# Patient Record
Sex: FEMALE | Race: WHITE | HISPANIC OR LATINO | Employment: PART TIME | URBAN - METROPOLITAN AREA
[De-identification: names, ages, dates, MRNs, and addresses within clinical notes are randomized per-mention and may not be internally consistent; named-entity substitution may affect disease eponyms.]

---

## 2017-01-24 ENCOUNTER — ALLSCRIPTS OFFICE VISIT (OUTPATIENT)
Dept: OTHER | Facility: OTHER | Age: 37
End: 2017-01-24

## 2017-01-24 DIAGNOSIS — S99.922A INJURY OF LEFT FOOT: ICD-10-CM

## 2017-01-31 ENCOUNTER — ALLSCRIPTS OFFICE VISIT (OUTPATIENT)
Dept: OTHER | Facility: OTHER | Age: 37
End: 2017-01-31

## 2017-02-27 ENCOUNTER — APPOINTMENT (EMERGENCY)
Dept: RADIOLOGY | Facility: HOSPITAL | Age: 37
End: 2017-02-27
Payer: COMMERCIAL

## 2017-02-27 ENCOUNTER — HOSPITAL ENCOUNTER (EMERGENCY)
Facility: HOSPITAL | Age: 37
Discharge: HOME | End: 2017-02-28
Attending: EMERGENCY MEDICINE | Admitting: EMERGENCY MEDICINE
Payer: COMMERCIAL

## 2017-02-27 VITALS
RESPIRATION RATE: 14 BRPM | HEART RATE: 65 BPM | OXYGEN SATURATION: 98 % | SYSTOLIC BLOOD PRESSURE: 124 MMHG | TEMPERATURE: 98.3 F | DIASTOLIC BLOOD PRESSURE: 53 MMHG

## 2017-02-27 DIAGNOSIS — M79.676 TOE PAIN: Primary | ICD-10-CM

## 2017-02-27 PROCEDURE — 73630 X-RAY EXAM OF FOOT: CPT

## 2017-02-27 RX ORDER — TRAMADOL HYDROCHLORIDE 50 MG/1
50 TABLET ORAL EVERY 6 HOURS PRN
Qty: 30 TABLET | Refills: 0 | Status: SHIPPED | OUTPATIENT
Start: 2017-02-27 | End: 2019-02-13 | Stop reason: SDUPTHER

## 2017-02-27 RX ORDER — CLONAZEPAM 0.5 MG/1
0.5 TABLET ORAL DAILY
COMMUNITY
End: 2018-02-01 | Stop reason: SDUPTHER

## 2017-02-28 PROCEDURE — 99283 EMERGENCY DEPT VISIT LOW MDM: CPT

## 2017-03-03 ENCOUNTER — ALLSCRIPTS OFFICE VISIT (OUTPATIENT)
Dept: OTHER | Facility: OTHER | Age: 37
End: 2017-03-03

## 2017-03-07 ENCOUNTER — ALLSCRIPTS OFFICE VISIT (OUTPATIENT)
Dept: OTHER | Facility: OTHER | Age: 37
End: 2017-03-07

## 2017-06-13 ENCOUNTER — ALLSCRIPTS OFFICE VISIT (OUTPATIENT)
Dept: OTHER | Facility: OTHER | Age: 37
End: 2017-06-13

## 2017-06-13 DIAGNOSIS — T83.39XA OTHER MECHANICAL COMPLICATION OF INTRAUTERINE CONTRACEPTIVE DEVICE, INITIAL ENCOUNTER: ICD-10-CM

## 2017-06-13 LAB
BACTERIA UR QL AUTO: NORMAL
CLUE CELL (HISTORICAL): NORMAL
HYPHAL YEAST (HISTORICAL): NORMAL
KOH PREP (HISTORICAL): NORMAL
PH UR STRIP.AUTO: 4 [PH]
TRICHOMONAS (HISTORICAL): NORMAL
YEAST (HISTORICAL): NORMAL

## 2017-07-07 ENCOUNTER — GENERIC CONVERSION - ENCOUNTER (OUTPATIENT)
Dept: OTHER | Facility: OTHER | Age: 37
End: 2017-07-07

## 2017-07-18 ENCOUNTER — ALLSCRIPTS OFFICE VISIT (OUTPATIENT)
Dept: OTHER | Facility: OTHER | Age: 37
End: 2017-07-18

## 2017-07-18 DIAGNOSIS — E66.8 OTHER OBESITY: ICD-10-CM

## 2017-07-18 DIAGNOSIS — R63.5 ABNORMAL WEIGHT GAIN: ICD-10-CM

## 2017-07-18 DIAGNOSIS — E55.9 VITAMIN D DEFICIENCY: ICD-10-CM

## 2017-07-18 DIAGNOSIS — M79.7 FIBROMYALGIA: ICD-10-CM

## 2017-10-19 ENCOUNTER — TRANSCRIBE ORDERS (OUTPATIENT)
Dept: LAB | Facility: HOSPITAL | Age: 37
End: 2017-10-19

## 2017-10-19 ENCOUNTER — APPOINTMENT (OUTPATIENT)
Dept: LAB | Facility: HOSPITAL | Age: 37
End: 2017-10-19
Payer: COMMERCIAL

## 2017-10-19 DIAGNOSIS — E66.8 OTHER OBESITY: ICD-10-CM

## 2017-10-19 DIAGNOSIS — M79.7 FIBROMYALGIA: ICD-10-CM

## 2017-10-19 DIAGNOSIS — E55.9 VITAMIN D DEFICIENCY: ICD-10-CM

## 2017-10-19 DIAGNOSIS — R63.5 ABNORMAL WEIGHT GAIN: ICD-10-CM

## 2017-10-19 LAB
25(OH)D3 SERPL-MCNC: 29.4 NG/ML (ref 30–100)
ALBUMIN SERPL BCP-MCNC: 3.9 G/DL (ref 3.5–5)
ALP SERPL-CCNC: 73 U/L (ref 46–116)
ALT SERPL W P-5'-P-CCNC: 49 U/L (ref 12–78)
ANION GAP SERPL CALCULATED.3IONS-SCNC: 7 MMOL/L (ref 4–13)
AST SERPL W P-5'-P-CCNC: 23 U/L (ref 5–45)
BILIRUB SERPL-MCNC: 0.35 MG/DL (ref 0.2–1)
BUN SERPL-MCNC: 14 MG/DL (ref 5–25)
CALCIUM SERPL-MCNC: 9.4 MG/DL (ref 8.3–10.1)
CHLORIDE SERPL-SCNC: 103 MMOL/L (ref 100–108)
CO2 SERPL-SCNC: 27 MMOL/L (ref 21–32)
CREAT SERPL-MCNC: 0.94 MG/DL (ref 0.6–1.3)
GFR SERPL CREATININE-BSD FRML MDRD: 78 ML/MIN/1.73SQ M
GLUCOSE SERPL-MCNC: 102 MG/DL (ref 65–140)
POTASSIUM SERPL-SCNC: 3.8 MMOL/L (ref 3.5–5.3)
PROT SERPL-MCNC: 8.2 G/DL (ref 6.4–8.2)
SODIUM SERPL-SCNC: 137 MMOL/L (ref 136–145)
TSH SERPL DL<=0.05 MIU/L-ACNC: 2.54 UIU/ML (ref 0.36–3.74)

## 2017-10-19 PROCEDURE — 84443 ASSAY THYROID STIM HORMONE: CPT

## 2017-10-19 PROCEDURE — 82306 VITAMIN D 25 HYDROXY: CPT

## 2017-10-19 PROCEDURE — 36415 COLL VENOUS BLD VENIPUNCTURE: CPT

## 2017-10-19 PROCEDURE — 80053 COMPREHEN METABOLIC PANEL: CPT

## 2017-10-29 ENCOUNTER — GENERIC CONVERSION - ENCOUNTER (OUTPATIENT)
Dept: OTHER | Facility: OTHER | Age: 37
End: 2017-10-29

## 2017-11-07 ENCOUNTER — GENERIC CONVERSION - ENCOUNTER (OUTPATIENT)
Dept: OTHER | Facility: OTHER | Age: 37
End: 2017-11-07

## 2017-11-13 ENCOUNTER — ALLSCRIPTS OFFICE VISIT (OUTPATIENT)
Dept: OTHER | Facility: OTHER | Age: 37
End: 2017-11-13

## 2017-12-05 ENCOUNTER — ALLSCRIPTS OFFICE VISIT (OUTPATIENT)
Dept: OTHER | Facility: OTHER | Age: 37
End: 2017-12-05

## 2017-12-05 ENCOUNTER — LAB REQUISITION (OUTPATIENT)
Dept: LAB | Facility: HOSPITAL | Age: 37
End: 2017-12-05
Payer: COMMERCIAL

## 2017-12-05 DIAGNOSIS — Z11.3 ENCOUNTER FOR SCREENING FOR INFECTIONS WITH PREDOMINANTLY SEXUAL MODE OF TRANSMISSION: ICD-10-CM

## 2017-12-05 LAB
BACTERIA UR QL AUTO: NORMAL
CLUE CELL (HISTORICAL): NORMAL
HYPHAL YEAST (HISTORICAL): NORMAL
KOH PREP (HISTORICAL): NORMAL
PH UR STRIP.AUTO: 4.5 [PH]
TRICHOMONAS (HISTORICAL): NORMAL
YEAST (HISTORICAL): NORMAL

## 2017-12-05 PROCEDURE — 87591 N.GONORRHOEAE DNA AMP PROB: CPT | Performed by: NURSE PRACTITIONER

## 2017-12-05 PROCEDURE — 87491 CHLMYD TRACH DNA AMP PROBE: CPT | Performed by: NURSE PRACTITIONER

## 2017-12-06 LAB
CHLAMYDIA DNA CVX QL NAA+PROBE: NORMAL
N GONORRHOEA DNA GENITAL QL NAA+PROBE: NORMAL

## 2018-01-09 NOTE — MISCELLANEOUS
Provider Comments  Provider Comments:   Pt  was a no show for her Rheumatology re-evaluation today, 2/17/16        Signatures   Electronically signed by : Tami Bass DO; Feb 17 2016  3:24PM EST                       (Author)

## 2018-01-10 NOTE — RESULT NOTES
Discussion/Summary   Normal Blood sugar, liver and kidney function and thyroid test   slightly low vitamin D- Take vitamin D 5000 IU once a day   - Dr J Carlos Rodrigues      Verified Results  (1) TSH WITH FT4 REFLEX 19Oct2017 12:30PM Toya Caal     Test Name Result Flag Reference   TSH 2 540 uIU/mL  0 358-3 740   Patients undergoing fluorescein dye angiography may retain small amounts of fluorescein in the body for 48-72 hours post procedure  Samples containing fluorescein can produce falsely depressed TSH values  If the patient had this procedure,a specimen should be resubmitted post fluorescein clearance  The recommended reference ranges for TSH during pregnancy are as follows:  First trimester 0 1 to 2 5 uIU/mL  Second trimester  0 2 to 3 0 uIU/mL  Third trimester 0 3 to 3 0 uIU/m     (1) COMPREHENSIVE METABOLIC PANEL 83UIN8800 16:70NF Toya Caal     Test Name Result Flag Reference   GLUCOSE,RANDM 102 mg/dL     If the patient is fasting, the ADA then defines impaired fasting glucose as > 100 mg/dL and diabetes as > or equal to 123 mg/dL  Specimen collection should occur prior to Sulfasalazine administration due to the potential for falsely depressed results  Specimen collection should occur prior to Sulfapyridine administration due to the potential for falsely elevated results  SODIUM 137 mmol/L  136-145   POTASSIUM 3 8 mmol/L  3 5-5 3   CHLORIDE 103 mmol/L  100-108   CARBON DIOXIDE 27 mmol/L  21-32   ANION GAP (CALC) 7 mmol/L  4-13   BLOOD UREA NITROGEN 14 mg/dL  5-25   CREATININE 0 94 mg/dL  0 60-1 30   Standardized to IDMS reference method   CALCIUM 9 4 mg/dL  8 3-10 1   BILI, TOTAL 0 35 mg/dL  0 20-1 00   ALK PHOSPHATAS 73 U/L     ALT (SGPT) 49 U/L  12-78   Specimen collection should occur prior to Sulfasalazine and/or Sulfapyridine administration due to the potential for falsely depressed results     AST(SGOT) 23 U/L  5-45   Specimen collection should occur prior to Sulfasalazine administration due to the potential for falsely depressed results  ALBUMIN 3 9 g/dL  3 5-5 0   TOTAL PROTEIN 8 2 g/dL  6 4-8 2   eGFR 78 ml/min/1 73sq m     National Kidney Disease Education Program recommendations are as follows:  GFR calculation is accurate only with a steady state creatinine  Chronic Kidney disease less than 60 ml/min/1 73 sq  meters  Kidney failure less than 15 ml/min/1 73 sq  meters  (1) VITAMIN D 25-HYDROXY 44Fbp8038 12:30PM Toya Caal     Test Name Result Flag Reference   VIT D 25-HYDROX 29 4 ng/mL L 30 0-100 0   This assay is a certified procedure of the CDC Vitamin D Standardization Certification Program (VDSCP)     Deficiency <20ng/ml   Insufficiency 20-30ng/ml   Sufficient  ng/ml     *Patients undergoing fluorescein dye angiography may retain small amounts of fluorescein in the body for 48-72 hours post procedure  Samples containing fluorescein can produce falsely elevated Vitamin D values  If the patient had this procedure, a specimen should be resubmitted post fluorescein clearance         Signatures   Electronically signed by : Mariela Maldonado MD; Oct 29 2017  2:31PM EST                       (Author)

## 2018-01-12 VITALS
HEART RATE: 64 BPM | BODY MASS INDEX: 35.07 KG/M2 | RESPIRATION RATE: 18 BRPM | DIASTOLIC BLOOD PRESSURE: 66 MMHG | SYSTOLIC BLOOD PRESSURE: 124 MMHG | WEIGHT: 198 LBS

## 2018-01-12 NOTE — MISCELLANEOUS
Provider Comments  Provider Comments:   Our records indicate that you missed an appointment on 12/13/16  If you have not done so already, please call our office and we will be happy to schedule another appointment for you  If you must cancel your appointment, our office policy requires you to call our office at least 24 hours in advance so that we may utilize your appointment time for another patient who requires our services  If you have any questions, please contact our office at [phone]           Signatures   Electronically signed by : Melody Finley, ; Dec 13 2016  4:32PM EST                       (Author)

## 2018-01-13 VITALS
BODY MASS INDEX: 35.71 KG/M2 | WEIGHT: 195.25 LBS | DIASTOLIC BLOOD PRESSURE: 78 MMHG | SYSTOLIC BLOOD PRESSURE: 110 MMHG | TEMPERATURE: 97.4 F | HEART RATE: 82 BPM | RESPIRATION RATE: 16 BRPM

## 2018-01-13 VITALS
WEIGHT: 199 LBS | DIASTOLIC BLOOD PRESSURE: 76 MMHG | HEIGHT: 62 IN | BODY MASS INDEX: 36.62 KG/M2 | SYSTOLIC BLOOD PRESSURE: 114 MMHG

## 2018-01-13 VITALS
RESPIRATION RATE: 16 BRPM | HEART RATE: 76 BPM | HEIGHT: 62 IN | WEIGHT: 196.4 LBS | BODY MASS INDEX: 36.14 KG/M2 | SYSTOLIC BLOOD PRESSURE: 90 MMHG | DIASTOLIC BLOOD PRESSURE: 66 MMHG

## 2018-01-13 NOTE — RESULT NOTES
Verified Results  * XR SPINE CERVICAL COMPLETE 4 OR 5 VIEW [id number] 02:50PM Mu Caal Order Number: [id number]     Test Name Result Flag Reference   XR SPINE CERVICAL COMPLETE 4 OR 5 VW (Report)     CERVICAL SPINE     INDICATION: Left arm pain and weakness  Fibromyalgia  COMPARISON: None     VIEWS: 5; 7 images     FINDINGS:     No evidence of fracture or subluxation  The intervertebral disc spaces are preserved  The neural foramina are patent  The prevertebral soft tissues are within normal limits  The lung apices are intact  IMPRESSION:     Unremarkable cervical spine         Workstation performed: [id number]     Signed by:   Perico Soria MD   3/30/16       Discussion/Summary   normal X ray of neck    - Dr Juanjo Logan   Electronically signed by : Gloria Jensen MD; Mar 30 2016  4:30PM EST                       (Author)

## 2018-01-14 NOTE — CONSULTS
Assessment    1  Vitamin D deficiency (268 9) (E55 9)   2  Fibromyalgia (729 1) (M79 7)   3  Numbness and tingling in right hand (782 0) (R20 2)   4  Numbness and tingling in left arm (782 0) (R20 0,R20 2)    Diffuse myalgias in face of chronic Vit D deficiency with likely combined central  and peripheral sensitization  Plan  Fibromyalgia, Numbness and tingling in left arm, Numbness and tingling in right hand    · * MRI CERVICAL SPINE WO CONTRAST; Status:Need Information - Financial  Authorization; Requested for:61Dko1895;   Fibromyalgia, Vitamin D deficiency    · (1) VITAMIN D 25-HYDROXY; Status:Active; Requested for:20Cgj8294;   Neck pain    · Lyrica 75 MG Oral Capsule; Take one capsule in am and two capsule in pm for one  week, then two capsukles ain am ans pm  Numbness and tingling in left arm, Numbness and tingling in right hand, Vitamin D  deficiency    · Follow-up visit in 3 weeks Evaluation and Treatment  Follow-up  Status: Hold For -  Scheduling  Requested for: 52Bcg2836    Discussion/Summary  Impression: neck pain  Currently, the condition is unchanged  The diagnostic plan includes cervical spine MRI and blood tests  Medication changes are as documented in orders  Patient discussion: discussed with the patient  Chief Complaint  Dr Alex Fernández consult for neck and shoulder pain  History of Present Illness  31 yo female with c/o posterior neck pain and bilateral traps for over a year   nontraumatic onset  Has had some chronic lower back pain from scoliosis   this is "different"  Dx ed with FMS last year  Placed on pregabalin for same with "little bit help"   Has associated global numbness in hands, tingling, developed widespread myalgias   saw Neurology and Rheumatology  On NSAIDs, pregabalin, and TCA (CBP 10 mg BID) topiramate for migraines, and SSRI sertraline   still with pain and poor sleep  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        Review of Systems    Constitutional: poor sleep , recent 15 # lb weight gain and feeling tired  Gastrointestinal: IBS under treatment  Genitourinary: no complaints of dysuria, no incontinence  Musculoskeletal: as noted in HPI  Neurological: numbness  Endocrine: No complaints of muscle weakness, no feelings of weakness, no frequent urination, no excessive thirst    Psychiatric: poor sleep, anxiety and depression  Active Problems    1  Abnormal weight gain (783 1) (R63 5)   2  Acid reflux (530 81) (K21 9)   3  Adult BMI > 30 (V85 30) (E66 8)   4  Anxiety (300 00) (F41 9)   5  Arthralgia (719 40) (M25 50)   6  Backache (724 5) (M54 9)   7  Cervical dysplasia (622 10) (N87 9)   8  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,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)   9  Complaints of memory disturbance (780 93) (R41 3)   10  Constipation (564 00) (K59 00)   11  Encounter for IUD removal and reinsertion (V25 13) (Z30 433)   12  Endometriosis (617 9) (N80 9)   13  External hemorrhoids (455 3) (K64 4)   14  Fatigue (780 79) (R53 83)   15  Fibromyalgia (729 1) (M79 7)   16  Hand pain, right (729 5) (M79 641)   17  Insomnia (780 52) (G47 00)   18  Irritable bowel syndrome (564 1) (K58 9)   19  IUD threads lost (996 32)   20  Joint Pain In Both Knees (719 46)   21  Lipid screening (V77 91) (Z13 220)   22  Migraine headache (346 90) (G43 909)   23  Neck pain (723 1) (M54 2)   24  Obesity (278 00) (E66 9)   25  Pap smear with atypical squamous cells of undetermined sign (ASC-US) (796 9)    (R89 6)   26  Positive JUAN C (antinuclear antibody) (795 79) (R76 8)   27  Skin tag (701 9) (L91 8)   28  Upper back pain on right side (724 5) (M54 9)   29  Vitamin D deficiency (268 9) (E55 9)    Past Medical History    1  History of Encounter for routine gynecological examination (V72 31) (Z01 419)   2  History of Hematochezia (578 1) (K92 1)   3  History of anemia (V12 3) (Z86 2)   4  History of depression (V11 8) (Z86 59)   5   History of herpes simplex infection (V12 09) (Z86 19)   6  History of hypertension (V12 59) (Z86 79)   7  History of migraine (V12 49) (Z86 69)   8  History of Intrauterine contraceptive device, checking (V25 42) (Z30 431)   9  History of Mental status change (780 97) (R41 82)   10  History of Neck pain (723 1) (M54 2)   11  History of Neck pain (723 1) (M54 2)   12  History of Need for influenza vaccination (V04 81) (Z23)   13  History of Pain, upper back (724 5) (M54 9)   14  History of Reported Previous STD   15  History of Reported Prior Stomach Problems (V12 79)   16  History of Reported Tests: VDRL Positive   17  History of Scoliosis (737 30) (M41 9)   18  History of Screen for STD (sexually transmitted disease) (V74 5) (Z11 3)   19  History of Weight loss counseling, encounter for (V65 3) (Z71 3)    The active problems and past medical history were reviewed and updated today  Surgical History    1  History of Exploratory Laparoscopy    The surgical history was reviewed and updated today  Family History  Mother    1  Family history of cerebrovascular accident (CVA) (V17 1) (Z82 3)   2  Family history of hypertension (V17 49) (Z82 49)   3  Family history of psoriasis (V19 4) (Z84 0)  Father    4  Family history of   Son    5  Family history of Type 1 diabetes mellitus without complication  Family History    6  Denied: Family history of Crohn's disease   7  Family history of malignant neoplasm of breast (V16 3) (Z80 3)   8  Denied: Family history of rheumatoid arthritis   9  Denied: Family history of systemic lupus erythematosus   10  Denied: Family history of ulcerative colitis    The family history was reviewed and updated today  Social History    · Cigarette smoker (305 1) (F17 210)   · Completed 12th grade   · Current Some Day Smoker (305 1)   · Denied: History of Drug Use   · Employed   · No drug use   · Single   · Social alcohol use (Z78 9)  The social history was reviewed and is unchanged  Current Meds   1  Fiber CAPS; TAKE 1 CAPSULE TWICE DAILY; Therapy: (Recorded:18Nov2015) to Recorded   2  Lyrica 75 MG Oral Capsule; Take 1 capsule twice daily; Therapy: 09EAO2661 to (Evaluate:28May2016); Last Rx:29Jan2016 Ordered   3  Melatonin 3 MG Oral Capsule; TAKE 1 CAPSULE AT BEDTIME AS NEEDED; Therapy: 10HCO8741 to (Miah Berg)  Requested for: 30VPE6255; Last   Rx:29Jan2016 Ordered   4  Mirena 20 MCG/24HR Intrauterine Intrauterine Device; Therapy: (Recorded:15Lko7641) to Recorded   5  Multi Vitamin Daily TABS; TAKE 1 TABLET DAILY; Therapy: (Recorded:18Nov2015) to Recorded   6  Omeprazole 40 MG Oral Capsule Delayed Release; TAKE 1 CAPSULE DAILY; Therapy: 10VZE6633 to (Evaluate:10Nov2016)  Requested for: 48NYT1340; Last   Rx:38Agi6353 Ordered   7  TraMADol HCl - 50 MG Oral Tablet; take 1 tablet daily prn; Therapy: 48KKO3272 to (Evaluate:19Apr2016); Last Rx:30Mar2016 Ordered    The medication list was reviewed and updated today  Allergies    1  Cymbalta    2  Seasonal    Vitals  Signs   Recorded: 87WAD3344 07:67NZ   Systolic: 632, LUE, Sitting  Diastolic: 80, LUE, Sitting  Heart Rate: 60  Respiration: 16  Height: 5 ft 2 in  Weight: 193 lb 6 oz  BMI Calculated: 35 37  BSA Calculated: 1 88    Physical Exam  + Walker;' son the right but no clonus  , Tinel's negative over both hands  Constitutional - General appearance: Abnormal  overweight  Musculoskeletal - Gait and station: Abnormal  Gait evaluation demonstrated poor posture with adaptive pectoral shortening  Neurologic - Cranial nerves: Normal  Reflexes: Abnormal  Deep tendon reflexes: + Elvira [place]' son the right  no ankle clonus on the right and no ankle clonus on the left  Sensation: Normal  Upper extremity peripheral neuro exam: Normal  Lower extremity peripheral neuro exam: Normal    Psychiatric - Mood and affect: Abnormal  Mood and Affect: flat     Eyes   Pupils and irises: Normal        Results/Data  I personally reviewed the films/images/results in the office today  My interpretation follows  vis   X-ray Review C spine loss of lordosis c-7 barely visualized      Diagnostic Review Last Vit D is 22 1 Jan 2014        Signatures   Electronically signed by : Kathrene Prader, DO; Jul 28 2016  4:09PM EST                       (Author)

## 2018-01-14 NOTE — RESULT NOTES
Verified Results  * XR SPINE CERVICAL 2 OR 3 VIEW 72Qvc8177 01:18PM Ernie Caal Order Number: [id number]     Test Name Result Flag Reference   XR SPINE CERVICAL 2 OR 3 VW (Report)     CERVICAL SPINE     INDICATION: Neck pain radiating to both shoulder blades  COMPARISON: March 30, 2016     VIEWS: AP, lateral and open mouth projections; 3 images     FINDINGS:     Alignment of the cervical spine remains stable  Vertebral body height is maintained  The intervertebral disc spaces are preserved  The prevertebral soft tissues are within normal limits  The lung apices are intact  IMPRESSION:     Unremarkable cervical spine         Workstation performed: [id number]     Signed by:   Barbara Paredes MD   7/25/16       Discussion/Summary   normal neck X ray     - Dr Kavita Mcdaniels   Electronically signed by : Morris Han MD; Jul 25 2016  3:00PM EST                       (Author)

## 2018-01-16 NOTE — PROGRESS NOTES
Assessment    1  Fibromyalgia (729 1) (M79 7)   2  Complaints of memory disturbance (780 93) (R41 3)   3  Insomnia (780 52) (G47 00)    Plan  Fibromyalgia    · Lyrica 75 MG Oral Capsule; Take 1 capsule twice daily  Insomnia    · Melatonin 3 MG Oral Capsule; TAKE 1 CAPSULE AT BEDTIME AS NEEDED  Migraine headache    · Naproxen 250 MG Oral Tablet; TAKE 1 TABLET EVERY 12 HOURS DAILY    Discussion/Summary   reassured patient that her memory disturbance could be a side effects of Topamax  not affecting her ADLs and she wants to continue Topamax for her migraines since its controlling her symptoms   will try to get approved for Lyrica to help with her fibromyalgia symptoms     total time of encounter was 30 minutes and 25 minutes was spent counseling  Chief Complaint  PT is here today due to having problems falling asleep and she stated that she feels like she is in a fog  History of Present Illness  HPI: not sleeping well  hard time falling asleep- melatonin helps sleeping  having problems with memory and concentration for the last 2 months   forgetting words at times   Lyrica is not covered by her previous insurance       Insomnia: Zack Guzmán presents with complaints of insomnia  Associated symptoms include difficulty falling asleep, difficulty staying asleep and unrefreshing sleep, but no parasomnia, no daytime sleepiness, no anxiety upon awakening, no sleeping at inappropriate times, no snoring, no depression, no chronic pain, no alcohol abuse, no drug abuse, no periodic limb movements of sleep, no nocturnal leg cramps, no nightmares, no night terrors and no sleepwalking  Review of Systems    Constitutional: no fever and no chills  ENT: no ear ache, no loss of hearing, no nosebleeds or nasal discharge, no sore throat or hoarseness  Cardiovascular: no complaints of slow or fast heart rate, no chest pain, no palpitations, no leg claudication or lower extremity edema     Respiratory: no complaints of shortness of breath, no wheezing, no dyspnea on exertion, no orthopnea or PND  Gastrointestinal: constipation, but no abdominal pain, no vomiting and no blood in stools  Genitourinary: no complaints of dysuria, no incontinence, no pelvic pain, no dysmenorrhea, no vaginal discharge or abnormal vaginal bleeding  Musculoskeletal: arthralgias and myalgias  Integumentary: no complaints of skin rash or lesion, no itching or dry skin, no skin wounds  Neurological: as noted in HPI  Active Problems    1  Abnormal weight gain (783 1) (R63 5)   2  Acid reflux (530 81) (K21 9)   3  Adult BMI > 30 (V85 30) (E66 8)   4  Anxiety (300 00) (F41 9)   5  Arthralgia (719 40) (M25 50)   6  Backache (724 5) (M54 9)   7  Cervical dysplasia (622 10) (N87 9)   8  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,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)   9  Constipation (564 00) (K59 00)   10  Endometriosis (617 9) (N80 9)   11  External hemorrhoids (455 3) (K64 4)   12  Fatigue (780 79) (R53 83)   13  Fibromyalgia (729 1) (M79 7)   14  Hand pain, right (729 5) (M79 641)   15  Insomnia (780 52) (G47 00)   16  Irritable bowel syndrome (564 1) (K58 9)   17  IUD threads lost (996 32)   18  Joint Pain In Both Knees (719 46)   19  Lipid screening (V77 91) (Z13 220)   20  Migraine headache (346 90) (G43 909)   21  Obesity (278 00) (E66 9)   22  Pap smear with atypical squamous cells of undetermined sign (ASC-US) (796 9) (R89 6)   23  Positive JUAN C (antinuclear antibody) (795 79) (R76 8)   24  Skin tag (701 9) (L91 8)   25  Vitamin D deficiency (268 9) (E55 9)    Past Medical History    1  History of Encounter for routine gynecological examination (V72 31) (Z01 419)   2  History of Hematochezia (578 1) (K92 1)   3  History of anemia (V12 3) (Z86 2)   4  History of depression (V11 8) (Z86 59)   5  History of herpes simplex infection (V12 09) (Z86 19)   6  History of hypertension (V12 59) (Z86 79)   7  History of migraine (V12 49) (Z86 69)   8   History of Intrauterine contraceptive device, checking (V25 42) (Z30 431)   9  History of Mental status change (780 97) (R41 82)   10  History of Need for influenza vaccination (V04 81) (Z23)   11  History of Reported Previous STD   12  History of Reported Prior Stomach Problems (V12 79)   13  History of Reported Tests: VDRL Positive   14  History of Scoliosis (737 30) (M41 9)   15  History of Screen for STD (sexually transmitted disease) (V74 5) (Z11 3)   16  History of Weight loss counseling, encounter for (V65 3) (Z71 3)  Active Problems And Past Medical History Reviewed: The active problems and past medical history were reviewed and updated today  Family History    1  Family history of cerebrovascular accident (CVA) (V17 1) (Z82 3)   2  Family history of hypertension (V17 49) (Z82 49)   3  Family history of psoriasis (V19 4) (Z84 0)    4  Family history of     5  Family history of Type 1 diabetes mellitus without complication    6  Denied: Family history of Crohn's disease   7  Family history of malignant neoplasm of breast (V16 3) (Z80 3)   8  Denied: Family history of rheumatoid arthritis   9  Denied: Family history of systemic lupus erythematosus   10  Denied: Family history of ulcerative colitis  Family History Reviewed: The family history was reviewed and updated today  Social History    · Cigarette smoker (305 1) (F17 210)   · 5 cigarettes per year   · Completed 12th grade   · Current Some Day Smoker (305 1)   · Denied: History of Drug Use   · 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   · Single   · Social alcohol use (F10 99)   · 1-2 drinks per month  The social history was reviewed and updated today  The social history was reviewed and is unchanged  Surgical History    1  History of Exploratory Laparoscopy  Surgical History Reviewed: The surgical history was reviewed and updated today  Current Meds   1   Dicyclomine HCl - 20 MG Oral Tablet; TAKE 1 TABLET EVERY 6 HOURS AS NEEDED; Therapy: 80GLT0993 to (Evaluate:75Syg0940)  Requested for: 61JJO3367; Last   Rx:10Ibm9883 Ordered   2  Fiber CAPS; TAKE 1 CAPSULE TWICE DAILY; Therapy: (Recorded:18Nov2015) to Recorded   3  Linzess 290 MCG Oral Capsule; take 1 capsule daily; Therapy: 43GPW1863 to (Evaluate:98Plh8182); Last Rx:03Ovj1133 Ordered   4  Mirena 20 MCG/24HR Intrauterine Intrauterine Device; Therapy: (Recorded:08Sep2015) to Recorded   5  Multi Vitamin Daily TABS; TAKE 1 TABLET DAILY; Therapy: (Recorded:18Nov2015) to Recorded   6  Naproxen 250 MG Oral Tablet; TAKE 1 TABLET EVERY 12 HOURS AS NEEDED; Therapy: 20IZG6147 to (Evaluate:16Jan2016)  Requested for: 20XQR6200; Last   Rx:02Cpd4137 Ordered   7  Nexium 24HR 20 MG Oral Capsule Delayed Release; take 1 capsule daily; Therapy: 62PRH1961 to (Last Rx:07Dec2015) Ordered   8  Polyethylene Glycol 3350 Oral Powder; Take 17 grams of powder mixed in 8 ounces of   water daily; Therapy: 88EOT6446 to (Caryl Kearns)  Requested for: 23RRC6042; Last   Rx:07Dec2015 Ordered   9  Sertraline HCl - 50 MG Oral Tablet; TAKE  ONE TAB ONCE A DAY; Therapy: 53EHV3763 to (Evaluate:20Jan2017)  Requested for: 75HQL9518; Last   Rx:26Jan2016 Ordered   10  Topiramate 25 MG Oral Tablet; TAKE 1 TABLET DAILY X 1 WEEK THEN 1 TWICE A DAY; Therapy: 57BCW1047 to (Evaluate:89Xri2577)  Requested for: 27MTR2505; Last    Rx:19Jan2016 Ordered    The medication list was reviewed and updated today  Allergies    1  Cymbalta    2  Seasonal    Vitals   Recorded: 47FUM9913 02:37PM   Heart Rate 64   Respiration 12   Systolic 694   Diastolic 78   Height 5 ft 2 4 in   Weight 186 lb 2 oz   BMI Calculated 33 61   BSA Calculated 1 86     Physical Exam    Constitutional   General appearance: No acute distress, well appearing and well nourished  Eyes   Conjunctiva and lids: No swelling, erythema or discharge  Pupils and irises: Equal, round and reactive to light      Ears, Nose, Mouth, and Throat External inspection of ears and nose: Normal     Otoscopic examination: Tympanic membranes translucent with normal light reflex  Canals patent without erythema  Nasal mucosa, septum, and turbinates: Normal without edema or erythema  Oropharynx: Normal with no erythema, edema, exudate or lesions  Pulmonary   Respiratory effort: No increased work of breathing or signs of respiratory distress  Auscultation of lungs: Clear to auscultation  Cardiovascular   Palpation of heart: Normal PMI, no thrills  Auscultation of heart: Normal rate and rhythm, normal S1 and S2, without murmurs  Examination of extremities for edema and/or varicosities: Normal     Abdomen   Abdomen: Non-tender, no masses  Liver and spleen: No hepatomegaly or splenomegaly  Lymphatic   Palpation of lymph nodes in neck: No lymphadenopathy  Skin   Skin and subcutaneous tissue: Normal without rashes or lesions  Neurologic   Cranial nerves: Cranial nerves 2-12 intact  Reflexes: 2+ and symmetric  Sensation: No sensory loss      Psychiatric   Orientation to person, place, and time: Normal     Mood and affect: Normal          Future Appointments    Date/Time Provider Specialty Site   05/04/2016 02:30 PM Fabio Caal MD [address] 1   02/17/2016 03:00 PM Edwige Hernandez DO Rheumatology [address] [facility]     Signatures   Electronically signed by : Naveed Del Cid MD; Jan 29 2016  3:24PM EST                       (Author)

## 2018-01-16 NOTE — MISCELLANEOUS
Provider Comments  Provider Comments:   Our records indicate that you missed an appointment on 07/07/17  If you have not done so already, please call our office and we will be happy to schedule another appointment for you  If you must cancel your appointment, our office policy requires you to call our office at least 24 hours in advance so that we may utilize your appointment time for another patient who requires our services  If you have any questions, please contact our office at [phone]           Signatures   Electronically signed by : Polo Carlson, ; Jul 7 2017  3:28PM EST                       (Author)

## 2018-01-17 NOTE — PROGRESS NOTES
Assessment    1  Encounter for preventive health examination (V70 0) (Z00 00)   2  Allergic rhinitis (477 9) (J30 9)   3  Adult BMI > 30 (V85 30) (E66 8)    Plan  Allergic rhinitis    · Azelastine HCl - 0 1 % Nasal Solution; USE 2 SPRAYS IN EACH       NOSTRIL  TWICE DAILY  Depression screen    · *VB - PHQ-9 Tool; Status:Complete;   Done: 96IWI3052 02:44PM  Health Maintenance    · Follow-up visit in 1 year Evaluation and Treatment  Follow-up  Status: Hold For -  Scheduling  Requested for: 84VQU0664   · Begin or continue regular aerobic exercise  Gradually work up to at least 3 sessions of 30  minutes of exercise a week ; Status:Complete;   Done: 46VPJ6078   · Brush your teeth 3 times a day and floss at least once a day ; Status:Complete;   Done:  44JTH5348   · Eat a low fat and low cholesterol diet ; Status:Complete;   Done: 39HSI0986   · Regular aerobic exercise can help reduce stress ; Status:Complete;   Done: 27OIF2410   · Use a sun block product with an SPF of 15 or more ; Status:Complete;   Done:  73TGD7581   · Vitamins can help you get daily requirements that your diet may not be giving you ;  Status:Complete;   Done: 16BNT1878   · We recommend routine visits to a dentist ; Status:Complete;   Done: 28HPT2958   · We recommend that you bring your body mass index down to 26 ; Status:Complete;    Done: 77OKE3420   · We want you to follow the Therapeutic Lifestyle Changes (TLC) diet ; Status:Complete;    Done: 33KHJ1793   · Call [phone] if: You find a new or different kind of lump in your breast ;  Status:Complete;   Done: 17AAO1843   · Call [phone] if: You have any warning signs of skin cancer ; Status:Complete;    Done: 72YAX7365   · Stop: Fluzone Quadrivalent 0 5 ML Intramuscular Suspension Prefilled  Syringe    Discussion/Summary  health maintenance visit Currently, she has an adequate exercise regimen   the risks and benefits of cervical cancer screening were discussed cervical cancer screening is current Breast cancer screening: the risks and benefits of breast cancer screening were discussed and breast cancer screening is not indicated  Colorectal cancer screening: the risks and benefits of colorectal cancer screening were discussed and colorectal cancer screening is not indicated  Osteoporosis screening: the risks and benefits of osteoporosis screening were discussed  The patient declines immunizations  Advice and education were given regarding aerobic exercise, weight bearing exercise, weight loss, calcium supplements, vitamin D supplements, reproductive health, contraception, alcohol use, sunscreen use, self skin examination, helmet use, seat belt use and fall risk reduction  Patient discussion: discussed with the patient  Chief Complaint  pt here for physical      History of Present Illness  HM, Adult Female: The patient is being seen for a health maintenance evaluation  The last health maintenance visit was 2 year(s) ago  General Health: The patient's health since the last visit is described as good  She does not have regular dental visits  She complains of vision problems  Vision care includes wearing glasses  She denies hearing loss  Immunizations status: not up to date  Lifestyle:  She consumes a diverse and healthy diet  She does not have any weight concerns  She does not exercise regularly  She does not use tobacco  She denies alcohol use  She denies drug use  Reproductive health:  she reports abnormal menses  Menstrual history: The cycles are irregular  The duration of her recent periods has been irregular  she uses contraception  For contraception, she has an intrauterine device  she is sexually active  pregnancy history: G 3P 3, 3  Screening: cancer screening reviewed and updated  metabolic screening reviewed and updated  risk screening reviewed and updated     HPI: SAW HER PSYCHIATRIST RECENTLY AND HER ZOLOFT WAS CHANGED  MG FORM 50 MG  HER FOOT PAIN IS LESSEN NOW THAN LAST WEEK  C/O ITCHY NOSE, EYES AND WATERY          Review of Systems    Constitutional: No fever, no chills, feels well, no tiredness, no recent weight gain or weight loss  Eyes: No complaints of eye pain, no red eyes, no eyesight problems, no discharge, no dry eyes, no itching of eyes  ENT: nasal discharge, but as noted in HPI  Cardiovascular: No complaints of slow heart rate, no fast heart rate, no chest pain, no palpitations, no leg claudication, no lower extremity edema  Respiratory: No complaints of shortness of breath, no wheezing, no cough, no SOB on exertion, no orthopnea, no PND  Gastrointestinal: No complaints of abdominal pain, no constipation, no nausea or vomiting, no diarrhea, no bloody stools  Genitourinary: No complaints of dysuria, no incontinence, no pelvic pain, no dysmenorrhea, no vaginal discharge or bleeding  Musculoskeletal: arthralgias and myalgias  Integumentary: No complaints of skin rash or lesions, no itching, no skin wounds, no breast pain or lump  Neurological: No complaints of headache, no confusion, no convulsions, no numbness, no dizziness or fainting, no tingling, no limb weakness, no difficulty walking  Psychiatric: Not suicidal, no sleep disturbance, no anxiety or depression, no change in personality, no emotional problems  Endocrine: No complaints of proptosis, no hot flashes, no muscle weakness, no deepening of the voice, no feelings of weakness  Hematologic/Lymphatic: No complaints of swollen glands, no swollen glands in the neck, does not bleed easily, does not bruise easily  Active Problems    1  Abnormal weight gain (783 1) (R63 5)   2  Acid reflux (530 81) (K21 9)   3  Adult BMI > 30 (V85 30) (E66 8)   4  Anxiety (300 00) (F41 9)   5  Arthralgia (719 40) (M25 50)   6  Backache (724 5) (M54 9)   7  Bacterial vaginosis (616 10,041 9) (N76 0,B96 89)   8  Cervical dysplasia (622 10) (N87 9)   9   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,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)   10  Claustrophobia (300 29) (F40 240)   11  Constipation (564 00) (K59 00)   12  Encounter for IUD removal and reinsertion (V25 13) (Z30 433)   13  Endometriosis (617 9) (N80 9)   14  External hemorrhoids (455 3) (K64 4)   15  Fatigue (780 79) (R53 83)   16  Fibromyalgia (729 1) (M79 7)   17  Foot pain, bilateral (729 5) (M79 671,M79 672)   18  Hand pain, right (729 5) (M79 641)   19  Injury of toe, left, initial encounter (959 7) (W19 151W)   20  Insomnia (780 52) (G47 00)   21  Irritable bowel syndrome (564 1) (K58 9)   22  IUD threads lost (996 32)   23  Joint Pain In Both Knees (719 46)   24  Lipid screening (V77 91) (Z13 220)   25  Migraine headache (346 90) (G43 909)   26  Neck pain (723 1) (M54 2)   27  Numbness and tingling in left arm (782 0) (R20 0,R20 2)   28  Numbness and tingling in right hand (782 0) (R20 2)   29  Obesity (278 00) (E66 9)   30  Pap smear with atypical squamous cells of undetermined sign (ASC-US) (796 9) (R89 6)   31  Positive JUAN C (antinuclear antibody) (795 79) (R76 8)   32  Routine screening for STI (sexually transmitted infection) (V74 5) (Z11 3)   33  Skin tag (701 9) (L91 8)   34  Upper back pain on right side (724 5) (M54 9)   35  Vaginal candidiasis (112 1) (B37 3)   36  Vaginal odor (625 8) (N89 8)   37   Vitamin D deficiency (268 9) (E55 9)    Past Medical History    · History of Encounter for routine gynecological examination (V72 31) (Z01 419)   · History of Hematochezia (578 1) (K92 1)   · History of anemia (V12 3) (Z86 2)   · History of depression (V11 8) (Z86 59)   · History of herpes simplex infection (V12 09) (Z86 19)   · History of hypertension (V12 59) (Z86 79)   · History of migraine (V12 49) (Z86 69)   · History of Intrauterine contraceptive device, checking (V25 42) (Z30 431)   · History of Mental status change (780 97) (R41 82)   · History of Neck pain (723 1) (M54 2)   · History of Neck pain (723 1) (M54 2)   · History of Need for influenza vaccination (V04 81) (Z23)   · History of Pain, upper back (724 5) (M54 9)   · History of Reported Previous STD   · History of Reported Prior Stomach Problems (V12 79)   · History of Reported Tests: VDRL Positive   · History of Scoliosis (737 30) (M41 9)   · History of Screen for STD (sexually transmitted disease) (V74 5) (Z11 3)   · History of Weight loss counseling, encounter for (V65 3) (Z71 3)    Surgical History    · History of Exploratory Laparoscopy    Family History  Mother    · Family history of cerebrovascular accident (CVA) (V17 1) (Z82 3)   · Family history of hypertension (V17 49) (Z82 49)   · Family history of psoriasis (V19 4) (Z84 0)  Father    · Family history of   Son    · Family history of Type 1 diabetes mellitus without complication  Family History    · Denied: Family history of Crohn's disease   · Family history of malignant neoplasm of breast (V16 3) (Z80 3)   · Denied: Family history of rheumatoid arthritis   · Denied: Family history of systemic lupus erythematosus   · Denied: Family history of ulcerative colitis    Social History    · Cigarette smoker (305 1) (F17 210)   · 5 cigarettes per year   · Completed 12th grade   · Current Some Day Smoker (305 1)   · Denied: History of Drug Use   · 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   · Single   · Social alcohol use (Z78 9)   · 1-2 drinks per month    Current Meds   1  ClonazePAM 0 5 MG Oral Tablet; take 1 tablet by mouth daily as directed; Therapy: 72BBV9389 to (Evaluate:2017); Last Rx:2017 Ordered   2  DrRx Diflucan 150 MG #1; Yeast infection; Therapy: 68SPL7644 to (Last Rx:2017) Ordered   3  HydrOXYzine HCl - 50 MG Oral Tablet; TAKE 1 TABLET Every 8 hours; Therapy: 69EMC1992 to (Evaluate:2017)  Requested for: 81CMH7592; Last   Rx:27Pft0828 Ordered   4  LORazepam 0 5 MG Oral Tablet; Take one tablet an gour before MRI, may repaet once   at MRI; Therapy: 60Akr3330 to (Last Rx:2016) Ordered   5  Melatonin 5 MG Oral Capsule; TAKE 1 CAPSULE Bedtime; Therapy: 02AEN5012 to (Evaluate:07Hza8417)  Requested for: 92Oib9186; Last   Rx:2016 Ordered   6  Mirena (52 MG) 20 MCG/24HR Intrauterine Intrauterine Device; Therapy: (Recorded:13Bku7459) to Recorded   7  Montelukast Sodium 10 MG Oral Tablet; Therapy: 2016 to Recorded   8  Multi Vitamin Daily TABS; TAKE 1 TABLET DAILY; Therapy: (Recorded:2015) to Recorded   9  Omeprazole 40 MG Oral Capsule Delayed Release; TAKE 1 CAPSULE BY MOUTH   DAILY; Therapy: 07RPM0151 to ([id number])  Requested for: 24NXE1839; Last   Rx:2017 Ordered   10  Topiramate 100 MG Oral Tablet; TAKE 1 TABLET AT BEDTIME; Therapy: 26QTX3857 to (Evaluate:2017)  Requested for: 48ZSY3142; Last    Rx:2017 Ordered   11  TraMADol HCl - 50 MG Oral Tablet; take 1 tablet daily prn; Therapy: 40IEY1247 to (Evaluate:2017); Last Rx:2017 Ordered   12  Zoloft 100 MG Oral Tablet; TAKE 1 TABLET DAILY AS DIRECTED; Therapy: 89WVE2089 to (Evaluate:2017)  Requested for: 62QTY5413 Recorded    Allergies    1  Cymbalta    2  Seasonal    Vitals   Recorded: 94WHP3622 02:39PM   Heart Rate 98   Respiration 18   Systolic 856 mm Hg   Diastolic 74 mm Hg   Height 5 ft 2 68 in   Weight 196 lb 2 oz   BMI Calculated 35 1 kg/m2   BSA Calculated 1 91 m2   O2 Saturation 97     Physical Exam    Constitutional   General appearance: No acute distress, well appearing and well nourished  Head and Face   Head and face: Normal     Palpation of the face and sinuses: No sinus tenderness  Eyes   Conjunctiva and lids: No swelling, erythema or discharge  Pupils and irises: Equal, round, reactive to light  Ophthalmoscopic examination: Normal fundi and optic discs  Ears, Nose, Mouth, and Throat   External inspection of ears and nose: Normal     Otoscopic examination: Tympanic membranes translucent with normal light reflex  Canals patent without erythema      Hearing: Normal     Nasal mucosa, septum, and turbinates: Normal without edema or erythema  Lips, teeth, and gums: Normal, good dentition  Oropharynx: Normal with no erythema, edema, exudate or lesions  Neck   Neck: Supple, symmetric, trachea midline, no masses  Thyroid: Normal, no thyromegaly  Pulmonary   Respiratory effort: No increased work of breathing or signs of respiratory distress  Percussion of chest: Normal     Auscultation of lungs: Clear to auscultation  Cardiovascular   Palpation of heart: Normal PMI, no thrills  Auscultation of heart: Normal rate and rhythm, normal S1 and S2, no murmurs  Examination of extremities for edema and/or varicosities: Normal     Chest   Chest: Normal     Abdomen   Abdomen: Non-tender, no masses  Liver and spleen: No hepatomegaly or splenomegaly  Examination for hernias: No hernia appreciated  Lymphatic   Palpation of lymph nodes in neck: No lymphadenopathy  Palpation of lymph nodes in axillae: No lymphadenopathy  Musculoskeletal   Gait and station: Normal     Digits and nails: Normal without clubbing or cyanosis  Joints, bones, and muscles: Normal     Range of motion: Normal     Stability: Normal     Muscle strength/tone: Normal     Skin   Skin and subcutaneous tissue: Normal without rashes or lesions  Palpation of skin and subcutaneous tissue: Normal turgor  Neurologic   Cranial nerves: Cranial nerves II-XII intact  Cortical function: Normal mental status  Reflexes: 2+ and symmetric  Sensation: No sensory loss  Coordination: Normal finger to nose and heel to shin  Psychiatric   Judgment and insight: Normal     Orientation to person, place, and time: Normal     Recent and remote memory: Intact      Mood and affect: Normal        Results/Data  *VB - PHQ-9 Tool 97WLO6563 02:44PM Toya Caal     Test Name Result Flag Reference   PHQ-9 Adult Depression Score 12     PHQ-9 Adult Depression Screening Positive     PHQ-9 Adult Depression Score 12     PHQ-9 Adult Depression Screening Positive               Signatures   Electronically signed by : Martin Barron MD; Mar  7 2017  3:07PM EST                       (Author)

## 2018-01-17 NOTE — PROGRESS NOTES
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)   9  Constipation (564 00) (K59 00)   10  Encounter for annual routine gynecological examination (V72 31) (Z01 419)   11  Endometriosis (617 9) (N80 9)   12  External hemorrhoids (455 3) (K64 4)   13  Fibromyalgia (729 1) (M79 7)   14  Flu vaccine need (V04 81) (Z23)   15  Insomnia (780 52) (G47 00)   16  Irritable bowel syndrome (564 1) (K58 9)   17  IUD threads lost (996 32)   18  Lipid screening (V77 91) (Z13 220)   19  Migraine headache (346 90) (G43 909)   20  Neck pain (723 1) (M54 2)   21  Need for tuberculosis vaccination (V03 2) (Z23)   22  Numbness and tingling in left arm (782 0) (R20 0,R20 2)   23  Numbness and tingling in right hand (782 0) (R20 0,R20 2)   24  Obesity (278 00) (E66 9)   25  Pap smear with atypical squamous cells of undetermined sign (ASC-US) (796 9) (R89 6)   26  Positive JUAN C (antinuclear antibody) (795 79) (R76 8)   27  Routine screening for STI (sexually transmitted infection) (V74 5) (Z11 3)   28  Skin tag (701 9) (L91 8)   29  Vitamin D deficiency (268 9) (E55 9)    Current Meds   1  Azelastine HCl - 0 1 % Nasal Solution; USE 2 SPRAYS IN EACH       NOSTRIL TWICE   DAILY; Therapy: 93FHC1699 to (Maritza Soda)  Requested for: 09KXC3413; Last   Rx:92Fzq6984 Ordered   2  ClonazePAM 0 5 MG Oral Tablet; TAKE 1 TABLET EVERY DAY AS NEEDED; Therapy: 44FVA1842 to (Evaluate:76Lbm3814)  Requested for: 72BOZ1272; Last   Rx:07Nov2017 Ordered   3  Cyclobenzaprine HCl - 10 MG Oral Tablet; TAKE ONE TABLET BY MOUTH EVERY 8   HOURS AS NEEDED;    Therapy: 29UNU3549 to (Evaluate:02Kaq0799) Requested for: 61QUD6216; Last   KE:65ZDG8419 Ordered   4  Melatonin 5 MG Oral Capsule; TAKE 1 CAPSULE Bedtime; Therapy: 47WHD9262 to (Janith Limb)  Requested for: 64AYH8451; Last   Rx:07Nov2017 Ordered   5  Mirena (52 MG) 20 MCG/24HR Intrauterine Intrauterine Device; Therapy: (Recorded:76Mvr1093) to Recorded   6  Montelukast Sodium 10 MG Oral Tablet; Therapy: 21Jun2016 to Recorded   7  Multi Vitamin Daily TABS; TAKE 1 TABLET DAILY; Therapy: (Recorded:18Nov2015) to Recorded   8  Naproxen 250 MG Oral Tablet; TAKE 1 TABLET EVERY 12 HOURS DAILY; Therapy: 36PUT6552 to (Eliceo Isis)  Requested for: 50LKB0360; Last   Rx:07Nov2017 Ordered   9  Omeprazole 40 MG Oral Capsule Delayed Release; TAKE 1 CAPSULE BY MOUTH   DAILY; Therapy: 27SZH0558 to 9845 8544)  Requested for: 13MXF6957; Last   Rx:24Jan2017 Ordered   10  Phentermine HCl - 37 5 MG Oral Capsule; TAKE 0 5 CAPSULE Daily; Last    Rx:10Ifj4654 Ordered   11  Topiramate 100 MG Oral Tablet; TAKE 1 TABLET AT BEDTIME; Therapy: 75YGN4691 to (Evaluate:80Ynd4811)  Requested for: 85XKE7296; Last    Rx:19Fej5660 Ordered   12  TraMADol HCl - 50 MG Oral Tablet; take 1 tablet by mouth daily as needed; Therapy: 34OMN8636 to (22 763479)  Requested for: 23EVX1359; Last    Rx:26Sep2017 Ordered   13  ValACYclovir HCl - 500 MG Oral Tablet; take 1 pill BID for 5 days; Therapy: 88FMT4540 to (Last Rx:13Jun2017)  Requested for: 13Jun2017 Ordered   14  Vitamin D3 2000 UNIT Oral Tablet; Take 1 tablet daily; Therapy: 05ZLL7451 to (Janith Limb)  Requested for: 55XLD9306; Last    Rx:07Nov2017 Ordered    Allergies    1  Cymbalta    2  Seasonal    Assessment    1   Need for tuberculosis vaccination (V03 2) (Z23)    Plan  Need for tuberculosis vaccination    · PPD    Future Appointments    Date/Time Provider Specialty Site   03/08/2018 11:30 AM Tom Caal MD Family Medicine [facility]   Electronically signed by : Maldonado Betancur MD; Nov 13 2017  2:01PM EST                       (Author)

## 2018-01-22 VITALS
DIASTOLIC BLOOD PRESSURE: 68 MMHG | WEIGHT: 192.38 LBS | SYSTOLIC BLOOD PRESSURE: 122 MMHG | RESPIRATION RATE: 16 BRPM | HEART RATE: 80 BPM | BODY MASS INDEX: 35.19 KG/M2

## 2018-01-22 VITALS
HEART RATE: 98 BPM | HEIGHT: 63 IN | SYSTOLIC BLOOD PRESSURE: 112 MMHG | OXYGEN SATURATION: 97 % | BODY MASS INDEX: 34.75 KG/M2 | WEIGHT: 196.13 LBS | RESPIRATION RATE: 18 BRPM | DIASTOLIC BLOOD PRESSURE: 74 MMHG

## 2018-01-23 VITALS
WEIGHT: 196 LBS | SYSTOLIC BLOOD PRESSURE: 109 MMHG | HEIGHT: 62 IN | BODY MASS INDEX: 36.07 KG/M2 | DIASTOLIC BLOOD PRESSURE: 75 MMHG

## 2018-01-27 DIAGNOSIS — K21.9 GASTROESOPHAGEAL REFLUX DISEASE WITHOUT ESOPHAGITIS: Primary | ICD-10-CM

## 2018-01-29 RX ORDER — OMEPRAZOLE 40 MG/1
CAPSULE, DELAYED RELEASE ORAL
Qty: 90 CAPSULE | Refills: 3 | Status: SHIPPED | OUTPATIENT
Start: 2018-01-29 | End: 2018-01-30 | Stop reason: SDUPTHER

## 2018-01-30 DIAGNOSIS — K21.9 GASTROESOPHAGEAL REFLUX DISEASE WITHOUT ESOPHAGITIS: ICD-10-CM

## 2018-01-30 RX ORDER — OMEPRAZOLE 40 MG/1
CAPSULE, DELAYED RELEASE ORAL
Qty: 90 CAPSULE | Refills: 3 | Status: SHIPPED | OUTPATIENT
Start: 2018-01-30 | End: 2018-03-08

## 2018-02-01 DIAGNOSIS — F41.9 ANXIETY: Primary | ICD-10-CM

## 2018-02-01 RX ORDER — CLONAZEPAM 0.5 MG/1
TABLET ORAL
Qty: 30 TABLET | Refills: 0 | Status: SHIPPED | OUTPATIENT
Start: 2018-02-01 | End: 2018-02-15 | Stop reason: SDUPTHER

## 2018-02-04 DIAGNOSIS — G43.709 CHRONIC MIGRAINE WITHOUT AURA WITHOUT STATUS MIGRAINOSUS, NOT INTRACTABLE: Primary | ICD-10-CM

## 2018-02-05 RX ORDER — TOPIRAMATE 50 MG/1
TABLET, FILM COATED ORAL
Qty: 90 TABLET | Refills: 2 | Status: SHIPPED | OUTPATIENT
Start: 2018-02-05 | End: 2018-03-08

## 2018-02-09 ENCOUNTER — OFFICE VISIT (OUTPATIENT)
Dept: URGENT CARE | Age: 38
End: 2018-02-09
Payer: COMMERCIAL

## 2018-02-09 VITALS
RESPIRATION RATE: 18 BRPM | SYSTOLIC BLOOD PRESSURE: 120 MMHG | HEIGHT: 63 IN | OXYGEN SATURATION: 98 % | BODY MASS INDEX: 33.95 KG/M2 | WEIGHT: 191.6 LBS | HEART RATE: 72 BPM | DIASTOLIC BLOOD PRESSURE: 60 MMHG | TEMPERATURE: 98.3 F

## 2018-02-09 DIAGNOSIS — L50.9 HIVES: Primary | ICD-10-CM

## 2018-02-09 DIAGNOSIS — L70.0 ACNE COMEDONE: ICD-10-CM

## 2018-02-09 PROCEDURE — G0382 LEV 3 HOSP TYPE B ED VISIT: HCPCS | Performed by: FAMILY MEDICINE

## 2018-02-09 PROCEDURE — 99283 EMERGENCY DEPT VISIT LOW MDM: CPT | Performed by: FAMILY MEDICINE

## 2018-02-09 RX ORDER — VALACYCLOVIR HYDROCHLORIDE 500 MG/1
1 TABLET, FILM COATED ORAL DAILY PRN
COMMUNITY
Start: 2017-06-13 | End: 2019-11-18 | Stop reason: SDUPTHER

## 2018-02-09 RX ORDER — PHENTERMINE HYDROCHLORIDE 37.5 MG/1
18.75 TABLET ORAL DAILY
Refills: 0 | COMMUNITY
Start: 2017-12-09 | End: 2018-03-15 | Stop reason: SDUPTHER

## 2018-02-09 RX ORDER — AZELASTINE 1 MG/ML
2 SPRAY, METERED NASAL 2 TIMES DAILY PRN
COMMUNITY
Start: 2017-03-07 | End: 2018-05-08 | Stop reason: SDUPTHER

## 2018-02-09 RX ORDER — CYCLOBENZAPRINE HCL 10 MG
1 TABLET ORAL
COMMUNITY
Start: 2017-11-07 | End: 2018-07-19 | Stop reason: SDUPTHER

## 2018-02-09 RX ORDER — MONTELUKAST SODIUM 10 MG/1
1 TABLET ORAL DAILY PRN
COMMUNITY
Start: 2016-06-21 | End: 2020-02-26 | Stop reason: SDUPTHER

## 2018-02-10 NOTE — PATIENT INSTRUCTIONS
Take Benadryl or Allegra for itch relief and rash  Apply Benadryl gel or calamine lotion to the rash for symptomatic relief  May apply rubbing alcohol to acne until you follow up with your dermatologist   Call dermatologist to make an appointment  Call [facility] Link if you need referral to dermatologist  If symptoms worsen or if not resolving call your family doctor or go to the Er

## 2018-02-10 NOTE — PROGRESS NOTES
[facility]        NAME: Roberta Nichole is a 40 y o  female  : 1980    MRN: [id number]  DATE: 2018  TIME: 7:27 PM    Assessment and Plan   Hives [L50 9]  1  Hives     2  Acne comedone       Take Benadryl or Allegra for itch relief and rash  Apply Benadryl gel or calamine lotion to the rash for symptomatic relief  May apply rubbing alcohol to acne until you follow up with your dermatologist   Call dermatologist to make an appointment  Call [facility] Link if you need referral to dermatologist  If symptoms worsen or if not resolving call your family doctor or go to the Er  All questions answered  Precautions given  Ddx  If no resolution of non-acne lesions with continued itching, consider pityriasis rosea  Patient Instructions     Follow up with PCP in 3-5 days  Proceed to  ER if symptoms worsen  Chief Complaint     Chief Complaint   Patient presents with    Rash     Using Rx facila acne cream on back for recent back acne  Redness and itching since yesterday - now extending onto shoulders  History of Present Illness   Roberta Nichole presents to the clinic c/o    Patient is a 28-year-old female presenting with complaint of red painful, itchy bumps on her back x1 day  She notes the symptoms are typical of her back acne  She was previously prescribed benzoyl peroxide cream for this condition which resolved both bumps and associated symptoms  She has an old prescription of this cream which typically relieved both symptoms  She used this cream, which caused her to develop more bumps that are smaller and more itchy than previous lesions  She believes cream was   She does not currently follow with a dermatologist due to change of insurance  No other rashes  Review of Systems   Review of Systems   Constitutional: Negative for chills, diaphoresis and fever  Respiratory: Negative for shortness of breath and wheezing      Gastrointestinal: Negative for nausea and vomiting  Musculoskeletal: Negative for arthralgias and myalgias  Skin: Negative for color change and pallor           Current Medications     Long-Term Prescriptions   Medication Sig Dispense Refill    azelastine (ASTELIN) 0 1 % nasal spray 2 sprays into each nostril 2 (two) times a day as needed      clonazePAM (KlonoPIN) 0 5 mg tablet TAKE 1 TABLET BY MOUTH EVERY DAY AS NEEDED 30 tablet 0    [employer] VITAMIN D 2000 units CAPS Take 1 capsule by mouth daily  3    cyclobenzaprine (FLEXERIL) 10 mg tablet Take 1 tablet by mouth daily at bedtime      levonorgestrel (MIRENA) 20 MCG/24HR IUD Mirena 20 MCG/24HR Intrauterine Intrauterine Device  Refills: 0  Active      montelukast (SINGULAIR) 10 mg tablet Take 1 tablet by mouth daily as needed      omeprazole (PriLOSEC) 40 MG capsule TAKE 1 CAPSULE BY MOUTH DAILY (Patient taking differently: TAKE 1 CAPSULE BY MOUTH DAILY prn) 90 capsule 3    phentermine (ADIPEX-P) 37 5 MG tablet 18 75 mg daily  0    topiramate (TOPAMAX) 50 MG tablet TAKE 1 TABLET BEDTIME 90 tablet 2    valACYclovir (VALTREX) 500 mg tablet Take 1 tablet by mouth daily as needed      [DISCONTINUED] levonorgestrel (MIRENA, 52 MG,) 20 MCG/24HR IUD by Intrauterine route         Current Allergies     Allergies as of 02/09/2018 - Reviewed 02/09/2018   Allergen Reaction Noted    Cymbalta [duloxetine hcl] Nausea Only and GI Intolerance 11/18/2015            The following portions of the patient's history were reviewed and updated as appropriate: allergies, current medications, past family history, past medical history, past social history, past surgical history and problem list     Objective   /60 (BP Location: Right arm, Patient Position: Sitting, Cuff Size: Standard)   Pulse 72   Temp 98 3 °F (36 8 °C) (Oral)   Resp 18   Ht 5' 3" (1 6 m)   Wt 86 9 kg (191 lb 9 6 oz)   LMP 01/29/2018 (Approximate)   SpO2 98%   BMI 33 94 kg/m²        Physical Exam     Physical Exam   Constitutional: She is oriented to person, place, and time  She appears well-developed and well-nourished  HENT:   Head: Normocephalic and atraumatic  Eyes: Conjunctivae are normal    Cardiovascular: Normal rate, regular rhythm and normal heart sounds  Pulmonary/Chest: Effort normal and breath sounds normal  No respiratory distress  She has no wheezes  She has no rales  Neurological: She is alert and oriented to person, place, and time  No cranial nerve deficit  Skin: Skin is warm and dry  Multiple punctate, scattered maculopapular erythematous lesions of the back as well as 2-4 mm inflamed papules  Some white comedones  Excoriation marks throughout  Two 2cm lesions, one on each lateral surface of back of darkened flesh base and  Punctate skin colored papules within each of the lesions  Nursing note and vitals reviewed

## 2018-02-15 DIAGNOSIS — F41.9 ANXIETY: ICD-10-CM

## 2018-02-15 RX ORDER — CLONAZEPAM 0.5 MG/1
TABLET ORAL
Qty: 30 TABLET | Refills: 0 | OUTPATIENT
Start: 2018-02-15 | End: 2018-04-09

## 2018-02-23 ENCOUNTER — TELEPHONE (OUTPATIENT)
Dept: FAMILY MEDICINE CLINIC | Facility: CLINIC | Age: 38
End: 2018-02-23

## 2018-02-23 NOTE — TELEPHONE ENCOUNTER
I Called the pharmacy it was sent to the pharmacy on 02/15/18 and it is there waiting for patient to  I left message for patient ok to  at Pharmacy

## 2018-03-15 ENCOUNTER — OFFICE VISIT (OUTPATIENT)
Dept: FAMILY MEDICINE CLINIC | Facility: CLINIC | Age: 38
End: 2018-03-15
Payer: COMMERCIAL

## 2018-03-15 VITALS
BODY MASS INDEX: 33.84 KG/M2 | WEIGHT: 191 LBS | SYSTOLIC BLOOD PRESSURE: 120 MMHG | HEIGHT: 63 IN | RESPIRATION RATE: 16 BRPM | DIASTOLIC BLOOD PRESSURE: 62 MMHG | HEART RATE: 72 BPM

## 2018-03-15 DIAGNOSIS — M79.7 FIBROMYALGIA: ICD-10-CM

## 2018-03-15 DIAGNOSIS — M25.562 CHRONIC PAIN OF BOTH KNEES: ICD-10-CM

## 2018-03-15 DIAGNOSIS — M25.561 CHRONIC PAIN OF BOTH KNEES: ICD-10-CM

## 2018-03-15 DIAGNOSIS — K58.1 IRRITABLE BOWEL SYNDROME WITH CONSTIPATION: ICD-10-CM

## 2018-03-15 DIAGNOSIS — G89.29 CHRONIC PAIN OF BOTH KNEES: ICD-10-CM

## 2018-03-15 DIAGNOSIS — M22.41 CHONDROMALACIA OF BOTH PATELLAE: ICD-10-CM

## 2018-03-15 DIAGNOSIS — E66.09 CLASS 1 OBESITY DUE TO EXCESS CALORIES WITHOUT SERIOUS COMORBIDITY WITH BODY MASS INDEX (BMI) OF 30.0 TO 30.9 IN ADULT: ICD-10-CM

## 2018-03-15 DIAGNOSIS — Z00.00 ENCOUNTER FOR WELL ADULT EXAM WITHOUT ABNORMAL FINDINGS: Primary | ICD-10-CM

## 2018-03-15 DIAGNOSIS — M22.42 CHONDROMALACIA OF BOTH PATELLAE: ICD-10-CM

## 2018-03-15 PROCEDURE — 99395 PREV VISIT EST AGE 18-39: CPT | Performed by: FAMILY MEDICINE

## 2018-03-15 PROCEDURE — 3725F SCREEN DEPRESSION PERFORMED: CPT | Performed by: FAMILY MEDICINE

## 2018-03-15 RX ORDER — PHENTERMINE HYDROCHLORIDE 37.5 MG/1
18.75 TABLET ORAL DAILY
Qty: 30 TABLET | Refills: 0 | Status: SHIPPED | OUTPATIENT
Start: 2018-03-15 | End: 2018-07-17 | Stop reason: ALTCHOICE

## 2018-03-15 RX ORDER — DICYCLOMINE HCL 20 MG
20 TABLET ORAL EVERY 6 HOURS
Qty: 90 TABLET | Refills: 0 | Status: SHIPPED | OUTPATIENT
Start: 2018-03-15 | End: 2018-04-06 | Stop reason: SDUPTHER

## 2018-03-15 NOTE — PROGRESS NOTES
David Rizo was seen today for physical exam     Diagnoses and all orders for this visit:    Encounter for well adult exam without abnormal findings    Fibromyalgia  -     Ambulatory referral to Orthopedic Surgery    Chronic pain of both knees  -     Ambulatory referral to Orthopedic Surgery    Chondromalacia of both patellae    Irritable bowel syndrome with constipation  -     dicyclomine (BENTYL) 20 mg tablet; Take 1 tablet (20 mg total) by mouth every 6 (six) hours Dicyclomine HCl - 20 MG Oral Tablet TAKE 1 TABLET EVERY 6 HOURS AS NEEDED  Quantity: 30;  Refills: 0    Toya Caal MD;  Started 7-Dec-2015 Active    Class 1 obesity due to excess calories without serious comorbidity with body mass index (BMI) of 30 0 to 30 9 in adult  -     phentermine (ADIPEX-P) 37 5 MG tablet; Take 0 5 tablets (18 75 mg total) by mouth daily      Patient Counseling:  --Nutrition: Stressed importance of moderation in sodium/caffeine intake, saturated fat and cholesterol, caloric balance, sufficient intake of fresh fruits, vegetables, fiber, calcium, iron, and 1 mg of folate supplement per day   --Discussed  calcium supplement, and the daily use of baby aspirin  --Exercise: Stressed the importance of regular exercise  --Substance Abuse: Discussed cessation/primary prevention of tobacco, alcohol, or other drug use; driving or other dangerous activities under the influence; availability of treatment for abuse  --Sexuality: Discussed sexually transmitted diseases, partner selection, use of condoms, avoidance of unintended pregnancy  and contraceptive alternatives  --Injury prevention: Discussed safety belts, safety helmets, smoke detector, smoking near bedding or upholstery  --Dental health: Discussed importance of regular tooth brushing, flossing, and dental visits  --Immunizations reviewed      Chief Complaint   Patient presents with    Physical Exam       HPI    Patient here for a comprehensive physical exam The patient reports no problems other than worsening knee pain     Do you take any herbs or supplements that were not prescribed by a doctor? no   Are you taking calcium supplements? no   Are you taking aspirin daily? no  How often do you exercise? Tried to work out a couple of times a week   Diet? 2-3 servings of fruits and vegetables   Dental visit:     Vision test: wears glasses      History:  LMP: 1 month ago- Mirena IUD in place   Last pap date: less tan 1 year ago   Abnormal pap? no  : 3  Para: 3      History   Sexual Activity    Sexual activity: Yes    Partners: Male       Review of Systems   Constitutional: Negative  HENT: Negative  Eyes: Negative  Respiratory: Negative  Cardiovascular: Negative  Gastrointestinal: Negative  Endocrine: Negative  Genitourinary: Negative  Musculoskeletal: Positive for arthralgias, joint swelling and myalgias  Skin: Negative  Allergic/Immunologic: Negative  Neurological: Negative  Hematological: Negative  Psychiatric/Behavioral: Negative            Family History   Problem Relation Age of Onset   Huong Leisure Stroke Mother     Hypertension Mother     Psoriasis Mother     Breast cancer Family     Diabetes type I Son        Past Medical History:   Diagnosis Date    Allergic rhinitis     Anemia     Anxiety     Depression     Fibromyalgia     Fibromyalgia     GERD (gastroesophageal reflux disease)     Hematochezia     Herpes simplex infection     Hypertension     IBS (irritable bowel syndrome)     Insomnia     Mental status change     Migraine     Obesity 2015    Scoliosis     Scoliosis          Social History     Social History    Marital status: Single     Spouse name: N/A    Number of children: N/A    Years of education: completed 12th grade     Occupational History    home health aide      Social History Main Topics    Smoking status: Former Smoker     Types: Cigarettes    Smokeless tobacco: Never Used      Comment: casual, current some day smoker per allscripts    Alcohol use 2 4 oz/week     4 Glasses of wine per week      Comment: yearly, social 1-2 drinks per month per allscripts    Drug use: No    Sexual activity: Yes     Partners: Male     Other Topics Concern    Not on file     Social History Narrative    No narrative on file       Current Outpatient Prescriptions on File Prior to Visit   Medication Sig Dispense Refill    azelastine (ASTELIN) 0 1 % nasal spray 2 sprays into each nostril 2 (two) times a day as needed      clonazePAM (KlonoPIN) 0 5 mg tablet Take 1 tablet by mouth daily      [employer]S VITAMIN D 2000 units CAPS Take 1 capsule by mouth daily  3    cyclobenzaprine (FLEXERIL) 10 mg tablet Take 1 tablet by mouth daily at bedtime      Lactobacillus (ACIDOPHILUS PROBIOTIC PO) Take 1 tablet by mouth daily      levonorgestrel (MIRENA) 20 MCG/24HR IUD Mirena 20 MCG/24HR Intrauterine Intrauterine Device  Refills: 0  Active      Linaclotide (LINZESS) 290 MCG CAPS Take 1 capsule by mouth daily as needed Linzess 290 MCG Oral Capsule take 1 capsule daily  Quantity: 15; Refills: 0    Cayetano Villa MD;  Started 7-Dec-2015 Active       Melatonin 5 MG CAPS Take 1 capsule by mouth      montelukast (SINGULAIR) 10 mg tablet Take 1 tablet by mouth daily as needed      naproxen (NAPROSYN) 250 mg tablet Take 1 tablet by mouth every 12 (twelve) hours      omeprazole (PriLOSEC) 40 MG capsule Take 1 capsule by mouth daily      topiramate (TOPAMAX) 100 mg tablet Take 1 tablet by mouth      valACYclovir (VALTREX) 500 mg tablet Take 1 tablet by mouth daily as needed      [DISCONTINUED] dicyclomine (BENTYL) 20 mg tablet Dicyclomine HCl - 20 MG Oral Tablet TAKE 1 TABLET EVERY 6 HOURS AS NEEDED    Quantity: 30;  Refills: 0    Toya Caal MD;  Started 7-Dec-2015 Active      [DISCONTINUED] phentermine (ADIPEX-P) 37 5 MG tablet 18 75 mg daily  0    clonazePAM (KlonoPIN) 0 5 mg tablet TAKE 1 TABLET BY MOUTH EVERY DAY AS NEEDED 30 tablet 0    esomeprazole (NEXIUM 24HR) 20 mg capsule Take 20 mg by mouth daily as needed Nexium 24HR 20 MG Oral Capsule Delayed Release take 1 capsule daily  Quantity: 10; Refills: 0    Toya Caal MD;  Started 7-Dec-2015 Active       Melatonin 3 MG CAPS Melatonin 3 MG Oral Capsule TAKE 1 CAPSULE AT BEDTIME AS NEEDED  Quantity: 30;  Refills: 3    Toya Caal MD;  Started 29-Jan-2016 Active       No current facility-administered medications on file prior to visit  Allergies   Allergen Reactions    Cymbalta [Duloxetine Hcl] Nausea Only and GI Intolerance     Stomach upset         Vitals:    03/15/18 1337   BP: 120/62   BP Location: Right arm   Patient Position: Sitting   Cuff Size: Standard   Pulse: 72   Resp: 16   Weight: 86 6 kg (191 lb)   Height: 5' 2 76" (1 594 m)         Physical Exam   Constitutional: She is oriented to person, place, and time  Vital signs are normal  She appears well-developed and well-nourished  HENT:   Head: Normocephalic and atraumatic  Nose: Nose normal    Mouth/Throat: Oropharynx is clear and moist    Eyes: Pupils are equal, round, and reactive to light  Neck: Normal range of motion  Neck supple  No thyromegaly present  Cardiovascular: Normal rate and regular rhythm  No murmur heard  Pulmonary/Chest: Effort normal and breath sounds normal    Abdominal: Soft  Bowel sounds are normal    Musculoskeletal: Normal range of motion  She exhibits tenderness  She exhibits no edema or deformity  Bilateral knee caps   Neurological: She is alert and oriented to person, place, and time  She has normal reflexes  Skin: Skin is warm  No rash noted  No erythema  Psychiatric: She has a normal mood and affect   Her behavior is normal

## 2018-03-29 DIAGNOSIS — F41.9 ANXIETY: Primary | ICD-10-CM

## 2018-03-29 RX ORDER — CLONAZEPAM 0.5 MG/1
TABLET ORAL
Qty: 30 TABLET | Refills: 5 | Status: SHIPPED | OUTPATIENT
Start: 2018-03-29 | End: 2018-10-02 | Stop reason: SDUPTHER

## 2018-04-06 DIAGNOSIS — K58.1 IRRITABLE BOWEL SYNDROME WITH CONSTIPATION: ICD-10-CM

## 2018-04-06 RX ORDER — DICYCLOMINE HCL 20 MG
TABLET ORAL
Qty: 90 TABLET | Refills: 0 | Status: SHIPPED | OUTPATIENT
Start: 2018-04-06 | End: 2019-08-28 | Stop reason: SDUPTHER

## 2018-04-09 ENCOUNTER — OFFICE VISIT (OUTPATIENT)
Dept: OBGYN CLINIC | Facility: CLINIC | Age: 38
End: 2018-04-09
Payer: COMMERCIAL

## 2018-04-09 ENCOUNTER — APPOINTMENT (OUTPATIENT)
Dept: RADIOLOGY | Facility: CLINIC | Age: 38
End: 2018-04-09
Payer: COMMERCIAL

## 2018-04-09 VITALS
DIASTOLIC BLOOD PRESSURE: 66 MMHG | BODY MASS INDEX: 33.7 KG/M2 | HEIGHT: 63 IN | HEART RATE: 73 BPM | WEIGHT: 190.2 LBS | SYSTOLIC BLOOD PRESSURE: 97 MMHG

## 2018-04-09 DIAGNOSIS — M17.0 PRIMARY OSTEOARTHRITIS OF BOTH KNEES: ICD-10-CM

## 2018-04-09 DIAGNOSIS — G89.29 CHRONIC PAIN OF BOTH KNEES: ICD-10-CM

## 2018-04-09 DIAGNOSIS — M25.561 CHRONIC PAIN OF BOTH KNEES: ICD-10-CM

## 2018-04-09 DIAGNOSIS — M22.2X2 PATELLOFEMORAL PAIN SYNDROME OF BOTH KNEES: Primary | ICD-10-CM

## 2018-04-09 DIAGNOSIS — M25.562 CHRONIC PAIN OF BOTH KNEES: ICD-10-CM

## 2018-04-09 DIAGNOSIS — M22.2X1 PATELLOFEMORAL PAIN SYNDROME OF BOTH KNEES: Primary | ICD-10-CM

## 2018-04-09 PROCEDURE — 73562 X-RAY EXAM OF KNEE 3: CPT

## 2018-04-09 PROCEDURE — 20610 DRAIN/INJ JOINT/BURSA W/O US: CPT | Performed by: PHYSICIAN ASSISTANT

## 2018-04-09 PROCEDURE — 99203 OFFICE O/P NEW LOW 30 MIN: CPT | Performed by: ORTHOPAEDIC SURGERY

## 2018-04-09 RX ORDER — LIDOCAINE HYDROCHLORIDE 10 MG/ML
2 INJECTION, SOLUTION INFILTRATION; PERINEURAL
Status: COMPLETED | OUTPATIENT
Start: 2018-04-09 | End: 2018-04-09

## 2018-04-09 RX ORDER — BUPIVACAINE HYDROCHLORIDE 2.5 MG/ML
2 INJECTION, SOLUTION INFILTRATION; PERINEURAL
Status: COMPLETED | OUTPATIENT
Start: 2018-04-09 | End: 2018-04-09

## 2018-04-09 RX ORDER — BETAMETHASONE SODIUM PHOSPHATE AND BETAMETHASONE ACETATE 3; 3 MG/ML; MG/ML
12 INJECTION, SUSPENSION INTRA-ARTICULAR; INTRALESIONAL; INTRAMUSCULAR; SOFT TISSUE
Status: COMPLETED | OUTPATIENT
Start: 2018-04-09 | End: 2018-04-09

## 2018-04-09 RX ADMIN — BUPIVACAINE HYDROCHLORIDE 2 ML: 2.5 INJECTION, SOLUTION INFILTRATION; PERINEURAL at 12:43

## 2018-04-09 RX ADMIN — BETAMETHASONE SODIUM PHOSPHATE AND BETAMETHASONE ACETATE 12 MG: 3; 3 INJECTION, SUSPENSION INTRA-ARTICULAR; INTRALESIONAL; INTRAMUSCULAR; SOFT TISSUE at 12:44

## 2018-04-09 RX ADMIN — BUPIVACAINE HYDROCHLORIDE 2 ML: 2.5 INJECTION, SOLUTION INFILTRATION; PERINEURAL at 12:44

## 2018-04-09 RX ADMIN — LIDOCAINE HYDROCHLORIDE 2 ML: 10 INJECTION, SOLUTION INFILTRATION; PERINEURAL at 12:44

## 2018-04-09 RX ADMIN — BETAMETHASONE SODIUM PHOSPHATE AND BETAMETHASONE ACETATE 12 MG: 3; 3 INJECTION, SUSPENSION INTRA-ARTICULAR; INTRALESIONAL; INTRAMUSCULAR; SOFT TISSUE at 12:43

## 2018-04-09 RX ADMIN — LIDOCAINE HYDROCHLORIDE 2 ML: 10 INJECTION, SOLUTION INFILTRATION; PERINEURAL at 12:43

## 2018-04-09 NOTE — PROGRESS NOTES
Assessment/Plan:     40y o  year old female   All of bilateral patellofemoral pain syndrome and osteoarthritis of bilateral knees, worse in patellofemoral compartments    Plan is as follows:    - Pt given instructions on Mocconnell taping technique and also a referral to physical therapy  She was told that she should use the taping technique under she is out   Walking long distances or for any extended activity  On her feet  -   Explained to the patient that pain she is experiencing may be more bony pain rather than arthritic pain, if this were the case than a corticosteroid injection would likely not be that beneficial   Understanding this, she did opt for bilateral corticosteroid injections in the knees  She received these injections and tolerated them well without any immediate complications  -   Discussed viscosupplementation with the patient, explained that this may be an option for however as her arthritis is mild she may not need this injection, however she received corticosteroid injections today this likely satisfy insurancer requirements for HA injections  The assessment and plan was formulated by the attending physician and I assisted in carrying it out  Follow Up:  6  week(s)      Subjective:     CHIEF COMPLAINT:  Chief Complaint   Patient presents with    Left Knee - Pain, Locking    Right Knee - Pain, Locking       HPI:  Amna Shirley is a 40y o  year old female  with history of fibromyalgia presenting the office today complaining of anterolateral Bilateral knee pain  She describes as a mostly aching pain, chronic in nature which began at least 12 years ago however has gotten worse in the past year, especially the last couple months  She denies any injury to the knees  Says the pain is constant, 7/10 at its worst and 3/10 at minimum  She says it is worse with activity such as dancing and also getting up after lying or sitting for extended periods of time    Is better with rest and prescription over-the-counter pain medication  She denies any swelling of the knees, however she reports that she notices slight bruising in the knees after night of dancing  These resolve on their own  She denies any numbness or tingling, fevers, or chills      PAST MEDICAL HISTORY:  Past Medical History:   Diagnosis Date    Allergic rhinitis     Anemia     Anxiety     Depression     Fibromyalgia     Fibromyalgia     GERD (gastroesophageal reflux disease)     Hematochezia     Herpes simplex infection     Hypertension     IBS (irritable bowel syndrome)     Insomnia     Mental status change     Migraine     Obesity 8/6/2015    Scoliosis     Scoliosis        PAST SURGICAL HISTORY:  Past Surgical History:   Procedure Laterality Date    EXPLORATORY LAPAROTOMY      WISDOM TOOTH EXTRACTION         FAMILY HISTORY:  Family History   Problem Relation Age of Onset    Stroke Mother     Hypertension Mother     Psoriasis Mother     Breast cancer Family     Diabetes type I Son        SOCIAL HISTORY:  Social History   Substance Use Topics    Smoking status: Former Smoker     Types: Cigarettes    Smokeless tobacco: Never Used      Comment: casual, current some day smoker per allscripts    Alcohol use 2 4 oz/week     4 Glasses of wine per week      Comment: yearly, social 1-2 drinks per month per allscripts       MEDICATIONS:    Current Outpatient Prescriptions:     azelastine (ASTELIN) 0 1 % nasal spray, 2 sprays into each nostril 2 (two) times a day as needed, Disp: , Rfl:     clonazePAM (KlonoPIN) 0 5 mg tablet, TAKE 1 TABLET BY MOUTH EVERY DAY, Disp: 30 tablet, Rfl: 5    CVS VITAMIN D 2000 units CAPS, Take 1 capsule by mouth daily, Disp: , Rfl: 3    cyclobenzaprine (FLEXERIL) 10 mg tablet, Take 1 tablet by mouth daily at bedtime, Disp: , Rfl:     dicyclomine (BENTYL) 20 mg tablet, TAKE 1 TABLET EVERY 6 HOURS AS NEEDED, Disp: 90 tablet, Rfl: 0    Lactobacillus (ACIDOPHILUS PROBIOTIC PO), Take 1 tablet by mouth daily, Disp: , Rfl:     levonorgestrel (MIRENA) 20 MCG/24HR IUD, Mirena 20 MCG/24HR Intrauterine Intrauterine Device  Refills: 0  Active, Disp: , Rfl:     Linaclotide (LINZESS) 290 MCG CAPS, Take 1 capsule by mouth daily as needed Linzess 290 MCG Oral Capsule take 1 capsule daily  Quantity: 15; Refills: 0    Ursula Durand MD;  Started 7-Dec-2015 Active , Disp: , Rfl:     Melatonin 5 MG CAPS, Take 1 capsule by mouth, Disp: , Rfl:     montelukast (SINGULAIR) 10 mg tablet, Take 1 tablet by mouth daily as needed, Disp: , Rfl:     naproxen (NAPROSYN) 250 mg tablet, Take 1 tablet by mouth every 12 (twelve) hours, Disp: , Rfl:     omeprazole (PriLOSEC) 40 MG capsule, Take 1 capsule by mouth daily, Disp: , Rfl:     phentermine (ADIPEX-P) 37 5 MG tablet, Take 0 5 tablets (18 75 mg total) by mouth daily, Disp: 30 tablet, Rfl: 0    topiramate (TOPAMAX) 100 mg tablet, Take 1 tablet by mouth, Disp: , Rfl:     valACYclovir (VALTREX) 500 mg tablet, Take 1 tablet by mouth daily as needed, Disp: , Rfl:     ALLERGIES:  Allergies   Allergen Reactions    Cymbalta [Duloxetine Hcl] Nausea Only and GI Intolerance     Stomach upset       REVIEW OF SYSTEMS:  Pertinent items are noted in HPI  A comprehensive review of systems was negative except for: Constitutional: positive for weight gain  Gastrointestinal: positive for abdominal pain  Neurological: positive for Symptoms; Neuro: headaches and paresthesia  Behavioral/Psych: positive for Symptoms;  Pyschiatric: anxiety, depression and concentration difficulty    LABS:  HgA1c:   Lab Results   Component Value Date    HGBA1C 5 2 08/13/2015     BMP:   Lab Results   Component Value Date    GLUCOSE 102 10/19/2017    CALCIUM 9 4 10/19/2017     10/19/2017    K 3 8 10/19/2017    CO2 27 10/19/2017     10/19/2017    BUN 14 10/19/2017    CREATININE 0 94 10/19/2017         Objective:     _____________________________________________________  PHYSICAL EXAMINATION:  General: well developed and well nourished, alert, oriented times 3 and appears comfortable  Psychiatric: Normal  HEENT: Trachea Midline, No torticollis  Cardiovascular: No discernable arrhythmia  Pulmonary: No wheezing or stridor  Skin: No masses, erthema, lacerations, fluctation, ulcerations  Neurovascular: Pulses Intact and   Skin warm well perfused, but motor and sensory intact L1 through S1     MUSCULOSKELETAL EXAMINATION:  Left knee:   Full range of motion flexion-extension  No synovial lining warmth noted  No appreciable effusion or popliteal mass palpated  No ligamentous laxity with varus or valgus stress at full extension or 30 degrees flexion  Negative Lachman's  Medial and lateral joint line tenderness, slightly more  Medial and lateral, tenderness to palpation over suprapatellar region  Positive patellar grind and patellar apprehension test   Equivocal Katt's laterally  Right Knee:  Full range of motion flexion-extension  No synovial lining warmth noted  No appreciable effusion or popliteal mass palpated  No ligamentous laxity with varus or valgus stress at full extension or 30 degrees flexion  Negative Lachman's  Negative Katt's  Medial and lateral joint line tenderness, slightly more  Medial and lateral, tenderness to palpation over suprapatellar region  Positive patellar grind and patellar apprehension test  _____________________________________________________  STUDIES REVIEWED:  No Studies to review and I have personally reviewed pertinent films in PACS and my interpretation is  x-ray of bilateral knees:  Mild joint space narrowing medial tibial femoral compartments observed bilaterally, left slightly worse than right  Mild lateralization of the patella on patellar view with osteophyte formation noted  and patellofemoral arthritic change  Brent Arevalo       PROCEDURES PERFORMED:  Large joint arthrocentesis  Date/Time: 4/9/2018 12:43 PM  Consent given by: patient  Site marked: site marked  Timeout: Immediately prior to procedure a time out was called to verify the correct patient, procedure, equipment, support staff and site/side marked as required   Supporting Documentation  Indications: pain   Procedure Details  Location: knee - R knee  Preparation: Patient was prepped and draped in the usual sterile fashion  Needle size: 22 G  Ultrasound guidance: no  Approach: anterolateral  Medications administered: 2 mL lidocaine 1 %; 2 mL bupivacaine 0 25 %; 12 mg betamethasone acetate-betamethasone sodium phosphate 6 (3-3) mg/mL    Patient tolerance: patient tolerated the procedure well with no immediate complications  Dressing:  Sterile dressing applied  Large joint arthrocentesis  Date/Time: 4/9/2018 12:44 PM  Consent given by: patient  Site marked: site marked  Timeout: Immediately prior to procedure a time out was called to verify the correct patient, procedure, equipment, support staff and site/side marked as required   Supporting Documentation  Indications: pain   Procedure Details  Location: knee - L knee  Preparation: Patient was prepped and draped in the usual sterile fashion  Needle size: 22 G  Ultrasound guidance: no  Approach: anterolateral  Medications administered: 2 mL lidocaine 1 %; 2 mL bupivacaine 0 25 %; 12 mg betamethasone acetate-betamethasone sodium phosphate 6 (3-3) mg/mL    Patient tolerance: patient tolerated the procedure well with no immediate complications  Dressing:  Sterile dressing applied

## 2018-05-08 DIAGNOSIS — J30.2 SEASONAL ALLERGIC RHINITIS, UNSPECIFIED TRIGGER: Primary | ICD-10-CM

## 2018-05-08 RX ORDER — AZELASTINE 1 MG/ML
SPRAY, METERED NASAL
Qty: 5 ML | Refills: 1 | Status: SHIPPED | OUTPATIENT
Start: 2018-05-08 | End: 2018-07-27 | Stop reason: SDUPTHER

## 2018-07-17 ENCOUNTER — OFFICE VISIT (OUTPATIENT)
Dept: FAMILY MEDICINE CLINIC | Facility: CLINIC | Age: 38
End: 2018-07-17
Payer: COMMERCIAL

## 2018-07-17 ENCOUNTER — OFFICE VISIT (OUTPATIENT)
Dept: OBGYN CLINIC | Facility: HOSPITAL | Age: 38
End: 2018-07-17
Payer: COMMERCIAL

## 2018-07-17 ENCOUNTER — APPOINTMENT (OUTPATIENT)
Dept: LAB | Facility: HOSPITAL | Age: 38
End: 2018-07-17
Payer: COMMERCIAL

## 2018-07-17 VITALS
DIASTOLIC BLOOD PRESSURE: 76 MMHG | BODY MASS INDEX: 31.18 KG/M2 | SYSTOLIC BLOOD PRESSURE: 118 MMHG | HEIGHT: 63 IN | HEART RATE: 79 BPM | WEIGHT: 176 LBS

## 2018-07-17 VITALS
BODY MASS INDEX: 31.01 KG/M2 | WEIGHT: 175 LBS | HEIGHT: 63 IN | RESPIRATION RATE: 18 BRPM | SYSTOLIC BLOOD PRESSURE: 128 MMHG | DIASTOLIC BLOOD PRESSURE: 72 MMHG | HEART RATE: 82 BPM

## 2018-07-17 DIAGNOSIS — M79.7 FIBROMYALGIA: ICD-10-CM

## 2018-07-17 DIAGNOSIS — K58.1 IRRITABLE BOWEL SYNDROME WITH CONSTIPATION: Primary | ICD-10-CM

## 2018-07-17 DIAGNOSIS — N89.8 VAGINAL IRRITATION: ICD-10-CM

## 2018-07-17 DIAGNOSIS — Z72.51 HIGH RISK SEXUAL BEHAVIOR: ICD-10-CM

## 2018-07-17 DIAGNOSIS — F41.9 ANXIETY: ICD-10-CM

## 2018-07-17 DIAGNOSIS — K59.04 CHRONIC IDIOPATHIC CONSTIPATION: ICD-10-CM

## 2018-07-17 DIAGNOSIS — K58.1 IRRITABLE BOWEL SYNDROME WITH CONSTIPATION: ICD-10-CM

## 2018-07-17 DIAGNOSIS — N90.89 VULVAR IRRITATION: Primary | ICD-10-CM

## 2018-07-17 DIAGNOSIS — E66.09 CLASS 1 OBESITY DUE TO EXCESS CALORIES WITHOUT SERIOUS COMORBIDITY WITH BODY MASS INDEX (BMI) OF 30.0 TO 30.9 IN ADULT: ICD-10-CM

## 2018-07-17 DIAGNOSIS — G43.709 CHRONIC MIGRAINE WITHOUT AURA WITHOUT STATUS MIGRAINOSUS, NOT INTRACTABLE: ICD-10-CM

## 2018-07-17 PROCEDURE — 87389 HIV-1 AG W/HIV-1&-2 AB AG IA: CPT

## 2018-07-17 PROCEDURE — 86592 SYPHILIS TEST NON-TREP QUAL: CPT

## 2018-07-17 PROCEDURE — 87210 SMEAR WET MOUNT SALINE/INK: CPT | Performed by: NURSE PRACTITIONER

## 2018-07-17 PROCEDURE — 99214 OFFICE O/P EST MOD 30 MIN: CPT | Performed by: FAMILY MEDICINE

## 2018-07-17 PROCEDURE — 87591 N.GONORRHOEAE DNA AMP PROB: CPT | Performed by: NURSE PRACTITIONER

## 2018-07-17 PROCEDURE — 87491 CHLMYD TRACH DNA AMP PROBE: CPT | Performed by: NURSE PRACTITIONER

## 2018-07-17 PROCEDURE — 87255 GENET VIRUS ISOLATE HSV: CPT | Performed by: NURSE PRACTITIONER

## 2018-07-17 PROCEDURE — 1036F TOBACCO NON-USER: CPT | Performed by: FAMILY MEDICINE

## 2018-07-17 PROCEDURE — 99213 OFFICE O/P EST LOW 20 MIN: CPT | Performed by: NURSE PRACTITIONER

## 2018-07-17 PROCEDURE — 36415 COLL VENOUS BLD VENIPUNCTURE: CPT

## 2018-07-17 PROCEDURE — 87340 HEPATITIS B SURFACE AG IA: CPT

## 2018-07-17 RX ORDER — SENNA PLUS 8.6 MG/1
1 TABLET ORAL DAILY
Qty: 90 TABLET | Refills: 0 | Status: SHIPPED | OUTPATIENT
Start: 2018-07-17 | End: 2018-08-22 | Stop reason: SDUPTHER

## 2018-07-17 RX ORDER — BUSPIRONE HYDROCHLORIDE 7.5 MG/1
7.5 TABLET ORAL 2 TIMES DAILY
Qty: 60 TABLET | Refills: 0 | Status: SHIPPED | OUTPATIENT
Start: 2018-07-17 | End: 2018-08-15 | Stop reason: SDUPTHER

## 2018-07-17 RX ORDER — TOPIRAMATE 50 MG/1
50 TABLET, FILM COATED ORAL
Refills: 2 | COMMUNITY
Start: 2018-07-11 | End: 2018-07-17 | Stop reason: SDUPTHER

## 2018-07-17 RX ORDER — POLYETHYLENE GLYCOL 3350 17 G/17G
17 POWDER, FOR SOLUTION ORAL DAILY
Qty: 30 EACH | Refills: 3 | Status: SHIPPED | OUTPATIENT
Start: 2018-07-17 | End: 2018-07-17 | Stop reason: SDUPTHER

## 2018-07-17 RX ORDER — NYSTATIN AND TRIAMCINOLONE ACETONIDE 100000; 1 [USP'U]/G; MG/G
OINTMENT TOPICAL 2 TIMES DAILY
Qty: 30 G | Refills: 0 | Status: SHIPPED | OUTPATIENT
Start: 2018-07-17 | End: 2019-04-30

## 2018-07-17 NOTE — PATIENT INSTRUCTIONS
Nistatina/Triamcinolona (Para la piel)   Trata infecciones de hongos de la piel, especialmente infecciones de levaduras  Dinesh(s) :   Existen muchas otras marcas de alis medicamento  Alis medicamento no debe ser usado cuando:   No tome esta medicina si alguna vez ha tenido kiana reacción alérgica a nistatina, triamcinolona o cualquier fármaco a base de cortisona  Forma de usar alis medicamento:   [facility]  · Pearl médico le dirá la cantidad de medicameno que debe usar y la frecuencia para hacerlo  · Aplíquese la medicina sobre la whit afectada y frote cuidadosamente  No deje que le toque los ojos  · No se ponga kiana venda en la whit a menos que se lo diga el doctor  · Evite pañales o calzones apretados si la está usando en la whit del pañal del bebé  · Use ropa suelta cuando use esta medicina en la gege (entrepierna)  · Es importante que use el medicamento por el tiempo indicado por el médico  Si la infección no empieza a mejorar a [employer] o vince ANGERS, o si Ardelia Baseman al doctor  Si kiana dosis es olvidada:   · Use la dosis olvidada lo más pronto posible  · Use la dosis olvidada lo más pronto posibleNo use la dosis que se olvido si es ann la hora de pearl próxima dosis regular  · No tome dos dosis a la vez  · Trate de no olvidarse de ninguna dosis  Erin Jona guardar y botar alis medicamento:   · Guarde pearl medicamento a temperatura ambiente, lejos del calor y la dianne directa  No lo congele  · [address] de vencimiento haya expirado y las medicinas que ya no necesita siguiendo las instrucciones del farmacéutico, médico o paramédico   · Guarde todos los medicamentos fuera del alcance de los niños  Medicamentos y [employer] que debe evitar:   Consulte con pearl médico o farmacéutico antes de usar cualquier medicamento, incluyendo los que compra sin receta médica, las vitaminas y los productos herbales    · Informe al doctor si usted está usando cualquier [facility] o [facility] sobre pearl piel   · Si es diabético, consulte con el doctor antes de cambiar la dieta o la dosis de medicina para la diabetes  Precauciones alejandra el uso de annalee medicamento:   · Si está embarazada o dando de lactar, informe al doctor antes de usar [employer]  · Si se [facility] mientras [facility] esta medicina, informe al doctor  · No lo use con más frecuencia ni por más tiempo de los que gutierres doctor ordenó  · Informe al doctor si sufre de otros problemas médicos, especialmente diabetes, herpes, tuberculosis de la piel, vaccinia, varicela, u otras infecciones en la piel  · Llame al médico si le aparece kiana irritación de la piel que no existía antes de empezar a usar [employer]  Efectos secundarios que pueden presentarse alejandra el uso de annalee medicamento:   Consulte con el médico si nota los siguientes efectos secundarios menos graves:  · Peladuras, picazón, ardor, o ampollas en la piel  · Erupción de acné  · Líneas rojizas moradas sobre la piel  · Moretones frecuentes  Consulte con el médico si nota otros efectos secundarios que cindi son causados por annalee medicamento  Llame a gutierres médico para consultarle Eileen Rodriguez puede notificar pita efectos secundarios al FDA al [phone]  © 2017 [address] [employer] Information is for End User's use only and may not be sold, redistributed or otherwise used for commercial purposes  Esta información es sólo para uso en educación  Gutierres intención no es darle un consejo médico sobre enfermedades o tratamientos  Colsulte con gutierres Dewain Racer farmacéutico antes de seguir cualquier régimen médico para saber si es seguro y efectivo para vern

## 2018-07-17 NOTE — PROGRESS NOTES
Assessment/Plan:         Diagnoses and all orders for this visit:    Irritable bowel syndrome with constipation  -     polyethylene glycol (MIRALAX) 17 g packet; Take 17 g by mouth daily  -     senna (SENNA LAXATIVE) 8 6 MG tablet; Take 1 tablet (8 6 mg total) by mouth daily  -     psyllium (METAMUCIL SMOOTH TEXTURE) 28 % packet; Take 1 packet by mouth 2 (two) times a day    Chronic migraine without aura without status migrainosus, not intractable    Fibromyalgia    Chronic idiopathic constipation  -     polyethylene glycol (MIRALAX) 17 g packet; Take 17 g by mouth daily  -     senna (SENNA LAXATIVE) 8 6 MG tablet; Take 1 tablet (8 6 mg total) by mouth daily  -     psyllium (METAMUCIL SMOOTH TEXTURE) 28 % packet; Take 1 packet by mouth 2 (two) times a day    Anxiety  -     busPIRone (BUSPAR) 7 5 mg tablet; Take 1 tablet (7 5 mg total) by mouth 2 (two) times a day    Class 1 obesity due to excess calories without serious comorbidity with body mass index (BMI) of 30 0 to 30 9 in adult    Other orders  -     Discontinue: topiramate (TOPAMAX) 50 MG tablet; 50 mg daily at bedtime      added Buspar today   To continue physical activity to improve stress  Spent 25 minutes with the patient       Subjective:      Patient ID: Stephanie Starr is a 40 y o  female  Constipation   This is a recurrent problem  The current episode started in the past 7 days  The problem has been waxing and waning since onset  The patient is not on a high fiber diet  She does not exercise regularly  There has not been adequate water intake  Pertinent negatives include no back pain, bloating, diarrhea, difficulty urinating, fecal incontinence, flatus, hematochezia, hemorrhoids, melena, nausea, rectal pain or weight loss  She has tried stool softeners for the symptoms  The treatment provided no relief   There is no history of abdominal surgery, endocrine disease, inflammatory bowel disease, irritable bowel syndrome, metabolic disease, neurologic disease, neuromuscular disease, psychiatric history or radiation treatment  Anxiety   Presents for follow-up visit  Symptoms include decreased concentration, depressed mood, excessive worry, insomnia, irritability and nervous/anxious behavior  Patient reports no chest pain, compulsions, confusion, dizziness, dry mouth, feeling of choking, hyperventilation, impotence, malaise, muscle tension, nausea, obsessions, palpitations, panic, restlessness or shortness of breath  Symptoms occur most days  The severity of symptoms is moderate  The quality of sleep is fair  Nighttime awakenings: occasional      Compliance with medications is %  Broke up with her boyfriend a month ago and more house hold responsibilities and bills     The following portions of the patient's history were reviewed and updated as appropriate: allergies, current medications, past family history, past medical history, past social history, past surgical history and problem list     Review of Systems   Constitutional: Positive for irritability  Negative for weight loss  Respiratory: Negative for shortness of breath  Cardiovascular: Negative for chest pain and palpitations  Gastrointestinal: Positive for constipation  Negative for bloating, diarrhea, flatus, hematochezia, hemorrhoids, melena, nausea and rectal pain  Genitourinary: Negative for difficulty urinating and impotence  Musculoskeletal: Negative for back pain  Neurological: Negative for dizziness  Psychiatric/Behavioral: Positive for decreased concentration  Negative for confusion  The patient is nervous/anxious and has insomnia                Past Medical History:   Diagnosis Date    Allergic rhinitis     Anemia     Anxiety     Depression     Fibromyalgia     Fibromyalgia     GERD (gastroesophageal reflux disease)     Hematochezia     Herpes simplex infection     Hypertension     IBS (irritable bowel syndrome)     Insomnia     Mental status change     Migraine     Obesity 8/6/2015    Scoliosis     Scoliosis      Past Surgical History:   Procedure Laterality Date    EXPLORATORY LAPAROTOMY      WISDOM TOOTH EXTRACTION       Social History     Social History    Marital status: Single     Spouse name: N/A    Number of children: N/A    Years of education: completed 12th grade     Occupational History    home health aide      Social History Main Topics    Smoking status: Former Smoker     Types: Cigarettes    Smokeless tobacco: Never Used      Comment: casual, current some day smoker per allscripts    Alcohol use 2 4 oz/week     4 Glasses of wine per week      Comment: yearly, social 1-2 drinks per month per allscripts    Drug use: No    Sexual activity: Yes     Partners: Male     Other Topics Concern    Not on file     Social History Narrative    No narrative on file     Allergies   Allergen Reactions    Cymbalta [Duloxetine Hcl] Nausea Only and GI Intolerance     Stomach upset         Family History   Problem Relation Age of Onset    Stroke Mother     Hypertension Mother     Psoriasis Mother     Breast cancer Family     Diabetes type I Son            Current Outpatient Prescriptions:     azelastine (ASTELIN) 0 1 % nasal spray, USE 2 SPRAYS IN EACH NOSTRIL TWICE DAILY, Disp: 5 mL, Rfl: 1    clonazePAM (KlonoPIN) 0 5 mg tablet, TAKE 1 TABLET BY MOUTH EVERY DAY, Disp: 30 tablet, Rfl: 5    cyclobenzaprine (FLEXERIL) 10 mg tablet, Take 1 tablet by mouth daily at bedtime, Disp: , Rfl:     dicyclomine (BENTYL) 20 mg tablet, TAKE 1 TABLET EVERY 6 HOURS AS NEEDED, Disp: 90 tablet, Rfl: 0    Lactobacillus (ACIDOPHILUS PROBIOTIC PO), Take 1 tablet by mouth daily, Disp: , Rfl:     levonorgestrel (MIRENA) 20 MCG/24HR IUD, Mirena 20 MCG/24HR Intrauterine Intrauterine Device  Refills: 0  Active, Disp: , Rfl:     Melatonin 5 MG CAPS, Take 1 capsule by mouth, Disp: , Rfl:     montelukast (SINGULAIR) 10 mg tablet, Take 1 tablet by mouth daily as needed, Disp: , Rfl:     naproxen (NAPROSYN) 250 mg tablet, Take 1 tablet by mouth every 12 (twelve) hours, Disp: , Rfl:     omeprazole (PriLOSEC) 40 MG capsule, Take 1 capsule by mouth daily, Disp: , Rfl:     topiramate (TOPAMAX) 100 mg tablet, Take 1 tablet by mouth, Disp: , Rfl:     busPIRone (BUSPAR) 7 5 mg tablet, Take 1 tablet (7 5 mg total) by mouth 2 (two) times a day, Disp: 60 tablet, Rfl: 0    [employer] VITAMIN D 2000 units CAPS, Take 1 capsule by mouth daily, Disp: , Rfl: 3    Linaclotide (LINZESS) 290 MCG CAPS, Take 1 capsule by mouth daily as needed Linzess 290 MCG Oral Capsule take 1 capsule daily  Quantity: 15; Refills: 0    Chao Alvarado MD;  Started 7-Dec-2015 Active , Disp: , Rfl:     polyethylene glycol (MIRALAX) 17 g packet, Take 17 g by mouth daily, Disp: 30 each, Rfl: 3    psyllium (METAMUCIL SMOOTH TEXTURE) 28 % packet, Take 1 packet by mouth 2 (two) times a day, Disp: 60 packet, Rfl: 0    senna (SENNA LAXATIVE) 8 6 MG tablet, Take 1 tablet (8 6 mg total) by mouth daily, Disp: 90 tablet, Rfl: 0    valACYclovir (VALTREX) 500 mg tablet, Take 1 tablet by mouth daily as needed, Disp: , Rfl:       Objective:      /72 (BP Location: Left arm, Patient Position: Sitting, Cuff Size: Standard)   Pulse 82   Resp 18   Ht 5' 3" (1 6 m)   Wt 79 4 kg (175 lb)   BMI 31 00 kg/m²          Physical Exam   Constitutional: She appears well-developed and well-nourished  Eyes: EOM are normal  Pupils are equal, round, and reactive to light  Neck: Normal range of motion  Neck supple  Cardiovascular: Normal rate and regular rhythm  Pulmonary/Chest: Effort normal and breath sounds normal    Musculoskeletal: Normal range of motion  She exhibits no edema or tenderness     Psychiatric:   Anxious, depressed

## 2018-07-18 LAB
CHLAMYDIA DNA CVX QL NAA+PROBE: NORMAL
HBV SURFACE AG SER QL: NORMAL
N GONORRHOEA DNA GENITAL QL NAA+PROBE: NORMAL
RPR SER QL: NORMAL

## 2018-07-18 RX ORDER — POLYETHYLENE GLYCOL 3350 17 G/17G
17 POWDER, FOR SOLUTION ORAL DAILY
Qty: 30 EACH | Refills: 0 | Status: SHIPPED | OUTPATIENT
Start: 2018-07-18 | End: 2019-01-15 | Stop reason: ALTCHOICE

## 2018-07-19 DIAGNOSIS — M79.7 FIBROMYALGIA: Primary | ICD-10-CM

## 2018-07-19 LAB
HIV 1+2 AB+HIV1 P24 AG SERPL QL IA: NORMAL
HSV SPEC CULT: NORMAL

## 2018-07-19 RX ORDER — CYCLOBENZAPRINE HCL 10 MG
10 TABLET ORAL
Qty: 30 TABLET | Refills: 0 | Status: SHIPPED | OUTPATIENT
Start: 2018-07-19 | End: 2018-08-19 | Stop reason: SDUPTHER

## 2018-07-27 DIAGNOSIS — J30.2 SEASONAL ALLERGIC RHINITIS, UNSPECIFIED TRIGGER: ICD-10-CM

## 2018-07-27 RX ORDER — AZELASTINE HYDROCHLORIDE 137 UG/1
SPRAY, METERED NASAL
Qty: 1 BOTTLE | Refills: 1 | Status: SHIPPED | OUTPATIENT
Start: 2018-07-27 | End: 2018-10-28 | Stop reason: SDUPTHER

## 2018-08-02 ENCOUNTER — TELEPHONE (OUTPATIENT)
Dept: FAMILY MEDICINE CLINIC | Facility: CLINIC | Age: 38
End: 2018-08-02

## 2018-08-03 DIAGNOSIS — R76.8 POSITIVE ANA (ANTINUCLEAR ANTIBODY): Primary | ICD-10-CM

## 2018-08-03 DIAGNOSIS — Z13.220 LIPID SCREENING: ICD-10-CM

## 2018-08-03 DIAGNOSIS — M79.7 FIBROMYALGIA: ICD-10-CM

## 2018-08-15 DIAGNOSIS — F41.9 ANXIETY: ICD-10-CM

## 2018-08-15 RX ORDER — BUSPIRONE HYDROCHLORIDE 7.5 MG/1
7.5 TABLET ORAL 2 TIMES DAILY
Qty: 60 TABLET | Refills: 0 | Status: SHIPPED | OUTPATIENT
Start: 2018-08-15 | End: 2018-12-12 | Stop reason: SDUPTHER

## 2018-08-19 DIAGNOSIS — M79.7 FIBROMYALGIA: ICD-10-CM

## 2018-08-20 RX ORDER — CYCLOBENZAPRINE HCL 10 MG
10 TABLET ORAL
Qty: 30 TABLET | Refills: 0 | Status: SHIPPED | OUTPATIENT
Start: 2018-08-20 | End: 2019-01-15 | Stop reason: SDUPTHER

## 2018-08-22 DIAGNOSIS — K59.04 CHRONIC IDIOPATHIC CONSTIPATION: ICD-10-CM

## 2018-08-22 DIAGNOSIS — K58.1 IRRITABLE BOWEL SYNDROME WITH CONSTIPATION: ICD-10-CM

## 2018-08-22 RX ORDER — SENNA PLUS 8.6 MG/1
1 TABLET ORAL DAILY
Qty: 90 TABLET | Refills: 0 | Status: SHIPPED | OUTPATIENT
Start: 2018-08-22 | End: 2019-01-02 | Stop reason: SDUPTHER

## 2018-10-02 DIAGNOSIS — F41.9 ANXIETY: ICD-10-CM

## 2018-10-03 RX ORDER — CLONAZEPAM 0.5 MG/1
TABLET ORAL
Qty: 30 TABLET | Refills: 0 | Status: SHIPPED | OUTPATIENT
Start: 2018-10-03 | End: 2018-10-30 | Stop reason: SDUPTHER

## 2018-10-23 DIAGNOSIS — K58.1 IRRITABLE BOWEL SYNDROME WITH CONSTIPATION: ICD-10-CM

## 2018-10-23 DIAGNOSIS — K59.04 CHRONIC IDIOPATHIC CONSTIPATION: ICD-10-CM

## 2018-10-23 RX ORDER — SENNOSIDES 8.6 MG
1 TABLET ORAL DAILY
Qty: 90 TABLET | Refills: 0 | Status: SHIPPED | OUTPATIENT
Start: 2018-10-23 | End: 2019-01-15 | Stop reason: SDUPTHER

## 2018-10-28 DIAGNOSIS — J30.2 SEASONAL ALLERGIC RHINITIS, UNSPECIFIED TRIGGER: ICD-10-CM

## 2018-10-29 RX ORDER — AZELASTINE HYDROCHLORIDE 137 UG/1
SPRAY, METERED NASAL
Qty: 1 BOTTLE | Refills: 1 | Status: SHIPPED | OUTPATIENT
Start: 2018-10-29 | End: 2019-01-06 | Stop reason: SDUPTHER

## 2018-10-30 DIAGNOSIS — F41.9 ANXIETY: ICD-10-CM

## 2018-10-30 RX ORDER — CLONAZEPAM 0.5 MG/1
TABLET ORAL
Qty: 30 TABLET | Refills: 0 | Status: SHIPPED | OUTPATIENT
Start: 2018-10-30 | End: 2018-12-12 | Stop reason: SDUPTHER

## 2018-11-02 DIAGNOSIS — G43.709 CHRONIC MIGRAINE WITHOUT AURA WITHOUT STATUS MIGRAINOSUS, NOT INTRACTABLE: ICD-10-CM

## 2018-11-02 RX ORDER — TOPIRAMATE 50 MG/1
TABLET, FILM COATED ORAL
Qty: 90 TABLET | Refills: 2 | Status: SHIPPED | OUTPATIENT
Start: 2018-11-02 | End: 2019-01-02 | Stop reason: SDUPTHER

## 2018-12-12 DIAGNOSIS — F41.9 ANXIETY: ICD-10-CM

## 2018-12-12 RX ORDER — CLONAZEPAM 0.5 MG/1
0.5 TABLET ORAL DAILY
Qty: 30 TABLET | Refills: 0 | Status: SHIPPED | OUTPATIENT
Start: 2018-12-12 | End: 2019-01-12 | Stop reason: SDUPTHER

## 2018-12-12 RX ORDER — BUSPIRONE HYDROCHLORIDE 7.5 MG/1
7.5 TABLET ORAL 2 TIMES DAILY
Qty: 60 TABLET | Refills: 5 | Status: SHIPPED | OUTPATIENT
Start: 2018-12-12 | End: 2019-06-06 | Stop reason: SDUPTHER

## 2019-01-02 ENCOUNTER — TELEPHONE (OUTPATIENT)
Dept: OTHER | Facility: OTHER | Age: 39
End: 2019-01-02

## 2019-01-02 ENCOUNTER — OFFICE VISIT (OUTPATIENT)
Dept: URGENT CARE | Age: 39
End: 2019-01-02
Payer: COMMERCIAL

## 2019-01-02 VITALS
OXYGEN SATURATION: 97 % | BODY MASS INDEX: 31 KG/M2 | SYSTOLIC BLOOD PRESSURE: 105 MMHG | HEART RATE: 75 BPM | WEIGHT: 175 LBS | RESPIRATION RATE: 20 BRPM | TEMPERATURE: 98.5 F | DIASTOLIC BLOOD PRESSURE: 52 MMHG

## 2019-01-02 DIAGNOSIS — J02.9 SORE THROAT: ICD-10-CM

## 2019-01-02 DIAGNOSIS — J02.0 ACUTE STREPTOCOCCAL PHARYNGITIS: Primary | ICD-10-CM

## 2019-01-02 LAB — S PYO AG THROAT QL: POSITIVE

## 2019-01-02 PROCEDURE — 99283 EMERGENCY DEPT VISIT LOW MDM: CPT | Performed by: FAMILY MEDICINE

## 2019-01-02 PROCEDURE — 87430 STREP A AG IA: CPT | Performed by: FAMILY MEDICINE

## 2019-01-02 PROCEDURE — G0382 LEV 3 HOSP TYPE B ED VISIT: HCPCS | Performed by: FAMILY MEDICINE

## 2019-01-02 PROCEDURE — 99213 OFFICE O/P EST LOW 20 MIN: CPT | Performed by: FAMILY MEDICINE

## 2019-01-02 RX ORDER — AMOXICILLIN 500 MG/1
500 CAPSULE ORAL EVERY 8 HOURS SCHEDULED
Qty: 30 CAPSULE | Refills: 0 | Status: SHIPPED | OUTPATIENT
Start: 2019-01-02 | End: 2019-01-12

## 2019-01-02 NOTE — PROGRESS NOTES
[facility]        NAME: Perlita Roger is a 41 y o  female  : 1980    MRN: [id number]  DATE: 2019  TIME: 3:26 PM    Assessment and Plan   Sore throat [J02 9]  1  Sore throat  POCT rapid strepA   2  Acute streptococcal pharyngitis  amoxicillin (AMOXIL) 500 mg capsule         Patient Instructions       Follow up with PCP in 3-5 days  Proceed to  ER if symptoms worsen  Chief Complaint     Chief Complaint   Patient presents with    Sore Throat     bialteral ear pain for 3 days   Cough    Headache    Earache    Generalized Body Aches         History of Present Illness       Sore Throat    This is a new problem  The current episode started yesterday  The problem has been unchanged  There has been no fever  Associated symptoms include abdominal pain, headaches and stridor  Pertinent negatives include no congestion, coughing, diarrhea, drooling, ear discharge, ear pain, hoarse voice, plugged ear sensation, neck pain, shortness of breath, swollen glands, trouble swallowing or vomiting  She has had no exposure to strep or mono  The treatment provided mild relief  Review of Systems   Review of Systems   Constitutional: Negative for chills, fatigue and fever  HENT: Positive for sore throat  Negative for congestion, drooling, ear discharge, ear pain, hearing loss, hoarse voice, postnasal drip, sinus pain, sinus pressure and trouble swallowing  Eyes: Negative for pain and discharge  Respiratory: Positive for stridor  Negative for cough, chest tightness and shortness of breath  Cardiovascular: Negative for chest pain  Gastrointestinal: Positive for abdominal pain  Negative for constipation, diarrhea, nausea and vomiting  Genitourinary: Negative for difficulty urinating  Musculoskeletal: Negative for arthralgias, myalgias and neck pain  Skin: Negative for rash  Neurological: Positive for headaches  Negative for dizziness     Psychiatric/Behavioral: Negative for behavioral problems           Current Medications       Current Outpatient Prescriptions:     Azelastine HCl 137 MCG/SPRAY SOLN, 2 SPRAYS INTO EACH NOSTRIL TWICE A DAY, Disp: 1 Bottle, Rfl: 1    busPIRone (BUSPAR) 7 5 mg tablet, Take 1 tablet (7 5 mg total) by mouth 2 (two) times a day, Disp: 60 tablet, Rfl: 5    clonazePAM (KlonoPIN) 0 5 mg tablet, Take 1 tablet (0 5 mg total) by mouth daily, Disp: 30 tablet, Rfl: 0    CVS SENNA 8 6 MG tablet, TAKE 1 TABLET (8 6 MG TOTAL) BY MOUTH DAILY, Disp: 90 tablet, Rfl: 0    cyclobenzaprine (FLEXERIL) 10 mg tablet, TAKE 1 TABLET (10 MG TOTAL) BY MOUTH DAILY AT BEDTIME, Disp: 30 tablet, Rfl: 0    dicyclomine (BENTYL) 20 mg tablet, TAKE 1 TABLET EVERY 6 HOURS AS NEEDED, Disp: 90 tablet, Rfl: 0    Lactobacillus (ACIDOPHILUS PROBIOTIC PO), Take 1 tablet by mouth daily, Disp: , Rfl:     levonorgestrel (MIRENA) 20 MCG/24HR IUD, Mirena 20 MCG/24HR Intrauterine Intrauterine Device  Refills: 0  Active, Disp: , Rfl:     Melatonin 5 MG CAPS, Take 1 capsule by mouth, Disp: , Rfl:     montelukast (SINGULAIR) 10 mg tablet, Take 1 tablet by mouth daily as needed, Disp: , Rfl:     naproxen (NAPROSYN) 250 mg tablet, Take 1 tablet by mouth every 12 (twelve) hours, Disp: , Rfl:     nystatin-triamcinolone (MYCOLOG-II) ointment, Apply topically 2 (two) times a day, Disp: 30 g, Rfl: 0    omeprazole (PriLOSEC) 40 MG capsule, Take 1 capsule by mouth daily, Disp: , Rfl:     psyllium (METAMUCIL SMOOTH TEXTURE) 28 % packet, Take 1 packet by mouth 2 (two) times a day, Disp: 60 packet, Rfl: 0    topiramate (TOPAMAX) 100 mg tablet, Take 1 tablet by mouth, Disp: , Rfl:     valACYclovir (VALTREX) 500 mg tablet, Take 1 tablet by mouth daily as needed, Disp: , Rfl:     amoxicillin (AMOXIL) 500 mg capsule, Take 1 capsule (500 mg total) by mouth every 8 (eight) hours for 10 days, Disp: 30 capsule, Rfl: 0    polyethylene glycol (MIRALAX) 17 g packet, Take 17 g by mouth daily (Patient not taking: Reported on 1/2/2019 ), Disp: 30 each, Rfl: 0    Current Allergies     Allergies as of 01/02/2019 - Reviewed 01/02/2019   Allergen Reaction Noted    Cymbalta [duloxetine hcl] Nausea Only and GI Intolerance 11/18/2015            The following portions of the patient's history were reviewed and updated as appropriate: allergies, current medications, past family history, past medical history, past social history, past surgical history and problem list      Past Medical History:   Diagnosis Date    Allergic rhinitis     Anemia     Anxiety     Depression     Fibromyalgia     Fibromyalgia     GERD (gastroesophageal reflux disease)     Hematochezia     Herpes simplex infection     Hypertension     IBS (irritable bowel syndrome)     Insomnia     Mental status change     Migraine     Obesity 8/6/2015    Scoliosis     Scoliosis        Past Surgical History:   Procedure Laterality Date    EXPLORATORY LAPAROTOMY      WISDOM TOOTH EXTRACTION         Family History   Problem Relation Age of Onset    Stroke Mother     Hypertension Mother     Psoriasis Mother     Breast cancer Family     Diabetes type I Son          Medications have been verified  Objective   /52   Pulse 75   Temp 98 5 °F (36 9 °C) (Temporal)   Resp 20   Wt 79 4 kg (175 lb)   SpO2 97%   BMI 31 00 kg/m²        Physical Exam     Physical Exam   Constitutional: She is oriented to person, place, and time  She appears well-developed and well-nourished  HENT:   Right Ear: Tympanic membrane and external ear normal    Left Ear: Tympanic membrane and external ear normal    Mouth/Throat: Uvula is midline and mucous membranes are normal  Oropharyngeal exudate, posterior oropharyngeal edema and posterior oropharyngeal erythema present  Neck: Normal range of motion  No edema present  Cardiovascular: Normal rate, regular rhythm, S1 normal, S2 normal and normal heart sounds  No murmur heard    Pulmonary/Chest: Effort normal and breath sounds normal  No respiratory distress  She has no wheezes  She has no rales  She exhibits no tenderness  Lymphadenopathy:     She has no cervical adenopathy  Neurological: She is alert and oriented to person, place, and time  Skin: Skin is warm, dry and intact  No rash noted  Psychiatric: She has a normal mood and affect  Her speech is normal and behavior is normal    Nursing note and vitals reviewed

## 2019-01-02 NOTE — PATIENT INSTRUCTIONS

## 2019-01-06 DIAGNOSIS — J30.2 SEASONAL ALLERGIC RHINITIS, UNSPECIFIED TRIGGER: ICD-10-CM

## 2019-01-07 RX ORDER — AZELASTINE HYDROCHLORIDE 137 UG/1
SPRAY, METERED NASAL
Qty: 1 BOTTLE | Refills: 1 | Status: SHIPPED | OUTPATIENT
Start: 2019-01-07 | End: 2019-06-27 | Stop reason: SDUPTHER

## 2019-01-08 ENCOUNTER — OFFICE VISIT (OUTPATIENT)
Dept: OBGYN CLINIC | Facility: CLINIC | Age: 39
End: 2019-01-08

## 2019-01-08 VITALS
SYSTOLIC BLOOD PRESSURE: 116 MMHG | HEART RATE: 74 BPM | BODY MASS INDEX: 31.18 KG/M2 | HEIGHT: 63 IN | WEIGHT: 176 LBS | DIASTOLIC BLOOD PRESSURE: 71 MMHG

## 2019-01-08 DIAGNOSIS — Z11.3 SCREEN FOR SEXUALLY TRANSMITTED DISEASES: ICD-10-CM

## 2019-01-08 DIAGNOSIS — B37.3 VULVOVAGINAL CANDIDIASIS: ICD-10-CM

## 2019-01-08 DIAGNOSIS — R30.0 DYSURIA: ICD-10-CM

## 2019-01-08 DIAGNOSIS — Z72.51 HIGH RISK SEXUAL BEHAVIOR, UNSPECIFIED TYPE: Primary | ICD-10-CM

## 2019-01-08 PROCEDURE — 99213 OFFICE O/P EST LOW 20 MIN: CPT | Performed by: OBSTETRICS & GYNECOLOGY

## 2019-01-08 PROCEDURE — 87086 URINE CULTURE/COLONY COUNT: CPT | Performed by: OBSTETRICS & GYNECOLOGY

## 2019-01-08 NOTE — PROGRESS NOTES
Assessment:  41 y o   sexually active female w/LMP of 18 and BC of Mirena p/w vaginal itching      Plan:  Vaginal itching: workup negative; RTC prn  Consider genital cx at next visit if sx persist  UPT: negative    RTC prn    Subjective:    Time of sx onset: 19 (start of amoxicillin - Strep [id number]/31/18)    Color of vaginal discharge: none    Constitutional sx:    Fever: no   Chills: no      Objective:    Vitals:    19 1343   BP: 116/71   Pulse: 74   Weight: 79 8 kg (176 lb)   Height: 5' 3" (1 6 m)     Lab Results   Component Value Date    WBC 4 69 2015    HGB 14 5 2015    HCT 42 6 2015    MCV 93 2015     2015     Lab Results   Component Value Date    GLUCOSE 91 2015    CALCIUM 9 4 10/19/2017     2015    K 3 8 10/19/2017    CO2 27 10/19/2017     10/19/2017    BUN 14 10/19/2017    CREATININE 0 94 10/19/2017     Lab Results   Component Value Date    CREATININE 0 94 10/19/2017     Lab Results   Component Value Date    ALT 49 10/19/2017    AST 23 10/19/2017    ALKPHOS 73 10/19/2017    BILITOT 0 70 2015         Patient's Medications   New Prescriptions    No medications on file   Previous Medications    AMOXICILLIN (AMOXIL) 500 MG CAPSULE    Take 1 capsule (500 mg total) by mouth every 8 (eight) hours for 10 days    AZELASTINE  MCG/SPRAY SOLN    2 SPRAYS INTO EACH NOSTRIL TWICE A DAY    BUSPIRONE (BUSPAR) 7 5 MG TABLET    Take 1 tablet (7 5 mg total) by mouth 2 (two) times a day    CLONAZEPAM (KLONOPIN) 0 5 MG TABLET    Take 1 tablet (0 5 mg total) by mouth daily    [employer]S SENNA 8 6 MG TABLET    TAKE 1 TABLET (8 6 MG TOTAL) BY MOUTH DAILY    CYCLOBENZAPRINE (FLEXERIL) 10 MG TABLET    TAKE 1 TABLET (10 MG TOTAL) BY MOUTH DAILY AT BEDTIME    DICYCLOMINE (BENTYL) 20 MG TABLET    TAKE 1 TABLET EVERY 6 HOURS AS NEEDED    LACTOBACILLUS (ACIDOPHILUS PROBIOTIC PO)    Take 1 tablet by mouth daily    LEVONORGESTREL (MIRENA) 20 MCG/24HR IUD Mirena 20 MCG/24HR Intrauterine Intrauterine Device  Refills: 0  Active    MELATONIN 5 MG CAPS    Take 1 capsule by mouth    MONTELUKAST (SINGULAIR) 10 MG TABLET    Take 1 tablet by mouth daily as needed    NAPROXEN (NAPROSYN) 250 MG TABLET    Take 1 tablet by mouth every 12 (twelve) hours    NYSTATIN-TRIAMCINOLONE (MYCOLOG-II) OINTMENT    Apply topically 2 (two) times a day    OMEPRAZOLE (PRILOSEC) 40 MG CAPSULE    Take 1 capsule by mouth daily    POLYETHYLENE GLYCOL (MIRALAX) 17 G PACKET    Take 17 g by mouth daily    PSYLLIUM (METAMUCIL SMOOTH TEXTURE) 28 % PACKET    Take 1 packet by mouth 2 (two) times a day    TOPIRAMATE (TOPAMAX) 100 MG TABLET    Take 1 tablet by mouth    VALACYCLOVIR (VALTREX) 500 MG TABLET    Take 1 tablet by mouth daily as needed   Modified Medications    No medications on file   Discontinued Medications    No medications on file         Physical Exam:  General: No Acute Distress      Inspection:    Labia minora: Atrophy: no    Erythema: no    Exudate: no    Discharge: no    Warts: no    Rash: no    Speculum exam:    Vagina:    Vaginal mucopurulent discharge: no    Warts: no    Blood in posterior vault: no    Purulent fluid in posterior vault: no    Smoothening of rugae present: no   Cervix:     Endocervical purulent discharge: no    Cervical friability: no    Cervical lesions or petichiae: no    Bleeding from the os: no      Urine dip:    protein absent   ketones absent   nitrites neg    leuks moderate    all other measured parameters negative         Microscopy:   1 drop of normal saline applied to slide prepared from swab collected from vaginal sidewall fluid:      No motile trichomonads visualized when viewed at 40x power on Brightfield microscopy      Amsel's criteria: 0 /4     absence of greyish-white, thin, homogenous discharge that smoothly coats the vaginal walls     pH <4 5     Negative Whiff test     Absence of clue cells when viewed at 40x power on Brightfield microscopy     1 drop of KOH applied to slide prepared from swab collected from vaginal sidewall fluid revealed an absence of pseudohyphae when viewed at 40x power on Brightfield microscopy

## 2019-01-09 LAB — BACTERIA UR CULT: NORMAL

## 2019-01-12 DIAGNOSIS — F41.9 ANXIETY: ICD-10-CM

## 2019-01-14 RX ORDER — CLONAZEPAM 0.5 MG/1
TABLET ORAL
Qty: 30 TABLET | Refills: 0 | Status: SHIPPED | OUTPATIENT
Start: 2019-01-14 | End: 2019-02-17 | Stop reason: SDUPTHER

## 2019-01-15 ENCOUNTER — OFFICE VISIT (OUTPATIENT)
Dept: FAMILY MEDICINE CLINIC | Facility: CLINIC | Age: 39
End: 2019-01-15
Payer: COMMERCIAL

## 2019-01-15 VITALS
OXYGEN SATURATION: 98 % | RESPIRATION RATE: 18 BRPM | BODY MASS INDEX: 31.71 KG/M2 | DIASTOLIC BLOOD PRESSURE: 74 MMHG | WEIGHT: 179 LBS | TEMPERATURE: 97 F | HEART RATE: 90 BPM | HEIGHT: 63 IN | SYSTOLIC BLOOD PRESSURE: 116 MMHG

## 2019-01-15 DIAGNOSIS — K59.04 CHRONIC IDIOPATHIC CONSTIPATION: ICD-10-CM

## 2019-01-15 DIAGNOSIS — Z23 NEED FOR VACCINATION: ICD-10-CM

## 2019-01-15 DIAGNOSIS — K58.1 IRRITABLE BOWEL SYNDROME WITH CONSTIPATION: ICD-10-CM

## 2019-01-15 DIAGNOSIS — M79.7 FIBROMYALGIA: ICD-10-CM

## 2019-01-15 DIAGNOSIS — Z00.00 ANNUAL PHYSICAL EXAM: Primary | ICD-10-CM

## 2019-01-15 DIAGNOSIS — N76.0 ACUTE VAGINITIS: ICD-10-CM

## 2019-01-15 PROCEDURE — 99395 PREV VISIT EST AGE 18-39: CPT | Performed by: FAMILY MEDICINE

## 2019-01-15 PROCEDURE — 86580 TB INTRADERMAL TEST: CPT

## 2019-01-15 RX ORDER — FLUCONAZOLE 150 MG/1
150 TABLET ORAL ONCE
Qty: 1 TABLET | Refills: 0 | Status: SHIPPED | OUTPATIENT
Start: 2019-01-15 | End: 2019-01-15

## 2019-01-15 RX ORDER — CYCLOBENZAPRINE HCL 10 MG
10 TABLET ORAL
Qty: 30 TABLET | Refills: 0 | Status: SHIPPED | OUTPATIENT
Start: 2019-01-15 | End: 2019-03-12 | Stop reason: SDUPTHER

## 2019-01-15 RX ORDER — SENNA PLUS 8.6 MG/1
1 TABLET ORAL DAILY
Qty: 90 TABLET | Refills: 0 | Status: SHIPPED | OUTPATIENT
Start: 2019-01-15 | End: 2019-08-28 | Stop reason: SDUPTHER

## 2019-01-15 NOTE — PROGRESS NOTES
ADULT ANNUAL PHYSICAL  [facility] - [facility]    NAME: Perlita Roger  AGE: 41 y o  SEX: female  : 1980     DATE: 1/15/2019     Assessment and Plan:     Problem List Items Addressed This Visit        Digestive    Irritable bowel syndrome    Relevant Medications    senna ([facility] SENNA) 8 6 MG tablet       Other    Constipation    Relevant Medications    senna ([employer]S SENNA) 8 6 MG tablet    Fibromyalgia    Relevant Medications    cyclobenzaprine (FLEXERIL) 10 mg tablet      Other Visit Diagnoses     Annual physical exam    -  Primary    Relevant Orders    Comprehensive metabolic panel    Lipid Panel with Direct LDL reflex    Vitamin D 25 hydroxy    Need for vaccination        Relevant Orders    TB Skin Test (Completed)    Acute vaginitis        Relevant Medications    fluconazole (DIFLUCAN) 150 mg tablet          Health maintenance and preventative care screenings were discussed with patient today  Appropriate education was printed on patient's after visit summary  · Discussed risks/benefits of screening for cervical cancer, chlamydia/gonorrhea, high cholesterol, diabetes and HIV  Patient is up-to-date with their preventive screenings  · Immunizations were reviewed: patient is up-to-date with her immunizations  Counseling:  Dental Health: discussed importance of regular tooth brushing, flossing, and dental visits  BMI Counseling: Body mass index is 31 71 kg/m²  Discussed the patient's BMI with her  The BMI is above average  BMI counseling and education was provided to the patient  Nutrition recommendations include reducing portion sizes, decreasing overall calorie intake, 3-5 servings of fruits/vegetables daily, reducing fast food intake, consuming healthier snacks, decreasing soda and/or juice intake, moderation in carbohydrate intake, increasing intake of lean protein, reducing intake of saturated fat and trans fat and reducing intake of cholesterol   Exercise recommendations include moderate aerobic physical activity for 150 minutes/week  Sexual health: discussed sexually transmitted diseases, partner selection, use of condoms, avoidance of unintended pregnancy, and contraceptive alternatives  · Alcohol/drug use: discussed moderation in alcohol intake and avoidance of illicit drug use  Return in about 6 months (around 7/15/2019) for Recheck  Chief Complaint:     Chief Complaint   Patient presents with    Annual Exam      History of Present Illness:     Adult Annual Physical   Patient here for a comprehensive physical exam  The patient reports no problems  Diet and Physical Activity  · Diet/Nutrition: well balanced diet and consuming 3-5 servings of fruits/vegetables daily  · Weight concerns: patient has class 1 obesity (BMI 30-34 9)  · Exercise: 1-2 times a week on average  Depression Screening  PHQ-9 Depression Screening    PHQ-9:    Frequency of the following problems over the past two weeks:            General Health  · Sleep: sleeps well  · Hearing: normal - bilateral   · Vision: goes for regular eye exams and most recent eye exam <1 year ago  · Dental: regular dental visits and brushes teeth twice daily  /GYN Health  · Last menstrual period:   · Contraceptive method: IUD placement  · History of STDs?: no      Review of Systems:     Review of Systems   Constitutional: Negative  HENT: Negative  Eyes: Negative  Respiratory: Negative  Cardiovascular: Negative  Gastrointestinal: Negative  Endocrine: Negative  Genitourinary: Negative  Musculoskeletal: Negative  Skin: Negative  Allergic/Immunologic: Negative  Neurological: Negative  Hematological: Negative  Psychiatric/Behavioral: Negative         Past Medical History:     Past Medical History:   Diagnosis Date    Allergic rhinitis     Anemia     Anxiety     Depression     Fibromyalgia     Fibromyalgia     GERD (gastroesophageal reflux disease)  Hematochezia     Herpes simplex infection     Hypertension     IBS (irritable bowel syndrome)     Insomnia     Mental status change     Migraine     Obesity 8/6/2015    Scoliosis     Scoliosis       Past Surgical History:     Past Surgical History:   Procedure Laterality Date    EXPLORATORY LAPAROTOMY      WISDOM TOOTH EXTRACTION        Social History:     Social History     Social History    Marital status: Single     Spouse name: N/A    Number of children: N/A    Years of education: completed 12th grade     Occupational History    home health aide      Social History Main Topics    Smoking status: Former Smoker     Types: Cigarettes    Smokeless tobacco: Never Used      Comment: casual, current some day smoker per allscripts    Alcohol use 2 4 oz/week     4 Glasses of wine per week      Comment: yearly, social 1-2 drinks per month per allscripts    Drug use: No    Sexual activity: Yes     Partners: Male     Birth control/ protection: IUD     Other Topics Concern    None     Social History Narrative    None      Family History:     Family History   Problem Relation Age of Onset    Stroke Mother     Hypertension Mother     Psoriasis Mother     Breast cancer Family     Diabetes type I Son       Current Medications:     Current Outpatient Prescriptions   Medication Sig Dispense Refill    Azelastine HCl 137 MCG/SPRAY SOLN 2 SPRAYS INTO EACH NOSTRIL TWICE A DAY 1 Bottle 1    busPIRone (BUSPAR) 7 5 mg tablet Take 1 tablet (7 5 mg total) by mouth 2 (two) times a day 60 tablet 5    clonazePAM (KlonoPIN) 0 5 mg tablet TAKE 1 TABLET BY MOUTH EVERY DAY 30 tablet 0    cyclobenzaprine (FLEXERIL) 10 mg tablet Take 1 tablet (10 mg total) by mouth daily at bedtime 30 tablet 0    dicyclomine (BENTYL) 20 mg tablet TAKE 1 TABLET EVERY 6 HOURS AS NEEDED 90 tablet 0    Lactobacillus (ACIDOPHILUS PROBIOTIC PO) Take 1 tablet by mouth daily      levonorgestrel (MIRENA) 20 MCG/24HR IUD Mirena 20 MCG/24HR Intrauterine Intrauterine Device  Refills: 0  Active      Melatonin 5 MG CAPS Take 1 capsule by mouth      montelukast (SINGULAIR) 10 mg tablet Take 1 tablet by mouth daily as needed      naproxen (NAPROSYN) 250 mg tablet Take 1 tablet by mouth every 12 (twelve) hours      nystatin-triamcinolone (MYCOLOG-II) ointment Apply topically 2 (two) times a day 30 g 0    omeprazole (PriLOSEC) 40 MG capsule Take 1 capsule by mouth daily      psyllium (METAMUCIL SMOOTH TEXTURE) 28 % packet Take 1 packet by mouth 2 (two) times a day 60 packet 0    senna (CVS SENNA) 8 6 MG tablet Take 1 tablet (8 6 mg total) by mouth daily 90 tablet 0    valACYclovir (VALTREX) 500 mg tablet Take 1 tablet by mouth daily as needed      fluconazole (DIFLUCAN) 150 mg tablet Take 1 tablet (150 mg total) by mouth once for 1 dose 1 tablet 0     No current facility-administered medications for this visit  Allergies: Allergies   Allergen Reactions    Cymbalta [Duloxetine Hcl] Nausea Only and GI Intolerance     Stomach upset      Objective:     /74 (BP Location: Left arm, Patient Position: Sitting, Cuff Size: Standard)   Pulse 90   Temp (!) 97 °F (36 1 °C)   Resp 18   Ht 5' 3" (1 6 m)   Wt 81 2 kg (179 lb)   LMP 12/28/2018   SpO2 98%   BMI 31 71 kg/m²     Physical Exam   Constitutional: She is oriented to person, place, and time  She appears well-developed and well-nourished  HENT:   Head: Normocephalic and atraumatic  Eyes: Pupils are equal, round, and reactive to light  EOM are normal    Neck: Normal range of motion  Neck supple  Cardiovascular: Normal rate and regular rhythm  Pulmonary/Chest: Effort normal and breath sounds normal  No respiratory distress  She has no wheezes  Abdominal: Soft  Bowel sounds are normal    Musculoskeletal: Normal range of motion  She exhibits no edema or deformity  Neurological: She is alert and oriented to person, place, and time  Skin: Skin is warm     Psychiatric: She has a normal mood and affect   Her behavior is normal         Health Maintenance:     Health Maintenance   Topic Date Due    INFLUENZA VACCINE  10/01/2019 (Originally 7/1/2018)    Depression Screening PHQ  01/02/2020    PAP SMEAR  07/30/2020    DTaP,Tdap,and Td Vaccines (2 - Td) 10/08/2023     Immunization History   Administered Date(s) Administered    Influenza 10/13/2015, 11/07/2017    Influenza Quadrivalent Preservative Free 3 years and older IM 10/13/2015, 11/07/2017    Influenza TIV (IM) 10/08/2013    Tdap 10/08/2013    Tuberculin Skin Test-PPD Intradermal 11/13/2017, 01/15/2019       Salina Vivas MD  [address] [facility]

## 2019-01-15 NOTE — PATIENT INSTRUCTIONS

## 2019-01-17 ENCOUNTER — CLINICAL SUPPORT (OUTPATIENT)
Dept: FAMILY MEDICINE CLINIC | Facility: CLINIC | Age: 39
End: 2019-01-17

## 2019-01-17 DIAGNOSIS — Z11.1 SCREENING FOR TUBERCULOSIS: Primary | ICD-10-CM

## 2019-01-17 LAB
INDURATION: 0 MM
TB SKIN TEST: NEGATIVE

## 2019-01-17 PROCEDURE — 3510F DOC TB SCRNG-RSLTS INTERPD: CPT

## 2019-01-21 NOTE — PROGRESS NOTES
Assessment/Plan:    No problem-specific Assessment & Plan notes found for this encounter  Diagnoses and all orders for this visit:    Screening for tuberculosis          Subjective:      Patient ID: Perlita Roger is a 41 y o  female      HPI        Review of Systems      Objective:      LMP 12/28/2018          Physical Exam

## 2019-01-31 DIAGNOSIS — F51.01 PRIMARY INSOMNIA: Primary | ICD-10-CM

## 2019-01-31 RX ORDER — MELATONIN 5 MG
CAPSULE ORAL
Qty: 90 CAPSULE | Refills: 0 | Status: SHIPPED | OUTPATIENT
Start: 2019-01-31 | End: 2019-02-01 | Stop reason: SDUPTHER

## 2019-02-01 DIAGNOSIS — F51.01 PRIMARY INSOMNIA: ICD-10-CM

## 2019-02-01 RX ORDER — MELATONIN 5 MG
CAPSULE ORAL
Qty: 90 CAPSULE | Refills: 0 | Status: SHIPPED | OUTPATIENT
Start: 2019-02-01 | End: 2019-08-28 | Stop reason: SDUPTHER

## 2019-02-13 ENCOUNTER — TELEPHONE (OUTPATIENT)
Dept: FAMILY MEDICINE CLINIC | Facility: CLINIC | Age: 39
End: 2019-02-13

## 2019-02-13 DIAGNOSIS — M79.7 FIBROMYALGIA: Primary | ICD-10-CM

## 2019-02-13 RX ORDER — TRAMADOL HYDROCHLORIDE 50 MG/1
50 TABLET ORAL EVERY 6 HOURS PRN
Qty: 30 TABLET | Refills: 0 | Status: SHIPPED | OUTPATIENT
Start: 2019-02-13 | End: 2019-02-13 | Stop reason: SDUPTHER

## 2019-02-13 RX ORDER — TRAMADOL HYDROCHLORIDE 50 MG/1
50 TABLET ORAL EVERY 6 HOURS PRN
Qty: 30 TABLET | Refills: 0 | Status: SHIPPED | OUTPATIENT
Start: 2019-02-13 | End: 2019-04-15 | Stop reason: SDUPTHER

## 2019-02-13 NOTE — TELEPHONE ENCOUNTER
Patient left voice message on the script line needing a refill on Tramadol    I do not see it on her medication list and it didn't come up on PDMP

## 2019-02-17 DIAGNOSIS — M25.50 ARTHRALGIA, UNSPECIFIED JOINT: Primary | ICD-10-CM

## 2019-02-17 DIAGNOSIS — F41.9 ANXIETY: ICD-10-CM

## 2019-02-18 RX ORDER — NAPROXEN 250 MG/1
TABLET ORAL
Qty: 60 TABLET | Refills: 4 | Status: SHIPPED | OUTPATIENT
Start: 2019-02-18 | End: 2020-02-26

## 2019-02-18 RX ORDER — CLONAZEPAM 0.5 MG/1
TABLET ORAL
Qty: 30 TABLET | Refills: 0 | Status: SHIPPED | OUTPATIENT
Start: 2019-02-18 | End: 2019-05-30 | Stop reason: SDUPTHER

## 2019-03-12 ENCOUNTER — OFFICE VISIT (OUTPATIENT)
Dept: FAMILY MEDICINE CLINIC | Facility: CLINIC | Age: 39
End: 2019-03-12
Payer: COMMERCIAL

## 2019-03-12 VITALS
HEART RATE: 97 BPM | TEMPERATURE: 98.3 F | HEIGHT: 63 IN | RESPIRATION RATE: 16 BRPM | SYSTOLIC BLOOD PRESSURE: 122 MMHG | BODY MASS INDEX: 30.83 KG/M2 | WEIGHT: 174 LBS | OXYGEN SATURATION: 98 % | DIASTOLIC BLOOD PRESSURE: 80 MMHG

## 2019-03-12 DIAGNOSIS — M22.42 CHONDROMALACIA OF BOTH PATELLAE: ICD-10-CM

## 2019-03-12 DIAGNOSIS — K21.9 GASTROESOPHAGEAL REFLUX DISEASE WITHOUT ESOPHAGITIS: ICD-10-CM

## 2019-03-12 DIAGNOSIS — F41.9 ANXIETY: Primary | ICD-10-CM

## 2019-03-12 DIAGNOSIS — M22.41 CHONDROMALACIA OF BOTH PATELLAE: ICD-10-CM

## 2019-03-12 DIAGNOSIS — M79.7 FIBROMYALGIA: ICD-10-CM

## 2019-03-12 DIAGNOSIS — R76.8 POSITIVE ANA (ANTINUCLEAR ANTIBODY): ICD-10-CM

## 2019-03-12 PROCEDURE — 99214 OFFICE O/P EST MOD 30 MIN: CPT | Performed by: FAMILY MEDICINE

## 2019-03-12 PROCEDURE — 3008F BODY MASS INDEX DOCD: CPT | Performed by: FAMILY MEDICINE

## 2019-03-12 RX ORDER — OXYCODONE HYDROCHLORIDE AND ACETAMINOPHEN 5; 325 MG/1; MG/1
1 TABLET ORAL EVERY 4 HOURS PRN
Qty: 20 TABLET | Refills: 0 | Status: SHIPPED | OUTPATIENT
Start: 2019-03-12 | End: 2019-04-30

## 2019-03-12 RX ORDER — CYCLOBENZAPRINE HCL 10 MG
10 TABLET ORAL
Qty: 90 TABLET | Refills: 0 | Status: SHIPPED | OUTPATIENT
Start: 2019-03-12 | End: 2019-12-08 | Stop reason: SDUPTHER

## 2019-03-12 RX ORDER — RANITIDINE 150 MG/1
150 CAPSULE ORAL 2 TIMES DAILY
Qty: 60 CAPSULE | Refills: 0 | Status: SHIPPED | OUTPATIENT
Start: 2019-03-12 | End: 2019-04-30

## 2019-03-12 NOTE — PROGRESS NOTES
Assessment/Plan:         Diagnoses and all orders for this visit:    Anxiety    Gastroesophageal reflux disease without esophagitis  -     ranitidine (ZANTAC) 150 MG capsule; Take 1 capsule (150 mg total) by mouth 2 (two) times a day    Chondromalacia of both patellae  -     Ambulatory referral to Orthopedic Surgery  -     oxyCODONE-acetaminophen (PERCOCET) 5-325 mg per tablet; Take 1 tablet by mouth every 4 (four) hours as needed for moderate painMax Daily Amount: 6 tablets    Fibromyalgia  -     cyclobenzaprine (FLEXERIL) 10 mg tablet; Take 1 tablet (10 mg total) by mouth daily at bedtime  -     oxyCODONE-acetaminophen (PERCOCET) 5-325 mg per tablet; Take 1 tablet by mouth every 4 (four) hours as needed for moderate painMax Daily Amount: 6 tablets  -     Ambulatory referral to Rheumatology    Positive JUAN C (antinuclear antibody)  -     Ambulatory referral to Rheumatology      to use percocet sparingly  To see Dr Afshan Naylor for her     Subjective:      Patient ID: Curtis Arroyo is a 41 y o  female  Worsening bilateral upper back pain and knee pain for the few weeks   Naproxen and Tramadol weren't helping   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    Heartburn   She complains of heartburn  She reports no abdominal pain, no belching, no chest pain, no choking, no coughing, no dysphagia, no globus sensation, no hoarse voice, no nausea, no sore throat, no stridor, no tooth decay, no water brash or no wheezing  This is a recurrent problem  The current episode started in the past 7 days  The problem occurs occasionally  The problem has been waxing and waning  The heartburn is of mild intensity  The heartburn does not wake her from sleep  The heartburn does not limit her activity  The heartburn doesn't change with position  Pertinent negatives include no anemia, fatigue, melena, muscle weakness, orthopnea or weight loss  The treatment provided mild relief   Past procedures do not include an abdominal ultrasound, an EGD, esophageal pH monitoring or a UGI  Anxiety   Presents for follow-up visit  Symptoms include nervous/anxious behavior  Patient reports no chest pain, compulsions, excessive worry, feeling of choking, hyperventilation, insomnia, irritability, nausea, obsessions, palpitations, shortness of breath or suicidal ideas  Symptoms occur occasionally  The quality of sleep is good  Compliance with medications is %  The following portions of the patient's history were reviewed and updated as appropriate: allergies, current medications, past family history, past medical history, past social history, past surgical history and problem list     Review of Systems   Constitutional: Negative for fatigue, irritability and weight loss  HENT: Negative for hoarse voice and sore throat  Respiratory: Negative for cough, choking, shortness of breath and wheezing  Cardiovascular: Negative for chest pain and palpitations  Gastrointestinal: Positive for heartburn  Negative for abdominal pain, dysphagia, melena and nausea  Musculoskeletal: Negative for muscle weakness  Psychiatric/Behavioral: Negative for suicidal ideas  The patient is nervous/anxious  The patient does not have insomnia  Objective:      /80 (BP Location: Left arm, Patient Position: Sitting, Cuff Size: Standard)   Pulse 97   Temp 98 3 °F (36 8 °C) (Tympanic)   Resp 16   Ht 5' 3" (1 6 m)   Wt 78 9 kg (174 lb)   SpO2 98%   BMI 30 82 kg/m²          Physical Exam   Constitutional: She is oriented to person, place, and time  She appears well-developed and well-nourished  Eyes: Pupils are equal, round, and reactive to light  EOM are normal    Neck: Normal range of motion  Neck supple  Cardiovascular: Normal rate and regular rhythm  Exam reveals no friction rub  No murmur heard  Pulmonary/Chest: Effort normal and breath sounds normal    Musculoskeletal: She exhibits tenderness     Bilateral knee and shoulders   Neurological: She is alert and oriented to person, place, and time  No cranial nerve deficit

## 2019-04-08 ENCOUNTER — OFFICE VISIT (OUTPATIENT)
Dept: OBGYN CLINIC | Facility: CLINIC | Age: 39
End: 2019-04-08

## 2019-04-08 VITALS
SYSTOLIC BLOOD PRESSURE: 112 MMHG | WEIGHT: 176 LBS | HEIGHT: 63 IN | DIASTOLIC BLOOD PRESSURE: 70 MMHG | BODY MASS INDEX: 31.18 KG/M2 | HEART RATE: 76 BPM

## 2019-04-08 DIAGNOSIS — N89.8 VAGINAL ODOR: ICD-10-CM

## 2019-04-08 DIAGNOSIS — B96.89 BV (BACTERIAL VAGINOSIS): ICD-10-CM

## 2019-04-08 DIAGNOSIS — T83.32XA INTRAUTERINE CONTRACEPTIVE DEVICE THREADS LOST, INITIAL ENCOUNTER: ICD-10-CM

## 2019-04-08 DIAGNOSIS — N76.0 BV (BACTERIAL VAGINOSIS): ICD-10-CM

## 2019-04-08 LAB — SL AMB POCT URINE HCG: NORMAL

## 2019-04-08 PROCEDURE — 81025 URINE PREGNANCY TEST: CPT | Performed by: OBSTETRICS & GYNECOLOGY

## 2019-04-08 PROCEDURE — 87591 N.GONORRHOEAE DNA AMP PROB: CPT | Performed by: OBSTETRICS & GYNECOLOGY

## 2019-04-08 PROCEDURE — 87491 CHLMYD TRACH DNA AMP PROBE: CPT | Performed by: OBSTETRICS & GYNECOLOGY

## 2019-04-08 PROCEDURE — 99213 OFFICE O/P EST LOW 20 MIN: CPT | Performed by: OBSTETRICS & GYNECOLOGY

## 2019-04-08 RX ORDER — METRONIDAZOLE 500 MG/1
500 TABLET ORAL 2 TIMES DAILY
Qty: 14 TABLET | Refills: 0 | Status: SHIPPED | OUTPATIENT
Start: 2019-04-08 | End: 2019-04-15

## 2019-04-09 LAB
C TRACH DNA SPEC QL NAA+PROBE: NEGATIVE
N GONORRHOEA DNA SPEC QL NAA+PROBE: NEGATIVE

## 2019-04-15 DIAGNOSIS — M79.7 FIBROMYALGIA: ICD-10-CM

## 2019-04-16 RX ORDER — TRAMADOL HYDROCHLORIDE 50 MG/1
TABLET ORAL
Qty: 30 TABLET | Refills: 0 | Status: SHIPPED | OUTPATIENT
Start: 2019-04-16 | End: 2019-05-06 | Stop reason: SDUPTHER

## 2019-04-18 ENCOUNTER — TELEPHONE (OUTPATIENT)
Dept: OBGYN CLINIC | Facility: CLINIC | Age: 39
End: 2019-04-18

## 2019-04-30 ENCOUNTER — OFFICE VISIT (OUTPATIENT)
Dept: OBGYN CLINIC | Facility: CLINIC | Age: 39
End: 2019-04-30

## 2019-04-30 VITALS
WEIGHT: 171 LBS | SYSTOLIC BLOOD PRESSURE: 139 MMHG | HEART RATE: 77 BPM | BODY MASS INDEX: 30.3 KG/M2 | HEIGHT: 63 IN | DIASTOLIC BLOOD PRESSURE: 78 MMHG

## 2019-04-30 DIAGNOSIS — B37.3 VAGINAL YEAST INFECTION: Primary | ICD-10-CM

## 2019-04-30 DIAGNOSIS — T83.32XD INTRAUTERINE CONTRACEPTIVE DEVICE THREADS LOST, SUBSEQUENT ENCOUNTER: ICD-10-CM

## 2019-04-30 PROBLEM — T83.32XA IUD THREADS LOST: Status: ACTIVE | Noted: 2019-04-30

## 2019-04-30 PROCEDURE — 87210 SMEAR WET MOUNT SALINE/INK: CPT | Performed by: NURSE PRACTITIONER

## 2019-04-30 PROCEDURE — 99213 OFFICE O/P EST LOW 20 MIN: CPT | Performed by: NURSE PRACTITIONER

## 2019-04-30 RX ORDER — FLUCONAZOLE 150 MG/1
150 TABLET ORAL ONCE
Qty: 1 TABLET | Refills: 1 | Status: SHIPPED | OUTPATIENT
Start: 2019-04-30 | End: 2019-05-30 | Stop reason: SDUPTHER

## 2019-05-06 ENCOUNTER — OFFICE VISIT (OUTPATIENT)
Dept: OBGYN CLINIC | Facility: CLINIC | Age: 39
End: 2019-05-06

## 2019-05-06 VITALS
SYSTOLIC BLOOD PRESSURE: 120 MMHG | BODY MASS INDEX: 30.65 KG/M2 | WEIGHT: 173 LBS | HEIGHT: 63 IN | DIASTOLIC BLOOD PRESSURE: 56 MMHG | HEART RATE: 67 BPM

## 2019-05-06 DIAGNOSIS — B37.3 VAGINAL YEAST INFECTION: Primary | ICD-10-CM

## 2019-05-06 DIAGNOSIS — B37.3 VAGINAL YEAST INFECTION: ICD-10-CM

## 2019-05-06 DIAGNOSIS — M79.7 FIBROMYALGIA: ICD-10-CM

## 2019-05-06 PROCEDURE — 99213 OFFICE O/P EST LOW 20 MIN: CPT | Performed by: NURSE PRACTITIONER

## 2019-05-06 PROCEDURE — 87210 SMEAR WET MOUNT SALINE/INK: CPT | Performed by: NURSE PRACTITIONER

## 2019-05-06 RX ORDER — TRAMADOL HYDROCHLORIDE 50 MG/1
50 TABLET ORAL EVERY 8 HOURS PRN
Qty: 30 TABLET | Refills: 0 | Status: SHIPPED | OUTPATIENT
Start: 2019-05-06 | End: 2019-06-25 | Stop reason: SDUPTHER

## 2019-05-07 RX ORDER — FLUCONAZOLE 150 MG/1
TABLET ORAL
Qty: 1 TABLET | Refills: 1 | OUTPATIENT
Start: 2019-05-07

## 2019-05-30 DIAGNOSIS — F41.9 ANXIETY: ICD-10-CM

## 2019-05-30 DIAGNOSIS — B37.3 VAGINAL YEAST INFECTION: ICD-10-CM

## 2019-05-30 RX ORDER — CLONAZEPAM 0.5 MG/1
TABLET ORAL
Qty: 30 TABLET | Refills: 0 | Status: SHIPPED | OUTPATIENT
Start: 2019-05-30 | End: 2019-06-25 | Stop reason: SDUPTHER

## 2019-05-30 RX ORDER — FLUCONAZOLE 150 MG/1
150 TABLET ORAL ONCE
Qty: 1 TABLET | Refills: 0 | Status: SHIPPED | OUTPATIENT
Start: 2019-05-30 | End: 2019-05-30

## 2019-05-31 ENCOUNTER — OFFICE VISIT (OUTPATIENT)
Dept: OBGYN CLINIC | Facility: CLINIC | Age: 39
End: 2019-05-31

## 2019-05-31 VITALS
BODY MASS INDEX: 31.36 KG/M2 | WEIGHT: 177 LBS | HEART RATE: 69 BPM | DIASTOLIC BLOOD PRESSURE: 84 MMHG | SYSTOLIC BLOOD PRESSURE: 129 MMHG | HEIGHT: 63 IN

## 2019-05-31 DIAGNOSIS — N89.8 VAGINAL DISCHARGE: ICD-10-CM

## 2019-05-31 DIAGNOSIS — B37.3 VAGINAL YEAST INFECTION: Primary | ICD-10-CM

## 2019-05-31 DIAGNOSIS — T83.32XD INTRAUTERINE CONTRACEPTIVE DEVICE THREADS LOST, SUBSEQUENT ENCOUNTER: ICD-10-CM

## 2019-05-31 PROCEDURE — 87147 CULTURE TYPE IMMUNOLOGIC: CPT | Performed by: NURSE PRACTITIONER

## 2019-05-31 PROCEDURE — 99213 OFFICE O/P EST LOW 20 MIN: CPT | Performed by: NURSE PRACTITIONER

## 2019-05-31 PROCEDURE — 87070 CULTURE OTHR SPECIMN AEROBIC: CPT | Performed by: NURSE PRACTITIONER

## 2019-05-31 PROCEDURE — 87210 SMEAR WET MOUNT SALINE/INK: CPT | Performed by: NURSE PRACTITIONER

## 2019-05-31 RX ORDER — NYSTATIN AND TRIAMCINOLONE ACETONIDE 100000; 1 [USP'U]/G; MG/G
OINTMENT TOPICAL 2 TIMES DAILY
Qty: 30 G | Refills: 0 | Status: SHIPPED | OUTPATIENT
Start: 2019-05-31 | End: 2019-11-07

## 2019-05-31 RX ORDER — FLUCONAZOLE 150 MG/1
150 TABLET ORAL 2 TIMES WEEKLY
Qty: 2 TABLET | Refills: 0 | Status: SHIPPED | OUTPATIENT
Start: 2019-05-31 | End: 2019-06-07

## 2019-06-02 LAB
BACTERIA GENITAL AEROBE CULT: ABNORMAL

## 2019-06-06 DIAGNOSIS — F41.9 ANXIETY: ICD-10-CM

## 2019-06-06 RX ORDER — BUSPIRONE HYDROCHLORIDE 7.5 MG/1
7.5 TABLET ORAL 2 TIMES DAILY
Qty: 60 TABLET | Refills: 5 | Status: SHIPPED | OUTPATIENT
Start: 2019-06-06 | End: 2020-02-26

## 2019-06-14 DIAGNOSIS — F41.9 ANXIETY: ICD-10-CM

## 2019-06-14 RX ORDER — CLONAZEPAM 0.5 MG/1
TABLET ORAL
Qty: 30 TABLET | Refills: 0 | OUTPATIENT
Start: 2019-06-14

## 2019-06-25 ENCOUNTER — APPOINTMENT (OUTPATIENT)
Dept: LAB | Facility: HOSPITAL | Age: 39
End: 2019-06-25
Payer: COMMERCIAL

## 2019-06-25 DIAGNOSIS — F41.9 ANXIETY: ICD-10-CM

## 2019-06-25 DIAGNOSIS — M79.7 FIBROMYALGIA: ICD-10-CM

## 2019-06-25 DIAGNOSIS — Z13.220 LIPID SCREENING: ICD-10-CM

## 2019-06-25 DIAGNOSIS — Z00.00 ANNUAL PHYSICAL EXAM: ICD-10-CM

## 2019-06-25 DIAGNOSIS — R76.8 POSITIVE ANA (ANTINUCLEAR ANTIBODY): ICD-10-CM

## 2019-06-25 LAB
25(OH)D3 SERPL-MCNC: 24.9 NG/ML (ref 30–100)
ALBUMIN SERPL BCP-MCNC: 3.8 G/DL (ref 3.5–5)
ALP SERPL-CCNC: 68 U/L (ref 46–116)
ALT SERPL W P-5'-P-CCNC: 28 U/L (ref 12–78)
ANION GAP SERPL CALCULATED.3IONS-SCNC: 5 MMOL/L (ref 4–13)
AST SERPL W P-5'-P-CCNC: 15 U/L (ref 5–45)
BILIRUB SERPL-MCNC: 0.49 MG/DL (ref 0.2–1)
BUN SERPL-MCNC: 14 MG/DL (ref 5–25)
CALCIUM SERPL-MCNC: 9.1 MG/DL (ref 8.3–10.1)
CHLORIDE SERPL-SCNC: 103 MMOL/L (ref 100–108)
CHOLEST SERPL-MCNC: 168 MG/DL (ref 50–200)
CO2 SERPL-SCNC: 27 MMOL/L (ref 21–32)
CREAT SERPL-MCNC: 0.76 MG/DL (ref 0.6–1.3)
GFR SERPL CREATININE-BSD FRML MDRD: 101 ML/MIN/1.73SQ M
GLUCOSE P FAST SERPL-MCNC: 96 MG/DL (ref 65–99)
HDLC SERPL-MCNC: 62 MG/DL (ref 40–60)
LDLC SERPL CALC-MCNC: 91 MG/DL (ref 0–100)
POTASSIUM SERPL-SCNC: 4.3 MMOL/L (ref 3.5–5.3)
PROT SERPL-MCNC: 7.5 G/DL (ref 6.4–8.2)
SODIUM SERPL-SCNC: 135 MMOL/L (ref 136–145)
TRIGL SERPL-MCNC: 74 MG/DL

## 2019-06-25 PROCEDURE — 86038 ANTINUCLEAR ANTIBODIES: CPT

## 2019-06-25 PROCEDURE — 86039 ANTINUCLEAR ANTIBODIES (ANA): CPT

## 2019-06-25 PROCEDURE — 86430 RHEUMATOID FACTOR TEST QUAL: CPT

## 2019-06-25 PROCEDURE — 36415 COLL VENOUS BLD VENIPUNCTURE: CPT

## 2019-06-25 PROCEDURE — 80061 LIPID PANEL: CPT

## 2019-06-25 PROCEDURE — 82306 VITAMIN D 25 HYDROXY: CPT

## 2019-06-25 PROCEDURE — 80053 COMPREHEN METABOLIC PANEL: CPT | Performed by: FAMILY MEDICINE

## 2019-06-25 RX ORDER — TRAMADOL HYDROCHLORIDE 50 MG/1
50 TABLET ORAL EVERY 8 HOURS PRN
Qty: 30 TABLET | Refills: 0 | Status: SHIPPED | OUTPATIENT
Start: 2019-06-25 | End: 2019-08-08 | Stop reason: SDUPTHER

## 2019-06-25 RX ORDER — CLONAZEPAM 0.5 MG/1
0.5 TABLET ORAL DAILY
Qty: 30 TABLET | Refills: 0 | Status: SHIPPED | OUTPATIENT
Start: 2019-06-25 | End: 2019-08-11 | Stop reason: SDUPTHER

## 2019-06-26 LAB
ANA HOMOGEN SER QL IF: NORMAL
ANA HOMOGEN TITR SER: NORMAL {TITER}
RHEUMATOID FACT SER QL LA: NEGATIVE
RYE IGE QN: POSITIVE

## 2019-06-27 DIAGNOSIS — J30.2 SEASONAL ALLERGIC RHINITIS, UNSPECIFIED TRIGGER: ICD-10-CM

## 2019-06-27 RX ORDER — AZELASTINE HYDROCHLORIDE 137 UG/1
SPRAY, METERED NASAL
Qty: 30 ML | Refills: 1 | Status: SHIPPED | OUTPATIENT
Start: 2019-06-27 | End: 2019-08-31 | Stop reason: SDUPTHER

## 2019-08-03 ENCOUNTER — HOSPITAL ENCOUNTER (EMERGENCY)
Facility: HOSPITAL | Age: 39
Discharge: HOME | End: 2019-08-03
Attending: EMERGENCY MEDICINE | Admitting: EMERGENCY MEDICINE
Payer: COMMERCIAL

## 2019-08-03 VITALS
RESPIRATION RATE: 16 BRPM | OXYGEN SATURATION: 97 % | HEIGHT: 63 IN | HEART RATE: 53 BPM | TEMPERATURE: 98.6 F | BODY MASS INDEX: 31.09 KG/M2 | WEIGHT: 175.49 LBS | SYSTOLIC BLOOD PRESSURE: 113 MMHG | DIASTOLIC BLOOD PRESSURE: 58 MMHG

## 2019-08-03 DIAGNOSIS — G43.909 MIGRAINE: Primary | ICD-10-CM

## 2019-08-03 DIAGNOSIS — G43.709 CHRONIC MIGRAINE WITHOUT AURA WITHOUT STATUS MIGRAINOSUS, NOT INTRACTABLE: ICD-10-CM

## 2019-08-03 LAB
EXT PREG TEST URINE: NEGATIVE
EXT. CONTROL ED NAV: NORMAL

## 2019-08-03 PROCEDURE — 99284 EMERGENCY DEPT VISIT MOD MDM: CPT | Performed by: PHYSICIAN ASSISTANT

## 2019-08-03 PROCEDURE — 96375 TX/PRO/DX INJ NEW DRUG ADDON: CPT

## 2019-08-03 PROCEDURE — 81025 URINE PREGNANCY TEST: CPT | Performed by: PHYSICIAN ASSISTANT

## 2019-08-03 PROCEDURE — 96361 HYDRATE IV INFUSION ADD-ON: CPT

## 2019-08-03 PROCEDURE — 99283 EMERGENCY DEPT VISIT LOW MDM: CPT

## 2019-08-03 PROCEDURE — 96365 THER/PROPH/DIAG IV INF INIT: CPT

## 2019-08-03 RX ORDER — MAGNESIUM SULFATE HEPTAHYDRATE 40 MG/ML
2 INJECTION, SOLUTION INTRAVENOUS ONCE
Status: COMPLETED | OUTPATIENT
Start: 2019-08-03 | End: 2019-08-03

## 2019-08-03 RX ORDER — METOCLOPRAMIDE HYDROCHLORIDE 5 MG/ML
10 INJECTION INTRAMUSCULAR; INTRAVENOUS ONCE
Status: COMPLETED | OUTPATIENT
Start: 2019-08-03 | End: 2019-08-03

## 2019-08-03 RX ORDER — DIPHENHYDRAMINE HYDROCHLORIDE 50 MG/ML
25 INJECTION INTRAMUSCULAR; INTRAVENOUS ONCE
Status: COMPLETED | OUTPATIENT
Start: 2019-08-03 | End: 2019-08-03

## 2019-08-03 RX ORDER — ONDANSETRON 4 MG/1
4 TABLET, ORALLY DISINTEGRATING ORAL ONCE
Status: COMPLETED | OUTPATIENT
Start: 2019-08-03 | End: 2019-08-03

## 2019-08-03 RX ORDER — KETOROLAC TROMETHAMINE 30 MG/ML
30 INJECTION, SOLUTION INTRAMUSCULAR; INTRAVENOUS ONCE
Status: COMPLETED | OUTPATIENT
Start: 2019-08-03 | End: 2019-08-03

## 2019-08-03 RX ORDER — ACETAMINOPHEN 325 MG/1
975 TABLET ORAL ONCE
Status: COMPLETED | OUTPATIENT
Start: 2019-08-03 | End: 2019-08-03

## 2019-08-03 RX ORDER — NAPROXEN 500 MG/1
500 TABLET ORAL EVERY 12 HOURS PRN
Qty: 10 TABLET | Refills: 0 | Status: SHIPPED | OUTPATIENT
Start: 2019-08-03 | End: 2019-12-05

## 2019-08-03 RX ORDER — ONDANSETRON 4 MG/1
4 TABLET, ORALLY DISINTEGRATING ORAL EVERY 8 HOURS PRN
Qty: 20 TABLET | Refills: 0 | Status: SHIPPED | OUTPATIENT
Start: 2019-08-03 | End: 2019-08-08 | Stop reason: SDUPTHER

## 2019-08-03 RX ADMIN — ONDANSETRON 4 MG: 4 TABLET, ORALLY DISINTEGRATING ORAL at 15:33

## 2019-08-03 RX ADMIN — ACETAMINOPHEN 975 MG: 325 TABLET, FILM COATED ORAL at 15:32

## 2019-08-03 RX ADMIN — METOCLOPRAMIDE 10 MG: 5 INJECTION, SOLUTION INTRAMUSCULAR; INTRAVENOUS at 14:00

## 2019-08-03 RX ADMIN — MAGNESIUM SULFATE HEPTAHYDRATE 2 G: 40 INJECTION, SOLUTION INTRAVENOUS at 14:01

## 2019-08-03 RX ADMIN — SODIUM CHLORIDE 1000 ML: 0.9 INJECTION, SOLUTION INTRAVENOUS at 14:00

## 2019-08-03 RX ADMIN — KETOROLAC TROMETHAMINE 30 MG: 30 INJECTION, SOLUTION INTRAMUSCULAR at 13:59

## 2019-08-03 RX ADMIN — DIPHENHYDRAMINE HYDROCHLORIDE 25 MG: 50 INJECTION, SOLUTION INTRAMUSCULAR; INTRAVENOUS at 14:00

## 2019-08-03 NOTE — ED PROVIDER NOTES
History  Chief Complaint   Patient presents with    Headache - Recurrent or Known Dx Migraines     Pt presents to ED from home w/ migraine for 3 days  Pt (+) N/V, photosensitivity, etc   Pt (+) hx migraines, states normal presentation  Abigail Garcia is a 41 y o  female with a past medical history of migraines and fibromyalgia who presents to the ED with complaints of right-sided headache which progresses to the generalized head, nausea, vomiting, photophobia and phonophobia over the past 3 days  Patient states she was previously on Topamax for migraine prophylaxis but discontinue medication due to hair loss  Patient states she did take Excedrin this morning without relief  Patient states her migraine today's feels similar to her previous headaches  Denies head injury, changes in vision, eye pain, dizziness, numbness, tingling, weakness, neck pain, neck stiffness, abdominal pain, urinary symptoms, fever, chills  History provided by:  Patient  Headache   Pain location:  Generalized  Quality:  Stabbing  Severity currently:  710  Severity at highest:  8/10  Onset quality:  Gradual  Duration:  3 days  Timing:  Constant  Associated symptoms: nausea and vomiting    Associated symptoms: no abdominal pain, no back pain, no blurred vision, no congestion, no cough, no diarrhea, no dizziness, no drainage, no ear pain, no eye pain, no facial pain, no fatigue, no fever, no focal weakness, no hearing loss, no loss of balance, no myalgias, no near-syncope, no neck pain, no neck stiffness, no numbness, no paresthesias, no photophobia, no seizures, no sinus pressure, no sore throat, no swollen glands, no syncope, no tingling, no URI, no visual change and no weakness        Prior to Admission Medications   Prescriptions Last Dose Informant Patient Reported? Taking?    Azelastine HCl 137 MCG/SPRAY SOLN   No No   Si SPRAYS INTO EACH NOSTRIL TWICE A DAY   [facility]S MELATONIN 5 MG CAPS   No No   Sig: TAKE 1 CAPSULE BEDTIME Lactobacillus (ACIDOPHILUS PROBIOTIC PO)   Yes No   Sig: Take 1 tablet by mouth daily   busPIRone (BUSPAR) 7 5 mg tablet   No No   Sig: TAKE 1 TABLET (7 5 MG TOTAL) BY MOUTH 2 (TWO) TIMES A DAY   clonazePAM (KlonoPIN) 0 5 mg tablet   No No   Sig: Take 1 tablet (0 5 mg total) by mouth daily   cyclobenzaprine (FLEXERIL) 10 mg tablet   No No   Sig: Take 1 tablet (10 mg total) by mouth daily at bedtime   dicyclomine (BENTYL) 20 mg tablet   No No   Sig: TAKE 1 TABLET EVERY 6 HOURS AS NEEDED   levonorgestrel (MIRENA) 20 MCG/24HR IUD   Yes No   Sig: Mirena 20 MCG/24HR Intrauterine Intrauterine Device  Refills: 0  Active   montelukast (SINGULAIR) 10 mg tablet   Yes No   Sig: Take 1 tablet by mouth daily as needed   naproxen (NAPROSYN) 250 mg tablet   No No   Sig: TAKE 1 TABLET EVERY 12 HOURS DAILY     nystatin-triamcinolone (MYCOLOG-II) ointment   No No   Sig: Apply topically 2 (two) times a day   senna (CVS SENNA) 8 6 MG tablet   No No   Sig: Take 1 tablet (8 6 mg total) by mouth daily   traMADol (ULTRAM) 50 mg tablet   No No   Sig: Take 1 tablet (50 mg total) by mouth every 8 (eight) hours as needed for moderate pain   valACYclovir (VALTREX) 500 mg tablet   Yes No   Sig: Take 1 tablet by mouth daily as needed      Facility-Administered Medications: None       Past Medical History:   Diagnosis Date    Allergic rhinitis     Anemia     Anxiety     Depression     Fibromyalgia     Fibromyalgia     GERD (gastroesophageal reflux disease)     Hematochezia     Herpes simplex infection     Hypertension     IBS (irritable bowel syndrome)     Insomnia     Mental status change     Migraine     Obesity 8/6/2015    Scoliosis     Scoliosis        Past Surgical History:   Procedure Laterality Date    EXPLORATORY LAPAROTOMY      WISDOM TOOTH EXTRACTION         Family History   Problem Relation Age of Onset    Stroke Mother     Hypertension Mother     Psoriasis Mother     Breast cancer Family     Diabetes type I Son I have reviewed and agree with the history as documented  Social History     Tobacco Use    Smoking status: Current Some Day Smoker     Types: Cigarettes    Smokeless tobacco: Never Used    Tobacco comment: casual, current some day smoker per allscripts   Substance Use Topics    Alcohol use: Yes     Alcohol/week: 3 0 standard drinks     Types: 1 Glasses of wine, 1 Cans of beer, 1 Shots of liquor per week     Comment: yearly, social 1-2 drinks per month per allscripts    Drug use: Yes     Frequency: 3 0 times per week     Types: Marijuana        Review of Systems   Constitutional: Negative for appetite change, chills, fatigue, fever and unexpected weight change  HENT: Negative for congestion, drooling, ear pain, hearing loss, postnasal drip, rhinorrhea, sinus pressure, sore throat, trouble swallowing and voice change  Eyes: Negative for blurred vision, photophobia, pain, discharge, redness and visual disturbance  Respiratory: Negative for cough, shortness of breath, wheezing and stridor  Cardiovascular: Negative for chest pain, palpitations, leg swelling, syncope and near-syncope  Gastrointestinal: Positive for nausea and vomiting  Negative for abdominal pain, blood in stool, constipation and diarrhea  Genitourinary: Negative for dysuria, flank pain, frequency, hematuria and urgency  Musculoskeletal: Negative for back pain, gait problem, joint swelling, myalgias, neck pain and neck stiffness  Skin: Negative for color change and rash  Neurological: Positive for headaches  Negative for dizziness, focal weakness, seizures, facial asymmetry, weakness, light-headedness, numbness, paresthesias and loss of balance  Physical Exam  Physical Exam   Constitutional: She is oriented to person, place, and time  She appears well-developed and well-nourished  Sitting upright in bed, talking on cell phone, no acute distress   HENT:   Head: Normocephalic and atraumatic     Nose: Nose normal  Mouth/Throat: Oropharynx is clear and moist    Eyes: Pupils are equal, round, and reactive to light  Conjunctivae and EOM are normal    Cardiovascular: Normal rate, regular rhythm and intact distal pulses  Pulmonary/Chest: Effort normal and breath sounds normal    Musculoskeletal: Normal range of motion  Neurological: She is alert and oriented to person, place, and time  She has normal strength and normal reflexes  No cranial nerve deficit or sensory deficit  GCS eye subscore is 4  GCS verbal subscore is 5  GCS motor subscore is 6  Alert and oriented to person, place, and time  PERRL and 3 mm symmetrical, EOMI with no evidence of nystagmus  Visual fields were intact to confrontation  Visual pursuits were smooth with normal saccadic eye movements  Facial sensation intact b/l with no evidence of facial asymmetry  Hearing was normal b/l and the tongue and palate were in midline  SCM and upper trapezius strength normal b/l  Normal muscle tone, muscle strength testing revealed 5/5 for both upper and lower extremities   DTRs were 2+ both upper and lower, plantar reflex was flexor b/l   No evidence of postural or action tremor, No dysmetria seen on FTN testing   No evidence of sensory deficits    Skin: Skin is warm and dry  Capillary refill takes less than 2 seconds  Psychiatric: She has a normal mood and affect  Nursing note and vitals reviewed        Vital Signs  ED Triage Vitals [08/03/19 1314]   Temperature Pulse Respirations Blood Pressure SpO2   98 6 °F (37 °C) (!) 52 16 149/65 100 %      Temp Source Heart Rate Source Patient Position - Orthostatic VS BP Location FiO2 (%)   Oral Monitor Sitting Right arm --      Pain Score       7           Vitals:    08/03/19 1314 08/03/19 1534   BP: 149/65 113/58   Pulse: (!) 52 (!) 53   Patient Position - Orthostatic VS: Sitting Lying         Visual Acuity      ED Medications  Medications   ketorolac (TORADOL) injection 30 mg (30 mg Intravenous Given 8/3/19 2009) diphenhydrAMINE (BENADRYL) injection 25 mg (25 mg Intravenous Given 8/3/19 1400)   metoclopramide (REGLAN) injection 10 mg (10 mg Intravenous Given 8/3/19 1400)   magnesium sulfate 2 g/50 mL IVPB (premix) 2 g (0 g Intravenous Stopped 8/3/19 1500)   sodium chloride 0 9 % bolus 1,000 mL (0 mL Intravenous Stopped 8/3/19 1532)   acetaminophen (TYLENOL) tablet 975 mg (975 mg Oral Given 8/3/19 1532)   ondansetron (ZOFRAN-ODT) dispersible tablet 4 mg (4 mg Oral Given 8/3/19 1533)       Diagnostic Studies  Results Reviewed     Procedure Component Value Units Date/Time    POCT pregnancy, urine [[id number]]  (Normal) Resulted:  08/03/19 1347    Lab Status:  Final result Updated:  08/03/19 1347     EXT PREG TEST UR (Ref: Negative) Negative     Control Valid                 No orders to display              Procedures  Procedures       ED Course  ED Course as of Aug 03 1645   Sat Aug 03, 2019   1521 Recheck patient, patient states she is feeling better after migraine cocktail  65 Educated patient regarding diagnosis and management  Advised patient to follow up with PCP  Advised patient to RTER for persistent or worsening symptoms  MDM  Number of Diagnoses or Management Options  Diagnosis management comments: Differential diagnosis included but not limited to: Migraine, headache, unlikely intracranial process    Urine pregnancy negative  Pain improved with migraine cocktail  I provided patient with strict RTER precautions  I advised patient follow-up with PCP in 24-48 hours  Patient verbalized understanding          Amount and/or Complexity of Data Reviewed  Clinical lab tests: ordered and reviewed    Risk of Complications, Morbidity, and/or Mortality  Presenting problems: moderate  Diagnostic procedures: moderate  Management options: moderate    Patient Progress  Patient progress: improved      Disposition  Final diagnoses:   Migraine     Time reflects when diagnosis was documented in both MDM as applicable and the Disposition within this note     Time User Action Codes Description Comment    8/3/2019  3:32 PM [address] [place] [address] Drive, [address] [[id number]] Migraine       ED Disposition     ED Disposition Condition Date/Time Comment    Discharge Stable Sat Aug 3, 2019  3:32 PM Bon Singleton discharge to home/self care  Follow-up Information    None         Patient's Medications   Discharge Prescriptions    NAPROXEN (NAPROSYN) 500 MG TABLET    Take 1 tablet (500 mg total) by mouth every 12 (twelve) hours as needed for mild pain, moderate pain or headaches for up to 5 days       Start Date: 8/3/2019  End Date: 8/8/2019       Order Dose: 500 mg       Quantity: 10 tablet    Refills: 0    ONDANSETRON (ZOFRAN-ODT) 4 MG DISINTEGRATING TABLET    Take 1 tablet (4 mg total) by mouth every 8 (eight) hours as needed for nausea or vomiting       Start Date: 8/3/2019  End Date: --       Order Dose: 4 mg       Quantity: 20 tablet    Refills: 0     No discharge procedures on file      ED Provider  Electronically Signed by           Ricci Santizo PA-C  08/03/19 2273

## 2019-08-03 NOTE — DISCHARGE INSTRUCTIONS
Migraña   LO QUE NECESITA SABER:   Royetta Jw es un dolor de thor intenso  El dolor puede ser tan severo que interfiere con ladonna actividades cotidianas  Royetta Jw puede durar desde pocas horas hasta varios días  La causa exacta de la migraña no es conocida  INSTRUCCIONES SOBRE EL [facility]:   Regrese a la cristela de emergencias si:   · Usted tiene dolor de thor que parece ser diferente o mucho peor que pearl migraña habitual     · Usted tiene un dolor de thor severo con fiebre o rigidez en el oleg  · Usted tiene nuevos problemas con el habla, la visión, el equilibrio o el [address]  · Usted siente que se va a desmayar, se siente confundido o sufre kiana convulsión  Comuníquese con pearl médico o neurólogo si:   · Pearl migraña interfiere con ladonna actividades cotidianas  · Ladonna medicamentos o tratamientos heide de funcionar  · Usted tiene preguntas o inquietudes acerca de pearl condición o cuidado  Medicamentos:  Usted podría  necesitar alguno de los siguientes  Hendron medicamento tan pronto vivek sienta que le comienza Royetta Jw  · Un medicamento con receta para el dolor  podrían ser Sharita Squibb  No espere a que el dolor sea muy intenso para jacqueline el medicamento  · Medicamentos para la migraña  se [place] para evitar kiana migraña o detenerla kiana vez que comience  · Los medicamentos contra las náuseas  pueden darse para calmar pearl estómago y ayudarle a prevenir los vómitos  Alis medicamento también puede aliviar el dolor  · Hendron ladonna medicamentos vivek se le haya indicado  Consulte con pearl médico si usted cindi que pearl medicamento no le está ayudando o si presenta efectos secundarios  Infórmele si es alérgico a cualquier medicamento  Mantenga kiana lista actualizada de los [place], las vitaminas y los productos herbales que lidya  Incluya los siguientes datos de los medicamentos: cantidad, frecuencia y motivo de administración   Traiga con usted la lista o los envases de la píldoras a ladonna citas de seguimiento  Lleve la lista de los medicamentos con usted en martinez de kiana emergencia  El [place] de pearl síntomas:   · Repose en kiana habitación oscura y [place] ayudará a disminuir el dolor  El dormir también podría ayudarlo a aliviar pearl dolor  · Aplique hielo para reducir el dolor  Use un paquete de hielo o ponga hielo molido dentro de [employer]  Cubra el paquete de hielo con kiana toalla y colóqueselo en la thor  Aplique hielo alejandra 15 a 20 minutos cada hora  · Aplique calor para disminuir el dolor y los espasmos musculares  Utilice kiana toalla pequeña empapada con [place], kiana almohada térmica o tome un baño de annabel con agua tibia  Aplique la compresa caliente sobre el área por 20 a 30 minutos cada 2 horas  Usted puede alternar el calor y el hielo  · Mantenga un registro de las migrañas  Escriba cuándo comienzan y terminan pita migrañas  [place] y [place] haciendo cuando comenzó [place]  Registre lo que comió y lo que tomó las 24 horas antes de que comenzó pearl migraña  Mantenga un registro de lo que hizo para tratar pearl migraña y si funcionó  Traiga el registro de las migrañas con usted a las citas con pearl médico   Programe kiana kj con pearl médico o pearl neurólogo vivek se le indique:  Lleve pearl registro de migrañas con usted  Anote pita preguntas para que se acuerde de hacerlas alejandra pita visitas  Evite otra migraña:   · No fume  La nicotina y otras sustancias químicas en los cigarrillos y puros pueden desencadenar kiana Faiza Juancarlos  Pida información a pearl médico si usted actualmente fuma y necesita ayuda para dejar de fumar  Los cigarrillos electrónicos o tabaco sin humo todavía contienen nicotina  Consulte con pearl médico antes de [employer]  · No consuma alcohol  El alcohol puede provocar migraña  También puede impedir que [employer] medicamentos para la migraña  · Ejercítese regularmente    El ejercicio puede ayudar a evitar migrañas  Consulte con gutierres médico acerca de cuál es el mejor régimen de ejercicio para usted  Trate de hacer unos 30 minutos de ejercicio todos los días que pueda  · Controle el estrés  El estrés podría provocar migraña  Aprenda nuevas maneras de relajarse vivek la respiración profunda  · Establezca un horario para dormir  Acuéstese y levántese a la misma hora cada día  No kathleen televisión inmediatamente antes de acostarse  · Coma pita comidas regularmente  Incluya alimentos [employer] fruta, verduras, panes de grano entero, productos lácteos bajos en grasa, frijoles, carne magra y pescado  No consuma alimentos o bebidas que puedan desencadenar pita migrañas  © 2017 [address] Information is for End User's use only and may not be sold, redistributed or otherwise used for commercial purposes  All illustrations and images included in CareNotes® are the copyrighted property of A D A M , Inc  or Clarke Tariq  Esta información es sólo para uso en educación  Gutierres intención no es darle un consejo médico sobre enfermedades o tratamientos  Colsulte con gutierres Kaylen Every farmacéutico antes de seguir cualquier régimen médico para saber si es seguro y efectivo para usted

## 2019-08-05 RX ORDER — TOPIRAMATE 50 MG/1
TABLET, FILM COATED ORAL
Qty: 90 TABLET | Refills: 3 | Status: SHIPPED | OUTPATIENT
Start: 2019-08-05 | End: 2019-08-29 | Stop reason: ALTCHOICE

## 2019-08-06 ENCOUNTER — TELEPHONE (OUTPATIENT)
Dept: FAMILY MEDICINE CLINIC | Facility: CLINIC | Age: 39
End: 2019-08-06

## 2019-08-06 NOTE — TELEPHONE ENCOUNTER
Patient called and was seen in the ER on 8/3/2019 for severe migraine pain  She is not feeling better and is feeling nauseous since Friday  She is taking Excedrin Migraine but it is not helping  Patient is requesting to be seen ASAP  Please advise where I may schedule patient? Should it be 15 or 30 minutes?

## 2019-08-08 ENCOUNTER — OFFICE VISIT (OUTPATIENT)
Dept: FAMILY MEDICINE CLINIC | Facility: CLINIC | Age: 39
End: 2019-08-08
Payer: COMMERCIAL

## 2019-08-08 VITALS
OXYGEN SATURATION: 98 % | TEMPERATURE: 98.4 F | BODY MASS INDEX: 31.08 KG/M2 | HEART RATE: 65 BPM | SYSTOLIC BLOOD PRESSURE: 110 MMHG | DIASTOLIC BLOOD PRESSURE: 80 MMHG | RESPIRATION RATE: 16 BRPM | WEIGHT: 175.4 LBS | HEIGHT: 63 IN

## 2019-08-08 DIAGNOSIS — G43.909 MIGRAINE: ICD-10-CM

## 2019-08-08 DIAGNOSIS — K59.04 CHRONIC IDIOPATHIC CONSTIPATION: ICD-10-CM

## 2019-08-08 DIAGNOSIS — K21.9 GASTROESOPHAGEAL REFLUX DISEASE WITHOUT ESOPHAGITIS: ICD-10-CM

## 2019-08-08 DIAGNOSIS — M79.7 FIBROMYALGIA: ICD-10-CM

## 2019-08-08 DIAGNOSIS — G43.709 CHRONIC MIGRAINE WITHOUT AURA WITHOUT STATUS MIGRAINOSUS, NOT INTRACTABLE: Primary | ICD-10-CM

## 2019-08-08 PROCEDURE — 99214 OFFICE O/P EST MOD 30 MIN: CPT | Performed by: FAMILY MEDICINE

## 2019-08-08 RX ORDER — DEXAMETHASONE 4 MG/1
4 TABLET ORAL 2 TIMES DAILY WITH MEALS
Qty: 6 TABLET | Refills: 0 | Status: SHIPPED | OUTPATIENT
Start: 2019-08-08 | End: 2019-08-11

## 2019-08-08 RX ORDER — TRAMADOL HYDROCHLORIDE 50 MG/1
50 TABLET ORAL EVERY 8 HOURS PRN
Qty: 30 TABLET | Refills: 0 | Status: SHIPPED | OUTPATIENT
Start: 2019-08-08 | End: 2019-08-29 | Stop reason: SDUPTHER

## 2019-08-08 RX ORDER — ONDANSETRON 4 MG/1
4 TABLET, ORALLY DISINTEGRATING ORAL EVERY 8 HOURS PRN
Qty: 30 TABLET | Refills: 0 | Status: SHIPPED | OUTPATIENT
Start: 2019-08-08 | End: 2019-08-28 | Stop reason: SDUPTHER

## 2019-08-08 NOTE — PROGRESS NOTES
Assessment/Plan:    No problem-specific Assessment & Plan notes found for this encounter  Diagnoses and all orders for this visit:    Chronic migraine without aura without status migrainosus, not intractable  -     dexamethasone (DECADRON) 4 mg tablet; Take 1 tablet (4 mg total) by mouth 2 (two) times a day with meals for 3 days    Gastroesophageal reflux disease without esophagitis  Comments:  stable   watch diet    Fibromyalgia  Comments:  worsening   Tramadol PRN   Orders:  -     traMADol (ULTRAM) 50 mg tablet; Take 1 tablet (50 mg total) by mouth every 8 (eight) hours as needed for moderate pain    Chronic idiopathic constipation  Comments:  doing better  continue to monitor     Migraine  -     ondansetron (ZOFRAN-ODT) 4 mg disintegrating tablet; Take 1 tablet (4 mg total) by mouth every 8 (eight) hours as needed for nausea or vomiting  -     dexamethasone (DECADRON) 4 mg tablet; Take 1 tablet (4 mg total) by mouth 2 (two) times a day with meals for 3 days          Subjective:      Patient ID: Natan Dietrich is a 41 y o  female  Here for follow up   Was in Er 4 days ago for worsening migraines   Not feeling better  +nausea  Worsening joint pain    Migraine    This is a chronic problem  The current episode started in the past 7 days  The problem occurs daily  The problem has been unchanged  The pain does not radiate  The pain quality is similar to prior headaches  The pain is at a severity of 2/10  The pain is mild  Associated symptoms include dizziness, muscle aches and nausea  Pertinent negatives include no abdominal pain, abnormal behavior, anorexia, back pain, blurred vision, coughing, facial sweating, fever, hearing loss, insomnia, sinus pressure, sore throat or swollen glands  Her past medical history is significant for migraine headaches  There is no history of cancer, cluster headaches, recent head traumas or sinus disease  Anxiety   Presents for follow-up visit   Symptoms include dizziness, nausea and nervous/anxious behavior  Patient reports no chest pain, compulsions, excessive worry, feeling of choking or insomnia  The following portions of the patient's history were reviewed and updated as appropriate: allergies, current medications, past family history, past medical history, past social history, past surgical history and problem list     Review of Systems   Constitutional: Negative for fever  HENT: Negative for hearing loss, sinus pressure and sore throat  Eyes: Negative for blurred vision  Respiratory: Negative for cough  Cardiovascular: Negative for chest pain  Gastrointestinal: Positive for nausea  Negative for abdominal pain and anorexia  Musculoskeletal: Negative for back pain  Neurological: Positive for dizziness, light-headedness and headaches  Psychiatric/Behavioral: The patient is nervous/anxious  The patient does not have insomnia  Objective:      /80 (BP Location: Left arm, Patient Position: Sitting, Cuff Size: Standard)   Pulse 65   Temp 98 4 °F (36 9 °C) (Tympanic)   Resp 16   Ht 5' 3" (1 6 m)   Wt 79 6 kg (175 lb 6 4 oz)   SpO2 98%   BMI 31 07 kg/m²          Physical Exam   Constitutional: She is oriented to person, place, and time  She appears well-developed and well-nourished  HENT:   Head: Normocephalic  Eyes: Pupils are equal, round, and reactive to light  EOM are normal    Neck: No tracheal deviation present  No thyromegaly present  Cardiovascular: Normal rate and regular rhythm  Exam reveals no friction rub  No murmur heard  Pulmonary/Chest: Effort normal and breath sounds normal    Musculoskeletal: Normal range of motion  She exhibits no edema  Neurological: She is alert and oriented to person, place, and time  Skin: No rash noted  No erythema  Psychiatric: She has a normal mood and affect   Her behavior is normal

## 2019-08-09 ENCOUNTER — HOSPITAL ENCOUNTER (EMERGENCY)
Facility: HOSPITAL | Age: 39
Discharge: HOME | End: 2019-08-10
Attending: EMERGENCY MEDICINE | Admitting: EMERGENCY MEDICINE
Payer: COMMERCIAL

## 2019-08-09 DIAGNOSIS — R51.9 HEADACHE: Primary | ICD-10-CM

## 2019-08-09 LAB
EXT PREG TEST URINE: NEGATIVE
EXT. CONTROL ED NAV: NORMAL

## 2019-08-09 PROCEDURE — 99284 EMERGENCY DEPT VISIT MOD MDM: CPT | Performed by: EMERGENCY MEDICINE

## 2019-08-09 PROCEDURE — 99283 EMERGENCY DEPT VISIT LOW MDM: CPT

## 2019-08-09 PROCEDURE — 81025 URINE PREGNANCY TEST: CPT | Performed by: EMERGENCY MEDICINE

## 2019-08-09 RX ORDER — ACETAMINOPHEN 325 MG/1
975 TABLET ORAL ONCE
Status: COMPLETED | OUTPATIENT
Start: 2019-08-09 | End: 2019-08-10

## 2019-08-09 RX ORDER — METOCLOPRAMIDE HYDROCHLORIDE 5 MG/ML
10 INJECTION INTRAMUSCULAR; INTRAVENOUS ONCE
Status: COMPLETED | OUTPATIENT
Start: 2019-08-09 | End: 2019-08-10

## 2019-08-09 RX ORDER — DIPHENHYDRAMINE HYDROCHLORIDE 50 MG/ML
25 INJECTION INTRAMUSCULAR; INTRAVENOUS ONCE
Status: COMPLETED | OUTPATIENT
Start: 2019-08-09 | End: 2019-08-10

## 2019-08-09 RX ORDER — KETOROLAC TROMETHAMINE 30 MG/ML
15 INJECTION, SOLUTION INTRAMUSCULAR; INTRAVENOUS ONCE
Status: COMPLETED | OUTPATIENT
Start: 2019-08-09 | End: 2019-08-10

## 2019-08-10 VITALS
HEIGHT: 63 IN | HEART RATE: 76 BPM | SYSTOLIC BLOOD PRESSURE: 123 MMHG | BODY MASS INDEX: 31.07 KG/M2 | OXYGEN SATURATION: 99 % | DIASTOLIC BLOOD PRESSURE: 57 MMHG | TEMPERATURE: 98.5 F | RESPIRATION RATE: 16 BRPM

## 2019-08-10 PROCEDURE — 96375 TX/PRO/DX INJ NEW DRUG ADDON: CPT

## 2019-08-10 PROCEDURE — 96361 HYDRATE IV INFUSION ADD-ON: CPT

## 2019-08-10 PROCEDURE — 96374 THER/PROPH/DIAG INJ IV PUSH: CPT

## 2019-08-10 RX ORDER — LIDOCAINE HYDROCHLORIDE 20 MG/ML
5 INJECTION, SOLUTION EPIDURAL; INFILTRATION; INTRACAUDAL; PERINEURAL ONCE
Status: COMPLETED | OUTPATIENT
Start: 2019-08-10 | End: 2019-08-10

## 2019-08-10 RX ADMIN — ACETAMINOPHEN 975 MG: 325 TABLET ORAL at 00:27

## 2019-08-10 RX ADMIN — KETOROLAC TROMETHAMINE 15 MG: 30 INJECTION, SOLUTION INTRAMUSCULAR at 00:27

## 2019-08-10 RX ADMIN — DIPHENHYDRAMINE HYDROCHLORIDE 25 MG: 50 INJECTION INTRAMUSCULAR; INTRAVENOUS at 00:27

## 2019-08-10 RX ADMIN — LIDOCAINE HYDROCHLORIDE 5 ML: 20 INJECTION, SOLUTION EPIDURAL; INFILTRATION; INTRACAUDAL; PERINEURAL at 00:28

## 2019-08-10 RX ADMIN — SODIUM CHLORIDE 1000 ML: 0.9 INJECTION, SOLUTION INTRAVENOUS at 00:27

## 2019-08-10 RX ADMIN — METOCLOPRAMIDE 10 MG: 5 INJECTION, SOLUTION INTRAMUSCULAR; INTRAVENOUS at 00:28

## 2019-08-10 NOTE — ED PROVIDER NOTES
History  Chief Complaint   Patient presents with    Headache - Recurrent or Known Dx Migraines     Presents for eval of ongoing migraine since last friday  Routine and PRN meds at home non-eff  (+) nausea, photosensitivity, dizziness  HPI     Patient is a pleasant 41 yof who presents with a migraine  Achi, severe, similar to prior, no exac or relieving, assoc with nausea  HA was gradual onset  No f/c/s  No neck stiffness  No focal neurological symptoms  No temporal artery pain/tenderness  No vision changes  Headaches are not increasing in severity or frequency  Not worse in the AM  No head trauma  Doubt IIH  No dysarthria, nystagmus, dysphagia, diplopia, aphasia, vertigo, ataxia, visions loss, dysmetria  Normal finger to nose, gait, rapid alternating movement,  tandem gait, strength and sensation in bilat upper and lower extremities  Normal upper and lower reflexes  No pronator drift  Normal heel to shin  EOMI, no visual field defects  CN 2-12 intact  GCS 15  AOx3  MDM well appearing female, will treat for migraine  The patient (and any family present) verbalized understanding of the discharge instructions and warnings that would necessitate return to the Emergency Department  Gave verbal in addition to written discharge instructions  Specifically highlighted areas of special concern regarding the written and verbal discharge instructions and return precautions  All questions were answered prior to discharge  Prior to Admission Medications   Prescriptions Last Dose Informant Patient Reported? Taking?    Azelastine HCl 137 MCG/SPRAY SOLN   No No   Si SPRAYS INTO EACH NOSTRIL TWICE A DAY   [facility] MELATONIN 5 MG CAPS   No No   Sig: TAKE 1 CAPSULE BEDTIME   Lactobacillus (ACIDOPHILUS PROBIOTIC PO)   Yes No   Sig: Take 1 tablet by mouth daily   busPIRone (BUSPAR) 7 5 mg tablet   No No   Sig: TAKE 1 TABLET (7 5 MG TOTAL) BY MOUTH 2 (TWO) TIMES A DAY   clonazePAM (KlonoPIN) 0 5 mg tablet   No No   Sig: Take 1 tablet (0 5 mg total) by mouth daily   cyclobenzaprine (FLEXERIL) 10 mg tablet   No No   Sig: Take 1 tablet (10 mg total) by mouth daily at bedtime   dexamethasone (DECADRON) 4 mg tablet   No No   Sig: Take 1 tablet (4 mg total) by mouth 2 (two) times a day with meals for 3 days   dicyclomine (BENTYL) 20 mg tablet   No No   Sig: TAKE 1 TABLET EVERY 6 HOURS AS NEEDED   levonorgestrel (MIRENA) 20 MCG/24HR IUD   Yes No   Sig: Mirena 20 MCG/24HR Intrauterine Intrauterine Device  Refills: 0  Active   montelukast (SINGULAIR) 10 mg tablet   Yes No   Sig: Take 1 tablet by mouth daily as needed   naproxen (NAPROSYN) 250 mg tablet   No No   Sig: TAKE 1 TABLET EVERY 12 HOURS DAILY     naproxen (NAPROSYN) 500 mg tablet   No No   Sig: Take 1 tablet (500 mg total) by mouth every 12 (twelve) hours as needed for mild pain, moderate pain or headaches for up to 5 days   nystatin-triamcinolone (MYCOLOG-II) ointment   No No   Sig: Apply topically 2 (two) times a day   ondansetron (ZOFRAN-ODT) 4 mg disintegrating tablet   No No   Sig: Take 1 tablet (4 mg total) by mouth every 8 (eight) hours as needed for nausea or vomiting   senna ([employer] SENNA) 8 6 MG tablet   No No   Sig: Take 1 tablet (8 6 mg total) by mouth daily   topiramate (TOPAMAX) 50 MG tablet   No No   Sig: TAKE 1 TABLET BEDTIME   traMADol (ULTRAM) 50 mg tablet   No No   Sig: Take 1 tablet (50 mg total) by mouth every 8 (eight) hours as needed for moderate pain   valACYclovir (VALTREX) 500 mg tablet   Yes No   Sig: Take 1 tablet by mouth daily as needed      Facility-Administered Medications: None       Past Medical History:   Diagnosis Date    Allergic rhinitis     Anemia     Anxiety     Depression     Fibromyalgia     Fibromyalgia     GERD (gastroesophageal reflux disease)     Hematochezia     Herpes simplex infection     Hypertension     IBS (irritable bowel syndrome)     Insomnia     Mental status change     Migraine     Obesity 8/6/2015    Scoliosis     Scoliosis        Past Surgical History:   Procedure Laterality Date    EXPLORATORY LAPAROTOMY      WISDOM TOOTH EXTRACTION         Family History   Problem Relation Age of Onset   Alejandro Bella Stroke Mother     Hypertension Mother     Psoriasis Mother     Breast cancer Family     Diabetes type I Son      I have reviewed and agree with the history as documented  Social History     Tobacco Use    Smoking status: Current Some Day Smoker     Types: Cigarettes    Smokeless tobacco: Never Used    Tobacco comment: casual, current some day smoker per allscripts   Substance Use Topics    Alcohol use: Yes     Alcohol/week: 3 0 standard drinks     Types: 1 Glasses of wine, 1 Cans of beer, 1 Shots of liquor per week     Comment: yearly, social 1-2 drinks per month per allscripts    Drug use: Yes     Frequency: 3 0 times per week     Types: Marijuana        Review of Systems   Gastrointestinal: Positive for nausea and vomiting  Neurological: Positive for headaches  All other systems reviewed and are negative  Physical Exam  Physical Exam   Constitutional: She is oriented to person, place, and time  She appears well-developed and well-nourished  HENT:   Head: Normocephalic and atraumatic  Right Ear: External ear normal    Left Ear: External ear normal    Eyes: Conjunctivae and EOM are normal    Neck: Normal range of motion  Neck supple  No JVD present  No tracheal deviation present  Cardiovascular: Normal rate, regular rhythm and normal heart sounds  Pulmonary/Chest: Effort normal  No respiratory distress  She has no wheezes  She has no rales  Abdominal: Soft  Bowel sounds are normal  There is no tenderness  There is no rebound and no guarding  Musculoskeletal: She exhibits no edema or tenderness  Neurological: She is alert and oriented to person, place, and time  Skin: Skin is warm and dry  No rash noted  No erythema  Psychiatric: She has a normal mood and affect   Thought content normal    Nursing note and vitals reviewed  Vital Signs  ED Triage Vitals [08/09/19 2237]   Temperature Pulse Respirations Blood Pressure SpO2   98 5 °F (36 9 °C) 88 18 129/58 100 %      Temp Source Heart Rate Source Patient Position - Orthostatic VS BP Location FiO2 (%)   Oral Monitor Sitting Right arm --      Pain Score       8           Vitals:    08/09/19 2237   BP: 129/58   Pulse: 88   Patient Position - Orthostatic VS: Sitting         Visual Acuity      ED Medications  Medications   sodium chloride 0 9 % bolus 1,000 mL (1,000 mL Intravenous New Bag 8/10/19 0027)   metoclopramide (REGLAN) injection 10 mg (10 mg Intravenous Given 8/10/19 0028)   acetaminophen (TYLENOL) tablet 975 mg (975 mg Oral Given 8/10/19 0027)   ketorolac (TORADOL) injection 15 mg (15 mg Intravenous Given 8/10/19 0027)   diphenhydrAMINE (BENADRYL) injection 25 mg (25 mg Intravenous Given 8/10/19 0027)   lidocaine (PF) (XYLOCAINE-MPF) 2 % injection 5 mL (5 mL Inhalation Given 8/10/19 0028)       Diagnostic Studies  Results Reviewed     Procedure Component Value Units Date/Time    POCT pregnancy, urine [[id number]]  (Normal) Resulted:  08/09/19 2309    Lab Status:  Final result Updated:  08/09/19 2309     EXT PREG TEST UR (Ref: Negative) negative     Control valid                 No orders to display              Procedures  Procedures       ED Course                               MDM    Disposition  Final diagnoses:   Headache     Time reflects when diagnosis was documented in both MDM as applicable and the Disposition within this note     Time User Action Codes Description Comment    8/10/2019  1:04 AM Catrina Londono, [address] [R51] Headache       ED Disposition     ED Disposition Condition Date/Time Comment    Discharge Stable Sat Aug 10, 2019  1:04 AM Lexie Moon discharge to home/self care              Follow-up Information     Follow up With Specialties Details Why Contact Info    Luiz Tamayo MD Family Medicine In 1 day  [address] [id number]  [phone]            Patient's Medications   Discharge Prescriptions    No medications on file     No discharge procedures on file      ED Provider  Electronically Signed by           Britany Villa MD  08/10/19 8500

## 2019-08-11 DIAGNOSIS — F41.9 ANXIETY: ICD-10-CM

## 2019-08-11 RX ORDER — CLONAZEPAM 0.5 MG/1
TABLET ORAL
Qty: 30 TABLET | Refills: 0 | Status: SHIPPED | OUTPATIENT
Start: 2019-08-11 | End: 2019-09-17 | Stop reason: SDUPTHER

## 2019-08-12 ENCOUNTER — TELEPHONE (OUTPATIENT)
Dept: FAMILY MEDICINE CLINIC | Facility: CLINIC | Age: 39
End: 2019-08-12

## 2019-08-12 DIAGNOSIS — G43.709 CHRONIC MIGRAINE WITHOUT AURA WITHOUT STATUS MIGRAINOSUS, NOT INTRACTABLE: Primary | ICD-10-CM

## 2019-08-12 DIAGNOSIS — J30.2 SEASONAL ALLERGIC RHINITIS, UNSPECIFIED TRIGGER: ICD-10-CM

## 2019-08-12 RX ORDER — BUTALBITAL, ACETAMINOPHEN AND CAFFEINE 50; 325; 40 MG/1; MG/1; MG/1
1 TABLET ORAL EVERY 4 HOURS PRN
Qty: 20 TABLET | Refills: 0 | Status: SHIPPED | OUTPATIENT
Start: 2019-08-12 | End: 2019-08-28 | Stop reason: SDUPTHER

## 2019-08-12 NOTE — TELEPHONE ENCOUNTER
Spoke to patient and advised but she wanted to know what she can do for her back pain   Please advise

## 2019-08-12 NOTE — TELEPHONE ENCOUNTER
She needs to PT and take tramadol not sure what else to do for her  She is also due to see a rheumatologist  Does she want to try a different pain meds?

## 2019-08-12 NOTE — TELEPHONE ENCOUNTER
PT CALLED SEEN YOU LAST Friday FOR MIGRAINES  THEN HAD ORAL SURGERY FOUR DAYS LATER  STILL HAVING MIGRAINES BAD WENT TO ER TWICE  PT STILL HAS MIGRAINES/NAUSEA AND WEAK AND NOW PAIN IN LOWER BACK   PLS ADVISE OF WHAT PT CAN DO

## 2019-08-12 NOTE — TELEPHONE ENCOUNTER
Pt said her body hurts/she is taking ibuprofen now is having cramps when she urinates and lower back pain  I offered physical therapy pt kept saying she feels really weak and body hurts   pls advise for above

## 2019-08-28 DIAGNOSIS — F51.01 PRIMARY INSOMNIA: ICD-10-CM

## 2019-08-28 DIAGNOSIS — G43.709 CHRONIC MIGRAINE WITHOUT AURA WITHOUT STATUS MIGRAINOSUS, NOT INTRACTABLE: ICD-10-CM

## 2019-08-28 DIAGNOSIS — K59.04 CHRONIC IDIOPATHIC CONSTIPATION: ICD-10-CM

## 2019-08-28 DIAGNOSIS — K58.1 IRRITABLE BOWEL SYNDROME WITH CONSTIPATION: ICD-10-CM

## 2019-08-28 DIAGNOSIS — G43.909 MIGRAINE: ICD-10-CM

## 2019-08-28 DIAGNOSIS — F41.9 ANXIETY: ICD-10-CM

## 2019-08-28 RX ORDER — BUTALBITAL, ACETAMINOPHEN AND CAFFEINE 50; 325; 40 MG/1; MG/1; MG/1
1 TABLET ORAL EVERY 4 HOURS PRN
Qty: 20 TABLET | Refills: 0 | Status: SHIPPED | OUTPATIENT
Start: 2019-08-28 | End: 2021-03-09 | Stop reason: SDUPTHER

## 2019-08-28 RX ORDER — DICYCLOMINE HCL 20 MG
20 TABLET ORAL EVERY 6 HOURS PRN
Qty: 90 TABLET | Refills: 0 | Status: SHIPPED | OUTPATIENT
Start: 2019-08-28 | End: 2019-10-16 | Stop reason: SDUPTHER

## 2019-08-28 RX ORDER — ONDANSETRON 4 MG/1
4 TABLET, ORALLY DISINTEGRATING ORAL EVERY 8 HOURS PRN
Qty: 30 TABLET | Refills: 0 | Status: SHIPPED | OUTPATIENT
Start: 2019-08-28 | End: 2020-08-27

## 2019-08-28 RX ORDER — CLONAZEPAM 0.5 MG/1
0.5 TABLET ORAL DAILY
Qty: 30 TABLET | Refills: 0 | Status: CANCELLED | OUTPATIENT
Start: 2019-08-28

## 2019-08-28 RX ORDER — SENNA PLUS 8.6 MG/1
1 TABLET ORAL DAILY
Qty: 90 TABLET | Refills: 0 | Status: SHIPPED | OUTPATIENT
Start: 2019-08-28 | End: 2020-05-04

## 2019-08-29 ENCOUNTER — OFFICE VISIT (OUTPATIENT)
Dept: FAMILY MEDICINE CLINIC | Facility: CLINIC | Age: 39
End: 2019-08-29
Payer: COMMERCIAL

## 2019-08-29 VITALS
HEIGHT: 63 IN | DIASTOLIC BLOOD PRESSURE: 62 MMHG | SYSTOLIC BLOOD PRESSURE: 92 MMHG | HEART RATE: 59 BPM | BODY MASS INDEX: 29.95 KG/M2 | WEIGHT: 169 LBS | RESPIRATION RATE: 16 BRPM | TEMPERATURE: 98.1 F | OXYGEN SATURATION: 97 %

## 2019-08-29 DIAGNOSIS — F41.9 ANXIETY: ICD-10-CM

## 2019-08-29 DIAGNOSIS — K58.1 IRRITABLE BOWEL SYNDROME WITH CONSTIPATION: Primary | ICD-10-CM

## 2019-08-29 DIAGNOSIS — M79.7 FIBROMYALGIA: ICD-10-CM

## 2019-08-29 DIAGNOSIS — K21.9 GASTROESOPHAGEAL REFLUX DISEASE WITHOUT ESOPHAGITIS: ICD-10-CM

## 2019-08-29 DIAGNOSIS — G43.709 CHRONIC MIGRAINE WITHOUT AURA WITHOUT STATUS MIGRAINOSUS, NOT INTRACTABLE: ICD-10-CM

## 2019-08-29 PROCEDURE — 99214 OFFICE O/P EST MOD 30 MIN: CPT | Performed by: FAMILY MEDICINE

## 2019-08-29 RX ORDER — TRAMADOL HYDROCHLORIDE 50 MG/1
50 TABLET ORAL EVERY 8 HOURS PRN
Qty: 30 TABLET | Refills: 0 | Status: SHIPPED | OUTPATIENT
Start: 2019-08-29 | End: 2019-11-07

## 2019-08-29 RX ORDER — AMITRIPTYLINE HYDROCHLORIDE 25 MG/1
25 TABLET, FILM COATED ORAL
Qty: 90 TABLET | Refills: 0 | Status: SHIPPED | OUTPATIENT
Start: 2019-08-29 | End: 2019-11-27 | Stop reason: SDUPTHER

## 2019-08-29 NOTE — PROGRESS NOTES
Assessment/Plan:    No problem-specific Assessment & Plan notes found for this encounter  Diagnoses and all orders for this visit:    Irritable bowel syndrome with constipation  Comments:  doing well on senna    Anxiety  Comments:  doing well on Buspar   Clonazepam PRN     Chronic migraine without aura without status migrainosus, not intractable  Comments:  not doing well  neuro appt december 2019  added amitripyline   call in 2 weeks to update   Orders:  -     amitriptyline (ELAVIL) 25 mg tablet; Take 1 tablet (25 mg total) by mouth daily at bedtime    Fibromyalgia  -     traMADol (ULTRAM) 50 mg tablet; Take 1 tablet (50 mg total) by mouth every 8 (eight) hours as needed for moderate pain    Fibromyalgia  Comments:  worsening   Tramadol PRN   Orders:  -     traMADol (ULTRAM) 50 mg tablet; Take 1 tablet (50 mg total) by mouth every 8 (eight) hours as needed for moderate pain    Gastroesophageal reflux disease without esophagitis  Comments:  stable  avoid triggers           Subjective:      Patient ID: Luz Flores is a 46 y o  female  Stopped topamax 2 months ago due to side effects  Still getting migraines daily  Joints pain on and off but better     Migraine    This is a recurrent problem  The current episode started more than 1 year ago  The problem occurs daily  The problem has been rapidly worsening  The pain quality is not similar to prior headaches  The pain is mild  Pertinent negatives include no abdominal pain, abnormal behavior, anorexia, back pain, blurred vision, coughing, dizziness, drainage, ear pain, facial sweating, fever, hearing loss, insomnia, loss of balance, muscle aches, nausea, neck pain, numbness, phonophobia, photophobia, rhinorrhea, scalp tenderness, seizures, sinus pressure, sore throat, swollen glands, tingling, tinnitus or vomiting  The symptoms are aggravated by bright light  The treatment provided mild relief  Her past medical history is significant for migraine headaches  There is no history of cancer, cluster headaches, hypertension, immunosuppression, migraines in the family, obesity, recent head traumas or sinus disease  Anxiety   Presents for follow-up visit  Patient reports no chest pain, dizziness, feeling of choking, insomnia, nausea, nervous/anxious behavior, obsessions, panic or restlessness  Symptoms occur occasionally  The severity of symptoms is mild  The quality of sleep is good  Compliance with medications is %  The following portions of the patient's history were reviewed and updated as appropriate: allergies, current medications, past family history, past medical history, past social history, past surgical history and problem list     Review of Systems   Constitutional: Negative for fever  HENT: Negative for ear pain, hearing loss, rhinorrhea, sinus pressure, sore throat and tinnitus  Eyes: Negative for blurred vision and photophobia  Respiratory: Negative for cough  Cardiovascular: Negative for chest pain  Gastrointestinal: Negative for abdominal pain, anorexia, nausea and vomiting  Musculoskeletal: Negative for back pain and neck pain  Neurological: Negative for dizziness, tingling, seizures, numbness and loss of balance  Psychiatric/Behavioral: The patient is not nervous/anxious and does not have insomnia  Objective:      BP 92/62 (BP Location: Left arm, Patient Position: Sitting, Cuff Size: Standard)   Pulse 59   Temp 98 1 °F (36 7 °C) (Tympanic)   Resp 16   Ht 5' 3" (1 6 m)   Wt 76 7 kg (169 lb)   SpO2 97%   BMI 29 94 kg/m²          Physical Exam   Constitutional: She is oriented to person, place, and time  She appears well-developed and well-nourished  HENT:   Head: Normocephalic  Mouth/Throat: No oropharyngeal exudate  Eyes: Pupils are equal, round, and reactive to light  Neck: Normal range of motion  No tracheal deviation present  No thyromegaly present     Cardiovascular: Normal rate and regular rhythm  Pulmonary/Chest: Effort normal and breath sounds normal    Abdominal: Soft  Bowel sounds are normal  She exhibits no distension  There is no tenderness  Musculoskeletal: She exhibits no edema or deformity  Neurological: She is alert and oriented to person, place, and time  Skin: No rash noted  No erythema  Psychiatric: She has a normal mood and affect

## 2019-08-31 DIAGNOSIS — J30.2 SEASONAL ALLERGIC RHINITIS, UNSPECIFIED TRIGGER: ICD-10-CM

## 2019-09-03 RX ORDER — AZELASTINE HYDROCHLORIDE 137 UG/1
SPRAY, METERED NASAL
Qty: 1 BOTTLE | Refills: 1 | Status: SHIPPED | OUTPATIENT
Start: 2019-09-03 | End: 2021-03-31

## 2019-09-17 DIAGNOSIS — F41.9 ANXIETY: ICD-10-CM

## 2019-09-18 RX ORDER — CLONAZEPAM 0.5 MG/1
TABLET ORAL
Qty: 30 TABLET | Refills: 0 | Status: SHIPPED | OUTPATIENT
Start: 2019-09-18 | End: 2019-11-05 | Stop reason: SDUPTHER

## 2019-10-15 ENCOUNTER — OFFICE VISIT (OUTPATIENT)
Dept: FAMILY MEDICINE CLINIC | Facility: CLINIC | Age: 39
End: 2019-10-15
Payer: COMMERCIAL

## 2019-10-15 VITALS
HEIGHT: 63 IN | SYSTOLIC BLOOD PRESSURE: 114 MMHG | RESPIRATION RATE: 16 BRPM | WEIGHT: 167.2 LBS | BODY MASS INDEX: 29.62 KG/M2 | DIASTOLIC BLOOD PRESSURE: 80 MMHG

## 2019-10-15 DIAGNOSIS — M79.7 FIBROMYALGIA: ICD-10-CM

## 2019-10-15 DIAGNOSIS — R53.83 FATIGUE, UNSPECIFIED TYPE: ICD-10-CM

## 2019-10-15 DIAGNOSIS — K21.9 GASTROESOPHAGEAL REFLUX DISEASE WITHOUT ESOPHAGITIS: ICD-10-CM

## 2019-10-15 DIAGNOSIS — R23.8 EASY BRUISING: Primary | ICD-10-CM

## 2019-10-15 DIAGNOSIS — G43.709 CHRONIC MIGRAINE WITHOUT AURA WITHOUT STATUS MIGRAINOSUS, NOT INTRACTABLE: ICD-10-CM

## 2019-10-15 DIAGNOSIS — B34.9 ACUTE VIRAL SYNDROME: ICD-10-CM

## 2019-10-15 PROCEDURE — 99214 OFFICE O/P EST MOD 30 MIN: CPT | Performed by: FAMILY MEDICINE

## 2019-10-15 RX ORDER — MECLIZINE HYDROCHLORIDE 25 MG/1
25 TABLET ORAL EVERY 8 HOURS SCHEDULED
Qty: 30 TABLET | Refills: 0 | Status: SHIPPED | OUTPATIENT
Start: 2019-10-15 | End: 2019-11-07

## 2019-10-15 NOTE — PROGRESS NOTES
Assessment/Plan:    No problem-specific Assessment & Plan notes found for this encounter  Diagnoses and all orders for this visit:    Easy bruising  -     CBC and differential; Future  -     Protime-INR; Future  -     Mononucleosis screen; Future  -     Comprehensive metabolic panel    Fatigue, unspecified type  -     CBC and differential; Future  -     Protime-INR; Future  -     Mononucleosis screen; Future  -     Comprehensive metabolic panel    Acute viral syndrome  -     meclizine (ANTIVERT) 25 mg tablet; Take 1 tablet (25 mg total) by mouth every 8 (eight) hours    Gastroesophageal reflux disease without esophagitis  Comments:  avoid triggers  Tums or pepcid OTC     Fibromyalgia  Comments:  doing well  tramadol PRN     Chronic migraine without aura without status migrainosus, not intractable  Comments:  stable   continue current meds      - blood work as ordered   - supportive care with rest and hydration   - to avoid aleve and Ibuprofen for now due to bruising  BMI Counseling: Body mass index is 29 62 kg/m²  The BMI is above normal  Nutrition recommendations include 3-5 servings of fruits/vegetables daily, reducing fast food intake, decreasing soda and/or juice intake and moderation in carbohydrate intake  Exercise recommendations include moderate aerobic physical activity for 150 minutes/week  Subjective:      Patient ID: Nieves Vieira is a 46 y o  female      HPI   C/o stomach pain and vomiting this am   + tinged blood in mucous x 1 episode this morning   Came back from the trip from [facility] this week   +easy bruising over arms and thighs for the last few days   + fatigue and tired lately   No fever or URI symptoms    The following portions of the patient's history were reviewed and updated as appropriate: allergies, current medications, past family history, past medical history, past social history, past surgical history and problem list     Review of Systems   Constitutional: Positive for fatigue  Negative for unexpected weight change  HENT: Positive for postnasal drip  Negative for congestion, dental problem, ear discharge, ear pain and facial swelling  Eyes: Negative  Respiratory: Negative  Cardiovascular: Negative  Gastrointestinal: Positive for nausea and vomiting  Genitourinary: Negative  Musculoskeletal: Negative  Neurological: Negative  Hematological: Negative  Psychiatric/Behavioral: Negative  Objective:      /80 (BP Location: Left arm, Patient Position: Sitting, Cuff Size: Standard)   Resp 16   Ht 5' 3" (1 6 m)   Wt 75 8 kg (167 lb 3 2 oz)   BMI 29 62 kg/m²          Physical Exam   Constitutional: She is oriented to person, place, and time  She appears well-developed and well-nourished  Eyes: Pupils are equal, round, and reactive to light  EOM are normal    Cardiovascular: Normal rate and regular rhythm  Pulmonary/Chest: Effort normal and breath sounds normal    Abdominal: She exhibits no distension  There is no tenderness  Musculoskeletal: Normal range of motion  She exhibits no edema, tenderness or deformity  Neurological: She is alert and oriented to person, place, and time  Skin: No rash noted   No erythema    + bruising [place] left thigh

## 2019-10-16 DIAGNOSIS — K58.1 IRRITABLE BOWEL SYNDROME WITH CONSTIPATION: ICD-10-CM

## 2019-10-16 RX ORDER — DICYCLOMINE HCL 20 MG
20 TABLET ORAL EVERY 6 HOURS PRN
Qty: 90 TABLET | Refills: 0 | Status: SHIPPED | OUTPATIENT
Start: 2019-10-16 | End: 2019-11-04 | Stop reason: SDUPTHER

## 2019-10-18 DIAGNOSIS — G43.709 CHRONIC MIGRAINE WITHOUT AURA WITHOUT STATUS MIGRAINOSUS, NOT INTRACTABLE: ICD-10-CM

## 2019-10-18 RX ORDER — TOPIRAMATE 50 MG/1
TABLET, FILM COATED ORAL
Qty: 90 TABLET | Refills: 3 | Status: SHIPPED | OUTPATIENT
Start: 2019-10-18 | End: 2019-11-07

## 2019-10-22 ENCOUNTER — APPOINTMENT (OUTPATIENT)
Dept: LAB | Facility: HOSPITAL | Age: 39
End: 2019-10-22
Payer: COMMERCIAL

## 2019-10-22 DIAGNOSIS — R23.8 EASY BRUISING: ICD-10-CM

## 2019-10-22 DIAGNOSIS — R53.83 FATIGUE, UNSPECIFIED TYPE: ICD-10-CM

## 2019-10-22 LAB
ALBUMIN SERPL BCP-MCNC: 3.8 G/DL (ref 3.5–5)
ALP SERPL-CCNC: 64 U/L (ref 46–116)
ALT SERPL W P-5'-P-CCNC: 44 U/L (ref 12–78)
ANION GAP SERPL CALCULATED.3IONS-SCNC: 4 MMOL/L (ref 4–13)
AST SERPL W P-5'-P-CCNC: 24 U/L (ref 5–45)
BASOPHILS # BLD AUTO: 0.03 THOUSANDS/ΜL (ref 0–0.1)
BASOPHILS NFR BLD AUTO: 1 % (ref 0–1)
BILIRUB SERPL-MCNC: 0.49 MG/DL (ref 0.2–1)
BUN SERPL-MCNC: 16 MG/DL (ref 5–25)
CALCIUM SERPL-MCNC: 8.9 MG/DL (ref 8.3–10.1)
CHLORIDE SERPL-SCNC: 104 MMOL/L (ref 100–108)
CO2 SERPL-SCNC: 28 MMOL/L (ref 21–32)
CREAT SERPL-MCNC: 0.78 MG/DL (ref 0.6–1.3)
EOSINOPHIL # BLD AUTO: 0.08 THOUSAND/ΜL (ref 0–0.61)
EOSINOPHIL NFR BLD AUTO: 1 % (ref 0–6)
ERYTHROCYTE [DISTWIDTH] IN BLOOD BY AUTOMATED COUNT: 12.3 % (ref 11.6–15.1)
GFR SERPL CREATININE-BSD FRML MDRD: 97 ML/MIN/1.73SQ M
GLUCOSE P FAST SERPL-MCNC: 78 MG/DL (ref 65–99)
HCT VFR BLD AUTO: 43 % (ref 34.8–46.1)
HETEROPH AB SER QL: NEGATIVE
HGB BLD-MCNC: 13.7 G/DL (ref 11.5–15.4)
IMM GRANULOCYTES # BLD AUTO: 0.02 THOUSAND/UL (ref 0–0.2)
IMM GRANULOCYTES NFR BLD AUTO: 0 % (ref 0–2)
INR PPP: 1.1 (ref 0.84–1.19)
LYMPHOCYTES # BLD AUTO: 1.17 THOUSANDS/ΜL (ref 0.6–4.47)
LYMPHOCYTES NFR BLD AUTO: 19 % (ref 14–44)
MCH RBC QN AUTO: 32.1 PG (ref 26.8–34.3)
MCHC RBC AUTO-ENTMCNC: 31.9 G/DL (ref 31.4–37.4)
MCV RBC AUTO: 101 FL (ref 82–98)
MONOCYTES # BLD AUTO: 0.53 THOUSAND/ΜL (ref 0.17–1.22)
MONOCYTES NFR BLD AUTO: 9 % (ref 4–12)
NEUTROPHILS # BLD AUTO: 4.39 THOUSANDS/ΜL (ref 1.85–7.62)
NEUTS SEG NFR BLD AUTO: 70 % (ref 43–75)
NRBC BLD AUTO-RTO: 0 /100 WBCS
PLATELET # BLD AUTO: 180 THOUSANDS/UL (ref 149–390)
PMV BLD AUTO: 11 FL (ref 8.9–12.7)
POTASSIUM SERPL-SCNC: 4 MMOL/L (ref 3.5–5.3)
PROT SERPL-MCNC: 7.4 G/DL (ref 6.4–8.2)
PROTHROMBIN TIME: 13.8 SECONDS (ref 11.6–14.5)
RBC # BLD AUTO: 4.27 MILLION/UL (ref 3.81–5.12)
SODIUM SERPL-SCNC: 136 MMOL/L (ref 136–145)
WBC # BLD AUTO: 6.22 THOUSAND/UL (ref 4.31–10.16)

## 2019-10-22 PROCEDURE — 36415 COLL VENOUS BLD VENIPUNCTURE: CPT | Performed by: FAMILY MEDICINE

## 2019-10-22 PROCEDURE — 80053 COMPREHEN METABOLIC PANEL: CPT | Performed by: FAMILY MEDICINE

## 2019-10-22 PROCEDURE — 86308 HETEROPHILE ANTIBODY SCREEN: CPT

## 2019-10-22 PROCEDURE — 85025 COMPLETE CBC W/AUTO DIFF WBC: CPT

## 2019-10-22 PROCEDURE — 85610 PROTHROMBIN TIME: CPT

## 2019-10-24 ENCOUNTER — APPOINTMENT (OUTPATIENT)
Dept: LAB | Age: 39
End: 2019-10-24
Attending: PREVENTIVE MEDICINE

## 2019-10-24 ENCOUNTER — TRANSCRIBE ORDERS (OUTPATIENT)
Dept: ADMINISTRATIVE | Age: 39
End: 2019-10-24

## 2019-10-24 DIAGNOSIS — Z11.1 SCREENING EXAMINATION FOR PULMONARY TUBERCULOSIS: ICD-10-CM

## 2019-10-24 DIAGNOSIS — Z11.1 SCREENING EXAMINATION FOR PULMONARY TUBERCULOSIS: Primary | ICD-10-CM

## 2019-10-24 PROCEDURE — 86480 TB TEST CELL IMMUN MEASURE: CPT

## 2019-10-24 PROCEDURE — 36415 COLL VENOUS BLD VENIPUNCTURE: CPT

## 2019-10-28 ENCOUNTER — OFFICE VISIT (OUTPATIENT)
Dept: URGENT CARE | Age: 39
End: 2019-10-28
Payer: COMMERCIAL

## 2019-10-28 VITALS
OXYGEN SATURATION: 99 % | HEART RATE: 71 BPM | HEIGHT: 63 IN | RESPIRATION RATE: 18 BRPM | TEMPERATURE: 98.2 F | SYSTOLIC BLOOD PRESSURE: 118 MMHG | DIASTOLIC BLOOD PRESSURE: 63 MMHG | BODY MASS INDEX: 30.3 KG/M2 | WEIGHT: 171 LBS

## 2019-10-28 DIAGNOSIS — J01.00 ACUTE MAXILLARY SINUSITIS, RECURRENCE NOT SPECIFIED: Primary | ICD-10-CM

## 2019-10-28 DIAGNOSIS — J02.9 SORE THROAT: ICD-10-CM

## 2019-10-28 LAB
GAMMA INTERFERON BACKGROUND BLD IA-ACNC: 0.02 IU/ML
M TB IFN-G BLD-IMP: NEGATIVE
M TB IFN-G CD4+ BCKGRND COR BLD-ACNC: 0 IU/ML
M TB IFN-G CD4+ BCKGRND COR BLD-ACNC: 0.02 IU/ML
MITOGEN IGNF BCKGRD COR BLD-ACNC: >10 IU/ML
S PYO AG THROAT QL: NEGATIVE

## 2019-10-28 PROCEDURE — 99283 EMERGENCY DEPT VISIT LOW MDM: CPT | Performed by: PHYSICIAN ASSISTANT

## 2019-10-28 PROCEDURE — 99213 OFFICE O/P EST LOW 20 MIN: CPT | Performed by: PHYSICIAN ASSISTANT

## 2019-10-28 PROCEDURE — 87880 STREP A ASSAY W/OPTIC: CPT | Performed by: PHYSICIAN ASSISTANT

## 2019-10-28 PROCEDURE — G0382 LEV 3 HOSP TYPE B ED VISIT: HCPCS | Performed by: PHYSICIAN ASSISTANT

## 2019-10-28 RX ORDER — AMOXICILLIN AND CLAVULANATE POTASSIUM 875; 125 MG/1; MG/1
1 TABLET, FILM COATED ORAL EVERY 12 HOURS SCHEDULED
Qty: 14 TABLET | Refills: 0 | Status: SHIPPED | OUTPATIENT
Start: 2019-10-28 | End: 2019-11-04

## 2019-10-28 NOTE — PATIENT INSTRUCTIONS
Take antibiotics as prescribed  Use nasal saline spray for symptomatic treatment  Stay hydrated with lots of water/fluids  Follow-up with PCP in the next few days for reexamination and to ensure resolution of symptoms  Go to the ED if any fevers, unable to stay hydrated, abdominal pain, chest pain, shortness of breath, change in voice, pain or difficulty swallowing, new or worsening symptoms or other concerning symptoms

## 2019-10-28 NOTE — PROGRESS NOTES
[facility]        NAME: Carla Hayden is a 46 y o  female  : 1980    MRN: [id number]  DATE: 2019  TIME: 10:38 AM    Assessment and Plan   Acute maxillary sinusitis, recurrence not specified [J01 00]  1  Acute maxillary sinusitis, recurrence not specified  amoxicillin-clavulanate (AUGMENTIN) 875-125 mg per tablet   2  Sore throat  POCT rapid strepA     Rapid strep negative, will treat acute sinusitis    Patient Instructions     Take antibiotics as prescribed  Use nasal saline spray for symptomatic treatment  Stay hydrated with lots of water/fluids  Follow-up with PCP in the next few days for reexamination and to ensure resolution of symptoms  Go to the ED if any fevers, unable to stay hydrated, abdominal pain, chest pain, shortness of breath, change in voice, pain or difficulty swallowing, new or worsening symptoms or other concerning symptoms  Chief Complaint     Chief Complaint   Patient presents with    Sore Throat     Started about a week ago cough, sore throat, pain in head and ears  Has been taking Dayquil and Nyquil  History of Present Illness       52-year-old female presents with sinus pressure, nasal congestion, cough, sore throat and intermittent ear pain times 1-2 weeks  Also notes postnasal drip  States the cough started off dry now it is productive of clearish phlegm  Has been taking DayQuil and NyQuil with little relief  Sick contacts around the house  She denies any fevers, chest pain, chest tightness, shortness of breath, GI/ symptoms, headaches or other complaints  She is it feels like her prior sinus infections  Denies any pregnancy risk      Review of Systems   Review of Systems   Constitutional: Negative for activity change, appetite change, chills, fatigue and fever  HENT: Positive for congestion, postnasal drip, rhinorrhea, sinus pressure and sore throat  Negative for ear pain, facial swelling, trouble swallowing and voice change  Eyes: Negative for discharge, itching and visual disturbance  Respiratory: Positive for cough  Negative for chest tightness, shortness of breath and wheezing  Cardiovascular: Negative for chest pain  Gastrointestinal: Negative for abdominal pain, diarrhea, nausea and vomiting  Musculoskeletal: Negative for back pain and neck pain  Skin: Negative for rash  Neurological: Negative for dizziness, syncope, weakness, numbness and headaches  All other systems reviewed and are negative          Current Medications       Current Outpatient Medications:     amitriptyline (ELAVIL) 25 mg tablet, Take 1 tablet (25 mg total) by mouth daily at bedtime, Disp: 90 tablet, Rfl: 0    Azelastine HCl 137 MCG/SPRAY SOLN, 2 SPRAYS INTO EACH NOSTRIL TWICE A DAY, Disp: 1 Bottle, Rfl: 1    busPIRone (BUSPAR) 7 5 mg tablet, TAKE 1 TABLET (7 5 MG TOTAL) BY MOUTH 2 (TWO) TIMES A DAY, Disp: 60 tablet, Rfl: 5    butalbital-acetaminophen-caffeine (FIORICET,ESGIC) -40 mg per tablet, Take 1 tablet by mouth every 4 (four) hours as needed for headaches, Disp: 20 tablet, Rfl: 0    clonazePAM (KlonoPIN) 0 5 mg tablet, TAKE 1 TABLET BY MOUTH EVERY DAY, Disp: 30 tablet, Rfl: 0    cyclobenzaprine (FLEXERIL) 10 mg tablet, Take 1 tablet (10 mg total) by mouth daily at bedtime, Disp: 90 tablet, Rfl: 0    dicyclomine (BENTYL) 20 mg tablet, TAKE 1 TABLET (20 MG TOTAL) BY MOUTH EVERY 6 (SIX) HOURS AS NEEDED (STOMACH PAIN), Disp: 90 tablet, Rfl: 0    Lactobacillus (ACIDOPHILUS PROBIOTIC PO), Take 1 tablet by mouth daily, Disp: , Rfl:     levonorgestrel (MIRENA) 20 MCG/24HR IUD, Mirena 20 MCG/24HR Intrauterine Intrauterine Device  Refills: 0  Active, Disp: , Rfl:     meclizine (ANTIVERT) 25 mg tablet, Take 1 tablet (25 mg total) by mouth every 8 (eight) hours, Disp: 30 tablet, Rfl: 0    Melatonin ([employer] MELATONIN) 5 MG CAPS, Take 1 capsule (5 mg total) by mouth daily at bedtime, Disp: 90 capsule, Rfl: 0    montelukast (SINGULAIR) 10 mg tablet, Take 1 tablet by mouth daily as needed, Disp: , Rfl:     naproxen (NAPROSYN) 250 mg tablet, TAKE 1 TABLET EVERY 12 HOURS DAILY  , Disp: 60 tablet, Rfl: 4    ondansetron (ZOFRAN-ODT) 4 mg disintegrating tablet, Take 1 tablet (4 mg total) by mouth every 8 (eight) hours as needed for nausea or vomiting, Disp: 30 tablet, Rfl: 0    senna ([employer] SENNA) 8 6 MG tablet, Take 1 tablet (8 6 mg total) by mouth daily, Disp: 90 tablet, Rfl: 0    topiramate (TOPAMAX) 50 MG tablet, TAKE 1 TABLET BY MOUTH EVERYDAY AT BEDTIME, Disp: 90 tablet, Rfl: 3    traMADol (ULTRAM) 50 mg tablet, Take 1 tablet (50 mg total) by mouth every 8 (eight) hours as needed for moderate pain, Disp: 30 tablet, Rfl: 0    valACYclovir (VALTREX) 500 mg tablet, Take 1 tablet by mouth daily as needed, Disp: , Rfl:     amoxicillin-clavulanate (AUGMENTIN) 875-125 mg per tablet, Take 1 tablet by mouth every 12 (twelve) hours for 7 days, Disp: 14 tablet, Rfl: 0    naproxen (NAPROSYN) 500 mg tablet, Take 1 tablet (500 mg total) by mouth every 12 (twelve) hours as needed for mild pain, moderate pain or headaches for up to 5 days (Patient not taking: Reported on 10/28/2019), Disp: 10 tablet, Rfl: 0    nystatin-triamcinolone (MYCOLOG-II) ointment, Apply topically 2 (two) times a day (Patient not taking: Reported on 8/29/2019), Disp: 30 g, Rfl: 0    topiramate (TOPAMAX) 50 MG tablet, Take 50 mg by mouth daily at bedtime, Disp: , Rfl: 3    Current Allergies     Allergies as of 10/28/2019 - Reviewed 10/28/2019   Allergen Reaction Noted    Cymbalta [duloxetine hcl] Nausea Only and GI Intolerance 11/18/2015            The following portions of the patient's history were reviewed and updated as appropriate: allergies, current medications, past family history, past medical history, past social history, past surgical history and problem list      Past Medical History:   Diagnosis Date    Allergic rhinitis     Anemia     Anxiety     Depression     Fibromyalgia     Fibromyalgia     GERD (gastroesophageal reflux disease)     Hematochezia     Herpes simplex infection     Hypertension     IBS (irritable bowel syndrome)     Insomnia     Mental status change     Migraine     Obesity 8/6/2015    Scoliosis     Scoliosis        Past Surgical History:   Procedure Laterality Date    EXPLORATORY LAPAROTOMY      WISDOM TOOTH EXTRACTION         Family History   Problem Relation Age of Onset   Isaura Green Stroke Mother     Hypertension Mother     Psoriasis Mother     Breast cancer Family     Diabetes type I Son          Medications have been verified  Objective   /63 (BP Location: Right arm, Patient Position: Sitting)   Pulse 71   Temp 98 2 °F (36 8 °C) (Temporal)   Resp 18   Ht 5' 3" (1 6 m)   Wt 77 6 kg (171 lb)   SpO2 99%   BMI 30 29 kg/m²        Physical Exam     Physical Exam   Constitutional: She is oriented to person, place, and time  She appears well-developed and well-nourished  No distress  HENT:   Right Ear: Tympanic membrane normal    Left Ear: Tympanic membrane normal    Mouth/Throat: Uvula is midline and mucous membranes are normal  No uvula swelling  Mild PND present with mildly erythematous posterior pharynx  Mild bilateral maxillary sinus pressure/discomfort to palpation  Airway patent, handling secretions  Eyes: Pupils are equal, round, and reactive to light  EOM are normal    Neck: Normal range of motion  Neck supple  Cardiovascular: Normal rate, regular rhythm and normal heart sounds  Pulmonary/Chest: Effort normal and breath sounds normal  No respiratory distress  She has no wheezes  Neurological: She is alert and oriented to person, place, and time  Skin: Capillary refill takes less than 2 seconds  Psychiatric: She has a normal mood and affect  Her behavior is normal    Nursing note and vitals reviewed

## 2019-10-28 NOTE — LETTER
October 28, 2019     Patient: Nieves Vieira   YOB: 1980   Date of Visit: 10/28/2019       To Whom It May Concern: It is my medical opinion that Nieves Vieira may return to work on 10/30/19  If you have any questions or concerns, please don't hesitate to call           Sincerely,        Aurea Donahue PA-C    CC: No Recipients

## 2019-11-04 DIAGNOSIS — K58.1 IRRITABLE BOWEL SYNDROME WITH CONSTIPATION: ICD-10-CM

## 2019-11-04 RX ORDER — DICYCLOMINE HCL 20 MG
20 TABLET ORAL EVERY 6 HOURS PRN
Qty: 90 TABLET | Refills: 0 | Status: SHIPPED | OUTPATIENT
Start: 2019-11-04 | End: 2020-08-27

## 2019-11-05 DIAGNOSIS — F41.9 ANXIETY: ICD-10-CM

## 2019-11-05 RX ORDER — CLONAZEPAM 0.5 MG/1
TABLET ORAL
Qty: 30 TABLET | Refills: 0 | Status: SHIPPED | OUTPATIENT
Start: 2019-11-05 | End: 2019-12-08 | Stop reason: SDUPTHER

## 2019-11-07 ENCOUNTER — OFFICE VISIT (OUTPATIENT)
Dept: OBGYN CLINIC | Facility: CLINIC | Age: 39
End: 2019-11-07

## 2019-11-07 VITALS
HEIGHT: 63 IN | WEIGHT: 173 LBS | DIASTOLIC BLOOD PRESSURE: 81 MMHG | HEART RATE: 73 BPM | SYSTOLIC BLOOD PRESSURE: 125 MMHG | BODY MASS INDEX: 30.65 KG/M2

## 2019-11-07 DIAGNOSIS — N89.8 VAGINAL DISCHARGE: Primary | ICD-10-CM

## 2019-11-07 DIAGNOSIS — B37.3 VAGINAL YEAST INFECTION: ICD-10-CM

## 2019-11-07 DIAGNOSIS — Z72.51 HIGH RISK HETEROSEXUAL BEHAVIOR: ICD-10-CM

## 2019-11-07 PROCEDURE — 87491 CHLMYD TRACH DNA AMP PROBE: CPT | Performed by: NURSE PRACTITIONER

## 2019-11-07 PROCEDURE — 87591 N.GONORRHOEAE DNA AMP PROB: CPT | Performed by: NURSE PRACTITIONER

## 2019-11-07 PROCEDURE — 99213 OFFICE O/P EST LOW 20 MIN: CPT | Performed by: NURSE PRACTITIONER

## 2019-11-07 PROCEDURE — 87210 SMEAR WET MOUNT SALINE/INK: CPT | Performed by: NURSE PRACTITIONER

## 2019-11-07 RX ORDER — FLUCONAZOLE 150 MG/1
150 TABLET ORAL 2 TIMES WEEKLY
Qty: 2 TABLET | Refills: 0 | Status: SHIPPED | OUTPATIENT
Start: 2019-11-07 | End: 2019-11-12

## 2019-11-07 NOTE — PROGRESS NOTES
Assessment/Plan:      Diagnoses and all orders for this visit:    Vaginal discharge  -     POCT wet mount    Vaginal yeast infection  -     fluconazole (DIFLUCAN) 150 mg tablet; Take 1 tablet (150 mg total) by mouth 2 (two) times a week for 2 doses    High risk heterosexual behavior  -     Chlamydia/GC amplified DNA by PCR      -reviewed diagnosis of vaginal yeast infection and treatment with Diflucan  Written information provided   -hygiene reviewed including avoid scented soaps lotions and lubricants, avoid douching, avoid tight/restrictive clothing, make sure your completely dry prior to getting dressed,  -will call with results    RTO for a for annual exam    Subjective:     Patient ID: Noe Sacks is a 46 y o  female  HPI  here with vaginal discharge  Vaginal discharge is described as thick, white  Associated symptoms include external and internal vaginal itching  Admits to a different odor  Denies alleviating or aggravating factors  Tried Monistat 3 day notice that her vagina started burning and was more uncomfortable  Admits to new partner  Denies pelvic pain, bowel or bladder concerns, fever or chills  Has had similar episodes in the past diagnosed with both yeast and bacterial vaginosis  Last treatment and diagnosis 2019  Last Pap smear 7/30/15-resulted NILM/ HR HPV negative  Review of Systems   Constitutional: Negative for chills and fever  Respiratory: Negative  Cardiovascular: Negative  Gastrointestinal: Negative  Genitourinary: Positive for vaginal discharge  Negative for dysuria, flank pain, genital sores, menstrual problem, urgency and vaginal bleeding  Objective:     Physical Exam   Constitutional: She is oriented to person, place, and time  She appears well-developed and well-nourished  Cardiovascular: Normal rate, regular rhythm and normal heart sounds     Pulmonary/Chest: Effort normal and breath sounds normal    Genitourinary: Uterus normal  There is no rash, tenderness, lesion or injury on the right labia  There is no rash, tenderness, lesion or injury on the left labia  Cervix exhibits no motion tenderness, no discharge and no friability  Right adnexum displays no mass, no tenderness and no fullness  Left adnexum displays no mass, no tenderness and no fullness  There is tenderness in the vagina  No erythema or bleeding in the vagina  No foreign body in the vagina  No signs of injury around the vagina  Vaginal discharge found  Genitourinary Comments: Moderate amount of white vaginal discharge noted on exam   Neurological: She is alert and oriented to person, place, and time  Skin: Skin is warm and dry  Psychiatric: She has a normal mood and affect   Her behavior is normal  Thought content normal

## 2019-11-07 NOTE — PATIENT INSTRUCTIONS
Fluconazole (By mouth)   Fluconazole (tvax-CPU-e-zole)  Prevents and treats fungal infections  Brand Name(s): Diflucan   There may be other brand names for this medicine  When This Medicine Should Not Be Used: This medicine is not right for everyone  Do not use it if you had an allergic reaction to fluconazole, or if you are pregnant  How to Use This Medicine:   Liquid, Tablet  · Your doctor will tell you how much medicine to use  Do not use more than directed  · Oral liquid: Shake well just before each use  Measure the oral liquid medicine with a marked measuring spoon, oral syringe, or medicine cup  · Take all of the medicine in your prescription to clear up your infection, even if you feel better after the first few doses  · Read and follow the patient instructions that come with this medicine  Talk to your doctor or pharmacist if you have any questions  · Missed dose: Take a dose as soon as you remember  If it is almost time for your next dose, wait until then and take a regular dose  Do not take extra medicine to make up for a missed dose  · Store the medicine in a closed container at room temperature, away from heat, moisture, and direct light  Store the oral liquid in the refrigerator or at room temperature and use it within 14 days  Do not freeze  Drugs and Foods to Avoid:   Ask your doctor or pharmacist before using any other medicine, including over-the-counter medicines, vitamins, and herbal products  · Do not use this medicine together with astemizole, cisapride, erythromycin, pimozide, quinidine, or terfenadine  · Some foods and medicines can affect how fluconazole works  Tell your doctor if you are using cimetidine, midazolam, prednisone, rifabutin, rifampin, theophylline, tofacitinib, triazolam, vitamin A supplements, or voriconazole   Also tell your doctor if you are using any of the following:   ¨ A blood thinner (such as warfarin)  ¨ A diuretic or "water pill" (such as hydrochlorothiazide), or blood pressure medicine (such as amlodipine, felodipine, isradipine, losartan, nifedipine)  ¨ Birth control pills  ¨ Cancer medicine (cyclophosphamide, vinblastine, vincristine)  ¨ Diabetes medicine that you take by mouth (glipizide, glyburide, tolbutamide)  ¨ Medicine to lower cholesterol (atorvastatin, fluvastatin, simvastatin)  ¨ Medicine to treat depression (amitriptyline, nortriptyline)  ¨ Medicine to treat HIV/AIDS (saquinavir, zidovudine)  ¨ Medicine to treat malaria (halofantrine)  ¨ Medicine to treat seizures (carbamazepine, phenytoin)  ¨ Medicine that weakens the immune system (cyclosporine, sirolimus, tacrolimus)  ¨ Narcotic pain medicine (alfentanil, fentanyl, methadone)  ¨ Pain or arthritis medicine (aspirin, celecoxib, diclofenac, ibuprofen, naproxen)  Warnings While Using This Medicine:   · It is not safe to take this medicine during pregnancy  It could harm an unborn baby  Tell your doctor right away if you become pregnant  · Tell your doctor if you are breastfeeding, or if you have kidney disease, liver disease, heart disease, heart rhythm problems, cancer, or HIV/AIDS  · This medicine may cause the following problems:   ¨ Liver problems  ¨ Serious skin reactions  ¨ Changes in heart rhythm, such as a condition called QT prolongation  · This medicine may make you dizzy or drowsy  Do not drive or do anything that could be dangerous until you know how this medicine affects you  · Call your doctor if your symptoms do not improve or if they get worse  · Keep all medicine out of the reach of children  Never share your medicine with anyone    Possible Side Effects While Using This Medicine:   Call your doctor right away if you notice any of these side effects:  · Allergic reaction: Itching or hives, swelling in your face or hands, swelling or tingling in your mouth or throat, chest tightness, trouble breathing  · Blistering, peeling, or red skin rash  · Dark urine or pale stools, nausea, vomiting, loss of appetite, stomach pain, yellow skin or eyes  · Fast, pounding, or uneven heartbeat  · Unusual bleeding, bruising, or weakness  If you notice these less serious side effects, talk with your doctor:   · Headache  · Mild nausea, vomiting, stomach pain, or diarrhea  If you notice other side effects that you think are caused by this medicine, tell your doctor  Call your doctor for medical advice about side effects  You may report side effects to FDA at 9-213-IMO-8432  © 2017 [employer]  Information is for End User's use only and may not be sold, redistributed or otherwise used for commercial purposes  The above information is an  only  It is not intended as medical advice for individual conditions or treatments  Talk to your doctor, nurse or pharmacist before following any medical regimen to see if it is safe and effective for you  Vulvovaginal Candidiasis   WHAT YOU NEED TO KNOW:   What is vulvovaginal candidiasis? Vulvovaginal candidiasis, or yeast infection, is a common vaginal infection  Vulvovaginal candidiasis is caused by a fungus, or yeast-like germ  Fungi are normally found in your vagina  When there are too many fungi, it can cause an infection  What increases my risk for vulvovaginal candidiasis? · Pregnancy    · Medical conditions that suppress your immune system, such as diabetes or HIV and AIDS    · Medicines, such as antibiotics, birth control pills, or steroid medicine    · Contraceptive devices, such as diaphragms, sponges, and intrauterine devices  What are the signs and symptoms of vulvovaginal candidiasis? · Thick, white, cheese-like discharge from your vagina    · Itching, swelling, or redness in your vagina    · Burning when you urinate  How is vulvovaginal candidiasis diagnosed? Your healthcare provider will ask about your medical history and examine you   A sample of your vaginal discharge may show what germ is causing your infection  How is vulvovaginal candidiasis treated? Medicines help treat the fungal infection and decrease inflammation  The medicine may be a pill, topical cream, or vaginal suppository  With treatment, the infection is usually gone within a week: How can I manage my symptoms? · Wear cotton underwear  · Keep the vaginal area clean and dry  · Wipe from front to back after you urinate or have a bowel movement  · Do not have sex until your symptoms are gone  · Do not douche  · Do not use strong perfumes or soaps  · Do not use feminine hygiene sprays, powders, or bubble bath  How can I prevent another infection? · Take showers instead of baths  · Eat yogurt  · Limit the amount of alcohol you drink  · Limit your time in hot tubs  · Control your blood sugar if you are diabetic  When should I seek immediate care? · You have fever and chills  · You are bleeding from your vagina and it is not your monthly period  · You develop abdominal or pelvic pain  When should I contact my healthcare provider? · Your signs and symptoms get worse, even after treatment  · You have questions or concerns about your condition or care  CARE AGREEMENT:   You have the right to help plan your care  Learn about your health condition and how it may be treated  Discuss treatment options with your caregivers to decide what care you want to receive  You always have the right to refuse treatment  The above information is an  only  It is not intended as medical advice for individual conditions or treatments  Talk to your doctor, nurse or pharmacist before following any medical regimen to see if it is safe and effective for you  © 2017 [employer] Information is for End User's use only and may not be sold, redistributed or otherwise used for commercial purposes   All illustrations and images included in CareNotes® are the copyrighted property of [employer]  or [employer]

## 2019-11-08 LAB
C TRACH DNA SPEC QL NAA+PROBE: NEGATIVE
N GONORRHOEA DNA SPEC QL NAA+PROBE: NEGATIVE

## 2019-11-11 ENCOUNTER — TELEPHONE (OUTPATIENT)
Dept: OBGYN CLINIC | Facility: CLINIC | Age: 39
End: 2019-11-11

## 2019-11-11 NOTE — TELEPHONE ENCOUNTER
----- Message from [address] sent at 11/11/2019  8:09 AM EST -----  Please call patient both gonorrhea and chlamydia are negative    Thank you

## 2019-11-18 ENCOUNTER — OFFICE VISIT (OUTPATIENT)
Dept: OBGYN CLINIC | Facility: CLINIC | Age: 39
End: 2019-11-18

## 2019-11-18 VITALS
DIASTOLIC BLOOD PRESSURE: 61 MMHG | BODY MASS INDEX: 30.22 KG/M2 | HEIGHT: 64 IN | WEIGHT: 177 LBS | HEART RATE: 77 BPM | SYSTOLIC BLOOD PRESSURE: 132 MMHG

## 2019-11-18 DIAGNOSIS — B96.89 BV (BACTERIAL VAGINOSIS): Primary | ICD-10-CM

## 2019-11-18 DIAGNOSIS — T83.32XS INTRAUTERINE CONTRACEPTIVE DEVICE THREADS LOST, SEQUELA: ICD-10-CM

## 2019-11-18 DIAGNOSIS — B00.9 HSV INFECTION: ICD-10-CM

## 2019-11-18 DIAGNOSIS — N76.0 BV (BACTERIAL VAGINOSIS): Primary | ICD-10-CM

## 2019-11-18 PROCEDURE — 87210 SMEAR WET MOUNT SALINE/INK: CPT | Performed by: NURSE PRACTITIONER

## 2019-11-18 PROCEDURE — 99213 OFFICE O/P EST LOW 20 MIN: CPT | Performed by: NURSE PRACTITIONER

## 2019-11-18 RX ORDER — METRONIDAZOLE 500 MG/1
500 TABLET ORAL EVERY 12 HOURS SCHEDULED
Qty: 14 TABLET | Refills: 0 | Status: SHIPPED | OUTPATIENT
Start: 2019-11-18 | End: 2019-11-25

## 2019-11-18 RX ORDER — VALACYCLOVIR HYDROCHLORIDE 500 MG/1
500 TABLET, FILM COATED ORAL 2 TIMES DAILY
Qty: 6 TABLET | Refills: 3 | Status: SHIPPED | OUTPATIENT
Start: 2019-11-18 | End: 2020-08-27

## 2019-11-18 RX ORDER — FLUCONAZOLE 150 MG/1
150 TABLET ORAL ONCE
Qty: 1 TABLET | Refills: 0 | Status: SHIPPED | OUTPATIENT
Start: 2019-11-18 | End: 2019-11-18

## 2019-11-18 NOTE — PATIENT INSTRUCTIONS
Genital Herpes Simplex   AMBULATORY CARE:   Genital herpes  is a sexually transmitted infection (STI) that is caused by the herpes simplex virus (HSV)  It is spread through oral, vaginal, or anal sex  It may be spread even if you do not see blisters  It can also be spread to other areas of your body, including your eyes, by touching open blisters  If you are pregnant, it may be spread to your baby while he is still in your womb or during vaginal delivery  Unprotected sex or sex with multiple partners increases your risk for genital herpes  Common symptoms include the following: The most common symptoms are blisters that appear on your genital area, thighs, or buttocks  The blisters will open, leak fluid, and then dry up (crust over)  Usually these sores will go away without leaving a scar  Other symptoms may include any of the following:  · Redness, burning, itching, or tingling in your genital area    · Fever or chills    · Headache, body weakness, or muscle pains    · Swollen lymph nodes in your groin    · Sore throat or loss of appetite    · Fluid or blood leaking from your vagina    · Pain when you urinate  Call 911 for any of the following:   · You have trouble breathing  · You have a seizure  · Your neck is stiff  · You have trouble thinking clearly  Contact your healthcare provider if:   · You have chills or a fever  · You have painful blisters on your penis, vagina, anus, or mouth  · Fluid or blood is coming out of your genitals  · You have trouble urinating  · You think you are pregnant and you are bleeding from your vagina  · You have trouble chewing or swallowing  · Your symptoms do not get better, or they get worse, even after treatment  · You have questions or concerns about your condition or care  Medicines: There is no cure for genital herpes  You may need any of the following:  · Antivirals  may help decrease your symptoms       · Numbing cream or ointment  may help decrease pain  · NSAIDs , such as ibuprofen, help decrease swelling, pain, and fever  This medicine is available with or without a doctor's order  NSAIDs can cause stomach bleeding or kidney problems in certain people  If you take blood thinner medicine, always ask your healthcare provider if NSAIDs are safe for you  Always read the medicine label and follow directions  Manage your symptoms:  Do the following to be more comfortable when your infection is active:  · Keep the blisters clean and dry  Wash them with soap and warm water, and pat dry gently  · Wear cotton underwear and loose clothing  This may help to keep the blisters dry and keep clothes from rubbing  · Apply ice  on the area for 15 to 20 minutes every hour or as directed  Use an ice pack, or put crushed ice in a plastic bag  Cover it with a towel  Ice helps prevent tissue damage and decreases swelling and pain  · Apply heat  on the area for 20 to 30 minutes every 2 hours for as many days as directed  A warm bath may also help  Heat helps decrease pain and muscle spasms  Prevent the spread of genital herpes:   · Use condoms  Use a latex condom when you have oral, genital, and anal sex  Use a new condom each time  Use a polyurethane condom if you are allergic to latex  · Try not to touch your blisters  Wash your hands before and after you touch the area  Do not kiss anyone if you have blisters around your mouth  Do not breastfeed if you have blisters on your breast      · Tell your partners  that you have genital herpes  Do not have sex until he or she knows that you have genital herpes  Ask your healthcare provider for ways to tell partners about your infection  · Tell your healthcare providers  that you have genital herpes  If you are pregnant, your baby may need special monitoring  Inform your healthcare provider of your condition to avoid spreading the infection to your baby    Follow up with your healthcare provider as directed:  Write down your questions so you remember to ask them during your visits  © 2017 [employer] Information is for End User's use only and may not be sold, redistributed or otherwise used for commercial purposes  All illustrations and images included in CareNotes® are the copyrighted property of [employer]  or [employer]  The above information is an  only  It is not intended as medical advice for individual conditions or treatments  Talk to your doctor, nurse or pharmacist before following any medical regimen to see if it is safe and effective for you  Valacyclovir (By mouth)   Valacyclovir (pjb-pj-ICU-kloe-vir)  Treats herpes virus infections, including shingles, cold sores, and genital herpes  This medicine will not cure herpes, but may prevent a breakout of herpes sores or blisters  Also treats chicken pox  Brand Name(s): Valtrex   There may be other brand names for this medicine  When This Medicine Should Not Be Used: This medicine is not right for everyone  Do not use it if you had an allergic reaction to valacyclovir or acyclovir  How to Use This Medicine:   Tablet  · Your doctor will tell you how much medicine to use  Do not use more than directed  · This medicine works best when you take it at the first sign of a herpes breakout  · Drink extra fluids so you will urinate more often and help prevent kidney problems  · Missed dose: Take a dose as soon as you remember  If it is almost time for your next dose, wait until then and take a regular dose  Do not take extra medicine to make up for a missed dose  · Store the medicine in a closed container at room temperature, away from heat, moisture, and direct light  Store the oral liquid in the refrigerator  Discard any unused medicine after 28 days    Drugs and Foods to Avoid:      Ask your doctor or pharmacist before using any other medicine, including over-the-counter medicines, vitamins, and herbal products  Warnings While Using This Medicine:   · Tell your doctor if you are pregnant or breastfeeding, or if you have kidney disease, HIV or AIDS, or had a bone marrow or kidney transplant  · This medicine may cause the following problems:   ¨ Kidney problems  ¨ Nervous system problems  ¨ Thrombotic thrombocytopenic purpura/hemolytic uremic syndrome (in patients who have HIV or had a bone marrow or kidney transplant)  · Do not have sex while you have herpes sores  Valacyclovir will not stop the spread of herpes during sex  · Even if you have no signs of a herpes infection, it is still possible to spread the virus to others  Always use condoms made from latex or polyurethane when you have sexual contact  · Call your doctor if your symptoms do not improve or if they get worse  · Tell any doctor or dentist who treats you that you are using this medicine  This medicine may affect certain medical test results  · Do not stop using this medicine suddenly, or change how you take it, without talking to your doctor  · Keep all medicine out of the reach of children  Never share your medicine with anyone    Possible Side Effects While Using This Medicine:   Call your doctor right away if you notice any of these side effects:  · Allergic reaction: Itching or hives, swelling in your face or hands, swelling or tingling in your mouth or throat, chest tightness, trouble breathing  · Confusion, agitation, depression, or other behavior changes  · Decrease in how much or how often you urinate  · Fast heartbeat, fainting, or extreme weakness  · Pinpoint red spots on your skin, unusual bleeding or bruising, blood in your urine or stools  · Problems with walking, speaking, or coordination, seeing or hearing things that are not there  · Seizures or tremors  · Yellow skin or eyes  If you notice these less serious side effects, talk with your doctor:   · Headache or dizziness  · Nausea, vomiting, diarrhea, stomach pain  If you notice other side effects that you think are caused by this medicine, tell your doctor  Call your doctor for medical advice about side effects  You may report side effects to FDA at 1-811-BPS-2502  © 2017 [employer] Information is for End User's use only and may not be sold, redistributed or otherwise used for commercial purposes  The above information is an  only  It is not intended as medical advice for individual conditions or treatments  Talk to your doctor, nurse or pharmacist before following any medical regimen to see if it is safe and effective for you  Fluconazole (By mouth)   Fluconazole (mdvy-AIW-n-zole)  Prevents and treats fungal infections  Brand Name(s): Diflucan   There may be other brand names for this medicine  When This Medicine Should Not Be Used: This medicine is not right for everyone  Do not use it if you had an allergic reaction to fluconazole, or if you are pregnant  How to Use This Medicine:   Liquid, Tablet  · Your doctor will tell you how much medicine to use  Do not use more than directed  · Oral liquid: Shake well just before each use  Measure the oral liquid medicine with a marked measuring spoon, oral syringe, or medicine cup  · Take all of the medicine in your prescription to clear up your infection, even if you feel better after the first few doses  · Read and follow the patient instructions that come with this medicine  Talk to your doctor or pharmacist if you have any questions  · Missed dose: Take a dose as soon as you remember  If it is almost time for your next dose, wait until then and take a regular dose  Do not take extra medicine to make up for a missed dose  · Store the medicine in a closed container at room temperature, away from heat, moisture, and direct light  Store the oral liquid in the refrigerator or at room temperature and use it within 14 days  Do not freeze    Drugs and Foods to Avoid:   Ask your doctor or pharmacist before using any other medicine, including over-the-counter medicines, vitamins, and herbal products  · Do not use this medicine together with astemizole, cisapride, erythromycin, pimozide, quinidine, or terfenadine  · Some foods and medicines can affect how fluconazole works  Tell your doctor if you are using cimetidine, midazolam, prednisone, rifabutin, rifampin, theophylline, tofacitinib, triazolam, vitamin A supplements, or voriconazole  Also tell your doctor if you are using any of the following:   ¨ A blood thinner (such as warfarin)  ¨ A diuretic or "water pill" (such as hydrochlorothiazide), or blood pressure medicine (such as amlodipine, felodipine, isradipine, losartan, nifedipine)  ¨ Birth control pills  ¨ Cancer medicine (cyclophosphamide, vinblastine, vincristine)  ¨ Diabetes medicine that you take by mouth (glipizide, glyburide, tolbutamide)  ¨ Medicine to lower cholesterol (atorvastatin, fluvastatin, simvastatin)  ¨ Medicine to treat depression (amitriptyline, nortriptyline)  ¨ Medicine to treat HIV/AIDS (saquinavir, zidovudine)  ¨ Medicine to treat malaria (halofantrine)  ¨ Medicine to treat seizures (carbamazepine, phenytoin)  ¨ Medicine that weakens the immune system (cyclosporine, sirolimus, tacrolimus)  ¨ Narcotic pain medicine (alfentanil, fentanyl, methadone)  ¨ Pain or arthritis medicine (aspirin, celecoxib, diclofenac, ibuprofen, naproxen)  Warnings While Using This Medicine:   · It is not safe to take this medicine during pregnancy  It could harm an unborn baby  Tell your doctor right away if you become pregnant  · Tell your doctor if you are breastfeeding, or if you have kidney disease, liver disease, heart disease, heart rhythm problems, cancer, or HIV/AIDS  · This medicine may cause the following problems:   ¨ Liver problems  ¨ Serious skin reactions  ¨ Changes in heart rhythm, such as a condition called QT prolongation  · This medicine may make you dizzy or drowsy   Do not drive or do anything that could be dangerous until you know how this medicine affects you  · Call your doctor if your symptoms do not improve or if they get worse  · Keep all medicine out of the reach of children  Never share your medicine with anyone  Possible Side Effects While Using This Medicine:   Call your doctor right away if you notice any of these side effects:  · Allergic reaction: Itching or hives, swelling in your face or hands, swelling or tingling in your mouth or throat, chest tightness, trouble breathing  · Blistering, peeling, or red skin rash  · Dark urine or pale stools, nausea, vomiting, loss of appetite, stomach pain, yellow skin or eyes  · Fast, pounding, or uneven heartbeat  · Unusual bleeding, bruising, or weakness  If you notice these less serious side effects, talk with your doctor:   · Headache  · Mild nausea, vomiting, stomach pain, or diarrhea  If you notice other side effects that you think are caused by this medicine, tell your doctor  Call your doctor for medical advice about side effects  You may report side effects to FDA at 4-152-FDA-2272  © 2017 [employer]  Information is for End User's use only and may not be sold, redistributed or otherwise used for commercial purposes  The above information is an  only  It is not intended as medical advice for individual conditions or treatments  Talk to your doctor, nurse or pharmacist before following any medical regimen to see if it is safe and effective for you  Metronidazole (By mouth)   Metronidazole (met-meli-ZORAIDA-da-zole)  Treats bacterial infections  Brand Name(s): Flagyl, Flagyl 375   There may be other brand names for this medicine  When This Medicine Should Not Be Used: This medicine is not right for everyone  Do not use if you had an allergic reaction to metronidazole or similar medicines  Do not use this medicine to treat trichomoniasis if you are in the first 3 months of pregnancy    How to Use This Medicine:   Capsule, Tablet, Long Acting Tablet  · Take this medicine as directed, and take it at the same time each day  · Capsule or Tablet: Take with food or milk to avoid stomach upset  · Extended-release tablet: Take it on an empty stomach, 1 hour before or 2 hours after a meal   · Swallow the extended-release tablet whole  Do not crush, break, or chew it  · Take all of the medicine in your prescription to clear up your infection, even if you feel better after the first few doses  · Missed dose: Take a dose as soon as you remember  If it is almost time for your next dose, wait until then and take a regular dose  Do not take extra medicine to make up for a missed dose  · Store the medicine in a closed container at room temperature, away from heat, moisture, and direct light  Drugs and Foods to Avoid:   Ask your doctor or pharmacist before using any other medicine, including over-the-counter medicines, vitamins, and herbal products  · Do not use this medicine if you have taken disulfiram within the last 2 weeks  Do not drink alcohol or use medicine that contains alcohol or propylene glycol while using this medicine and for at least 3 days after you have finished metronidazole treatment  · Some foods and medicines can affect how metronidazole works  Tell your doctor if you are using busulfan, cimetidine, lithium, phenobarbital, phenytoin, or warfarin or another blood thinner  Warnings While Using This Medicine:   · Tell your doctor if you are pregnant or breastfeeding, or if you have kidney disease, liver disease, blood or bone marrow problems, oral thrush, yeast infection, or a history of seizures  · This medicine may cause the following problems:  ¨ Brain or nervous system problems, including seizures, meningitis, or vision problems  · Trichomoniasis treatment:  Your doctor may want to also treat your sexual partner, even if he or she has no symptoms   Also, you may want to use a condom during sexual intercourse  These measures will help keep you from getting the infection back again from your partner  If you have any questions, ask your doctor  · Call your doctor if your symptoms do not improve or if they get worse  · Your doctor will do lab tests at regular visits to check on the effects of this medicine  Keep all appointments  · Tell any doctor or dentist who treats you that you are using this medicine  This medicine may affect certain medical test results  · Keep all medicine out of the reach of children  Never share your medicine with anyone  Possible Side Effects While Using This Medicine:   Call your doctor right away if you notice any of these side effects:  · Allergic reaction: Itching or hives, swelling in your face or hands, swelling or tingling in your mouth or throat, chest tightness, trouble breathing  · Confusion, drowsiness, fever, headache, loss of appetite, nausea or vomiting, stiff neck or back  · Dizziness, problems with muscle control, clumsiness, shakiness, trouble talking  · Fever, chills, cough, sore throat, and body aches  · Numbness, tingling, or burning pain in your hands, arms, legs, or feet  · Seizures  If you notice these less serious side effects, talk with your doctor:   · Mild nausea  · Unusual or unpleasant taste in your mouth  If you notice other side effects that you think are caused by this medicine, tell your doctor  Call your doctor for medical advice about side effects  You may report side effects to FDA at 9-223-FDA-1184  © 2017 [employer] Information is for End User's use only and may not be sold, redistributed or otherwise used for commercial purposes  The above information is an  only  It is not intended as medical advice for individual conditions or treatments  Talk to your doctor, nurse or pharmacist before following any medical regimen to see if it is safe and effective for you    Bacterial Vaginosis   WHAT YOU NEED TO KNOW:   What is bacterial vaginosis? Bacterial vaginosis (BV) is an infection in the vagina  It may cause vaginitis, which is irritation and inflammation of the vagina  What causes bacterial vaginosis? The cause of BV is not known  With BV, there is an imbalance in bacteria normally found in the vagina  Your risk for BV increases if you are sexually active  Your risk for BV also increases if you douche or have an intrauterine device (IUD)  What are the signs and symptoms of bacterial vaginosis? Some women have no symptoms  You may have the following:  · White, gray, or yellow vaginal discharge    · Vaginal discharge that smells like fish    · Itching or burning around the outside of your vagina  How is bacterial vaginosis diagnosed? Your healthcare provider will examine you and ask if you have other health conditions  He may need to take a sample of fluid from your vagina  This will be tested for the bacteria that causes BV  How is bacterial vaginosis treated? Antibiotics are given to kill the bacteria that cause BV  They may be given as a pill or as a cream to put in your vagina  Take or use as directed  What are the risks of bacterial vaginosis? If untreated, BV may spread and lead to serious infections in your uterus and fallopian tubes  This can make it more difficult to get pregnant  BV increases your risk for other sexually transmitted infections (STIs), such as chlamydia, gonorrhea, or HIV  How can I prevent bacterial vaginosis? · Keep your vaginal area clean and dry:  Wear underwear and pantyhose with a cotton crotch  Wipe from front to back after you urinate or have a bowel movement  After bathing, rinse soap from your vaginal area to decrease your risk for irritation  · Do not use products that cause irritation:  Always use unscented tampons or sanitary pads  Do not use feminine sprays, powders, or scented tampons because they may cause irritation and increase your risk of BV   Detergents and fabric softeners may also cause irritation  · Do not douche: This can cause an imbalance in healthy vaginal bacteria  · Use latex condoms: This helps prevent another infection and keeps your partner from getting the infection  When should I contact my healthcare provider? · Your symptoms come back or do not improve with treatment  · You have vaginal bleeding that is not your monthly period  · You have questions or concerns about your condition or care  CARE AGREEMENT:   You have the right to help plan your care  Learn about your health condition and how it may be treated  Discuss treatment options with your caregivers to decide what care you want to receive  You always have the right to refuse treatment  The above information is an  only  It is not intended as medical advice for individual conditions or treatments  Talk to your doctor, nurse or pharmacist before following any medical regimen to see if it is safe and effective for you  © 2017 [employer] Information is for End User's use only and may not be sold, redistributed or otherwise used for commercial purposes  All illustrations and images included in CareNotes® are the copyrighted property of [employer]  or [employer]

## 2019-11-18 NOTE — PROGRESS NOTES
Assessment/Plan:      Diagnoses and all orders for this visit:    BV (bacterial vaginosis)  -     metroNIDAZOLE (FLAGYL) 500 mg tablet; Take 1 tablet (500 mg total) by mouth every 12 (twelve) hours for 7 days  -     fluconazole (DIFLUCAN) 150 mg tablet; Take 1 tablet (150 mg total) by mouth once for 1 dose  -     POCT wet mount    HSV infection  -     valACYclovir (VALTREX) 500 mg tablet; Take 1 tablet (500 mg total) by mouth 2 (two) times a day for 3 days    Intrauterine contraceptive device threads lost, sequela      reviewed with patient diagnosis of bacterial vaginosis and treatment with metronidazole  Written information provided  Patient verbalized understanding to avoid alcohol use while taking medication   -fluconazole ordered to be taken on last day of metronidazole-day 7  To prevent antibiotic use yeast infection  -hygiene reviewed  -reviewed with patient again unable to visualize IUD strings am unable to tell for certain whether this is providing pregnancy  Patient encouraged to have pelvic ultrasound ordered 19 completed    Subjective:     Patient ID: John Sanchez is a 46 y o  female  HPI   here with complaints of vaginal discharge for 3-4 days  Discharge is described as thin with a foul odor  Associated symptoms include minimal external itching  Denies alleviating or aggravating factors  Has not tried over-the-counter remedies  Was recently treated for a vaginal yeast infection 19  Is requesting refill on Valtrex takes episodically as needed for outbreak  Last Pap smear 7/30/15- NILM  / HR PV negative    Review of Systems   Constitutional: Negative for chills and fever  Respiratory: Negative  Cardiovascular: Negative  Gastrointestinal: Negative  Genitourinary: Positive for vaginal discharge  Negative for dysuria, frequency, genital sores, hematuria, pelvic pain, urgency, vaginal bleeding and vaginal pain           Objective:     Physical Exam   Constitutional: She is oriented to person, place, and time  She appears well-developed and well-nourished  Genitourinary: There is no rash, tenderness, lesion or injury on the right labia  There is no rash, tenderness, lesion or injury on the left labia  No erythema, tenderness or bleeding in the vagina  No foreign body in the vagina  No signs of injury around the vagina  Vaginal discharge found  Genitourinary Comments: Moderate amount of white thin vaginal discharge noted on speculum exam   Unable to visualize IUD strings on speculum exam   Neurological: She is alert and oriented to person, place, and time  Psychiatric: She has a normal mood and affect   Her behavior is normal  Thought content normal

## 2019-11-27 DIAGNOSIS — G43.709 CHRONIC MIGRAINE WITHOUT AURA WITHOUT STATUS MIGRAINOSUS, NOT INTRACTABLE: ICD-10-CM

## 2019-11-27 RX ORDER — AMITRIPTYLINE HYDROCHLORIDE 25 MG/1
TABLET, FILM COATED ORAL
Qty: 30 TABLET | Refills: 2 | Status: SHIPPED | OUTPATIENT
Start: 2019-11-27 | End: 2020-02-26 | Stop reason: ALTCHOICE

## 2019-12-05 ENCOUNTER — ANNUAL EXAM (OUTPATIENT)
Dept: OBGYN CLINIC | Facility: CLINIC | Age: 39
End: 2019-12-05

## 2019-12-05 VITALS
DIASTOLIC BLOOD PRESSURE: 73 MMHG | HEART RATE: 73 BPM | HEIGHT: 63 IN | BODY MASS INDEX: 30.83 KG/M2 | WEIGHT: 174 LBS | SYSTOLIC BLOOD PRESSURE: 117 MMHG

## 2019-12-05 DIAGNOSIS — T83.32XS INTRAUTERINE CONTRACEPTIVE DEVICE THREADS LOST, SEQUELA: ICD-10-CM

## 2019-12-05 DIAGNOSIS — Z01.419 WOMEN'S ANNUAL ROUTINE GYNECOLOGICAL EXAMINATION: Primary | ICD-10-CM

## 2019-12-05 DIAGNOSIS — B37.3 VAGINAL YEAST INFECTION: ICD-10-CM

## 2019-12-05 PROCEDURE — 3725F SCREEN DEPRESSION PERFORMED: CPT | Performed by: NURSE PRACTITIONER

## 2019-12-05 PROCEDURE — 87210 SMEAR WET MOUNT SALINE/INK: CPT | Performed by: NURSE PRACTITIONER

## 2019-12-05 PROCEDURE — 99395 PREV VISIT EST AGE 18-39: CPT | Performed by: NURSE PRACTITIONER

## 2019-12-05 RX ORDER — FLUCONAZOLE 150 MG/1
150 TABLET ORAL ONCE
Qty: 1 TABLET | Refills: 0 | Status: SHIPPED | OUTPATIENT
Start: 2019-12-05 | End: 2019-12-05

## 2019-12-05 NOTE — PROGRESS NOTES
Daria Hopson is a 46 y o  female who presents for annual well woman exam   Last Pap smear 7/30/15 resulted NILM/ HR HPV negative  Next Pap smear due 2020  Periods are irregular has Mirena IUD placed 16 for contraception  Complains of external vaginal itching and small amount of white thick vaginal discharge since taking antibiotics for bacterial vaginosis  Approximately 4-5 days  Denies using any over-the-counter remedies  Denies alleviating or aggravating factors  Current contraception: Mirena IUD  History of abnormal Pap smear: yes - -HPV positive  Family history of uterine or ovarian cancer: no  Regular self breast exam: yes  History of abnormal mammogram:  Mammograms to begin at age 37 unless otherwise clinically indicated  Family history of breast cancer: yes -maternal aunt unsure age of diagnosis possibly 35s to 45s  History of abnormal lipids: no  Gardasil:  No    Menstrual History:  OB History        3    Para   3    Term   3            AB        Living   3       SAB        TAB        Ectopic        Multiple        Live Births                    Menarche age: 16  Patient's last menstrual period was 2019 (exact date)  Period Duration (Days): 5  Period Pattern: (!) Irregular(Mirena )    The following portions of the patient's history were reviewed and updated as appropriate: allergies, current medications, past family history, past medical history, past social history, past surgical history and problem list     Review of Systems  Pertinent items are noted in HPI        Objective      /73 (BP Location: Left arm, Patient Position: Sitting, Cuff Size: Standard)   Pulse 73   Ht 5' 3" (1 6 m)   Wt 78 9 kg (174 lb)   LMP 2019 (Exact Date)   BMI 30 82 kg/m²     General:   alert and oriented, in no acute distress, alert, appears stated age and cooperative   Heart: regular rate and rhythm, S1, S2 normal, no murmur, click, rub or gallop   Lungs: clear to auscultation bilaterally   Abdomen: soft, non-tender, without masses or organomegaly, nondistended and normal bowel sounds   Vulva: normal, Bartholin's, Urethra, [place]'s normal   Vagina: normal mucosa, curdlike discharge, no palpable nodules   Cervix: no cervical motion tenderness and no lesions, unable to visualize IUD strings   Uterus: normal size, non-tender, normal shape and consistency   Adnexa: normal adnexa and no mass, fullness, tenderness   Breast:  Nontender, no palpable masses, no nipple discharge, no skin changes bilaterally          Assessment      @well woman  no contraindication to continue hormonal therapy@   Plan      All questions answered  Breast self exam technique reviewed and patient encouraged to perform self-exam monthly  Contraception: IUD and Unable to visualize IUD threads since 2017  Pelvic ultrasound reported and patient encouraged to have ultrasound  Reviewed with patient we are unable to ensure IUD is in correct position at this time  Unable to ensure adequate contraception  Patient verbalizes understanding  Diagnosis explained in detail, including differential   Dietary diary  Discussed healthy lifestyle modifications  Educational material distributed  Follow up in 1 Year for annual exam   Follow up as needed  KOH prep  Wet prep  Breast awareness reviewed    Reviewed diagnosis of vaginal yeast infection and treatment with fluconazole  Written information provided  Encouraged patient to obtain pelvic ultrasound for IUD strings unable to be visualized  Patient believes IUD is in correct position  Encouraged healthy diet, lifestyle and exercise regimen  Influenza vaccination offered, patient declines after counseling  Reviewed Gardasil vaccine recommendations up to age 39  Written information provided  Patient declines  Encouraged to quit smoking marijuana  VBI-   BMI Counseling: Body mass index is 30 82 kg/m²   The BMI is above normal  Nutrition recommendations include reducing portion sizes, decreasing overall calorie intake and 3-5 servings of fruits/vegetables daily  Exercise recommendations include moderate aerobic physical activity for 150 minutes/week, exercising 3-5 times per week and strength training exercises

## 2019-12-05 NOTE — PATIENT INSTRUCTIONS
Breast Self Exam for Women   WHAT YOU NEED TO KNOW:   What is a breast self-exam (BSE)? A BSE is a way to check your breasts for lumps and other changes  Regular BSEs can help you know how your breasts normally look and feel  Most breast lumps or changes are not cancer, but you should always have them checked by a healthcare provider  Your healthcare provider can also watch you do a BSE and can tell you if you are doing your BSE correctly  Why should I do a BSE? Breast cancer is the most common type of cancer in women  Even if you have mammograms, you may still want to do a BSE regularly  If you know how your breasts normally feel and look, it may help you know when to contact your healthcare provider  Mammograms can miss some cancers  You may find a lump during a BSE that did not show up on your mammogram   When should I do a BSE? [place] your calendar to help you remember to do BSE on a regular schedule  One easy way to remember to do a BSE is to do the exam on the same day of each month  If you have periods, you may want to do your BSE 1 week after your period ends  This is the time when your breasts may be the least swollen, lumpy, or tender  You can do regular BSEs even if you are breastfeeding or have breast implants  How should I do a BSE? · Look at your breasts in a mirror  Look at the size and shape of each breast and nipple  Check for swelling, lumps, dimpling, scaly skin, or other skin changes  Look for nipple changes, such as a nipple that is painful or beginning to pull inward  Gently squeeze both nipples and check to see if fluid (that is not breast milk) comes out of them  If you find any of these or other breast changes, contact your healthcare provider  Check your breasts while you sit or  the following 3 positions:    [facility] your arms down at your sides  ¨ Raise your hands and join them behind your head  ¨ Put firm pressure with your hands on your hips   Bend slightly forward while you look at your breasts in the mirror  · Lie down and feel your breasts  When you lie down, your breast tissue spreads out evenly over your chest  This makes it easier for you to feel for lumps and anything that may not be normal for your breasts  Do a BSE on one breast at a time  ¨ Place a small pillow or towel under your left shoulder  Put your left arm behind your head  ¨ Use the 3 middle fingers of your right hand  Use your fingertip pads, on the top of your fingers  Your fingertip pad is the most sensitive part of your finger  ¨ Use small circles to feel your breast tissue  Use your fingertip pads to make dime-sized, overlapping circles on your breast and armpits  Use light, medium, and firm pressure  First, press lightly  Second, press with medium pressure to feel a little deeper into the breast  Last, use firm pressure to feel deep within your breast     ¨ Examine your entire breast area  Examine the breast area from above the breast to below the breast where you feel only ribs  Make small circles with your fingertips, starting in the middle of your armpit  Make circles going up and down the breast area  Continue toward your breast and all the way across it  Examine the area from your armpit all the way over to the middle of your chest (breastbone)  Stop at the middle of your chest     ¨ Move the pillow or towel to your right shoulder, and put your right arm behind your head  Use the 3 fingertip pads of your left hand, and repeat the above steps to do a BSE on your right breast        What else can I do to check for breast problems or cancer? Some experts suggest that women 36years of age or older should have a mammogram every year  Other experts suggest that women between the ages of 48and 76years old should have a mammogram every 2 years  Talk to your healthcare provider about when you should have a mammogram   When should I contact my healthcare provider?    · You find any lumps or changes in your breasts  · You have breast pain or fluid coming from your nipples  · You have questions or concerns or concerns about your condition or care  CARE AGREEMENT:   You have the right to help plan your care  Learn about your health condition and how it may be treated  Discuss treatment options with your caregivers to decide what care you want to receive  You always have the right to refuse treatment  The above information is an  only  It is not intended as medical advice for individual conditions or treatments  Talk to your doctor, nurse or pharmacist before following any medical regimen to see if it is safe and effective for you  © 2017 [employer]  Information is for End User's use only and may not be sold, redistributed or otherwise used for commercial purposes  All illustrations and images included in CareNotes® are the copyrighted property of [employer]  or [employer]  Vulvovaginal Candidiasis   AMBULATORY CARE:   Vulvovaginal candidiasis,  or yeast infection, is a common vaginal infection  Vulvovaginal candidiasis is caused by a fungus, or yeast-like germ  Fungi are normally found in your vagina  When there are too many fungi, it can cause an infection  Seek care immediately if:   · You have fever and chills  · You are bleeding from your vagina and it is not your monthly period  · You develop abdominal or pelvic pain  Contact your healthcare provider if:   · Your signs and symptoms get worse, even after treatment  · You have questions or concerns about your condition or care  Signs and symptoms:   · Thick, white, cheese-like discharge from your vagina    · Itching, swelling, or redness in your vagina    · Burning when you urinate  Treatment for vulvovaginal candidiasis  includes medicines to treat the fungal infection and decrease inflammation  The medicine may be a pill, topical cream, or vaginal suppository    Manage your symptoms:   · Wear cotton underwear  · Keep the vaginal area clean and dry  · Do not have sex until your symptoms are gone  · Do not douche  · Do not use feminine hygiene sprays, powders, or bubble bath  Prevent another infection:   · Take showers instead of baths  · Eat yogurt  · Limit the amount of alcohol you drink  · Control your blood sugar if you are diabetic  · Limit your time in hot tubs  Follow up with your healthcare provider as directed:  Write down your questions so you remember to ask them during your visits  © 2017 [employer] [facility] Information is for End User's use only and may not be sold, redistributed or otherwise used for commercial purposes  All illustrations and images included in CareNotes® are the copyrighted property of [employer]  or [employer]  The above information is an  only  It is not intended as medical advice for individual conditions or treatments  Talk to your doctor, nurse or pharmacist before following any medical regimen to see if it is safe and effective for you  Fluconazole (By mouth)   Fluconazole ([id number]-[id number])  Prevents and treats fungal infections  Brand Name(s): Diflucan   There may be other brand names for this medicine  When This Medicine Should Not Be Used: This medicine is not right for everyone  Do not use it if you had an allergic reaction to fluconazole, or if you are pregnant  How to Use This Medicine:   Liquid, Tablet  · Your doctor will tell you how much medicine to use  Do not use more than directed  · Oral liquid: Shake well just before each use  Measure the oral liquid medicine with a marked measuring spoon, oral syringe, or medicine cup  · Take all of the medicine in your prescription to clear up your infection, even if you feel better after the first few doses  · Read and follow the patient instructions that come with this medicine   Talk to your doctor or pharmacist if you have any questions  · Missed dose: Take a dose as soon as you remember  If it is almost time for your next dose, wait until then and take a regular dose  Do not take extra medicine to make up for a missed dose  · Store the medicine in a closed container at room temperature, away from heat, moisture, and direct light  Store the oral liquid in the refrigerator or at room temperature and use it within 14 days  Do not freeze  Drugs and Foods to Avoid:   Ask your doctor or pharmacist before using any other medicine, including over-the-counter medicines, vitamins, and herbal products  · Do not use this medicine together with astemizole, cisapride, erythromycin, pimozide, quinidine, or terfenadine  · Some foods and medicines can affect how fluconazole works  Tell your doctor if you are using cimetidine, midazolam, prednisone, rifabutin, rifampin, theophylline, tofacitinib, triazolam, vitamin A supplements, or voriconazole   Also tell your doctor if you are using any of the following:   ¨ A blood thinner (such as warfarin)  ¨ A diuretic or "water pill" (such as hydrochlorothiazide), or blood pressure medicine (such as amlodipine, felodipine, isradipine, losartan, nifedipine)  ¨ Birth control pills  ¨ Cancer medicine (cyclophosphamide, vinblastine, vincristine)  ¨ Diabetes medicine that you take by mouth (glipizide, glyburide, tolbutamide)  ¨ Medicine to lower cholesterol (atorvastatin, fluvastatin, simvastatin)  ¨ Medicine to treat depression (amitriptyline, nortriptyline)  ¨ Medicine to treat HIV/AIDS (saquinavir, zidovudine)  ¨ Medicine to treat malaria (halofantrine)  ¨ Medicine to treat seizures (carbamazepine, phenytoin)  ¨ Medicine that weakens the immune system (cyclosporine, sirolimus, tacrolimus)  ¨ Narcotic pain medicine (alfentanil, fentanyl, methadone)  ¨ Pain or arthritis medicine (aspirin, celecoxib, diclofenac, ibuprofen, naproxen)  Warnings While Using This Medicine:   · It is not safe to take this medicine during pregnancy  It could harm an unborn baby  Tell your doctor right away if you become pregnant  · Tell your doctor if you are breastfeeding, or if you have kidney disease, liver disease, heart disease, heart rhythm problems, cancer, or HIV/AIDS  · This medicine may cause the following problems:   ¨ Liver problems  ¨ Serious skin reactions  ¨ Changes in heart rhythm, such as a condition called QT prolongation  · This medicine may make you dizzy or drowsy  Do not drive or do anything that could be dangerous until you know how this medicine affects you  · Call your doctor if your symptoms do not improve or if they get worse  · Keep all medicine out of the reach of children  Never share your medicine with anyone  Possible Side Effects While Using This Medicine:   Call your doctor right away if you notice any of these side effects:  · Allergic reaction: Itching or hives, swelling in your face or hands, swelling or tingling in your mouth or throat, chest tightness, trouble breathing  · Blistering, peeling, or red skin rash  · Dark urine or pale stools, nausea, vomiting, loss of appetite, stomach pain, yellow skin or eyes  · Fast, pounding, or uneven heartbeat  · Unusual bleeding, bruising, or weakness  If you notice these less serious side effects, talk with your doctor:   · Headache  · Mild nausea, vomiting, stomach pain, or diarrhea  If you notice other side effects that you think are caused by this medicine, tell your doctor  Call your doctor for medical advice about side effects  You may report side effects to FDA at 9-707-FDA-0758  © 2017 [employer] Information is for End User's use only and may not be sold, redistributed or otherwise used for commercial purposes  The above information is an  only  It is not intended as medical advice for individual conditions or treatments   Talk to your doctor, nurse or pharmacist before following any medical regimen to see if it is safe and effective for you

## 2019-12-08 DIAGNOSIS — M79.7 FIBROMYALGIA: ICD-10-CM

## 2019-12-08 DIAGNOSIS — F41.9 ANXIETY: ICD-10-CM

## 2019-12-09 DIAGNOSIS — B37.3 VAGINAL YEAST INFECTION: Primary | ICD-10-CM

## 2019-12-09 RX ORDER — CYCLOBENZAPRINE HCL 10 MG
10 TABLET ORAL
Qty: 30 TABLET | Refills: 2 | Status: SHIPPED | OUTPATIENT
Start: 2019-12-09 | End: 2020-04-07

## 2019-12-09 RX ORDER — CLONAZEPAM 0.5 MG/1
TABLET ORAL
Qty: 30 TABLET | Refills: 0 | Status: SHIPPED | OUTPATIENT
Start: 2019-12-09 | End: 2020-02-22

## 2019-12-09 RX ORDER — FLUCONAZOLE 150 MG/1
150 TABLET ORAL ONCE
Qty: 1 TABLET | Refills: 0 | Status: SHIPPED | OUTPATIENT
Start: 2019-12-09 | End: 2019-12-09

## 2019-12-17 ENCOUNTER — TELEPHONE (OUTPATIENT)
Dept: FAMILY MEDICINE CLINIC | Facility: CLINIC | Age: 39
End: 2019-12-17

## 2019-12-17 DIAGNOSIS — Z86.69 HX OF MIGRAINES: Primary | ICD-10-CM

## 2019-12-17 NOTE — TELEPHONE ENCOUNTER
XIAO FROM [facility] CALLED AND NEEDS AND ORDER PUT IN FOR PT FOR MIGRAINES  PT IS GOING THERE ON 12/26/2019

## 2020-02-07 ENCOUNTER — OFFICE VISIT (OUTPATIENT)
Dept: OBGYN CLINIC | Facility: CLINIC | Age: 40
End: 2020-02-07

## 2020-02-07 VITALS
SYSTOLIC BLOOD PRESSURE: 123 MMHG | HEIGHT: 63 IN | BODY MASS INDEX: 31.01 KG/M2 | HEART RATE: 82 BPM | DIASTOLIC BLOOD PRESSURE: 84 MMHG | WEIGHT: 175 LBS

## 2020-02-07 DIAGNOSIS — N89.8 VAGINAL DISCHARGE: Primary | ICD-10-CM

## 2020-02-07 PROCEDURE — 99213 OFFICE O/P EST LOW 20 MIN: CPT | Performed by: OBSTETRICS & GYNECOLOGY

## 2020-02-07 PROCEDURE — 3008F BODY MASS INDEX DOCD: CPT | Performed by: OBSTETRICS & GYNECOLOGY

## 2020-02-07 PROCEDURE — 3008F BODY MASS INDEX DOCD: CPT | Performed by: PHYSICAL MEDICINE & REHABILITATION

## 2020-02-07 PROCEDURE — 1036F TOBACCO NON-USER: CPT | Performed by: OBSTETRICS & GYNECOLOGY

## 2020-02-07 PROCEDURE — T1015 CLINIC SERVICE: HCPCS | Performed by: OBSTETRICS & GYNECOLOGY

## 2020-02-07 NOTE — PROGRESS NOTES
Subjective      Derek Collins is a 46 y o  female who presents with concern for yeast infection  Pt reports nearly one week of symptoms of itching, discomfort, and dysuria  She also notes thick white discharge without malodor  She had the flu last week, and saw a physician Saturday  She has not tried anything OTC to help with this  She also reports that she feels the symptoms are improving  Menstrual History:  OB History        3    Para   3    Term   3            AB        Living   3       SAB        TAB        Ectopic        Multiple        Live Births                    Patient's last menstrual period was 2020 (approximate)  The following portions of the patient's history were reviewed and updated as appropriate: allergies, current medications, past family history, past medical history, past social history, past surgical history and problem list     Review of Systems  Pertinent items are noted in HPI  Objective      /84 (BP Location: Right arm, Patient Position: Sitting, Cuff Size: Standard)   Pulse 82   Ht 5' 3" (1 6 m)   Wt 79 4 kg (175 lb)   LMP 2020 (Approximate)   BMI 31 00 kg/m²     Gen: AaOx3, NAD, pleasant, cooperative  Abd: Soft, nontender, nondistended  No guarding or rebound tenderness  : External genitalia grossly normal in appearance  On speculum examination, physiologic appearing discharge noted  No blood in the vault  Cervix visually closed  On wet mount/KOH, no clue cells, trichomonads, or hyphae appreciated    Extremities: No edema, nontender, moves all extremities without difficulty            Assessment   Derek Collins is a P3 presenting with concern for vaginal discharge     Plan   Vaginal discharge:   - No evidence of bacterial or yeast infection on examination today  - Pt herself stated that she feels her symptoms are improving  - Encouraged pt to call and return of symptoms change or worsen  - Encouraged good hygiene, eating probiotic yogurt, etc     Discussed with Dr Juanjo Holloway,   OB/GYN, PGY4  2/14/2020, 8:57 AM

## 2020-02-21 DIAGNOSIS — F41.9 ANXIETY: ICD-10-CM

## 2020-02-22 RX ORDER — CLONAZEPAM 0.5 MG/1
TABLET ORAL
Qty: 30 TABLET | Refills: 0 | Status: SHIPPED | OUTPATIENT
Start: 2020-02-22 | End: 2020-04-07

## 2020-02-26 ENCOUNTER — OFFICE VISIT (OUTPATIENT)
Dept: FAMILY MEDICINE CLINIC | Facility: CLINIC | Age: 40
End: 2020-02-26
Payer: COMMERCIAL

## 2020-02-26 VITALS
DIASTOLIC BLOOD PRESSURE: 72 MMHG | TEMPERATURE: 98.4 F | BODY MASS INDEX: 31.21 KG/M2 | SYSTOLIC BLOOD PRESSURE: 110 MMHG | WEIGHT: 176.2 LBS | RESPIRATION RATE: 16 BRPM

## 2020-02-26 DIAGNOSIS — L30.9 ECZEMA, UNSPECIFIED TYPE: ICD-10-CM

## 2020-02-26 DIAGNOSIS — M79.7 FIBROMYALGIA: ICD-10-CM

## 2020-02-26 DIAGNOSIS — J30.2 SEASONAL ALLERGIC RHINITIS, UNSPECIFIED TRIGGER: ICD-10-CM

## 2020-02-26 DIAGNOSIS — M25.50 ARTHRALGIA, UNSPECIFIED JOINT: ICD-10-CM

## 2020-02-26 DIAGNOSIS — F41.9 ANXIETY: Primary | ICD-10-CM

## 2020-02-26 PROCEDURE — 99214 OFFICE O/P EST MOD 30 MIN: CPT | Performed by: FAMILY MEDICINE

## 2020-02-26 PROCEDURE — 1036F TOBACCO NON-USER: CPT | Performed by: FAMILY MEDICINE

## 2020-02-26 RX ORDER — NAPROXEN 250 MG/1
250 TABLET ORAL EVERY 12 HOURS
Qty: 60 TABLET | Refills: 4 | Status: SHIPPED | OUTPATIENT
Start: 2020-02-26 | End: 2020-08-27

## 2020-02-26 RX ORDER — CLOBETASOL PROPIONATE 0.5 MG/G
CREAM TOPICAL 2 TIMES DAILY
Qty: 30 G | Refills: 0 | Status: SHIPPED | OUTPATIENT
Start: 2020-02-26 | End: 2020-04-07

## 2020-02-26 RX ORDER — MONTELUKAST SODIUM 10 MG/1
10 TABLET ORAL
Qty: 90 TABLET | Refills: 0 | Status: SHIPPED | OUTPATIENT
Start: 2020-02-26 | End: 2020-05-19

## 2020-02-26 NOTE — PROGRESS NOTES
Assessment/Plan:    No problem-specific Assessment & Plan notes found for this encounter  Diagnoses and all orders for this visit:    Anxiety  Comments:  doing well   clonazepam PRN    Fibromyalgia  Comments:  stable  Naproxen PRN     Eczema, unspecified type  -     clobetasol (TEMOVATE) 0 05 % cream; Apply topically 2 (two) times a day    Arthralgia, unspecified joint  -     naproxen (NAPROSYN) 250 mg tablet; Take 1 tablet (250 mg total) by mouth every 12 (twelve) hours    Seasonal allergic rhinitis, unspecified trigger  -     montelukast (SINGULAIR) 10 mg tablet; Take 1 tablet (10 mg total) by mouth daily at bedtime          Subjective:      Patient ID: Ricky Lee is a 46 y o  female  Rash   This is a chronic problem  The current episode started more than 1 year ago  The problem has been waxing and waning since onset  The rash is characterized by blistering, swelling and itchiness  She was exposed to nothing  Pertinent negatives include no anorexia, congestion, facial edema, fatigue, rhinorrhea or shortness of breath  There is no history of allergies, asthma, eczema or varicella  Anxiety   Presents for follow-up visit  Symptoms include nervous/anxious behavior  Patient reports no chest pain, compulsions, confusion, decreased concentration, depressed mood, dizziness, excessive worry, feeling of choking, hyperventilation, impotence, insomnia, irritability, malaise, muscle tension, nausea, obsessions, palpitations, panic, restlessness, shortness of breath or suicidal ideas  There is no history of asthma  The following portions of the patient's history were reviewed and updated as appropriate: allergies, current medications, past family history, past medical history, past social history, past surgical history and problem list     Review of Systems   Constitutional: Negative for fatigue and irritability  HENT: Negative for congestion and rhinorrhea      Respiratory: Negative for shortness of breath  Cardiovascular: Negative for chest pain and palpitations  Gastrointestinal: Negative for anorexia and nausea  Genitourinary: Negative for impotence  Skin: Positive for rash  Neurological: Negative for dizziness  Psychiatric/Behavioral: Negative for confusion, decreased concentration and suicidal ideas  The patient is nervous/anxious  The patient does not have insomnia  Objective:      /72 (BP Location: Right arm, Patient Position: Sitting, Cuff Size: Large)   Temp 98 4 °F (36 9 °C) (Tympanic)   Resp 16   Wt 79 9 kg (176 lb 3 2 oz)   BMI 31 21 kg/m²          Physical Exam   Constitutional: She appears well-developed and well-nourished  Cardiovascular: Normal rate and regular rhythm  Pulmonary/Chest: Effort normal and breath sounds normal    Abdominal: Soft  Bowel sounds are normal    Skin: There is erythema  Bilateral arms    Psychiatric: She has a normal mood and affect   Her behavior is normal

## 2020-04-07 DIAGNOSIS — F41.9 ANXIETY: ICD-10-CM

## 2020-04-07 DIAGNOSIS — M79.7 FIBROMYALGIA: ICD-10-CM

## 2020-04-07 DIAGNOSIS — L30.9 ECZEMA, UNSPECIFIED TYPE: ICD-10-CM

## 2020-04-07 RX ORDER — CLONAZEPAM 0.5 MG/1
TABLET ORAL
Qty: 30 TABLET | Refills: 0 | Status: SHIPPED | OUTPATIENT
Start: 2020-04-07 | End: 2020-06-25

## 2020-04-07 RX ORDER — CYCLOBENZAPRINE HCL 10 MG
10 TABLET ORAL
Qty: 30 TABLET | Refills: 2 | Status: SHIPPED | OUTPATIENT
Start: 2020-04-07 | End: 2021-09-27

## 2020-04-07 RX ORDER — CLOBETASOL PROPIONATE 0.5 MG/G
CREAM TOPICAL
Qty: 30 G | Refills: 0 | Status: SHIPPED | OUTPATIENT
Start: 2020-04-07 | End: 2020-06-25

## 2020-05-04 DIAGNOSIS — K59.04 CHRONIC IDIOPATHIC CONSTIPATION: ICD-10-CM

## 2020-05-04 DIAGNOSIS — K58.1 IRRITABLE BOWEL SYNDROME WITH CONSTIPATION: ICD-10-CM

## 2020-05-04 RX ORDER — SENNOSIDES 8.6 MG
TABLET ORAL
Qty: 30 TABLET | Refills: 2 | Status: SHIPPED | OUTPATIENT
Start: 2020-05-04 | End: 2021-05-25 | Stop reason: SDUPTHER

## 2020-05-06 ENCOUNTER — TELEMEDICINE (OUTPATIENT)
Dept: OBGYN CLINIC | Facility: CLINIC | Age: 40
End: 2020-05-06

## 2020-05-06 DIAGNOSIS — L29.2 VULVAR PRURITUS: Primary | ICD-10-CM

## 2020-05-06 DIAGNOSIS — Z97.5 IUD (INTRAUTERINE DEVICE) IN PLACE: ICD-10-CM

## 2020-05-06 DIAGNOSIS — N89.8 VAGINAL DISCHARGE: ICD-10-CM

## 2020-05-06 DIAGNOSIS — N90.89 VULVAR FISSURE: ICD-10-CM

## 2020-05-06 PROCEDURE — T1015 CLINIC SERVICE: HCPCS | Performed by: OBSTETRICS & GYNECOLOGY

## 2020-05-06 PROCEDURE — 99214 OFFICE O/P EST MOD 30 MIN: CPT | Performed by: OBSTETRICS & GYNECOLOGY

## 2020-05-06 RX ORDER — NYSTATIN AND TRIAMCINOLONE ACETONIDE 100000; 1 [USP'U]/G; MG/G
OINTMENT TOPICAL 2 TIMES DAILY
Qty: 30 G | Refills: 0 | Status: SHIPPED | OUTPATIENT
Start: 2020-05-06 | End: 2020-08-27

## 2020-05-19 DIAGNOSIS — J30.2 SEASONAL ALLERGIC RHINITIS, UNSPECIFIED TRIGGER: ICD-10-CM

## 2020-05-19 RX ORDER — MONTELUKAST SODIUM 10 MG/1
TABLET ORAL
Qty: 90 TABLET | Refills: 0 | Status: SHIPPED | OUTPATIENT
Start: 2020-05-19 | End: 2020-08-17

## 2020-06-23 DIAGNOSIS — F41.9 ANXIETY: ICD-10-CM

## 2020-06-23 DIAGNOSIS — L30.9 ECZEMA, UNSPECIFIED TYPE: ICD-10-CM

## 2020-06-25 RX ORDER — CLONAZEPAM 0.5 MG/1
TABLET ORAL
Qty: 30 TABLET | Refills: 0 | Status: SHIPPED | OUTPATIENT
Start: 2020-06-25 | End: 2021-01-05 | Stop reason: SDUPTHER

## 2020-06-25 RX ORDER — CLOBETASOL PROPIONATE 0.5 MG/G
CREAM TOPICAL
Qty: 30 G | Refills: 0 | Status: SHIPPED | OUTPATIENT
Start: 2020-06-25 | End: 2020-08-27

## 2020-07-09 ENCOUNTER — HOSPITAL ENCOUNTER (EMERGENCY)
Facility: HOSPITAL | Age: 40
Discharge: HOME | End: 2020-07-09
Attending: EMERGENCY MEDICINE | Admitting: EMERGENCY MEDICINE
Payer: COMMERCIAL

## 2020-07-09 VITALS
DIASTOLIC BLOOD PRESSURE: 60 MMHG | BODY MASS INDEX: 31.89 KG/M2 | HEART RATE: 58 BPM | WEIGHT: 180 LBS | SYSTOLIC BLOOD PRESSURE: 122 MMHG | RESPIRATION RATE: 18 BRPM | TEMPERATURE: 98.9 F | OXYGEN SATURATION: 100 %

## 2020-07-09 DIAGNOSIS — K52.9 ACUTE GASTROENTERITIS: Primary | ICD-10-CM

## 2020-07-09 LAB
ALBUMIN SERPL BCP-MCNC: 4.1 G/DL (ref 3.5–5)
ALP SERPL-CCNC: 65 U/L (ref 46–116)
ALT SERPL W P-5'-P-CCNC: 33 U/L (ref 12–78)
ANION GAP SERPL CALCULATED.3IONS-SCNC: 6 MMOL/L (ref 4–13)
AST SERPL W P-5'-P-CCNC: 21 U/L (ref 5–45)
BASOPHILS # BLD AUTO: 0.03 THOUSANDS/ΜL (ref 0–0.1)
BASOPHILS NFR BLD AUTO: 0 % (ref 0–1)
BILIRUB SERPL-MCNC: 0.45 MG/DL (ref 0.2–1)
BILIRUB UR QL STRIP: NEGATIVE
BUN SERPL-MCNC: 14 MG/DL (ref 5–25)
CALCIUM SERPL-MCNC: 9.3 MG/DL (ref 8.3–10.1)
CHLORIDE SERPL-SCNC: 101 MMOL/L (ref 100–108)
CLARITY UR: CLEAR
CO2 SERPL-SCNC: 31 MMOL/L (ref 21–32)
COLOR UR: NORMAL
CREAT SERPL-MCNC: 0.9 MG/DL (ref 0.6–1.3)
EOSINOPHIL # BLD AUTO: 0.08 THOUSAND/ΜL (ref 0–0.61)
EOSINOPHIL NFR BLD AUTO: 1 % (ref 0–6)
ERYTHROCYTE [DISTWIDTH] IN BLOOD BY AUTOMATED COUNT: 11.9 % (ref 11.6–15.1)
EXT PREG TEST URINE: NEGATIVE
EXT. CONTROL ED NAV: NORMAL
GFR SERPL CREATININE-BSD FRML MDRD: 81 ML/MIN/1.73SQ M
GLUCOSE SERPL-MCNC: 97 MG/DL (ref 65–140)
GLUCOSE UR STRIP-MCNC: NEGATIVE MG/DL
HCT VFR BLD AUTO: 45.1 % (ref 34.8–46.1)
HGB BLD-MCNC: 14.9 G/DL (ref 11.5–15.4)
HGB UR QL STRIP.AUTO: NEGATIVE
IMM GRANULOCYTES # BLD AUTO: 0.02 THOUSAND/UL (ref 0–0.2)
IMM GRANULOCYTES NFR BLD AUTO: 0 % (ref 0–2)
KETONES UR STRIP-MCNC: NEGATIVE MG/DL
LEUKOCYTE ESTERASE UR QL STRIP: NEGATIVE
LIPASE SERPL-CCNC: 230 U/L (ref 73–393)
LYMPHOCYTES # BLD AUTO: 1.43 THOUSANDS/ΜL (ref 0.6–4.47)
LYMPHOCYTES NFR BLD AUTO: 21 % (ref 14–44)
MCH RBC QN AUTO: 32.1 PG (ref 26.8–34.3)
MCHC RBC AUTO-ENTMCNC: 33 G/DL (ref 31.4–37.4)
MCV RBC AUTO: 97 FL (ref 82–98)
MONOCYTES # BLD AUTO: 0.37 THOUSAND/ΜL (ref 0.17–1.22)
MONOCYTES NFR BLD AUTO: 5 % (ref 4–12)
NEUTROPHILS # BLD AUTO: 4.97 THOUSANDS/ΜL (ref 1.85–7.62)
NEUTS SEG NFR BLD AUTO: 73 % (ref 43–75)
NITRITE UR QL STRIP: NEGATIVE
NRBC BLD AUTO-RTO: 0 /100 WBCS
PH UR STRIP.AUTO: 6 [PH]
PLATELET # BLD AUTO: 212 THOUSANDS/UL (ref 149–390)
PMV BLD AUTO: 11 FL (ref 8.9–12.7)
POTASSIUM SERPL-SCNC: 3.8 MMOL/L (ref 3.5–5.3)
PROT SERPL-MCNC: 8.3 G/DL (ref 6.4–8.2)
PROT UR STRIP-MCNC: NEGATIVE MG/DL
RBC # BLD AUTO: 4.64 MILLION/UL (ref 3.81–5.12)
SARS-COV-2 RNA RESP QL NAA+PROBE: NEGATIVE
SODIUM SERPL-SCNC: 138 MMOL/L (ref 136–145)
SP GR UR STRIP.AUTO: 1.01 (ref 1–1.03)
UROBILINOGEN UR QL STRIP.AUTO: 0.2 E.U./DL
WBC # BLD AUTO: 6.9 THOUSAND/UL (ref 4.31–10.16)

## 2020-07-09 PROCEDURE — 81025 URINE PREGNANCY TEST: CPT | Performed by: EMERGENCY MEDICINE

## 2020-07-09 PROCEDURE — 99285 EMERGENCY DEPT VISIT HI MDM: CPT | Performed by: EMERGENCY MEDICINE

## 2020-07-09 PROCEDURE — 99283 EMERGENCY DEPT VISIT LOW MDM: CPT

## 2020-07-09 PROCEDURE — 96374 THER/PROPH/DIAG INJ IV PUSH: CPT

## 2020-07-09 PROCEDURE — 36415 COLL VENOUS BLD VENIPUNCTURE: CPT | Performed by: EMERGENCY MEDICINE

## 2020-07-09 PROCEDURE — 81003 URINALYSIS AUTO W/O SCOPE: CPT | Performed by: EMERGENCY MEDICINE

## 2020-07-09 PROCEDURE — 85025 COMPLETE CBC W/AUTO DIFF WBC: CPT | Performed by: EMERGENCY MEDICINE

## 2020-07-09 PROCEDURE — 83690 ASSAY OF LIPASE: CPT | Performed by: EMERGENCY MEDICINE

## 2020-07-09 PROCEDURE — 96361 HYDRATE IV INFUSION ADD-ON: CPT

## 2020-07-09 PROCEDURE — 80053 COMPREHEN METABOLIC PANEL: CPT | Performed by: EMERGENCY MEDICINE

## 2020-07-09 PROCEDURE — 87635 SARS-COV-2 COVID-19 AMP PRB: CPT | Performed by: EMERGENCY MEDICINE

## 2020-07-09 PROCEDURE — 96375 TX/PRO/DX INJ NEW DRUG ADDON: CPT

## 2020-07-09 RX ORDER — DICYCLOMINE HCL 20 MG
20 TABLET ORAL 3 TIMES DAILY PRN
Qty: 20 TABLET | Refills: 0 | Status: SHIPPED | OUTPATIENT
Start: 2020-07-09 | End: 2021-09-27

## 2020-07-09 RX ORDER — MORPHINE SULFATE 4 MG/ML
4 INJECTION, SOLUTION INTRAMUSCULAR; INTRAVENOUS ONCE
Status: COMPLETED | OUTPATIENT
Start: 2020-07-09 | End: 2020-07-09

## 2020-07-09 RX ORDER — ONDANSETRON 4 MG/1
4 TABLET, ORALLY DISINTEGRATING ORAL EVERY 6 HOURS PRN
Qty: 20 TABLET | Refills: 0 | Status: SHIPPED | OUTPATIENT
Start: 2020-07-09 | End: 2021-02-05 | Stop reason: ALTCHOICE

## 2020-07-09 RX ORDER — ONDANSETRON 2 MG/ML
4 INJECTION INTRAMUSCULAR; INTRAVENOUS ONCE
Status: COMPLETED | OUTPATIENT
Start: 2020-07-09 | End: 2020-07-09

## 2020-07-09 RX ADMIN — SODIUM CHLORIDE 1000 ML: 0.9 INJECTION, SOLUTION INTRAVENOUS at 17:27

## 2020-07-09 RX ADMIN — MORPHINE SULFATE 4 MG: 4 INJECTION INTRAVENOUS at 17:27

## 2020-07-09 RX ADMIN — ONDANSETRON 4 MG: 2 INJECTION INTRAMUSCULAR; INTRAVENOUS at 17:27

## 2020-07-09 NOTE — ED PROVIDER NOTES
History  Chief Complaint   Patient presents with    Sore Throat     weakness and diarrhea     28-year-old female presents the emergency department for evaluation of nausea, vomiting, diarrhea  Patient also reports having a mild sore throat  No cough, shortness of breath, or chest pain  Patient states that her symptoms started last evening with generalized abdominal cramping and several episodes of diarrhea  Patient denies recent sick contacts or travel  She does work as a healthcare aide  She reports that her patient's has not been ill  She has had approximately 5 episodes of loose watery nonbloody stool  No recent antibiotic use  Today she had nausea and 2 episodes of vomiting  She reports generalized body but reports a history of fibromyalgia and is unsure if her body aches is secondary to this  Patient reports a history of irritable bowel but states that is the constipation predominant      History provided by:  Patient and medical records   used: No    Vomiting   Severity:  Moderate  Duration:  1 day  Timing:  Intermittent  Number of daily episodes:  3  Quality:  Unable to specify  Progression:  Unchanged  Chronicity:  New  Recent urination:  Normal  Context: not post-tussive and not self-induced    Relieved by:  Nothing  Worsened by:  Nothing  Ineffective treatments:  None tried  Associated symptoms: abdominal pain, diarrhea, myalgias and sore throat    Associated symptoms: no chills, no cough and no fever    Risk factors: not pregnant, no prior abdominal surgery, no sick contacts and no travel to endemic areas        Prior to Admission Medications   Prescriptions Last Dose Informant Patient Reported? Taking?    Azelastine HCl 137 MCG/SPRAY SOLN 2020 at Unknown time  No Yes   Si SPRAYS INTO EACH NOSTRIL TWICE A DAY   [facility] SENNA 8 6 MG tablet 2020 at Unknown time  No Yes   Sig: TAKE 1 TABLET (8 6 MG TOTAL) BY MOUTH DAILY   butalbital-acetaminophen-caffeine (FIORICET,ESGIC) -40 mg per tablet 7/9/2020 at Unknown time  No Yes   Sig: Take 1 tablet by mouth every 4 (four) hours as needed for headaches   clobetasol (TEMOVATE) 0 05 % cream Not Taking at Unknown time  No No   Sig: APPLY TO AFFECTED AREA TWICE A DAY   Patient not taking: Reported on 7/9/2020   clonazePAM (KlonoPIN) 0 5 mg tablet 7/9/2020 at Unknown time  No Yes   Sig: TAKE 1 TABLET BY MOUTH EVERY DAY   cyclobenzaprine (FLEXERIL) 10 mg tablet 7/9/2020 at Unknown time  No Yes   Sig: TAKE 1 TABLET (10 MG TOTAL) BY MOUTH DAILY AT BEDTIME   dicyclomine (BENTYL) 20 mg tablet 7/9/2020 at Unknown time  No Yes   Sig: TAKE 1 TABLET (20 MG TOTAL) BY MOUTH EVERY 6 (SIX) HOURS AS NEEDED (STOMACH PAIN)   levonorgestrel (MIRENA) 20 MCG/24HR IUD 7/9/2020 at Unknown time  Yes Yes   Sig: Mirena 20 MCG/24HR Intrauterine Intrauterine Device  Refills: 0  Active   montelukast (SINGULAIR) 10 mg tablet 7/9/2020 at Unknown time  No Yes   Sig: TAKE 1 TABLET BY MOUTH DAILY AT BEDTIME   naproxen (NAPROSYN) 250 mg tablet 7/9/2020 at Unknown time  No Yes   Sig: Take 1 tablet (250 mg total) by mouth every 12 (twelve) hours   nystatin-triamcinolone (MYCOLOG-II) ointment Not Taking at Unknown time  No No   Sig: Apply topically 2 (two) times a day   Patient not taking: Reported on 7/9/2020   ondansetron (ZOFRAN-ODT) 4 mg disintegrating tablet Not Taking at Unknown time  No No   Sig: Take 1 tablet (4 mg total) by mouth every 8 (eight) hours as needed for nausea or vomiting   Patient not taking: Reported on 7/9/2020   valACYclovir (VALTREX) 500 mg tablet   No No   Sig: Take 1 tablet (500 mg total) by mouth 2 (two) times a day for 3 days      Facility-Administered Medications: None       Past Medical History:   Diagnosis Date    Allergic rhinitis     Anemia     Anxiety     Chondromalacia of both patellae     Depression     Fibromyalgia     Fibromyalgia     GERD (gastroesophageal reflux disease)     Hematochezia     Herpes simplex infection     HPV (human papilloma virus) infection     11/2013    Hypertension     IBS (irritable bowel syndrome)     Insomnia     Mental status change     Migraine     Obesity 8/6/2015    Scoliosis     Scoliosis        Past Surgical History:   Procedure Laterality Date    EXPLORATORY LAPAROTOMY      WISDOM TOOTH EXTRACTION         Family History   Problem Relation Age of Onset   [place] Umberto Stroke Mother     Hypertension Mother     Psoriasis Mother     Hepatitis Father         B     Breast cancer Family         maternal aunt - 27 's     Diabetes type I Son     Diabetes Son     No Known Problems Brother     No Known Problems Daughter     No Known Problems Maternal Grandmother     No Known Problems Maternal Grandfather     No Known Problems Paternal Grandmother     No Known Problems Paternal Grandfather     No Known Problems Brother     No Known Problems Daughter      I have reviewed and agree with the history as documented  E-Cigarette/Vaping     E-Cigarette/Vaping Substances     Social History     Tobacco Use    Smoking status: Former Smoker     Types: Cigarettes    Smokeless tobacco: Never Used    Tobacco comment: quit 2018   Substance Use Topics    Alcohol use: Yes     Alcohol/week: 3 0 standard drinks     Types: 1 Glasses of wine, 1 Cans of beer, 1 Shots of liquor per week     Comment: social     Drug use: Yes     Frequency: 3 0 times per week     Types: Marijuana     Comment: few times a week        Review of Systems   Constitutional: Positive for appetite change  Negative for chills and fever  HENT: Positive for sore throat  Respiratory: Negative for cough and shortness of breath  Cardiovascular: Negative for chest pain and palpitations  Gastrointestinal: Positive for abdominal pain, diarrhea and vomiting  Genitourinary: Negative for dysuria  Musculoskeletal: Positive for myalgias  Neurological: Negative for weakness     All other systems reviewed and are negative  Physical Exam  Physical Exam   Constitutional: She is oriented to person, place, and time  She appears well-developed and well-nourished  She does not appear ill  No distress  HENT:   Head: Normocephalic  Nose: Nose normal    Mouth/Throat: Oropharynx is clear and moist  No oropharyngeal exudate  Eyes: Pupils are equal, round, and reactive to light  Conjunctivae and EOM are normal    Neck: Normal range of motion  Neck supple  Cardiovascular: Normal rate, regular rhythm, normal heart sounds and intact distal pulses  Pulmonary/Chest: Effort normal  No respiratory distress  She has no rales  She exhibits no tenderness  Abdominal: Soft  Bowel sounds are normal  She exhibits no distension  There is generalized tenderness  There is no rebound and no guarding  No hernia  Musculoskeletal: Normal range of motion  She exhibits no edema, tenderness or deformity  Lymphadenopathy:     She has no cervical adenopathy  Neurological: She is alert and oriented to person, place, and time  She has normal strength and normal reflexes  No cranial nerve deficit or sensory deficit  She exhibits normal muscle tone  Coordination and gait normal    Skin: Skin is warm, dry and intact  No rash noted  Psychiatric: She has a normal mood and affect  Her behavior is normal  Judgment and thought content normal    Nursing note and vitals reviewed        Vital Signs  ED Triage Vitals   Temperature Pulse Respirations Blood Pressure SpO2   07/09/20 1652 07/09/20 1651 07/09/20 1651 07/09/20 1651 07/09/20 1651   98 9 °F (37 2 °C) 67 18 122/60 98 %      Temp Source Heart Rate Source Patient Position - Orthostatic VS BP Location FiO2 (%)   07/09/20 1652 07/09/20 1651 07/09/20 1651 07/09/20 1651 --   Oral Monitor Sitting Right arm       Pain Score       07/09/20 1651       7           Vitals:    07/09/20 1651 07/09/20 1845   BP: 122/60    Pulse: 67 58   Patient Position - Orthostatic VS: Sitting          Visual Acuity      ED Medications  Medications   sodium chloride 0 9 % bolus 1,000 mL (1,000 mL Intravenous New Bag 7/9/20 1727)   ondansetron (ZOFRAN) injection 4 mg (4 mg Intravenous Given 7/9/20 1727)   morphine (PF) 4 mg/mL injection 4 mg (4 mg Intravenous Given 7/9/20 1727)       Diagnostic Studies  Results Reviewed     Procedure Component Value Units Date/Time    Novel Coronavirus [facility] [[id number]]  (Normal) Collected:  07/09/20 1726    Lab Status:  Final result Specimen:  Nares from Nose Updated:  07/09/20 1843     SARS-CoV-2 Negative    Narrative: The specimen collection materials, transport medium, and/or testing methodology utilized in the production of these test results have been proven to be reliable in a limited validation with an abbreviated program under the Emergency Utilization Authorization provided by the FDA  Testing reported as "Presumptive positive" will be confirmed with secondary testing with a reference laboratory to ensure result accuracy  Clinical caution and judgement should be used with the interpretation of these results with consideration of the clinical impression and other laboratory testing  Testing reported as "Positive" or "Negative" has been proven to be accurate according to standard laboratory validation requirements  All testing is performed with control materials showing appropriate reactivity at standard intervals        Comprehensive metabolic panel [[id number]]  (Abnormal) Collected:  07/09/20 1726    Lab Status:  Final result Specimen:  Blood from Arm, Left Updated:  07/09/20 1755     Sodium 138 mmol/L      Potassium 3 8 mmol/L      Chloride 101 mmol/L      CO2 31 mmol/L      ANION GAP 6 mmol/L      BUN 14 mg/dL      Creatinine 0 90 mg/dL      Glucose 97 mg/dL      Calcium 9 3 mg/dL      AST 21 U/L      ALT 33 U/L      Alkaline Phosphatase 65 U/L      Total Protein 8 3 g/dL      Albumin 4 1 g/dL      Total Bilirubin 0 45 mg/dL      eGFR 81 ml/min/1 73sq m     Narrative: National Kidney Disease Foundation guidelines for Chronic Kidney Disease (CKD):     Stage 1 with normal or high GFR (GFR > 90 mL/min/1 73 square meters)    Stage 2 Mild CKD (GFR = 60-89 mL/min/1 73 square meters)    Stage 3A Moderate CKD (GFR = 45-59 mL/min/1 73 square meters)    Stage 3B Moderate CKD (GFR = 30-44 mL/min/1 73 square meters)    Stage 4 Severe CKD (GFR = 15-29 mL/min/1 73 square meters)    Stage 5 End Stage CKD (GFR <15 mL/min/1 73 square meters)  Note: GFR calculation is accurate only with a steady state creatinine    Lipase [[id number]]  (Normal) Collected:  07/09/20 1726    Lab Status:  Final result Specimen:  Blood from Arm, Left Updated:  07/09/20 1755     Lipase 230 u/L     CBC and differential [[id number]] Collected:  07/09/20 1726    Lab Status:  Final result Specimen:  Blood from Arm, Left Updated:  07/09/20 1743     WBC 6 90 Thousand/uL      RBC 4 64 Million/uL      Hemoglobin 14 9 g/dL      Hematocrit 45 1 %      MCV 97 fL      MCH 32 1 pg      MCHC 33 0 g/dL      RDW 11 9 %      MPV 11 0 fL      Platelets 478 Thousands/uL      nRBC 0 /100 WBCs      Neutrophils Relative 73 %      Immat GRANS % 0 %      Lymphocytes Relative 21 %      Monocytes Relative 5 %      Eosinophils Relative 1 %      Basophils Relative 0 %      Neutrophils Absolute 4 97 Thousands/µL      Immature Grans Absolute 0 02 Thousand/uL      Lymphocytes Absolute 1 43 Thousands/µL      Monocytes Absolute 0 37 Thousand/µL      Eosinophils Absolute 0 08 Thousand/µL      Basophils Absolute 0 03 Thousands/µL     UA w Reflex to Microscopic w Reflex to Culture [[id number]] Collected:  07/09/20 1727    Lab Status:  Final result Specimen:  Urine, Clean Catch Updated:  07/09/20 1739     Color, UA Light Yellow     Clarity, UA Clear     Specific Gravity, UA 1 010     pH, UA 6 0     Leukocytes, UA Negative     Nitrite, UA Negative     Protein, UA Negative mg/dl      Glucose, UA Negative mg/dl      Ketones, UA Negative mg/dl Urobilinogen, UA 0 2 E U /dl      Bilirubin, UA Negative     Blood, UA Negative    POCT pregnancy, urine [[id number]]  (Normal) Resulted:  07/09/20 1720    Lab Status:  Final result Updated:  07/09/20 1732     EXT PREG TEST UR (Ref: Negative) negative     Control valid                 No orders to display              Procedures  Procedures         ED Course  ED Course as of Jul 09 1848   u Jul 09, 2020   1810 Patient reports feeling much better  She has no nausea and feels that she could tolerate sips of fluid  I updated her with blood work and urinalysis results  COVID test is pending will plan to discharge home with medication for nausea /vomiting for acute gastroenteritis          US AUDIT      Most Recent Value   Initial Alcohol Screen: US AUDIT-C    1  How often do you have a drink containing alcohol? 1 Filed at: 07/09/2020 1652   2  How many drinks containing alcohol do you have on a typical day you are drinking? 0 Filed at: 07/09/2020 1652   3a  Male UNDER 65: How often do you have five or more drinks on one occasion? 1 Filed at: 07/09/2020 1652   3b  FEMALE Any Age, or MALE 65+: How often do you have 4 or more drinks on one occassion? 1 Filed at: 07/09/2020 1652   Audit-C Score  3 Filed at: 07/09/2020 1652                  DAISHA/DAST-10      Most Recent Value   How many times in the past year have you    Used an illegal drug or used a prescription medication for non-medical reasons?   Once or Twice Filed at: 07/09/2020 1652                                MDM  Number of Diagnoses or Management Options  Acute gastroenteritis: new and requires workup     Amount and/or Complexity of Data Reviewed  Clinical lab tests: ordered and reviewed  Decide to obtain previous medical records or to obtain history from someone other than the patient: yes  Independent visualization of images, tracings, or specimens: yes    Risk of Complications, Morbidity, and/or Mortality  General comments: 44-year-old female presents with 1 day history of nausea vomiting and diarrhea  Also reports sore throat  Patient felt better after receiving IV fluids and Zofran  She was able tolerate oral intake  Her workup is reassuring, normal electrolytes and urinalysis  Suspect that her symptoms are secondary to viral gastroenteritis  Patient's COVID testing was negative  We discussed signs and symptoms return to the emergency department  Disposition  Final diagnoses:   Acute gastroenteritis     Time reflects when diagnosis was documented in both MDM as applicable and the Disposition within this note     Time User Action Codes Description Comment    7/9/2020  6:37 PM Roque Trujillo Add [K52 9] Acute gastroenteritis       ED Disposition     ED Disposition Condition Date/Time Comment    Discharge Stable u Jul 9, 2020  6:39 PM Kar Najera discharge to home/self care  Follow-up Information     Follow up With Specialties Details Why Contact Info    Rajiv Bray MD Family Medicine Schedule an appointment as soon as possible for a visit in 2 days For recheck of current symptoms [address] [place] [address]  [phone]            Patient's Medications   Discharge Prescriptions    DICYCLOMINE (BENTYL) 20 MG TABLET    Take 1 tablet (20 mg total) by mouth 3 (three) times a day as needed (abdominal cramping)       Start Date: 7/9/2020  End Date: --       Order Dose: 20 mg       Quantity: 20 tablet    Refills: 0    ONDANSETRON (ZOFRAN-ODT) 4 MG DISINTEGRATING TABLET    Take 1 tablet (4 mg total) by mouth every 6 (six) hours as needed for nausea or vomiting       Start Date: 7/9/2020  End Date: --       Order Dose: 4 mg       Quantity: 20 tablet    Refills: 0     No discharge procedures on file      PDMP Review       Value Time User    PDMP Reviewed  Yes 12/9/2019  7:35 AM Rajiv Bray MD          ED Provider  Electronically Signed by           Ivis Kimble DO  07/09/20 3355

## 2020-08-15 DIAGNOSIS — J30.2 SEASONAL ALLERGIC RHINITIS, UNSPECIFIED TRIGGER: ICD-10-CM

## 2020-08-17 RX ORDER — MONTELUKAST SODIUM 10 MG/1
TABLET ORAL
Qty: 90 TABLET | Refills: 0 | Status: SHIPPED | OUTPATIENT
Start: 2020-08-17 | End: 2021-05-25 | Stop reason: SDUPTHER

## 2020-08-27 ENCOUNTER — OFFICE VISIT (OUTPATIENT)
Dept: OBGYN CLINIC | Facility: MEDICAL CENTER | Age: 40
End: 2020-08-27
Payer: COMMERCIAL

## 2020-08-27 ENCOUNTER — OFFICE VISIT (OUTPATIENT)
Dept: URGENT CARE | Age: 40
End: 2020-08-27
Payer: COMMERCIAL

## 2020-08-27 VITALS
RESPIRATION RATE: 18 BRPM | DIASTOLIC BLOOD PRESSURE: 58 MMHG | TEMPERATURE: 97.2 F | OXYGEN SATURATION: 99 % | HEART RATE: 66 BPM | SYSTOLIC BLOOD PRESSURE: 120 MMHG

## 2020-08-27 VITALS — HEART RATE: 74 BPM | SYSTOLIC BLOOD PRESSURE: 120 MMHG | DIASTOLIC BLOOD PRESSURE: 64 MMHG

## 2020-08-27 DIAGNOSIS — S82.832A OTHER CLOSED FRACTURE OF DISTAL END OF LEFT FIBULA, INITIAL ENCOUNTER: ICD-10-CM

## 2020-08-27 DIAGNOSIS — M79.7 FIBROMYALGIA: ICD-10-CM

## 2020-08-27 DIAGNOSIS — S82.832A OTHER CLOSED FRACTURE OF DISTAL END OF LEFT FIBULA, INITIAL ENCOUNTER: Primary | ICD-10-CM

## 2020-08-27 DIAGNOSIS — S99.912A ANKLE INJURY, LEFT, INITIAL ENCOUNTER: ICD-10-CM

## 2020-08-27 DIAGNOSIS — S99.922A FOOT INJURY, LEFT, INITIAL ENCOUNTER: Primary | ICD-10-CM

## 2020-08-27 PROCEDURE — 96372 THER/PROPH/DIAG INJ SC/IM: CPT | Performed by: NURSE PRACTITIONER

## 2020-08-27 PROCEDURE — 99244 OFF/OP CNSLTJ NEW/EST MOD 40: CPT | Performed by: PHYSICAL MEDICINE & REHABILITATION

## 2020-08-27 PROCEDURE — G0382 LEV 3 HOSP TYPE B ED VISIT: HCPCS | Performed by: NURSE PRACTITIONER

## 2020-08-27 PROCEDURE — 99213 OFFICE O/P EST LOW 20 MIN: CPT | Performed by: NURSE PRACTITIONER

## 2020-08-27 PROCEDURE — 1036F TOBACCO NON-USER: CPT | Performed by: PHYSICAL MEDICINE & REHABILITATION

## 2020-08-27 PROCEDURE — 99283 EMERGENCY DEPT VISIT LOW MDM: CPT | Performed by: NURSE PRACTITIONER

## 2020-08-27 RX ORDER — TRAMADOL HYDROCHLORIDE 50 MG/1
50 TABLET ORAL 3 TIMES DAILY
Qty: 15 TABLET | Refills: 0
Start: 2020-08-27 | End: 2021-02-05 | Stop reason: ALTCHOICE

## 2020-08-27 RX ORDER — NAPROXEN 500 MG/1
500 TABLET ORAL 2 TIMES DAILY PRN
Qty: 60 TABLET | Refills: 1 | Status: SHIPPED | OUTPATIENT
Start: 2020-08-27 | End: 2021-09-27

## 2020-08-27 RX ORDER — VALACYCLOVIR HYDROCHLORIDE 500 MG/1
500 TABLET, FILM COATED ORAL AS NEEDED
COMMUNITY
End: 2020-10-29

## 2020-08-27 RX ORDER — KETOROLAC TROMETHAMINE 30 MG/ML
30 INJECTION, SOLUTION INTRAMUSCULAR; INTRAVENOUS ONCE
Status: COMPLETED | OUTPATIENT
Start: 2020-08-27 | End: 2020-08-27

## 2020-08-27 RX ADMIN — KETOROLAC TROMETHAMINE 30 MG: 30 INJECTION, SOLUTION INTRAMUSCULAR; INTRAVENOUS at 11:34

## 2020-08-27 NOTE — PROGRESS NOTES
1  Other closed fracture of distal end of left fibula, initial encounter  naproxen (NAPROSYN) 500 mg tablet   2  Fibromyalgia       No orders of the defined types were placed in this encounter  Imaging Studies (I personally reviewed images in PACS and report):  Left ankle x-rays most recent to this encounter reviewed  These images show distal fibula avulsion fracture with associated soft tissue swelling  The patient's pertinent emergency department/urgent care notes were reviewed  Impression:  Left ankle pain secondary to distal fibula avulsion fracture with date of injury as 8/26/2020  The patient is globally tender throughout the ankle and this also includes her leg and hip  The patient carries a diagnosis of fibromyalgia  She was prescribed tramadol and also takes Klonopin  I will not be prescribing her any narcotics for this  I prescribed her naproxen  She can take the naproxen with Tylenol  I provided her with a note 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  I will reassess her in 2 weeks  She can begin to weightbear as tolerated in the cam boot  She has crutches for now  Return in about 2 weeks (around 9/10/2020)  HPI:  Pio Ramos is a 45 y o  female  who presents for evaluation of   Chief Complaint   Patient presents with    Left Ankle - Pain       Onset/Mechanism:  8/26/2020-the patient slipped on a step and fell down  Does not recall the way her ankle went  Location: Throughout the ankle  Radiation: Denies  Quality: It hurts and is painful  Provocative: Walking  Severity: Severe  Associated Symptoms: Swelling and trouble walking  Treatment so far: Seen at urgent care and was given tramadol and a splint  Review of Systems   Constitutional: Positive for activity change  Negative for fever  HENT: Negative for sore throat  Eyes: Negative for visual disturbance  Respiratory: Negative for shortness of breath      Cardiovascular: Negative for chest pain    Gastrointestinal: Negative for abdominal pain  Endocrine: Negative for polydipsia  Genitourinary: Negative for difficulty urinating  Musculoskeletal: Positive for arthralgias, gait problem and joint swelling  Skin: Negative for rash  Allergic/Immunologic: Negative for immunocompromised state  Neurological: Positive for weakness  Negative for numbness  Hematological: Does not bruise/bleed easily  Psychiatric/Behavioral: Negative for confusion  The patient is nervous/anxious          Following history reviewed and updated:  Past Medical History:   Diagnosis Date    Allergic rhinitis     Anemia     Anxiety     Chondromalacia of both patellae     Depression     Fibromyalgia     Fibromyalgia     GERD (gastroesophageal reflux disease)     Hematochezia     Herpes simplex infection     HPV (human papilloma virus) infection     11/2013    Hypertension     IBS (irritable bowel syndrome)     Insomnia     Mental status change     Migraine     Obesity 8/6/2015    Scoliosis     Scoliosis      Past Surgical History:   Procedure Laterality Date    EXPLORATORY LAPAROTOMY      WISDOM TOOTH EXTRACTION       Social History   Social History     Substance and Sexual Activity   Alcohol Use Yes    Alcohol/week: 3 0 standard drinks    Types: 1 Glasses of wine, 1 Cans of beer, 1 Shots of liquor per week    Comment: social      Social History     Substance and Sexual Activity   Drug Use Yes    Frequency: 3 0 times per week    Types: Marijuana    Comment: few times a week      Social History     Tobacco Use   Smoking Status Former Smoker    Types: Cigarettes   Smokeless Tobacco Never Used   Tobacco Comment    quit 2018     Family History   Problem Relation Age of Onset    Stroke Mother     Hypertension Mother     Psoriasis Mother     Hepatitis Father         B     Breast cancer Family         maternal aunt - 27 's     Diabetes type I Son     Diabetes Son     No Known Problems Brother  No Known Problems Daughter     No Known Problems Maternal Grandmother     No Known Problems Maternal Grandfather     No Known Problems Paternal Grandmother     No Known Problems Paternal Grandfather     No Known Problems Brother     No Known Problems Daughter      Allergies   Allergen Reactions    Cymbalta [Duloxetine Hcl] Nausea Only and GI Intolerance     Stomach upset        Constitutional:  /64   Pulse 74   LMP 08/27/2020    General: NAD  Eyes: Anicteric sclerae  Neck: Supple  Lungs: Unlabored breathing  Cardiovascular: No lower extremity edema  Skin: Intact without erythema  Neurologic: Sensation intact to light touch  Psychiatric: Mood and affect are appropriate  Left Ankle Exam     Tenderness   The patient is experiencing tenderness in the lateral malleolus  Swelling: mild    Range of Motion   Dorsiflexion: abnormal   Plantar flexion: abnormal   Eversion: abnormal   Inversion: abnormal     Other   Erythema: absent  Scars: absent  Sensation: normal  Pulse: present             Procedures - none for this visit  Portions of the record may have been created with voice recognition software  Occasional wrong word or "sound a like" substitutions may have occurred due to the inherent limitations of voice recognition software  Read the chart carefully and recognize, using context, where substitutions have occurred

## 2020-08-27 NOTE — PROGRESS NOTES
[facility]        NAME: Héctor Walter is a 45 y o  female  : 1980    MRN: [id number]  DATE: 2020  TIME: 11:17 AM    Assessment and Plan   Foot injury, left, initial encounter [Z76 [id number]]  1  Foot injury, left, initial encounter  XR foot 3+ vw left   2  Ankle injury, left, initial encounter  XR ankle 3+ vw left   3  Other closed fracture of distal end of left fibula, initial encounter  traMADol (ULTRAM) 50 mg tablet    Ambulatory referral to Orthopedic Surgery    ketorolac (TORADOL) injection 30 mg    Ankle Cude ankle/Ankle Brace    CANCELED: Post Op Shoe         Patient Instructions     Given paper copy of tramadol Prescription  Referral to ortho for fracture of distal fibula  Likely old fracture of the left 4th toe  NSAID or tylenol OTC prn for break through pain  Follow up with PCP in 3-5 days  Proceed to  ER if symptoms worsen  Chief Complaint     Chief Complaint   Patient presents with    Ankle Injury     left , twisted down steps last night    Leg Pain     left         History of Present Illness       HPI   Reports pain in the left ankle and foot that started yesterday in the evening  She states she was going down the steps in her back yard when she accidentally stepped into a crack on the stairs, causing her to twist her left ankle  Says she is having pain on the left foot and ankle  Moderate at rest, and feels numb  Pain radiates up the left leg, laterally  Severe pain with weight bearing  Reports twisting the same ankle several years ago  Review of Systems   Review of Systems   Constitutional: Negative for fever  Respiratory: Negative for shortness of breath  Musculoskeletal: Positive for arthralgias (left ankle), gait problem (d/t pain on the left ankle/foot) and joint swelling (left ankle)  Skin: Negative for color change, rash and wound  Neurological: Positive for numbness (left ankle)  Negative for weakness           Current Medications       Current Outpatient Medications:     Azelastine HCl 137 MCG/SPRAY SOLN, 2 SPRAYS INTO EACH NOSTRIL TWICE A DAY, Disp: 1 Bottle, Rfl: 1    butalbital-acetaminophen-caffeine (FIORICET,ESGIC) -40 mg per tablet, Take 1 tablet by mouth every 4 (four) hours as needed for headaches, Disp: 20 tablet, Rfl: 0    clobetasol (TEMOVATE) 0 05 % cream, APPLY TO AFFECTED AREA TWICE A DAY (Patient not taking: Reported on 7/9/2020), Disp: 30 g, Rfl: 0    clonazePAM (KlonoPIN) 0 5 mg tablet, TAKE 1 TABLET BY MOUTH EVERY DAY, Disp: 30 tablet, Rfl: 0    [employer] SENNA 8 6 MG tablet, TAKE 1 TABLET (8 6 MG TOTAL) BY MOUTH DAILY, Disp: 30 tablet, Rfl: 2    cyclobenzaprine (FLEXERIL) 10 mg tablet, TAKE 1 TABLET (10 MG TOTAL) BY MOUTH DAILY AT BEDTIME, Disp: 30 tablet, Rfl: 2    dicyclomine (BENTYL) 20 mg tablet, TAKE 1 TABLET (20 MG TOTAL) BY MOUTH EVERY 6 (SIX) HOURS AS NEEDED (STOMACH PAIN), Disp: 90 tablet, Rfl: 0    dicyclomine (BENTYL) 20 mg tablet, Take 1 tablet (20 mg total) by mouth 3 (three) times a day as needed (abdominal cramping), Disp: 20 tablet, Rfl: 0    levonorgestrel (MIRENA) 20 MCG/24HR IUD, Mirena 20 MCG/24HR Intrauterine Intrauterine Device  Refills: 0  Active, Disp: , Rfl:     montelukast (SINGULAIR) 10 mg tablet, TAKE 1 TABLET BY MOUTH EVERYDAY AT BEDTIME, Disp: 90 tablet, Rfl: 0    naproxen (NAPROSYN) 250 mg tablet, Take 1 tablet (250 mg total) by mouth every 12 (twelve) hours, Disp: 60 tablet, Rfl: 4    nystatin-triamcinolone (MYCOLOG-II) ointment, Apply topically 2 (two) times a day (Patient not taking: Reported on 7/9/2020), Disp: 30 g, Rfl: 0    ondansetron (ZOFRAN-ODT) 4 mg disintegrating tablet, Take 1 tablet (4 mg total) by mouth every 8 (eight) hours as needed for nausea or vomiting (Patient not taking: Reported on 7/9/2020), Disp: 30 tablet, Rfl: 0    ondansetron (ZOFRAN-ODT) 4 mg disintegrating tablet, Take 1 tablet (4 mg total) by mouth every 6 (six) hours as needed for nausea or vomiting, Disp: 20 tablet, Rfl: 0    traMADol (ULTRAM) 50 mg tablet, Take 1 tablet (50 mg total) by mouth 3 (three) times a day, Disp: 15 tablet, Rfl: 0    valACYclovir (VALTREX) 500 mg tablet, Take 1 tablet (500 mg total) by mouth 2 (two) times a day for 3 days, Disp: 6 tablet, Rfl: 3    Current Facility-Administered Medications:     ketorolac (TORADOL) injection 30 mg, 30 mg, Intramuscular, Once, AVTAR James    Current Allergies     Allergies as of 08/27/2020 - Reviewed 08/27/2020   Allergen Reaction Noted    Cymbalta [duloxetine hcl] Nausea Only and GI Intolerance 11/18/2015            The following portions of the patient's history were reviewed and updated as appropriate: allergies, current medications, past family history, past medical history, past social history, past surgical history and problem list      Past Medical History:   Diagnosis Date    Allergic rhinitis     Anemia     Anxiety     Chondromalacia of both patellae     Depression     Fibromyalgia     Fibromyalgia     GERD (gastroesophageal reflux disease)     Hematochezia     Herpes simplex infection     HPV (human papilloma virus) infection     11/2013    Hypertension     IBS (irritable bowel syndrome)     Insomnia     Mental status change     Migraine     Obesity 8/6/2015    Scoliosis     Scoliosis        Past Surgical History:   Procedure Laterality Date    EXPLORATORY LAPAROTOMY      WISDOM TOOTH EXTRACTION         Family History   Problem Relation Age of Onset    Stroke Mother     Hypertension Mother     Psoriasis Mother     Hepatitis Father         B     Breast cancer Family         maternal aunt - 27 's     Diabetes type I Son     Diabetes Son     No Known Problems Brother     No Known Problems Daughter     No Known Problems Maternal Grandmother     No Known Problems Maternal Grandfather     No Known Problems Paternal Grandmother     No Known Problems Paternal Grandfather     No Known Problems Brother     No Known Problems Daughter          Medications have been verified  Objective   /58   Pulse 66   Temp (!) 97 2 °F (36 2 °C) (Temporal)   Resp 18   LMP 08/27/2020   SpO2 99%        Physical Exam     Physical Exam  Musculoskeletal:         General: Swelling (of the lateral aspect of the left ankle, in the area of the lateral malleolus, moderate severity) and tenderness (with palpation of mostly the lateral aspect of the left ankle, and much less pain with palpation of the medial aspect of the ankle and also the back of the left ankle) present  Comments: Limited ROM secondary to pain, of the left ankle  Toes are normal     Skin:     General: Skin is warm  Capillary Refill: Capillary refill takes less than 2 seconds  Neurological:      General: No focal deficit present  Mental Status: She is alert  Gait: Gait abnormal (Limps, favoring the left foot/ankle)

## 2020-08-27 NOTE — LETTER
To Whom It May Concern,    Claudia De La Cruz is under my professional care  She was seen in my office on August 27, 2020  She is out of work until 9/10/2020  She will be reassessed on or just prior to that date  If you have any questions or concerns, please don't hesitate to call          Sincerely,          Gem Tellez, DO

## 2020-08-27 NOTE — LETTER
August 27, 2020     Patient: Juanita Lynn   YOB: 1980   Date of Visit: 8/27/2020       To Whom it May Concern:    Juanita Lynn is under my professional care  She was seen in my office on 8/27/2020  Work excuse for today  If you have any questions or concerns, please don't hesitate to call           Sincerely,          Gem Tellez DO        CC: No Recipients

## 2020-09-09 RX ORDER — OMEPRAZOLE 10 MG/1
10 CAPSULE, DELAYED RELEASE ORAL DAILY
COMMUNITY
End: 2021-09-27

## 2020-09-11 ENCOUNTER — OFFICE VISIT (OUTPATIENT)
Dept: OBGYN CLINIC | Facility: CLINIC | Age: 40
End: 2020-09-11
Payer: COMMERCIAL

## 2020-09-11 VITALS
DIASTOLIC BLOOD PRESSURE: 76 MMHG | SYSTOLIC BLOOD PRESSURE: 123 MMHG | WEIGHT: 181 LBS | HEART RATE: 77 BPM | BODY MASS INDEX: 32.07 KG/M2 | HEIGHT: 63 IN

## 2020-09-11 DIAGNOSIS — S82.832A OTHER CLOSED FRACTURE OF DISTAL END OF LEFT FIBULA, INITIAL ENCOUNTER: ICD-10-CM

## 2020-09-11 PROCEDURE — 99213 OFFICE O/P EST LOW 20 MIN: CPT | Performed by: PHYSICAL MEDICINE & REHABILITATION

## 2020-09-11 NOTE — PROGRESS NOTES
1  Other closed fracture of distal end of left fibula, initial encounter  Ambulatory referral to Orthopedic Surgery     No orders of the defined types were placed in this encounter  Imaging Studies (I personally reviewed images in PACS and report):  Left ankle x-rays most recent to this encounter reviewed  These images show distal fibula avulsion fracture with associated soft tissue swelling      Impression:  Left ankle pain secondary to distal fibula avulsion fracture with date of injury as 8/26/2020  Patient presents today in normal footwear  She is not wearing her boot  She claims that she discontinued this just today  She remains exquisitely tender at the distal fibula  We will obtain x-rays on that visit  Return in about 3 weeks (around 10/2/2020)  HPI:  Gregorio Olivarez is a 45 y o  female  who presents in follow up  Here for   Chief Complaint   Patient presents with    Follow-up       Date of injury: 8/26/2020-the patient slipped on a step and fell down  Does not recall the way her ankle went  Trajectory of symptoms:  Feeling better and stopped wearing her boot  Review of Systems   Constitutional: Positive for activity change  Negative for fever  HENT: Negative for sore throat  Eyes: Negative for visual disturbance  Respiratory: Negative for shortness of breath  Cardiovascular: Negative for chest pain  Gastrointestinal: Negative for abdominal pain  Endocrine: Negative for polydipsia  Genitourinary: Negative for difficulty urinating  Musculoskeletal: Positive for arthralgias  Skin: Negative for rash  Allergic/Immunologic: Negative for immunocompromised state  Neurological: Negative for weakness and numbness  Hematological: Does not bruise/bleed easily  Psychiatric/Behavioral: Negative for confusion         Following history reviewed and updated:  Past Medical History:   Diagnosis Date    Allergic rhinitis     Anemia     Anxiety     Chondromalacia of both patellae     Depression     Fibromyalgia     Fibromyalgia     GERD (gastroesophageal reflux disease)     Hematochezia     Herpes simplex infection     HPV (human papilloma virus) infection     11/2013    Hypertension     IBS (irritable bowel syndrome)     Insomnia     Mental status change     Migraine     Obesity 8/6/2015    Scoliosis     Scoliosis      Past Surgical History:   Procedure Laterality Date    EXPLORATORY LAPAROTOMY      WISDOM TOOTH EXTRACTION       Social History   Social History     Substance and Sexual Activity   Alcohol Use Yes    Alcohol/week: 3 0 standard drinks    Types: 1 Glasses of wine, 1 Cans of beer, 1 Shots of liquor per week    Comment: social      Social History     Substance and Sexual Activity   Drug Use Yes    Frequency: 3 0 times per week    Types: Marijuana    Comment: few times a week      Social History     Tobacco Use   Smoking Status Former Smoker    Types: Cigarettes   Smokeless Tobacco Never Used   Tobacco Comment    quit 2018     Family History   Problem Relation Age of Onset    Stroke Mother     Hypertension Mother     Psoriasis Mother     Hepatitis Father         B     Breast cancer Family         maternal aunt - 27 's     Diabetes type I Son     Diabetes Son     No Known Problems Brother     No Known Problems Daughter     No Known Problems Maternal Grandmother     No Known Problems Maternal Grandfather     No Known Problems Paternal Grandmother     No Known Problems Paternal Grandfather     No Known Problems Brother     No Known Problems Daughter      Allergies   Allergen Reactions    Cymbalta [Duloxetine Hcl] Nausea Only and GI Intolerance     Stomach upset        Constitutional:  /76   Pulse 77   Ht 5' 3" (1 6 m)   Wt 82 1 kg (181 lb)   LMP 08/27/2020   BMI 32 06 kg/m²    General: NAD  Eyes: Clear sclerae  ENT: No inflammation, lesion, or mass of lips  No tracheal deviation    Musculoskeletal: As mentioned below   Integumentary: No visible rashes or skin lesions  Pulmonary/Chest: Effort normal  No respiratory distress  Neuro: CN's grossly intact, GRULLON  Psych: Normal affect and judgement  Vascular: WWP  Left Ankle Exam     Tenderness   The patient is experiencing tenderness in the lateral malleolus  Swelling: mild    Range of Motion   The patient has normal left ankle ROM  Other   Erythema: absent  Scars: absent  Sensation: normal  Pulse: present             Procedures - none for this visit  Portions of the record may have been created with voice recognition software  Occasional wrong word or "sound a like" substitutions may have occurred due to the inherent limitations of voice recognition software  Read the chart carefully and recognize, using context, where substitutions have occurred

## 2020-10-09 ENCOUNTER — APPOINTMENT (OUTPATIENT)
Dept: RADIOLOGY | Facility: AMBULARY SURGERY CENTER | Age: 40
End: 2020-10-09
Payer: COMMERCIAL

## 2020-10-09 ENCOUNTER — OFFICE VISIT (OUTPATIENT)
Dept: OBGYN CLINIC | Facility: CLINIC | Age: 40
End: 2020-10-09
Payer: COMMERCIAL

## 2020-10-09 VITALS
HEIGHT: 63 IN | HEART RATE: 67 BPM | SYSTOLIC BLOOD PRESSURE: 104 MMHG | DIASTOLIC BLOOD PRESSURE: 60 MMHG | BODY MASS INDEX: 31.89 KG/M2 | WEIGHT: 180 LBS | TEMPERATURE: 97.5 F

## 2020-10-09 DIAGNOSIS — S82.832A OTHER CLOSED FRACTURE OF DISTAL END OF LEFT FIBULA, INITIAL ENCOUNTER: Primary | ICD-10-CM

## 2020-10-09 DIAGNOSIS — S82.832A OTHER CLOSED FRACTURE OF DISTAL END OF LEFT FIBULA, INITIAL ENCOUNTER: ICD-10-CM

## 2020-10-09 PROCEDURE — 73610 X-RAY EXAM OF ANKLE: CPT

## 2020-10-09 PROCEDURE — 1036F TOBACCO NON-USER: CPT | Performed by: PHYSICAL MEDICINE & REHABILITATION

## 2020-10-09 PROCEDURE — 99213 OFFICE O/P EST LOW 20 MIN: CPT | Performed by: PHYSICAL MEDICINE & REHABILITATION

## 2020-10-28 DIAGNOSIS — B00.9 HSV INFECTION: Primary | ICD-10-CM

## 2020-10-29 RX ORDER — VALACYCLOVIR HYDROCHLORIDE 500 MG/1
500 TABLET, FILM COATED ORAL 2 TIMES DAILY
Qty: 6 TABLET | Refills: 4 | Status: SHIPPED | OUTPATIENT
Start: 2020-10-29 | End: 2021-09-27

## 2020-11-06 ENCOUNTER — OFFICE VISIT (OUTPATIENT)
Dept: OBGYN CLINIC | Facility: CLINIC | Age: 40
End: 2020-11-06
Payer: COMMERCIAL

## 2020-11-06 ENCOUNTER — APPOINTMENT (OUTPATIENT)
Dept: RADIOLOGY | Facility: AMBULARY SURGERY CENTER | Age: 40
End: 2020-11-06
Payer: COMMERCIAL

## 2020-11-06 VITALS
HEART RATE: 66 BPM | HEIGHT: 63 IN | BODY MASS INDEX: 33.49 KG/M2 | SYSTOLIC BLOOD PRESSURE: 109 MMHG | DIASTOLIC BLOOD PRESSURE: 73 MMHG | WEIGHT: 189 LBS

## 2020-11-06 DIAGNOSIS — S82.832A OTHER CLOSED FRACTURE OF DISTAL END OF LEFT FIBULA, INITIAL ENCOUNTER: Primary | ICD-10-CM

## 2020-11-06 DIAGNOSIS — M25.672 ANKLE STIFF, LEFT: ICD-10-CM

## 2020-11-06 DIAGNOSIS — S82.832A OTHER CLOSED FRACTURE OF DISTAL END OF LEFT FIBULA, INITIAL ENCOUNTER: ICD-10-CM

## 2020-11-06 PROCEDURE — 1036F TOBACCO NON-USER: CPT | Performed by: PHYSICAL MEDICINE & REHABILITATION

## 2020-11-06 PROCEDURE — 3008F BODY MASS INDEX DOCD: CPT | Performed by: PHYSICAL MEDICINE & REHABILITATION

## 2020-11-06 PROCEDURE — 99213 OFFICE O/P EST LOW 20 MIN: CPT | Performed by: PHYSICAL MEDICINE & REHABILITATION

## 2020-11-06 PROCEDURE — 73610 X-RAY EXAM OF ANKLE: CPT

## 2020-11-13 ENCOUNTER — EVALUATION (OUTPATIENT)
Dept: PHYSICAL THERAPY | Facility: REHABILITATION | Age: 40
End: 2020-11-13
Payer: COMMERCIAL

## 2020-11-13 DIAGNOSIS — M25.672 ANKLE STIFF, LEFT: ICD-10-CM

## 2020-11-13 PROCEDURE — 97161 PT EVAL LOW COMPLEX 20 MIN: CPT

## 2020-11-13 PROCEDURE — 97112 NEUROMUSCULAR REEDUCATION: CPT

## 2020-12-09 ENCOUNTER — CLINICAL SUPPORT (OUTPATIENT)
Dept: FAMILY MEDICINE CLINIC | Facility: CLINIC | Age: 40
End: 2020-12-09

## 2020-12-09 ENCOUNTER — TELEPHONE (OUTPATIENT)
Dept: FAMILY MEDICINE CLINIC | Facility: CLINIC | Age: 40
End: 2020-12-09

## 2020-12-09 DIAGNOSIS — R11.0 NAUSEA: Primary | ICD-10-CM

## 2020-12-09 DIAGNOSIS — Z20.822 EXPOSURE TO COVID-19 VIRUS: Primary | ICD-10-CM

## 2020-12-09 PROCEDURE — U0003 INFECTIOUS AGENT DETECTION BY NUCLEIC ACID (DNA OR RNA); SEVERE ACUTE RESPIRATORY SYNDROME CORONAVIRUS 2 (SARS-COV-2) (CORONAVIRUS DISEASE [COVID-19]), AMPLIFIED PROBE TECHNIQUE, MAKING USE OF HIGH THROUGHPUT TECHNOLOGIES AS DESCRIBED BY CMS-2020-01-R: HCPCS | Performed by: FAMILY MEDICINE

## 2020-12-09 PROCEDURE — RECHECK

## 2020-12-10 LAB — SARS-COV-2 RNA SPEC QL NAA+PROBE: DETECTED

## 2020-12-14 ENCOUNTER — OFFICE VISIT (OUTPATIENT)
Dept: FAMILY MEDICINE CLINIC | Facility: CLINIC | Age: 40
End: 2020-12-14
Payer: COMMERCIAL

## 2020-12-14 VITALS — BODY MASS INDEX: 33.49 KG/M2 | HEIGHT: 63 IN | WEIGHT: 189 LBS

## 2020-12-14 DIAGNOSIS — U07.1 COVID-19: Primary | ICD-10-CM

## 2020-12-14 PROCEDURE — 1036F TOBACCO NON-USER: CPT | Performed by: FAMILY MEDICINE

## 2020-12-14 PROCEDURE — 3725F SCREEN DEPRESSION PERFORMED: CPT | Performed by: FAMILY MEDICINE

## 2020-12-14 PROCEDURE — 3008F BODY MASS INDEX DOCD: CPT | Performed by: FAMILY MEDICINE

## 2020-12-14 PROCEDURE — 99213 OFFICE O/P EST LOW 20 MIN: CPT | Performed by: FAMILY MEDICINE

## 2020-12-14 RX ORDER — ALBUTEROL SULFATE 90 UG/1
2 AEROSOL, METERED RESPIRATORY (INHALATION) EVERY 6 HOURS PRN
Qty: 18 G | Refills: 0 | Status: SHIPPED | OUTPATIENT
Start: 2020-12-14 | End: 2021-03-01

## 2021-01-05 DIAGNOSIS — F41.9 ANXIETY: ICD-10-CM

## 2021-01-05 RX ORDER — CLONAZEPAM 0.5 MG/1
0.5 TABLET ORAL DAILY
Qty: 30 TABLET | Refills: 0 | Status: SHIPPED | OUTPATIENT
Start: 2021-01-05 | End: 2021-02-05 | Stop reason: SDUPTHER

## 2021-02-05 ENCOUNTER — OFFICE VISIT (OUTPATIENT)
Dept: FAMILY MEDICINE CLINIC | Facility: CLINIC | Age: 41
End: 2021-02-05
Payer: COMMERCIAL

## 2021-02-05 VITALS
DIASTOLIC BLOOD PRESSURE: 70 MMHG | OXYGEN SATURATION: 98 % | HEIGHT: 63 IN | SYSTOLIC BLOOD PRESSURE: 120 MMHG | HEART RATE: 81 BPM | BODY MASS INDEX: 33.2 KG/M2 | WEIGHT: 187.4 LBS | TEMPERATURE: 98.2 F

## 2021-02-05 DIAGNOSIS — F41.9 ANXIETY: ICD-10-CM

## 2021-02-05 DIAGNOSIS — Z00.00 ANNUAL PHYSICAL EXAM: Primary | ICD-10-CM

## 2021-02-05 DIAGNOSIS — R68.82 LIBIDO, DECREASED: ICD-10-CM

## 2021-02-05 PROCEDURE — 99395 PREV VISIT EST AGE 18-39: CPT | Performed by: FAMILY MEDICINE

## 2021-02-05 PROCEDURE — 3008F BODY MASS INDEX DOCD: CPT | Performed by: FAMILY MEDICINE

## 2021-02-05 PROCEDURE — 1036F TOBACCO NON-USER: CPT | Performed by: FAMILY MEDICINE

## 2021-02-05 RX ORDER — CLONAZEPAM 0.5 MG/1
0.5 TABLET ORAL DAILY
Qty: 30 TABLET | Refills: 0 | Status: SHIPPED | OUTPATIENT
Start: 2021-02-05 | End: 2021-03-09 | Stop reason: SDUPTHER

## 2021-02-05 NOTE — PROGRESS NOTES
[facility]    NAME: Lauro Jamil  AGE: 45 y o  SEX: female  : 1980     DATE: 2021     Assessment and Plan:     Problem List Items Addressed This Visit        Other    Anxiety    Relevant Medications    clonazePAM (KlonoPIN) 0 5 mg tablet      Other Visit Diagnoses     Annual physical exam    -  Primary    Relevant Orders    Comprehensive metabolic panel    Lipid Panel with Direct LDL reflex    Libido, decreased        due for pap  to see GYN for this as well     Relevant Orders    Comprehensive metabolic panel    TSH, 3rd generation with Free T4 reflex    CBC and differential          Immunizations and preventive care screenings were discussed with patient today  Appropriate education was printed on patient's after visit summary  Counseling:  Alcohol/drug use: discussed moderation in alcohol intake, the recommendations for healthy alcohol use, and avoidance of illicit drug use  Dental Health: discussed importance of regular tooth brushing, flossing, and dental visits  Injury prevention: discussed safety/seat belts, safety helmets, smoke detectors, carbon dioxide detectors, and smoking near bedding or upholstery  Sexual health: discussed sexually transmitted diseases, partner selection, use of condoms, avoidance of unintended pregnancy, and contraceptive alternatives  · Exercise: the importance of regular exercise/physical activity was discussed  Recommend exercise 3-5 times per week for at least 30 minutes  BMI Counseling: Body mass index is 33 2 kg/m²  The BMI is above normal  Nutrition recommendations include decreasing portion sizes and encouraging healthy choices of fruits and vegetables  Exercise recommendations include moderate physical activity 150 minutes/week  No pharmacotherapy was ordered  No follow-ups on file       Chief Complaint:     Chief Complaint   Patient presents with    Follow-up Patient is here today for a 1 month follow up  History of Present Illness:     Adult Annual Physical   Patient here for a comprehensive physical exam  The patient reports libido issues   New partner for 1 1/2 year now       Diet and Physical Activity  · Diet/Nutrition: well balanced diet and consuming 3-5 servings of fruits/vegetables daily  · Exercise: walking  Depression Screening  PHQ-9 Depression Screening    PHQ-9:   Frequency of the following problems over the past two weeks:           General Health  · Sleep: sleeps well  · Hearing: normal - bilateral   · Vision: wears glasses  · Dental: regular dental visits and brushes teeth twice daily  /GYN Health  · Last menstrual period: last week  · Contraceptive method: IUD placement   · History of STDs?: no      Review of Systems:     Review of Systems   Constitutional: Negative  HENT: Negative  Eyes: Negative  Respiratory: Negative  Cardiovascular: Negative  Gastrointestinal: Negative  Endocrine: Negative  Genitourinary: Negative  Musculoskeletal: Negative  Skin: Negative  Allergic/Immunologic: Negative  Neurological: Negative  Hematological: Negative  Psychiatric/Behavioral: Negative         Past Medical History:     Past Medical History:   Diagnosis Date    Allergic rhinitis     Anemia     Anxiety     Chondromalacia of both patellae     Depression     Fibromyalgia     Fibromyalgia     GERD (gastroesophageal reflux disease)     Hematochezia     Herpes simplex infection     HPV (human papilloma virus) infection     11/2013    Hypertension     IBS (irritable bowel syndrome)     Insomnia     Mental status change     Migraine     Obesity 8/6/2015    Scoliosis     Scoliosis       Past Surgical History:     Past Surgical History:   Procedure Laterality Date    EXPLORATORY LAPAROTOMY      WISDOM TOOTH EXTRACTION        Social History:        Social History     Socioeconomic History    Marital status: Single     Spouse name: None    Number of children: None    Years of education: completed 12th grade    Highest education level: None   Occupational History    Occupation: home health aide   Social Needs    Financial resource strain: None    Food insecurity     Worry: None     Inability: None    Transportation needs     Medical: None     Non-medical: None   Tobacco Use    Smoking status: Former Smoker     Types: Cigarettes    Smokeless tobacco: Never Used    Tobacco comment: quit 2018   Substance and Sexual Activity    Alcohol use: Yes     Alcohol/week: 3 0 standard drinks     Types: 1 Glasses of wine, 1 Cans of beer, 1 Shots of liquor per week     Comment: social     Drug use: Yes     Frequency: 3 0 times per week     Types: Marijuana     Comment: few times a week     Sexual activity: Yes     Partners: Male     Birth control/protection: I U D     Lifestyle    Physical activity     Days per week: None     Minutes per session: None    Stress: None   Relationships    Social connections     Talks on phone: None     Gets together: None     Attends Sikhism service: None     Active member of club or organization: None     Attends meetings of clubs or organizations: None     Relationship status: None    Intimate partner violence     Fear of current or ex partner: None     Emotionally abused: None     Physically abused: None     Forced sexual activity: None   Other Topics Concern    None   Social History Narrative    None      Family History:     Family History   Problem Relation Age of Onset    Stroke Mother     Hypertension Mother     Psoriasis Mother     Hepatitis Father         B     Breast cancer Family         maternal aunt - 27 's     Diabetes type I Son     Diabetes Son     No Known Problems Brother     No Known Problems Daughter     No Known Problems Maternal Grandmother     No Known Problems Maternal Grandfather     No Known Problems Paternal Grandmother     No Known Problems Paternal Grandfather     No Known Problems Brother     No Known Problems Daughter       Current Medications:     Current Outpatient Medications   Medication Sig Dispense Refill    albuterol (ProAir HFA) 90 mcg/act inhaler Inhale 2 puffs every 6 (six) hours as needed for shortness of breath 18 g 0    Azelastine HCl 137 MCG/SPRAY SOLN 2 SPRAYS INTO EACH NOSTRIL TWICE A DAY 1 Bottle 1    butalbital-acetaminophen-caffeine (FIORICET,ESGIC) -40 mg per tablet Take 1 tablet by mouth every 4 (four) hours as needed for headaches 20 tablet 0    clonazePAM (KlonoPIN) 0 5 mg tablet Take 1 tablet (0 5 mg total) by mouth daily 30 tablet 0    CVS SENNA 8 6 MG tablet TAKE 1 TABLET (8 6 MG TOTAL) BY MOUTH DAILY 30 tablet 2    cyclobenzaprine (FLEXERIL) 10 mg tablet TAKE 1 TABLET (10 MG TOTAL) BY MOUTH DAILY AT BEDTIME 30 tablet 2    dicyclomine (BENTYL) 20 mg tablet Take 1 tablet (20 mg total) by mouth 3 (three) times a day as needed (abdominal cramping) 20 tablet 0    levonorgestrel (MIRENA) 20 MCG/24HR IUD Mirena 20 MCG/24HR Intrauterine Intrauterine Device  Refills: 0  Active      montelukast (SINGULAIR) 10 mg tablet TAKE 1 TABLET BY MOUTH EVERYDAY AT BEDTIME 90 tablet 0    naproxen (NAPROSYN) 500 mg tablet Take 1 tablet (500 mg total) by mouth 2 (two) times a day as needed for moderate pain 60 tablet 1    omeprazole (PriLOSEC) 10 mg delayed release capsule Take 10 mg by mouth daily      Pyrantel Pamoate 144 (50 Base) MG/ML SUSP Take 15 mL (750 mg) once, as a single dose  Repeat in 2 weeks   valACYclovir (VALTREX) 500 mg tablet Take 1 tablet (500 mg total) by mouth 2 (two) times a day for 3 days 6 tablet 4     No current facility-administered medications for this visit  Allergies:      Allergies   Allergen Reactions    Cymbalta [Duloxetine Hcl] Nausea Only and GI Intolerance     Stomach upset      Physical Exam:     /70 (BP Location: Left arm, Patient Position: Sitting, Cuff Size: Adult)   Pulse 81   Temp 98 2 °F (36 8 °C) (Tympanic)   Ht 5' 3" (1 6 m)   Wt 85 kg (187 lb 6 4 oz)   SpO2 98%   BMI 33 20 kg/m²     Physical Exam  Vitals signs and nursing note reviewed  Constitutional:       General: She is not in acute distress  Appearance: She is well-developed  HENT:      Head: Normocephalic and atraumatic  Eyes:      Conjunctiva/sclera: Conjunctivae normal    Neck:      Musculoskeletal: Neck supple  Cardiovascular:      Rate and Rhythm: Normal rate and regular rhythm  Heart sounds: No murmur  Pulmonary:      Effort: Pulmonary effort is normal  No respiratory distress  Breath sounds: Normal breath sounds  Abdominal:      Palpations: Abdomen is soft  Tenderness: There is no abdominal tenderness  Skin:     General: Skin is warm and dry  Neurological:      Mental Status: She is alert            Wayne Vazquez MD   [facility]

## 2021-02-05 NOTE — PATIENT INSTRUCTIONS

## 2021-02-18 ENCOUNTER — LAB (OUTPATIENT)
Dept: LAB | Facility: CLINIC | Age: 41
End: 2021-02-18
Payer: COMMERCIAL

## 2021-02-18 DIAGNOSIS — R68.82 LIBIDO, DECREASED: ICD-10-CM

## 2021-02-18 DIAGNOSIS — Z00.00 ANNUAL PHYSICAL EXAM: ICD-10-CM

## 2021-02-18 LAB
ALBUMIN SERPL BCP-MCNC: 4 G/DL (ref 3.5–5)
ALP SERPL-CCNC: 65 U/L (ref 46–116)
ALT SERPL W P-5'-P-CCNC: 40 U/L (ref 12–78)
ANION GAP SERPL CALCULATED.3IONS-SCNC: 5 MMOL/L (ref 4–13)
AST SERPL W P-5'-P-CCNC: 19 U/L (ref 5–45)
BASOPHILS # BLD AUTO: 0.06 THOUSANDS/ΜL (ref 0–0.1)
BASOPHILS NFR BLD AUTO: 1 % (ref 0–1)
BILIRUB SERPL-MCNC: 0.75 MG/DL (ref 0.2–1)
BUN SERPL-MCNC: 13 MG/DL (ref 5–25)
CALCIUM SERPL-MCNC: 9.6 MG/DL (ref 8.3–10.1)
CHLORIDE SERPL-SCNC: 109 MMOL/L (ref 100–108)
CHOLEST SERPL-MCNC: 187 MG/DL (ref 50–200)
CO2 SERPL-SCNC: 26 MMOL/L (ref 21–32)
CREAT SERPL-MCNC: 0.94 MG/DL (ref 0.6–1.3)
EOSINOPHIL # BLD AUTO: 0.05 THOUSAND/ΜL (ref 0–0.61)
EOSINOPHIL NFR BLD AUTO: 1 % (ref 0–6)
ERYTHROCYTE [DISTWIDTH] IN BLOOD BY AUTOMATED COUNT: 12 % (ref 11.6–15.1)
GFR SERPL CREATININE-BSD FRML MDRD: 77 ML/MIN/1.73SQ M
GLUCOSE P FAST SERPL-MCNC: 93 MG/DL (ref 65–99)
HCT VFR BLD AUTO: 44.6 % (ref 34.8–46.1)
HDLC SERPL-MCNC: 54 MG/DL
HGB BLD-MCNC: 14.5 G/DL (ref 11.5–15.4)
IMM GRANULOCYTES # BLD AUTO: 0 THOUSAND/UL (ref 0–0.2)
IMM GRANULOCYTES NFR BLD AUTO: 0 % (ref 0–2)
LDLC SERPL CALC-MCNC: 115 MG/DL (ref 0–100)
LYMPHOCYTES # BLD AUTO: 1.37 THOUSANDS/ΜL (ref 0.6–4.47)
LYMPHOCYTES NFR BLD AUTO: 25 % (ref 14–44)
MCH RBC QN AUTO: 31.7 PG (ref 26.8–34.3)
MCHC RBC AUTO-ENTMCNC: 32.5 G/DL (ref 31.4–37.4)
MCV RBC AUTO: 98 FL (ref 82–98)
MONOCYTES # BLD AUTO: 0.36 THOUSAND/ΜL (ref 0.17–1.22)
MONOCYTES NFR BLD AUTO: 7 % (ref 4–12)
NEUTROPHILS # BLD AUTO: 3.55 THOUSANDS/ΜL (ref 1.85–7.62)
NEUTS SEG NFR BLD AUTO: 66 % (ref 43–75)
NRBC BLD AUTO-RTO: 0 /100 WBCS
PLATELET # BLD AUTO: 166 THOUSANDS/UL (ref 149–390)
PMV BLD AUTO: 11.5 FL (ref 8.9–12.7)
POTASSIUM SERPL-SCNC: 4.2 MMOL/L (ref 3.5–5.3)
PROT SERPL-MCNC: 7.6 G/DL (ref 6.4–8.2)
RBC # BLD AUTO: 4.57 MILLION/UL (ref 3.81–5.12)
SODIUM SERPL-SCNC: 140 MMOL/L (ref 136–145)
TRIGL SERPL-MCNC: 90 MG/DL
TSH SERPL DL<=0.05 MIU/L-ACNC: 2.21 UIU/ML (ref 0.36–3.74)
WBC # BLD AUTO: 5.39 THOUSAND/UL (ref 4.31–10.16)

## 2021-02-18 PROCEDURE — 36415 COLL VENOUS BLD VENIPUNCTURE: CPT | Performed by: FAMILY MEDICINE

## 2021-02-18 PROCEDURE — 80053 COMPREHEN METABOLIC PANEL: CPT | Performed by: FAMILY MEDICINE

## 2021-02-18 PROCEDURE — 80061 LIPID PANEL: CPT

## 2021-02-18 PROCEDURE — 84443 ASSAY THYROID STIM HORMONE: CPT

## 2021-02-18 PROCEDURE — 85025 COMPLETE CBC W/AUTO DIFF WBC: CPT | Performed by: FAMILY MEDICINE

## 2021-02-27 DIAGNOSIS — U07.1 COVID-19: ICD-10-CM

## 2021-03-01 RX ORDER — ALBUTEROL SULFATE 90 UG/1
2 AEROSOL, METERED RESPIRATORY (INHALATION) EVERY 6 HOURS PRN
Qty: 18 INHALER | Refills: 0 | Status: SHIPPED | OUTPATIENT
Start: 2021-03-01 | End: 2021-09-27

## 2021-03-09 DIAGNOSIS — F41.9 ANXIETY: ICD-10-CM

## 2021-03-09 DIAGNOSIS — G43.709 CHRONIC MIGRAINE WITHOUT AURA WITHOUT STATUS MIGRAINOSUS, NOT INTRACTABLE: ICD-10-CM

## 2021-03-10 RX ORDER — BUTALBITAL, ACETAMINOPHEN AND CAFFEINE 50; 325; 40 MG/1; MG/1; MG/1
1 TABLET ORAL EVERY 4 HOURS PRN
Qty: 20 TABLET | Refills: 0 | Status: SHIPPED | OUTPATIENT
Start: 2021-03-10 | End: 2021-05-21 | Stop reason: SDUPTHER

## 2021-03-10 RX ORDER — CLONAZEPAM 0.5 MG/1
0.5 TABLET ORAL DAILY
Qty: 30 TABLET | Refills: 0 | Status: SHIPPED | OUTPATIENT
Start: 2021-03-10 | End: 2021-05-25 | Stop reason: SDUPTHER

## 2021-03-10 NOTE — TELEPHONE ENCOUNTER
Medication:  PDMP   02/05/2021  1  02/05/2021  CLONAZEPAM 0 5 MG TABLET  30 0  30  TI [facility]        Active agreement on file -No

## 2021-03-31 DIAGNOSIS — J30.2 SEASONAL ALLERGIC RHINITIS, UNSPECIFIED TRIGGER: ICD-10-CM

## 2021-03-31 RX ORDER — AZELASTINE HYDROCHLORIDE 137 UG/1
SPRAY, METERED NASAL
Qty: 1 BOTTLE | Refills: 1 | Status: SHIPPED | OUTPATIENT
Start: 2021-03-31 | End: 2021-05-26

## 2021-05-02 ENCOUNTER — APPOINTMENT (EMERGENCY)
Dept: CT IMAGING | Facility: HOSPITAL | Age: 41
End: 2021-05-02
Payer: COMMERCIAL

## 2021-05-02 ENCOUNTER — HOSPITAL ENCOUNTER (EMERGENCY)
Facility: HOSPITAL | Age: 41
Discharge: HOME | End: 2021-05-02
Attending: EMERGENCY MEDICINE | Admitting: EMERGENCY MEDICINE
Payer: COMMERCIAL

## 2021-05-02 VITALS
SYSTOLIC BLOOD PRESSURE: 115 MMHG | TEMPERATURE: 98.3 F | DIASTOLIC BLOOD PRESSURE: 56 MMHG | OXYGEN SATURATION: 97 % | BODY MASS INDEX: 32.61 KG/M2 | WEIGHT: 184.08 LBS | HEART RATE: 80 BPM | RESPIRATION RATE: 16 BRPM

## 2021-05-02 DIAGNOSIS — R51.9 HEADACHE: ICD-10-CM

## 2021-05-02 DIAGNOSIS — R07.89 ATYPICAL CHEST PAIN: Primary | ICD-10-CM

## 2021-05-02 LAB
ALBUMIN SERPL BCP-MCNC: 3.9 G/DL (ref 3.5–5)
ALP SERPL-CCNC: 70 U/L (ref 46–116)
ALT SERPL W P-5'-P-CCNC: 38 U/L (ref 12–78)
ANION GAP SERPL CALCULATED.3IONS-SCNC: 5 MMOL/L (ref 4–13)
AST SERPL W P-5'-P-CCNC: 20 U/L (ref 5–45)
BASOPHILS # BLD AUTO: 0.07 THOUSANDS/ΜL (ref 0–0.1)
BASOPHILS NFR BLD AUTO: 1 % (ref 0–1)
BILIRUB SERPL-MCNC: 0.29 MG/DL (ref 0.2–1)
BUN SERPL-MCNC: 21 MG/DL (ref 5–25)
CALCIUM SERPL-MCNC: 9 MG/DL (ref 8.3–10.1)
CHLORIDE SERPL-SCNC: 103 MMOL/L (ref 100–108)
CO2 SERPL-SCNC: 32 MMOL/L (ref 21–32)
CREAT SERPL-MCNC: 0.93 MG/DL (ref 0.6–1.3)
EOSINOPHIL # BLD AUTO: 0.33 THOUSAND/ΜL (ref 0–0.61)
EOSINOPHIL NFR BLD AUTO: 4 % (ref 0–6)
ERYTHROCYTE [DISTWIDTH] IN BLOOD BY AUTOMATED COUNT: 11.9 % (ref 11.6–15.1)
GFR SERPL CREATININE-BSD FRML MDRD: 78 ML/MIN/1.73SQ M
GLUCOSE SERPL-MCNC: 83 MG/DL (ref 65–140)
HCG SERPL QL: NEGATIVE
HCT VFR BLD AUTO: 41.9 % (ref 34.8–46.1)
HGB BLD-MCNC: 13.9 G/DL (ref 11.5–15.4)
IMM GRANULOCYTES # BLD AUTO: 0.03 THOUSAND/UL (ref 0–0.2)
IMM GRANULOCYTES NFR BLD AUTO: 0 % (ref 0–2)
LIPASE SERPL-CCNC: 168 U/L (ref 73–393)
LYMPHOCYTES # BLD AUTO: 2.24 THOUSANDS/ΜL (ref 0.6–4.47)
LYMPHOCYTES NFR BLD AUTO: 26 % (ref 14–44)
MAGNESIUM SERPL-MCNC: 2.3 MG/DL (ref 1.6–2.6)
MCH RBC QN AUTO: 32 PG (ref 26.8–34.3)
MCHC RBC AUTO-ENTMCNC: 33.2 G/DL (ref 31.4–37.4)
MCV RBC AUTO: 96 FL (ref 82–98)
MONOCYTES # BLD AUTO: 0.72 THOUSAND/ΜL (ref 0.17–1.22)
MONOCYTES NFR BLD AUTO: 8 % (ref 4–12)
NEUTROPHILS # BLD AUTO: 5.25 THOUSANDS/ΜL (ref 1.85–7.62)
NEUTS SEG NFR BLD AUTO: 61 % (ref 43–75)
NRBC BLD AUTO-RTO: 0 /100 WBCS
PLATELET # BLD AUTO: 214 THOUSANDS/UL (ref 149–390)
PMV BLD AUTO: 10.9 FL (ref 8.9–12.7)
POTASSIUM SERPL-SCNC: 3.3 MMOL/L (ref 3.5–5.3)
PROT SERPL-MCNC: 7.7 G/DL (ref 6.4–8.2)
RBC # BLD AUTO: 4.35 MILLION/UL (ref 3.81–5.12)
SODIUM SERPL-SCNC: 140 MMOL/L (ref 136–145)
TROPONIN I SERPL-MCNC: <0.02 NG/ML
WBC # BLD AUTO: 8.64 THOUSAND/UL (ref 4.31–10.16)

## 2021-05-02 PROCEDURE — 96361 HYDRATE IV INFUSION ADD-ON: CPT

## 2021-05-02 PROCEDURE — 85025 COMPLETE CBC W/AUTO DIFF WBC: CPT | Performed by: EMERGENCY MEDICINE

## 2021-05-02 PROCEDURE — 71275 CT ANGIOGRAPHY CHEST: CPT

## 2021-05-02 PROCEDURE — 99285 EMERGENCY DEPT VISIT HI MDM: CPT | Performed by: EMERGENCY MEDICINE

## 2021-05-02 PROCEDURE — 83735 ASSAY OF MAGNESIUM: CPT | Performed by: EMERGENCY MEDICINE

## 2021-05-02 PROCEDURE — 84484 ASSAY OF TROPONIN QUANT: CPT | Performed by: EMERGENCY MEDICINE

## 2021-05-02 PROCEDURE — 99285 EMERGENCY DEPT VISIT HI MDM: CPT

## 2021-05-02 PROCEDURE — 80053 COMPREHEN METABOLIC PANEL: CPT | Performed by: EMERGENCY MEDICINE

## 2021-05-02 PROCEDURE — 74174 CTA ABD&PLVS W/CONTRAST: CPT

## 2021-05-02 PROCEDURE — 36415 COLL VENOUS BLD VENIPUNCTURE: CPT | Performed by: EMERGENCY MEDICINE

## 2021-05-02 PROCEDURE — 96375 TX/PRO/DX INJ NEW DRUG ADDON: CPT

## 2021-05-02 PROCEDURE — 96374 THER/PROPH/DIAG INJ IV PUSH: CPT

## 2021-05-02 PROCEDURE — 93005 ELECTROCARDIOGRAM TRACING: CPT

## 2021-05-02 PROCEDURE — 84703 CHORIONIC GONADOTROPIN ASSAY: CPT | Performed by: EMERGENCY MEDICINE

## 2021-05-02 PROCEDURE — G1004 CDSM NDSC: HCPCS

## 2021-05-02 PROCEDURE — 83690 ASSAY OF LIPASE: CPT | Performed by: EMERGENCY MEDICINE

## 2021-05-02 PROCEDURE — 70450 CT HEAD/BRAIN W/O DYE: CPT

## 2021-05-02 RX ORDER — KETOROLAC TROMETHAMINE 30 MG/ML
15 INJECTION, SOLUTION INTRAMUSCULAR; INTRAVENOUS ONCE
Status: COMPLETED | OUTPATIENT
Start: 2021-05-02 | End: 2021-05-02

## 2021-05-02 RX ORDER — MORPHINE SULFATE 4 MG/ML
4 INJECTION, SOLUTION INTRAMUSCULAR; INTRAVENOUS ONCE
Status: COMPLETED | OUTPATIENT
Start: 2021-05-02 | End: 2021-05-02

## 2021-05-02 RX ADMIN — SODIUM CHLORIDE 1000 ML: 0.9 INJECTION, SOLUTION INTRAVENOUS at 02:49

## 2021-05-02 RX ADMIN — MORPHINE SULFATE 4 MG: 4 INJECTION INTRAVENOUS at 02:49

## 2021-05-02 RX ADMIN — IOHEXOL 100 ML: 350 INJECTION, SOLUTION INTRAVENOUS at 03:46

## 2021-05-02 RX ADMIN — KETOROLAC TROMETHAMINE 15 MG: 30 INJECTION, SOLUTION INTRAMUSCULAR at 04:49

## 2021-05-02 NOTE — ED PROVIDER NOTES
History  Chief Complaint   Patient presents with    Chest Pain     chest pain begining this afternoon, radiating down left arm, feeling numbness in left arm and leg     HPI     28-year-old female presents emergency department for several concerns  She had chest pain that began several hours ago this afternoon  It is radiating down her left arm  Patient also has headache and neck pain  Denies any falls or trauma  Denies any abdominal pain  Denies any nausea, vomiting, diarrhea  Reports history of migraines, feels slightly different  Denies any thunderclap feature of headache  Denies any fevers  Denies any significant cardiac history  Prior to Admission Medications   Prescriptions Last Dose Informant Patient Reported? Taking? Azelastine HCl 137 MCG/SPRAY SOLN   No No   Si SPRAYS INTO EACH NOSTRIL TWICE A DAY   [facility] SENNA 8 6 MG tablet  Self No No   Sig: TAKE 1 TABLET (8 6 MG TOTAL) BY MOUTH DAILY   Pyrantel Pamoate 144 (50 Base) MG/ML SUSP   Yes No   Sig: Take 15 mL (750 mg) once, as a single dose  Repeat in 2 weeks     albuterol (PROVENTIL HFA,VENTOLIN HFA) 90 mcg/act inhaler   No No   Sig: INHALE 2 PUFFS EVERY 6 (SIX) HOURS AS NEEDED FOR SHORTNESS OF BREATH   butalbital-acetaminophen-caffeine (FIORICET,ESGIC) -40 mg per tablet   No No   Sig: Take 1 tablet by mouth every 4 (four) hours as needed for headaches   clonazePAM (KlonoPIN) 0 5 mg tablet   No No   Sig: Take 1 tablet (0 5 mg total) by mouth daily   cyclobenzaprine (FLEXERIL) 10 mg tablet  Self No No   Sig: TAKE 1 TABLET (10 MG TOTAL) BY MOUTH DAILY AT BEDTIME   dicyclomine (BENTYL) 20 mg tablet  Self No No   Sig: Take 1 tablet (20 mg total) by mouth 3 (three) times a day as needed (abdominal cramping)   levonorgestrel (MIRENA) 20 MCG/24HR IUD  Self Yes No   Sig: Mirena 20 MCG/24HR Intrauterine Intrauterine Device  Refills: 0  Active   montelukast (SINGULAIR) 10 mg tablet  Self No No   Sig: TAKE 1 TABLET BY MOUTH EVERYDAY AT BEDTIME naproxen (NAPROSYN) 500 mg tablet   No No   Sig: Take 1 tablet (500 mg total) by mouth 2 (two) times a day as needed for moderate pain   omeprazole (PriLOSEC) 10 mg delayed release capsule   Yes No   Sig: Take 10 mg by mouth daily   valACYclovir (VALTREX) 500 mg tablet   No No   Sig: Take 1 tablet (500 mg total) by mouth 2 (two) times a day for 3 days      Facility-Administered Medications: None       Past Medical History:   Diagnosis Date    Allergic rhinitis     Anemia     Anxiety     Chondromalacia of both patellae     Depression     Fibromyalgia     Fibromyalgia     GERD (gastroesophageal reflux disease)     Hematochezia     Herpes simplex infection     HPV (human papilloma virus) infection     11/2013    Hypertension     IBS (irritable bowel syndrome)     Insomnia     Mental status change     Migraine     Obesity 8/6/2015    Scoliosis     Scoliosis        Past Surgical History:   Procedure Laterality Date    EXPLORATORY LAPAROTOMY      WISDOM TOOTH EXTRACTION         Family History   Problem Relation Age of Onset    Stroke Mother     Hypertension Mother     Psoriasis Mother     Hepatitis Father         B     Breast cancer Family         maternal aunt - 27 's     Diabetes type I Son     Diabetes Son     No Known Problems Brother     No Known Problems Daughter     No Known Problems Maternal Grandmother     No Known Problems Maternal Grandfather     No Known Problems Paternal Grandmother     No Known Problems Paternal Grandfather     No Known Problems Brother     No Known Problems Daughter      I have reviewed and agree with the history as documented  E-Cigarette/Vaping     E-Cigarette/Vaping Substances     Social History     Tobacco Use    Smoking status: Former Smoker     Types: Cigarettes    Smokeless tobacco: Never Used    Tobacco comment: quit 2018   Substance Use Topics    Alcohol use:  Yes     Alcohol/week: 3 0 standard drinks     Types: 1 Glasses of wine, 1 Cans of beer, 1 Shots of liquor per week     Comment: social     Drug use: Yes     Frequency: 3 0 times per week     Types: Marijuana     Comment: few times a week        Review of Systems   Cardiovascular: Positive for chest pain  Neurological: Positive for headaches  All other systems reviewed and are negative  Physical Exam  Physical Exam  Vitals signs and nursing note reviewed  Constitutional:       General: She is not in acute distress  Appearance: She is well-developed  HENT:      Head: Normocephalic and atraumatic  Eyes:      Pupils: Pupils are equal, round, and reactive to light  Neck:      Musculoskeletal: Normal range of motion and neck supple  Cardiovascular:      Rate and Rhythm: Normal rate and regular rhythm  Pulses:           Carotid pulses are 2+ on the right side and 2+ on the left side  Radial pulses are 2+ on the right side and 2+ on the left side  Dorsalis pedis pulses are 2+ on the right side and 2+ on the left side  Posterior tibial pulses are 2+ on the right side and 2+ on the left side  Heart sounds: Normal heart sounds  No murmur  Pulmonary:      Effort: Pulmonary effort is normal  No respiratory distress  Breath sounds: Normal breath sounds  No wheezing or rales  Abdominal:      General: Bowel sounds are normal  There is no distension  Palpations: Abdomen is soft  Tenderness: There is no abdominal tenderness  There is no guarding or rebound  Musculoskeletal: Normal range of motion  General: No deformity  Lymphadenopathy:      Cervical: No cervical adenopathy  Skin:     Capillary Refill: Capillary refill takes less than 2 seconds  Findings: No erythema or rash  Neurological:      Mental Status: She is alert and oriented to person, place, and time  Cranial Nerves: No cranial nerve deficit  Motor: No abnormal muscle tone        Coordination: Coordination normal       Comments: Normal cranial nerve exam   Normal strength and sensation bilateral upper lower extremities  Normal coordination, normal gait     Psychiatric:         Behavior: Behavior normal          Vital Signs  ED Triage Vitals   Temperature Pulse Respirations Blood Pressure SpO2   05/02/21 0207 05/02/21 0209 05/02/21 0209 05/02/21 0209 05/02/21 0209   98 3 °F (36 8 °C) 67 18 139/69 98 %      Temp Source Heart Rate Source Patient Position - Orthostatic VS BP Location FiO2 (%)   05/02/21 0207 05/02/21 0207 05/02/21 0209 05/02/21 0209 --   Oral Monitor Sitting Right arm       Pain Score       05/02/21 0249       8           Vitals:    05/02/21 0300 05/02/21 0330 05/02/21 0415 05/02/21 0500   BP: 136/64 130/56 123/72 115/56   Pulse: 72 70 78 80   Patient Position - Orthostatic VS: Lying Lying Lying Lying         Visual Acuity      ED Medications  Medications   morphine (PF) 4 mg/mL injection 4 mg (4 mg Intravenous Given 5/2/21 0249)   sodium chloride 0 9 % bolus 1,000 mL (0 mL Intravenous Stopped 5/2/21 0449)   iohexol (OMNIPAQUE) 350 MG/ML injection (MULTI-DOSE) 100 mL (100 mL Intravenous Given 5/2/21 0346)   ketorolac (TORADOL) injection 15 mg (15 mg Intravenous Given 5/2/21 0449)       Diagnostic Studies  Results Reviewed     Procedure Component Value Units Date/Time    hCG, qualitative pregnancy [[id number]]  (Normal) Collected: 05/02/21 0245    Lab Status: Final result Specimen: Blood from Arm, Left Updated: 05/02/21 0335     Preg, Serum Negative    Lipase [[id number]]  (Normal) Collected: 05/02/21 0245    Lab Status: Final result Specimen: Blood from Arm, Left Updated: 05/02/21 0333     Lipase 168 u/L     Magnesium [[id number]]  (Normal) Collected: 05/02/21 0245    Lab Status: Final result Specimen: Blood from Arm, Left Updated: 05/02/21 0333     Magnesium 2 3 mg/dL     Comprehensive metabolic panel [[id number]]  (Abnormal) Collected: 05/02/21 0230    Lab Status: Final result Specimen: Blood Updated: 05/02/21 0250     Sodium 140 mmol/L Potassium 3 3 mmol/L      Chloride 103 mmol/L      CO2 32 mmol/L      ANION GAP 5 mmol/L      BUN 21 mg/dL      Creatinine 0 93 mg/dL      Glucose 83 mg/dL      Calcium 9 0 mg/dL      AST 20 U/L      ALT 38 U/L      Alkaline Phosphatase 70 U/L      Total Protein 7 7 g/dL      Albumin 3 9 g/dL      Total Bilirubin 0 29 mg/dL      eGFR 78 ml/min/1 73sq m     Narrative:      National Kidney Disease Foundation guidelines for Chronic Kidney Disease (CKD):     Stage 1 with normal or high GFR (GFR > 90 mL/min/1 73 square meters)    Stage 2 Mild CKD (GFR = 60-89 mL/min/1 73 square meters)    Stage 3A Moderate CKD (GFR = 45-59 mL/min/1 73 square meters)    Stage 3B Moderate CKD (GFR = 30-44 mL/min/1 73 square meters)    Stage 4 Severe CKD (GFR = 15-29 mL/min/1 73 square meters)    Stage 5 End Stage CKD (GFR <15 mL/min/1 73 square meters)  Note: GFR calculation is accurate only with a steady state creatinine    Troponin I [[id number]]  (Normal) Collected: 05/02/21 0230    Lab Status: Final result Specimen: Blood Updated: 05/02/21 0249     Troponin I <0 02 ng/mL     CBC and differential [[id number]] Resulted: 05/02/21 0226    Lab Status: Final result Specimen: Blood Updated: 05/02/21 0226     WBC 8 64 Thousand/uL      RBC 4 35 Million/uL      Hemoglobin 13 9 g/dL      Hematocrit 41 9 %      MCV 96 fL      MCH 32 0 pg      MCHC 33 2 g/dL      RDW 11 9 %      MPV 10 9 fL      Platelets 670 Thousands/uL      nRBC 0 /100 WBCs      Neutrophils Relative 61 %      Immat GRANS % 0 %      Lymphocytes Relative 26 %      Monocytes Relative 8 %      Eosinophils Relative 4 %      Basophils Relative 1 %      Neutrophils Absolute 5 25 Thousands/µL      Immature Grans Absolute 0 03 Thousand/uL      Lymphocytes Absolute 2 24 Thousands/µL      Monocytes Absolute 0 72 Thousand/µL      Eosinophils Absolute 0 33 Thousand/µL      Basophils Absolute 0 07 Thousands/µL                  CT head without contrast   Final Result by Jacinto Crum, MD (05/02 0355)      No acute intracranial abnormality  Workstation performed: [id number]         CTA dissection protocol chest/abdomen/pelvis   Final Result by Ciro Jiang MD (05/02 2149)      No aortic aneurysm or dissection  No intramural hematoma  No infiltrate or pleural effusion  No acute intra-abdominal abnormality  Stable 7 mm indeterminate lesion at the left renal lower pole  Workstation performed: [id number]                    Procedures  ECG 12 Lead Documentation Only    Date/Time: 5/2/2021 2:01 AM  Performed by: Martell Kessler MD  Authorized by: Martell Kessler MD     Indications / Diagnosis:  Chest pain  Interpretation:     Interpretation: normal    Rate:     ECG rate:  73    ECG rate assessment: normal    Rhythm:     Rhythm: sinus rhythm    Ectopy:     Ectopy: none    QRS:     QRS axis:  Normal    QRS intervals:  Normal  Conduction:     Conduction: normal    ST segments:     ST segments:  Normal  T waves:     T waves: normal               ED Course             HEART Risk Score      Most Recent Value   Heart Score Risk Calculator   History  0 Filed at: 05/02/2021 0426   ECG  0 Filed at: 05/02/2021 0426   Age  0 Filed at: 05/02/2021 0426   Risk Factors  0 Filed at: 05/02/2021 0426   Troponin  0 Filed at: 05/02/2021 0426   HEART Score  0 Filed at: 05/02/2021 0426                                    MDM  Number of Diagnoses or Management Options  Atypical chest pain: new and requires workup  Headache: new and requires workup  Diagnosis management comments: Headache, neck pain, chest pain, arm pain  No significant cardiac risk factors  Does have a history of migraines  CT head with no acute intracranial abnormality  CT dissection study due to chest pain, neck pain, arm pain showed no signs of acute pathology  Patient's pain improved with morphine and Toradol  She was re-evaluated  She felt much better  Laboratory studies unremarkable    Patient is stable for discharge home, outpatient management  Return precautions discussed  Amount and/or Complexity of Data Reviewed  Clinical lab tests: ordered and reviewed  Tests in the radiology section of CPT®: ordered and reviewed  Tests in the medicine section of CPT®: ordered    Risk of Complications, Morbidity, and/or Mortality  Presenting problems: high  Diagnostic procedures: moderate  Management options: high    Patient Progress  Patient progress: improved      Disposition  Final diagnoses:   Atypical chest pain   Headache     Time reflects when diagnosis was documented in both MDM as applicable and the Disposition within this note     Time User Action Codes Description Comment    5/2/2021  4:27 AM Lashanda Danger Add [R07 89] Atypical chest pain     5/2/2021  4:27 AM Lashanda Danger Add [R51 9] Headache       ED Disposition     ED Disposition Condition Date/Time Comment    Discharge Stable Sun May 2, 2021  4:27 AM Deja Gomes discharge to home/self care              Follow-up Information     Follow up With Specialties Details Why Contact Info Additional Information    Rachelle Grider MD Family Medicine Schedule an appointment as soon as possible for a visit in 2 days As needed Clem Mayo  [phone]  [phone]       [facility] Emergency Department Emergency Medicine Go to  If symptoms worsen [address] Emergency Department, [address], [place], [place], [address]          Discharge Medication List as of 5/2/2021  4:27 AM      CONTINUE these medications which have NOT CHANGED    Details   albuterol (PROVENTIL HFA,VENTOLIN HFA) 90 mcg/act inhaler INHALE 2 PUFFS EVERY 6 (SIX) HOURS AS NEEDED FOR SHORTNESS OF BREATH, Starting Mon 3/1/2021, Normal      Azelastine HCl 137 MCG/SPRAY SOLN 2 SPRAYS INTO EACH NOSTRIL TWICE A DAY, Normal      butalbital-acetaminophen-caffeine (FIORICET,ESGIC) -40 mg per tablet Take 1 tablet by mouth every 4 (four) hours as needed for headaches, Starting Wed 3/10/2021, Normal      clonazePAM (KlonoPIN) 0 5 mg tablet Take 1 tablet (0 5 mg total) by mouth daily, Starting Wed 3/10/2021, Normal      [employer] SENNA 8 6 MG tablet TAKE 1 TABLET (8 6 MG TOTAL) BY MOUTH DAILY, Normal      cyclobenzaprine (FLEXERIL) 10 mg tablet TAKE 1 TABLET (10 MG TOTAL) BY MOUTH DAILY AT BEDTIME, Starting Tue 4/7/2020, Normal      dicyclomine (BENTYL) 20 mg tablet Take 1 tablet (20 mg total) by mouth 3 (three) times a day as needed (abdominal cramping), Starting Thu 7/9/2020, Normal      levonorgestrel (MIRENA) 20 MCG/24HR IUD Mirena 20 MCG/24HR Intrauterine Intrauterine Device  Refills: 0  Active, Historical Med      montelukast (SINGULAIR) 10 mg tablet TAKE 1 TABLET BY MOUTH EVERYDAY AT BEDTIME, Normal      naproxen (NAPROSYN) 500 mg tablet Take 1 tablet (500 mg total) by mouth 2 (two) times a day as needed for moderate pain, Starting Thu 8/27/2020, Normal      omeprazole (PriLOSEC) 10 mg delayed release capsule Take 10 mg by mouth daily, Historical Med      Pyrantel Pamoate 144 (50 Base) MG/ML SUSP Take 15 mL (750 mg) once, as a single dose  Repeat in 2 weeks  , Historical Med      valACYclovir (VALTREX) 500 mg tablet Take 1 tablet (500 mg total) by mouth 2 (two) times a day for 3 days, Starting Thu 10/29/2020, Until Sun 11/1/2020, Normal           No discharge procedures on file      PDMP Review       Value Time User    PDMP Reviewed  Yes 12/9/2019  7:35 AM Mary Jane Stack MD          ED Provider  Electronically Signed by           Adin Merino MD  05/02/21 3423

## 2021-05-03 LAB
ATRIAL RATE: 73 BPM
P AXIS: 67 DEGREES
PR INTERVAL: 188 MS
QRS AXIS: 69 DEGREES
QRSD INTERVAL: 106 MS
QT INTERVAL: 382 MS
QTC INTERVAL: 420 MS
T WAVE AXIS: 47 DEGREES
VENTRICULAR RATE: 73 BPM

## 2021-05-03 PROCEDURE — 93010 ELECTROCARDIOGRAM REPORT: CPT | Performed by: INTERNAL MEDICINE

## 2021-05-05 ENCOUNTER — DOCUMENTATION (OUTPATIENT)
Dept: FAMILY MEDICINE CLINIC | Facility: CLINIC | Age: 41
End: 2021-05-05

## 2021-05-05 ENCOUNTER — CLINICAL SUPPORT (OUTPATIENT)
Dept: FAMILY MEDICINE CLINIC | Facility: CLINIC | Age: 41
End: 2021-05-05
Payer: COMMERCIAL

## 2021-05-05 DIAGNOSIS — Z23 NEED FOR TUBERCULOSIS VACCINATION: Primary | ICD-10-CM

## 2021-05-05 PROCEDURE — RECHECK

## 2021-05-05 PROCEDURE — 86580 TB INTRADERMAL TEST: CPT

## 2021-05-05 NOTE — PROGRESS NOTES
With regard to [employer] coverage, the following are the requirements for the prior authorization to be approved  The areas in green are met  The areas in red are not met per Chart Review  Options include:  · Pursue prior auth based on green criteria listed below  · Have patient trial fiber supplements and/or glycerin/bisacodyl suppositories       145/290 mcg once daily prior auth required, 72mcg NOT covered at all  o FDA indication - Irritable bowel syndrome characterized by constipation K58 1  o Age appropriate - yes   o Drug interactions addressed by provider - state  o No contraindications - no known or suspected mechanical obstruction  o Constipation  - Failure, contraindication, or intolerance to ALL   Laxatives - senna   Fiber supplements - NOT on her record as tried?  Osmotic agents - Miralax   Bulk forming agents - Metamucil   Glycerin/bisacodyl suppositories - NOT on her record as tried?   o Up to 6 months approved initially      Demographics  Interaction Method: Virtual    Topic(s) Addressed  Medication Management    Intervention(s) Made      Non-Pharmacologic:      Other    Tool(s) Used  Other    Time Spent:       Time Spent in Care Coordination: 30 minutes    Recommendation(s) Accepted by the Patient/Caregiver: Not Applicable

## 2021-05-05 NOTE — PROGRESS NOTES
Assessment/Plan:    No problem-specific Assessment & Plan notes found for this encounter  Diagnoses and all orders for this visit:    Need for tuberculosis vaccination          Subjective:      Patient ID: Roberta Nichole is a 45 y o  female  HPI        Review of Systems      Objective: There were no vitals taken for this visit           Physical Exam

## 2021-05-07 ENCOUNTER — CLINICAL SUPPORT (OUTPATIENT)
Dept: FAMILY MEDICINE CLINIC | Facility: CLINIC | Age: 41
End: 2021-05-07

## 2021-05-07 DIAGNOSIS — Z11.1 TUBERCULIN SKIN TEST READING ENCOUNTER: Primary | ICD-10-CM

## 2021-05-07 LAB — TB SKIN TEST: NEGATIVE

## 2021-05-07 PROCEDURE — RECHECK

## 2021-05-13 DIAGNOSIS — G43.709 CHRONIC MIGRAINE WITHOUT AURA WITHOUT STATUS MIGRAINOSUS, NOT INTRACTABLE: Primary | ICD-10-CM

## 2021-05-13 RX ORDER — KETOROLAC TROMETHAMINE 10 MG/1
10 TABLET, FILM COATED ORAL EVERY 6 HOURS PRN
Qty: 20 TABLET | Refills: 0 | Status: SHIPPED | OUTPATIENT
Start: 2021-05-13 | End: 2021-06-15

## 2021-05-21 ENCOUNTER — TELEMEDICINE (OUTPATIENT)
Dept: FAMILY MEDICINE CLINIC | Facility: CLINIC | Age: 41
End: 2021-05-21
Payer: COMMERCIAL

## 2021-05-21 DIAGNOSIS — J01.00 ACUTE NON-RECURRENT MAXILLARY SINUSITIS: Primary | ICD-10-CM

## 2021-05-21 DIAGNOSIS — R09.81 NASAL CONGESTION: ICD-10-CM

## 2021-05-21 DIAGNOSIS — F41.9 ANXIETY: ICD-10-CM

## 2021-05-21 DIAGNOSIS — G43.709 CHRONIC MIGRAINE WITHOUT AURA WITHOUT STATUS MIGRAINOSUS, NOT INTRACTABLE: ICD-10-CM

## 2021-05-21 PROCEDURE — 99214 OFFICE O/P EST MOD 30 MIN: CPT | Performed by: FAMILY MEDICINE

## 2021-05-21 PROCEDURE — U0003 INFECTIOUS AGENT DETECTION BY NUCLEIC ACID (DNA OR RNA); SEVERE ACUTE RESPIRATORY SYNDROME CORONAVIRUS 2 (SARS-COV-2) (CORONAVIRUS DISEASE [COVID-19]), AMPLIFIED PROBE TECHNIQUE, MAKING USE OF HIGH THROUGHPUT TECHNOLOGIES AS DESCRIBED BY CMS-2020-01-R: HCPCS | Performed by: FAMILY MEDICINE

## 2021-05-21 PROCEDURE — U0005 INFEC AGEN DETEC AMPLI PROBE: HCPCS | Performed by: FAMILY MEDICINE

## 2021-05-21 RX ORDER — FLUCONAZOLE 150 MG/1
TABLET ORAL
COMMUNITY
Start: 2021-04-01 | End: 2021-09-27

## 2021-05-21 RX ORDER — FLUTICASONE PROPIONATE 50 MCG
2 SPRAY, SUSPENSION (ML) NASAL DAILY
Qty: 16 G | Refills: 0 | Status: SHIPPED | OUTPATIENT
Start: 2021-05-21 | End: 2021-06-14

## 2021-05-21 RX ORDER — BUTALBITAL, ACETAMINOPHEN AND CAFFEINE 50; 325; 40 MG/1; MG/1; MG/1
1 TABLET ORAL EVERY 4 HOURS PRN
Qty: 20 TABLET | Refills: 0 | Status: SHIPPED | OUTPATIENT
Start: 2021-05-21 | End: 2021-09-27

## 2021-05-21 RX ORDER — TOPIRAMATE 25 MG/1
TABLET ORAL
COMMUNITY
Start: 2021-05-07 | End: 2021-09-27

## 2021-05-21 RX ORDER — METRONIDAZOLE 500 MG/1
TABLET ORAL
COMMUNITY
Start: 2021-04-01 | End: 2021-09-27

## 2021-05-21 RX ORDER — AMOXICILLIN AND CLAVULANATE POTASSIUM 875; 125 MG/1; MG/1
1 TABLET, FILM COATED ORAL EVERY 12 HOURS SCHEDULED
Qty: 14 TABLET | Refills: 0 | Status: SHIPPED | OUTPATIENT
Start: 2021-05-21 | End: 2021-05-28

## 2021-05-21 NOTE — PROGRESS NOTES
COVID-19 Outpatient Progress Note    Assessment/Plan:    Dianne Hernandez was seen today for covid-19  Diagnoses and all orders for this visit:    Acute non-recurrent maxillary sinusitis  -     fluticasone (FLONASE) 50 mcg/act nasal spray; 2 sprays into each nostril daily  -     amoxicillin-clavulanate (AUGMENTIN) 875-125 mg per tablet; Take 1 tablet by mouth every 12 (twelve) hours for 7 days    Nasal congestion  -     Novel Coronavirus (Covid-19),PCR [facility] - Collected at [facility] or [facility]; Future  -     fluticasone (FLONASE) 50 mcg/act nasal spray; 2 sprays into each nostril daily    Chronic migraine without aura without status migrainosus, not intractable  Comments:  stable on current meds  Orders:  -     butalbital-acetaminophen-caffeine (FIORICET,ESGIC) -40 mg per tablet; Take 1 tablet by mouth every 4 (four) hours as needed for headaches    Anxiety  Comments:  stable on clonazepem PRN       Disposition:     I referred patient to one of our centralized sites for a COVID-19 swab  I have spent 15 minutes directly with the patient  Encounter provider Kris Go MD    Provider located at [address] [address]    Recent Visits  No visits were found meeting these conditions  Showing recent visits within past 7 days and meeting all other requirements     Today's Visits  Date Type Provider Dept   05/21/21 Telemedicine Kris Go MD [facility] today's visits and meeting all other requirements     Future Appointments  No visits were found meeting these conditions  Showing future appointments within next 150 days and meeting all other requirements      This virtual check-in was done via [email or web address] and patient was informed that this is a secure, HIPAA-compliant platform  She agrees to proceed      Patient agrees to participate in a virtual check in via telephone or video visit instead of presenting to the office to address urgent/immediate medical needs  Patient is aware this is a billable service  After connecting through [facility], the patient was identified by name and date of birth  Miri Robledo was informed that this was a telemedicine visit and that the exam was being conducted confidentially over secure lines  My office door was closed  No one else was in the room  Miri Robledo acknowledged consent and understanding of privacy and security of the telemedicine visit  I informed the patient that I have reviewed her record in Epic and presented the opportunity for her to ask any questions regarding the visit today  The patient agreed to participate  Subjective:   Miri Robledo is a 45 y o  female who is concerned about COVID-19  Patient's symptoms include nasal congestion, rhinorrhea, sore throat and abdominal pain  Patient denies fever, chills, fatigue, malaise, anosmia, loss of taste, cough, shortness of breath, chest tightness, nausea, vomiting, diarrhea, myalgias and headaches       Exposure:   Contact with a person who is under investigation (PUI) for or who is positive for COVID-19 within the last 14 days?: No    Hospitalized recently for fever and/or lower respiratory symptoms?: No      Currently a healthcare worker that is involved in direct patient care?: No      Works in a special setting where the risk of COVID-19 transmission may be high? (this may include long-term care, correctional and FDC facilities; homeless shelters; assisted-living facilities and group homes ): No      Resident in a special setting where the risk of COVID-19 transmission may be high? (this may include long-term care, correctional and FDC facilities; homeless shelters; assisted-living facilities and group homes ): No      Lab Results   Component Value Date    SARSCOV2 Detected (A) 12/09/2020     Past Medical History:   Diagnosis Date    Allergic rhinitis     Anemia     Anxiety     Chondromalacia of both patellae  Depression     Fibromyalgia     Fibromyalgia     GERD (gastroesophageal reflux disease)     Hematochezia     Herpes simplex infection     HPV (human papilloma virus) infection     11/2013    Hypertension     IBS (irritable bowel syndrome)     Insomnia     Mental status change     Migraine     Obesity 8/6/2015    Scoliosis     Scoliosis      Past Surgical History:   Procedure Laterality Date    EXPLORATORY LAPAROTOMY      WISDOM TOOTH EXTRACTION       Current Outpatient Medications   Medication Sig Dispense Refill    albuterol (PROVENTIL HFA,VENTOLIN HFA) 90 mcg/act inhaler INHALE 2 PUFFS EVERY 6 (SIX) HOURS AS NEEDED FOR SHORTNESS OF BREATH 18 Inhaler 0    Azelastine HCl 137 MCG/SPRAY SOLN 2 SPRAYS INTO EACH NOSTRIL TWICE A DAY 1 Bottle 1    butalbital-acetaminophen-caffeine (FIORICET,ESGIC) -40 mg per tablet Take 1 tablet by mouth every 4 (four) hours as needed for headaches 20 tablet 0    clonazePAM (KlonoPIN) 0 5 mg tablet Take 1 tablet (0 5 mg total) by mouth daily 30 tablet 0    [employer] SENNA 8 6 MG tablet TAKE 1 TABLET (8 6 MG TOTAL) BY MOUTH DAILY 30 tablet 2    cyclobenzaprine (FLEXERIL) 10 mg tablet TAKE 1 TABLET (10 MG TOTAL) BY MOUTH DAILY AT BEDTIME 30 tablet 2    dicyclomine (BENTYL) 20 mg tablet Take 1 tablet (20 mg total) by mouth 3 (three) times a day as needed (abdominal cramping) 20 tablet 0    ketorolac (TORADOL) 10 mg tablet Take 1 tablet (10 mg total) by mouth every 6 (six) hours as needed for moderate pain 20 tablet 0    levonorgestrel (MIRENA) 20 MCG/24HR IUD Mirena 20 MCG/24HR Intrauterine Intrauterine Device  Refills: 0  Active      montelukast (SINGULAIR) 10 mg tablet TAKE 1 TABLET BY MOUTH EVERYDAY AT BEDTIME 90 tablet 0    naproxen (NAPROSYN) 500 mg tablet Take 1 tablet (500 mg total) by mouth 2 (two) times a day as needed for moderate pain 60 tablet 1    omeprazole (PriLOSEC) 10 mg delayed release capsule Take 10 mg by mouth daily      Pyrantel Pamoate 144 (50 Base) MG/ML SUSP Take 15 mL (750 mg) once, as a single dose  Repeat in 2 weeks   amoxicillin-clavulanate (AUGMENTIN) 875-125 mg per tablet Take 1 tablet by mouth every 12 (twelve) hours for 7 days 14 tablet 0    fluconazole (DIFLUCAN) 150 mg tablet PLEASE SEE ATTACHED FOR DETAILED DIRECTIONS      fluticasone (FLONASE) 50 mcg/act nasal spray 2 sprays into each nostril daily 16 g 0    metroNIDAZOLE (FLAGYL) 500 mg tablet TAKE 1 TABLET BY MOUTH TWICE A DAY FOR 7 DAYS      topiramate (TOPAMAX) 25 mg tablet       valACYclovir (VALTREX) 500 mg tablet Take 1 tablet (500 mg total) by mouth 2 (two) times a day for 3 days 6 tablet 4     No current facility-administered medications for this visit  Allergies   Allergen Reactions    Cymbalta [Duloxetine Hcl] Nausea Only and GI Intolerance     Stomach upset       Review of Systems   Constitutional: Negative for chills, fatigue and fever  HENT: Positive for congestion, rhinorrhea and sore throat  Respiratory: Negative for cough, chest tightness and shortness of breath  Gastrointestinal: Positive for abdominal pain  Negative for diarrhea, nausea and vomiting  Musculoskeletal: Negative for myalgias  Neurological: Negative for headaches  Objective:    Vitals:       Physical Exam  Constitutional:       Appearance: Normal appearance  HENT:      Mouth/Throat:      Pharynx: No oropharyngeal exudate or posterior oropharyngeal erythema  Pulmonary:      Effort: Pulmonary effort is normal    Neurological:      General: No focal deficit present  Mental Status: She is alert and oriented to person, place, and time  Psychiatric:         Mood and Affect: Mood normal          Behavior: Behavior normal        VIRTUAL VISIT DISCLAIMER    Kar Najera acknowledges that she has consented to an online visit or consultation   She understands that the online visit is based solely on information provided by her, and that, in the absence of a face-to-face physical evaluation by the physician, the diagnosis she receives is both limited and provisional in terms of accuracy and completeness  This is not intended to replace a full medical face-to-face evaluation by the physician  Héctor Walter understands and accepts these terms

## 2021-05-22 LAB — SARS-COV-2 RNA RESP QL NAA+PROBE: NEGATIVE

## 2021-05-25 DIAGNOSIS — J30.2 SEASONAL ALLERGIC RHINITIS, UNSPECIFIED TRIGGER: ICD-10-CM

## 2021-05-25 DIAGNOSIS — F41.9 ANXIETY: ICD-10-CM

## 2021-05-25 DIAGNOSIS — K59.04 CHRONIC IDIOPATHIC CONSTIPATION: ICD-10-CM

## 2021-05-25 DIAGNOSIS — K58.1 IRRITABLE BOWEL SYNDROME WITH CONSTIPATION: ICD-10-CM

## 2021-05-25 RX ORDER — SENNA PLUS 8.6 MG/1
1 TABLET ORAL DAILY
Qty: 30 TABLET | Refills: 0 | Status: SHIPPED | OUTPATIENT
Start: 2021-05-25 | End: 2021-06-24

## 2021-05-25 RX ORDER — CLONAZEPAM 0.5 MG/1
0.5 TABLET ORAL DAILY
Qty: 30 TABLET | Refills: 0 | Status: SHIPPED | OUTPATIENT
Start: 2021-05-25 | End: 2021-10-01

## 2021-05-25 RX ORDER — MONTELUKAST SODIUM 10 MG/1
10 TABLET ORAL
Qty: 90 TABLET | Refills: 0 | Status: SHIPPED | OUTPATIENT
Start: 2021-05-25 | End: 2021-08-16

## 2021-05-26 DIAGNOSIS — J30.2 SEASONAL ALLERGIC RHINITIS, UNSPECIFIED TRIGGER: ICD-10-CM

## 2021-05-26 RX ORDER — AZELASTINE 1 MG/ML
SPRAY, METERED NASAL
Qty: 1 ML | Refills: 1 | Status: SHIPPED | OUTPATIENT
Start: 2021-05-26 | End: 2021-09-27

## 2021-06-13 DIAGNOSIS — R09.81 NASAL CONGESTION: ICD-10-CM

## 2021-06-13 DIAGNOSIS — J01.00 ACUTE NON-RECURRENT MAXILLARY SINUSITIS: ICD-10-CM

## 2021-06-14 DIAGNOSIS — G43.709 CHRONIC MIGRAINE WITHOUT AURA WITHOUT STATUS MIGRAINOSUS, NOT INTRACTABLE: ICD-10-CM

## 2021-06-14 RX ORDER — FLUTICASONE PROPIONATE 50 MCG
SPRAY, SUSPENSION (ML) NASAL
Qty: 16 ML | Refills: 2 | Status: SHIPPED | OUTPATIENT
Start: 2021-06-14 | End: 2021-09-27

## 2021-06-15 RX ORDER — KETOROLAC TROMETHAMINE 10 MG/1
10 TABLET, FILM COATED ORAL EVERY 6 HOURS PRN
Qty: 20 TABLET | Refills: 0 | Status: SHIPPED | OUTPATIENT
Start: 2021-06-15 | End: 2021-07-13

## 2021-06-23 DIAGNOSIS — K59.04 CHRONIC IDIOPATHIC CONSTIPATION: ICD-10-CM

## 2021-06-23 DIAGNOSIS — K58.1 IRRITABLE BOWEL SYNDROME WITH CONSTIPATION: ICD-10-CM

## 2021-06-24 RX ORDER — SENNOSIDES 8.6 MG
TABLET ORAL
Qty: 30 TABLET | Refills: 0 | Status: SHIPPED | OUTPATIENT
Start: 2021-06-24 | End: 2021-07-13

## 2021-07-12 DIAGNOSIS — K58.1 IRRITABLE BOWEL SYNDROME WITH CONSTIPATION: ICD-10-CM

## 2021-07-12 DIAGNOSIS — G43.709 CHRONIC MIGRAINE WITHOUT AURA WITHOUT STATUS MIGRAINOSUS, NOT INTRACTABLE: ICD-10-CM

## 2021-07-12 DIAGNOSIS — K59.04 CHRONIC IDIOPATHIC CONSTIPATION: ICD-10-CM

## 2021-07-13 RX ORDER — KETOROLAC TROMETHAMINE 10 MG/1
10 TABLET, FILM COATED ORAL EVERY 6 HOURS PRN
Qty: 20 TABLET | Refills: 0 | Status: SHIPPED | OUTPATIENT
Start: 2021-07-13 | End: 2021-09-27

## 2021-07-13 RX ORDER — SENNOSIDES 8.6 MG
TABLET ORAL
Qty: 30 TABLET | Refills: 0 | Status: SHIPPED | OUTPATIENT
Start: 2021-07-13 | End: 2021-09-27

## 2021-08-16 DIAGNOSIS — J30.2 SEASONAL ALLERGIC RHINITIS, UNSPECIFIED TRIGGER: ICD-10-CM

## 2021-08-16 RX ORDER — MONTELUKAST SODIUM 10 MG/1
TABLET ORAL
Qty: 90 TABLET | Refills: 0 | Status: SHIPPED | OUTPATIENT
Start: 2021-08-16 | End: 2021-09-27

## 2021-08-23 ENCOUNTER — TELEPHONE (OUTPATIENT)
Dept: FAMILY MEDICINE CLINIC | Facility: CLINIC | Age: 41
End: 2021-08-23

## 2021-08-23 NOTE — TELEPHONE ENCOUNTER
Pt called stating she has poison on her face  Tried calamine lotion   Requesting oral steroid for relief    Please advise    Pharmacy    [facility]/pharmacy #[id number] [facility] [id number] [place] [address], [address] [place] [address]   Phone:  [phone]  Fax:  [phone]   CEDRIC #:  [id number]

## 2021-09-27 ENCOUNTER — HOSPITAL ENCOUNTER (EMERGENCY)
Facility: HOSPITAL | Age: 41
Discharge: HOME | End: 2021-09-27
Attending: EMERGENCY MEDICINE
Payer: COMMERCIAL

## 2021-09-27 VITALS
BODY MASS INDEX: 31.55 KG/M2 | OXYGEN SATURATION: 99 % | RESPIRATION RATE: 18 BRPM | TEMPERATURE: 98 F | HEART RATE: 68 BPM | WEIGHT: 178.13 LBS | SYSTOLIC BLOOD PRESSURE: 118 MMHG | DIASTOLIC BLOOD PRESSURE: 67 MMHG

## 2021-09-27 DIAGNOSIS — E87.6 HYPOKALEMIA: ICD-10-CM

## 2021-09-27 DIAGNOSIS — R55 NEAR SYNCOPE: Primary | ICD-10-CM

## 2021-09-27 LAB
ALBUMIN SERPL BCP-MCNC: 3.6 G/DL (ref 3.5–5)
ALP SERPL-CCNC: 60 U/L (ref 46–116)
ALT SERPL W P-5'-P-CCNC: 27 U/L (ref 12–78)
ANION GAP SERPL CALCULATED.3IONS-SCNC: 8 MMOL/L (ref 4–13)
AST SERPL W P-5'-P-CCNC: 16 U/L (ref 5–45)
ATRIAL RATE: 59 BPM
BACTERIA UR QL AUTO: ABNORMAL /HPF
BASOPHILS # BLD AUTO: 0.04 THOUSANDS/ΜL (ref 0–0.1)
BASOPHILS NFR BLD AUTO: 1 % (ref 0–1)
BILIRUB SERPL-MCNC: 0.39 MG/DL (ref 0.2–1)
BILIRUB UR QL STRIP: NEGATIVE
BUN SERPL-MCNC: 8 MG/DL (ref 5–25)
CALCIUM SERPL-MCNC: 7.8 MG/DL (ref 8.3–10.1)
CHLORIDE SERPL-SCNC: 103 MMOL/L (ref 100–108)
CLARITY UR: ABNORMAL
CO2 SERPL-SCNC: 29 MMOL/L (ref 21–32)
COLOR UR: YELLOW
CREAT SERPL-MCNC: 0.9 MG/DL (ref 0.6–1.3)
EOSINOPHIL # BLD AUTO: 0.17 THOUSAND/ΜL (ref 0–0.61)
EOSINOPHIL NFR BLD AUTO: 3 % (ref 0–6)
ERYTHROCYTE [DISTWIDTH] IN BLOOD BY AUTOMATED COUNT: 12 % (ref 11.6–15.1)
EXT PREG TEST URINE: NEGATIVE
EXT. CONTROL ED NAV: NORMAL
GFR SERPL CREATININE-BSD FRML MDRD: 80 ML/MIN/1.73SQ M
GLUCOSE SERPL-MCNC: 148 MG/DL (ref 65–140)
GLUCOSE UR STRIP-MCNC: NEGATIVE MG/DL
HCT VFR BLD AUTO: 41 % (ref 34.8–46.1)
HGB BLD-MCNC: 13.5 G/DL (ref 11.5–15.4)
HGB UR QL STRIP.AUTO: NEGATIVE
IMM GRANULOCYTES # BLD AUTO: 0.02 THOUSAND/UL (ref 0–0.2)
IMM GRANULOCYTES NFR BLD AUTO: 0 % (ref 0–2)
KETONES UR STRIP-MCNC: NEGATIVE MG/DL
LEUKOCYTE ESTERASE UR QL STRIP: ABNORMAL
LYMPHOCYTES # BLD AUTO: 1.53 THOUSANDS/ΜL (ref 0.6–4.47)
LYMPHOCYTES NFR BLD AUTO: 30 % (ref 14–44)
MCH RBC QN AUTO: 31.6 PG (ref 26.8–34.3)
MCHC RBC AUTO-ENTMCNC: 32.9 G/DL (ref 31.4–37.4)
MCV RBC AUTO: 96 FL (ref 82–98)
MONOCYTES # BLD AUTO: 0.22 THOUSAND/ΜL (ref 0.17–1.22)
MONOCYTES NFR BLD AUTO: 4 % (ref 4–12)
NEUTROPHILS # BLD AUTO: 3.2 THOUSANDS/ΜL (ref 1.85–7.62)
NEUTS SEG NFR BLD AUTO: 62 % (ref 43–75)
NITRITE UR QL STRIP: NEGATIVE
NON-SQ EPI CELLS URNS QL MICRO: ABNORMAL /HPF
NRBC BLD AUTO-RTO: 0 /100 WBCS
P AXIS: 77 DEGREES
PH UR STRIP.AUTO: 8.5 [PH] (ref 4.5–8)
PLATELET # BLD AUTO: 169 THOUSANDS/UL (ref 149–390)
PMV BLD AUTO: 10.6 FL (ref 8.9–12.7)
POTASSIUM SERPL-SCNC: 3.3 MMOL/L (ref 3.5–5.3)
PR INTERVAL: 194 MS
PROT SERPL-MCNC: 7.1 G/DL (ref 6.4–8.2)
PROT UR STRIP-MCNC: NEGATIVE MG/DL
QRS AXIS: 80 DEGREES
QRSD INTERVAL: 102 MS
QT INTERVAL: 418 MS
QTC INTERVAL: 413 MS
RBC # BLD AUTO: 4.27 MILLION/UL (ref 3.81–5.12)
RBC #/AREA URNS AUTO: ABNORMAL /HPF
SODIUM SERPL-SCNC: 140 MMOL/L (ref 136–145)
SP GR UR STRIP.AUTO: 1.02 (ref 1–1.03)
T WAVE AXIS: 69 DEGREES
UROBILINOGEN UR QL STRIP.AUTO: 0.2 E.U./DL
VENTRICULAR RATE: 59 BPM
WBC # BLD AUTO: 5.18 THOUSAND/UL (ref 4.31–10.16)
WBC #/AREA URNS AUTO: ABNORMAL /HPF

## 2021-09-27 PROCEDURE — 99285 EMERGENCY DEPT VISIT HI MDM: CPT | Performed by: EMERGENCY MEDICINE

## 2021-09-27 PROCEDURE — 36415 COLL VENOUS BLD VENIPUNCTURE: CPT | Performed by: EMERGENCY MEDICINE

## 2021-09-27 PROCEDURE — 80053 COMPREHEN METABOLIC PANEL: CPT | Performed by: EMERGENCY MEDICINE

## 2021-09-27 PROCEDURE — 81025 URINE PREGNANCY TEST: CPT | Performed by: EMERGENCY MEDICINE

## 2021-09-27 PROCEDURE — 93010 ELECTROCARDIOGRAM REPORT: CPT | Performed by: INTERNAL MEDICINE

## 2021-09-27 PROCEDURE — 96360 HYDRATION IV INFUSION INIT: CPT

## 2021-09-27 PROCEDURE — 93005 ELECTROCARDIOGRAM TRACING: CPT

## 2021-09-27 PROCEDURE — 99285 EMERGENCY DEPT VISIT HI MDM: CPT

## 2021-09-27 PROCEDURE — 85025 COMPLETE CBC W/AUTO DIFF WBC: CPT | Performed by: EMERGENCY MEDICINE

## 2021-09-27 PROCEDURE — 81001 URINALYSIS AUTO W/SCOPE: CPT

## 2021-09-27 RX ORDER — POTASSIUM CHLORIDE 20 MEQ/1
20 TABLET, EXTENDED RELEASE ORAL ONCE
Status: COMPLETED | OUTPATIENT
Start: 2021-09-27 | End: 2021-09-27

## 2021-09-27 RX ORDER — ACETAMINOPHEN 325 MG/1
650 TABLET ORAL ONCE
Status: COMPLETED | OUTPATIENT
Start: 2021-09-27 | End: 2021-09-27

## 2021-09-27 RX ADMIN — SODIUM CHLORIDE 1000 ML: 0.9 INJECTION, SOLUTION INTRAVENOUS at 15:02

## 2021-09-27 RX ADMIN — ACETAMINOPHEN 650 MG: 325 TABLET, FILM COATED ORAL at 15:01

## 2021-09-27 RX ADMIN — POTASSIUM CHLORIDE 20 MEQ: 1500 TABLET, EXTENDED RELEASE ORAL at 15:55

## 2021-10-01 DIAGNOSIS — F41.9 ANXIETY: ICD-10-CM

## 2021-10-01 RX ORDER — CLONAZEPAM 0.5 MG/1
0.5 TABLET ORAL DAILY
Qty: 30 TABLET | Refills: 0 | Status: SHIPPED | OUTPATIENT
Start: 2021-10-01 | End: 2021-11-15

## 2021-11-13 DIAGNOSIS — J30.2 SEASONAL ALLERGIC RHINITIS, UNSPECIFIED TRIGGER: Primary | ICD-10-CM

## 2021-11-13 DIAGNOSIS — F41.9 ANXIETY: ICD-10-CM

## 2021-11-15 RX ORDER — CLONAZEPAM 0.5 MG/1
0.5 TABLET ORAL DAILY
Qty: 30 TABLET | Refills: 0 | Status: SHIPPED | OUTPATIENT
Start: 2021-11-15 | End: 2022-01-04

## 2021-11-15 RX ORDER — ALBUTEROL SULFATE 90 UG/1
AEROSOL, METERED RESPIRATORY (INHALATION)
Qty: 8 G | Refills: 0 | Status: SHIPPED | OUTPATIENT
Start: 2021-11-15

## 2021-11-30 ENCOUNTER — TELEMEDICINE (OUTPATIENT)
Dept: FAMILY MEDICINE CLINIC | Facility: CLINIC | Age: 41
End: 2021-11-30
Payer: COMMERCIAL

## 2021-11-30 ENCOUNTER — TELEPHONE (OUTPATIENT)
Dept: OTHER | Facility: OTHER | Age: 41
End: 2021-11-30

## 2021-11-30 DIAGNOSIS — J01.90 SUBACUTE SINUSITIS, UNSPECIFIED LOCATION: ICD-10-CM

## 2021-11-30 DIAGNOSIS — G43.709 CHRONIC MIGRAINE WITHOUT AURA WITHOUT STATUS MIGRAINOSUS, NOT INTRACTABLE: Primary | ICD-10-CM

## 2021-11-30 PROCEDURE — 99214 OFFICE O/P EST MOD 30 MIN: CPT | Performed by: FAMILY MEDICINE

## 2021-11-30 RX ORDER — SUMATRIPTAN 25 MG/1
25 TABLET, FILM COATED ORAL ONCE AS NEEDED
Qty: 3 TABLET | Refills: 0 | Status: SHIPPED | OUTPATIENT
Start: 2021-11-30 | End: 2021-12-23 | Stop reason: SDUPTHER

## 2021-11-30 RX ORDER — AMOXICILLIN AND CLAVULANATE POTASSIUM 875; 125 MG/1; MG/1
1 TABLET, FILM COATED ORAL EVERY 12 HOURS SCHEDULED
Qty: 14 TABLET | Refills: 0 | Status: SHIPPED | OUTPATIENT
Start: 2021-11-30 | End: 2021-12-07

## 2021-12-23 DIAGNOSIS — G43.709 CHRONIC MIGRAINE WITHOUT AURA WITHOUT STATUS MIGRAINOSUS, NOT INTRACTABLE: ICD-10-CM

## 2021-12-23 RX ORDER — SUMATRIPTAN 25 MG/1
25 TABLET, FILM COATED ORAL ONCE AS NEEDED
Qty: 3 TABLET | Refills: 0 | Status: SHIPPED | OUTPATIENT
Start: 2021-12-23 | End: 2022-02-17

## 2022-01-04 DIAGNOSIS — F41.9 ANXIETY: ICD-10-CM

## 2022-01-04 RX ORDER — CLONAZEPAM 0.5 MG/1
TABLET ORAL
Qty: 30 TABLET | Refills: 0 | Status: SHIPPED | OUTPATIENT
Start: 2022-01-04 | End: 2022-02-22

## 2022-02-10 ENCOUNTER — TELEMEDICINE (OUTPATIENT)
Dept: FAMILY MEDICINE CLINIC | Facility: CLINIC | Age: 42
End: 2022-02-10
Payer: COMMERCIAL

## 2022-02-10 VITALS — WEIGHT: 178.13 LBS | HEIGHT: 63 IN | BODY MASS INDEX: 31.56 KG/M2

## 2022-02-10 DIAGNOSIS — B34.9 VIRAL INFECTION, UNSPECIFIED: Primary | ICD-10-CM

## 2022-02-10 DIAGNOSIS — N80.9 ENDOMETRIOSIS: ICD-10-CM

## 2022-02-10 DIAGNOSIS — Z12.31 VISIT FOR SCREENING MAMMOGRAM: ICD-10-CM

## 2022-02-10 DIAGNOSIS — K58.1 IRRITABLE BOWEL SYNDROME WITH CONSTIPATION: ICD-10-CM

## 2022-02-10 PROBLEM — S82.832A CLOSED FRACTURE OF LEFT DISTAL FIBULA: Status: RESOLVED | Noted: 2020-08-27 | Resolved: 2022-02-10

## 2022-02-10 PROCEDURE — 99214 OFFICE O/P EST MOD 30 MIN: CPT | Performed by: FAMILY MEDICINE

## 2022-02-10 PROCEDURE — U0003 INFECTIOUS AGENT DETECTION BY NUCLEIC ACID (DNA OR RNA); SEVERE ACUTE RESPIRATORY SYNDROME CORONAVIRUS 2 (SARS-COV-2) (CORONAVIRUS DISEASE [COVID-19]), AMPLIFIED PROBE TECHNIQUE, MAKING USE OF HIGH THROUGHPUT TECHNOLOGIES AS DESCRIBED BY CMS-2020-01-R: HCPCS | Performed by: FAMILY MEDICINE

## 2022-02-10 PROCEDURE — U0005 INFEC AGEN DETEC AMPLI PROBE: HCPCS | Performed by: FAMILY MEDICINE

## 2022-02-10 RX ORDER — OXYCODONE HYDROCHLORIDE AND ACETAMINOPHEN 5; 325 MG/1; MG/1
1 TABLET ORAL DAILY PRN
Qty: 10 TABLET | Refills: 0 | Status: SHIPPED | OUTPATIENT
Start: 2022-02-10 | End: 2022-08-03 | Stop reason: ALTCHOICE

## 2022-02-10 RX ORDER — DICYCLOMINE HYDROCHLORIDE 10 MG/1
10 CAPSULE ORAL
Qty: 30 CAPSULE | Refills: 0 | Status: SHIPPED | OUTPATIENT
Start: 2022-02-10 | End: 2022-02-27

## 2022-02-10 NOTE — PROGRESS NOTES
COVID-19 Outpatient Progress Note    Assessment/Plan:    Problem List Items Addressed This Visit        Digestive    Irritable bowel syndrome     Flare up today   Called in bentyl PRN for her         Relevant Medications    dicyclomine (BENTYL) 10 mg capsule       Other    Endometriosis     Flare up due to recent illness  Percocet PRN   To f/u with her GYN         Relevant Medications    oxyCODONE-acetaminophen (Percocet) 5-325 mg per tablet      Other Visit Diagnoses     Viral infection, unspecified    -  Primary    Relevant Orders    COVID Only- Collected at [place] [address] or [facility]    Visit for screening mammogram        Relevant Orders    Mammo screening bilateral w 3d & cad         Disposition:     Referred patient to centralized site to test for COVID-19  I have spent 20 minutes directly with the patient  Encounter provider Cristiano Montano MD    Provider located at [address] [address]    Recent Visits  No visits were found meeting these conditions  Showing recent visits within past 7 days and meeting all other requirements  Today's Visits  Date Type Provider Dept   02/10/22 Telemedicine Cristiano Montano MD [facility] today's visits and meeting all other requirements  Future Appointments  No visits were found meeting these conditions  Showing future appointments within next 150 days and meeting all other requirements     This virtual check-in was done via [facility] and patient was informed that this is a secure, HIPAA-compliant platform  She agrees to proceed  Patient agrees to participate in a virtual check in via telephone or video visit instead of presenting to the office to address urgent/immediate medical needs  Patient is aware this is a billable service  After connecting through [facility], the patient was identified by name and date of birth   Héctor Walter was informed that this was a telemedicine visit and that the exam was being conducted confidentially over secure lines  My office door was closed  No one else was in the room  Imelda Faustin acknowledged consent and understanding of privacy and security of the telemedicine visit  I informed the patient that I have reviewed her record in Epic and presented the opportunity for her to ask any questions regarding the visit today  The patient agreed to participate  Verification of patient location:  Patient is located in the following state in which I hold an active license: [place]    Subjective:   Imelda Faustin is a 37 y o  female who is concerned about COVID-19  Patient's symptoms include nasal congestion, rhinorrhea, abdominal pain, nausea, vomiting, diarrhea and myalgias  Patient denies fever, chills, fatigue, malaise, sore throat, anosmia, loss of taste, cough, shortness of breath, chest tightness and headaches       - Date of symptom onset: 2/9/2022      COVID-19 vaccination status: Not vaccinated    Exposure:   Contact with a person who is under investigation (PUI) for or who is positive for COVID-19 within the last 14 days?: No    Hospitalized recently for fever and/or lower respiratory symptoms?: No      Currently a healthcare worker that is involved in direct patient care?: No      Works in a special setting where the risk of COVID-19 transmission may be high? (this may include long-term care, correctional and jail facilities; homeless shelters; assisted-living facilities and group homes ): No      Resident in a special setting where the risk of COVID-19 transmission may be high? (this may include long-term care, correctional and jail facilities; homeless shelters; assisted-living facilities and group homes ): No     started with periods last night and pain in lower abdomen started around the same time   Took ibuprofen and tylenol which are not helping      Lab Results   Component Value Date    SARSCOV2 Negative 05/21/2021    SARSCOV2 Detected (A) 12/09/2020    [facility] Not Detected 11/29/2021     Past Medical History:   Diagnosis Date    Allergic rhinitis     Anemia     Anxiety     Chondromalacia of both patellae     Closed fracture of left distal fibula 8/27/2020    Depression     Fibromyalgia     Fibromyalgia     GERD (gastroesophageal reflux disease)     Hematochezia     Herpes simplex infection     HPV (human papilloma virus) infection     11/2013    Hypertension     IBS (irritable bowel syndrome)     Insomnia     Mental status change     Migraine     Obesity 8/6/2015    Scoliosis     Scoliosis      Past Surgical History:   Procedure Laterality Date    EXPLORATORY LAPAROTOMY      WISDOM TOOTH EXTRACTION       Current Outpatient Medications   Medication Sig Dispense Refill    albuterol (PROVENTIL HFA,VENTOLIN HFA) 90 mcg/act inhaler INHALE 2 PUFFS EVERY 6 (SIX) HOURS AS NEEDED FOR SHORTNESS OF BREATH 8 g 0    clonazePAM (KlonoPIN) 0 5 mg tablet TAKE 1 TABLET BY MOUTH EVERY DAY 30 tablet 0    levonorgestrel (MIRENA) 20 MCG/24HR IUD Mirena 20 MCG/24HR Intrauterine Intrauterine Device  Refills: 0  Active      dicyclomine (BENTYL) 10 mg capsule Take 1 capsule (10 mg total) by mouth 4 (four) times a day (before meals and at bedtime) 30 capsule 0    oxyCODONE-acetaminophen (Percocet) 5-325 mg per tablet Take 1 tablet by mouth daily as needed for severe pain Max Daily Amount: 1 tablet 10 tablet 0    SUMAtriptan (IMITREX) 25 mg tablet TAKE 1 TABLET (25 MG TOTAL) BY MOUTH ONCE AS NEEDED FOR MIGRAINE FOR UP TO 3 DOSES MAY REPEAT AFTER 2 HOURS AS NEEDED 3 tablet 0     No current facility-administered medications for this visit  Allergies   Allergen Reactions    Cymbalta [Duloxetine Hcl] Nausea Only and GI Intolerance     Stomach upset       Review of Systems   Constitutional: Negative for chills, fatigue and fever  HENT: Positive for congestion and rhinorrhea  Negative for sore throat      Respiratory: Negative for cough, chest tightness and shortness of breath  Gastrointestinal: Positive for abdominal pain, diarrhea, nausea and vomiting  Musculoskeletal: Positive for myalgias  Neurological: Negative for headaches  Objective:    Vitals:    02/10/22 1448   Weight: 80 8 kg (178 lb 2 1 oz)   Height: 5' 3" (1 6 m)       Physical Exam  Constitutional:       Appearance: Normal appearance  Pulmonary:      Effort: Pulmonary effort is normal  No respiratory distress  Neurological:      Mental Status: She is alert  BMI Counseling: Body mass index is 31 55 kg/m²  The BMI is above normal  Nutrition recommendations include decreasing portion sizes and encouraging healthy choices of fruits and vegetables  Exercise recommendations include moderate physical activity 150 minutes/week  No pharmacotherapy was ordered  Rationale for BMI follow-up plan is due to patient being overweight or obese  Depression Screening and Follow-up Plan: Patient was screened for depression during today's encounter  They screened negative with a PHQ-2 score of 2  VIRTUAL VISIT DISCLAIMER    Roberta Nichole verbally agrees to participate in [employer]  Pt is aware that [employer] could be limited without vital signs or the ability to perform a full hands-on physical Clydene Signs understands she or the provider may request at any time to terminate the video visit and request the patient to seek care or treatment in person

## 2022-02-10 NOTE — LETTER
February 10, 2022     Patient: Rickie Johnson   YOB: 1980   Date of Visit: 2/10/2022       To Whom it May Concern:    Rickie Johnson is under my professional care  She was seen in my office on 2/10/2022  Please excuse Ramirez Farooq from work 2/10/2022-2/11/2022  She may return to work on 2/14/2022  If you have any questions or concerns, please don't hesitate to call           Sincerely,          Martin Barron MD

## 2022-02-16 ENCOUNTER — OFFICE VISIT (OUTPATIENT)
Dept: FAMILY MEDICINE CLINIC | Facility: CLINIC | Age: 42
End: 2022-02-16
Payer: COMMERCIAL

## 2022-02-16 ENCOUNTER — TELEPHONE (OUTPATIENT)
Dept: UROLOGY | Facility: MEDICAL CENTER | Age: 42
End: 2022-02-16

## 2022-02-16 ENCOUNTER — CONSULT (OUTPATIENT)
Dept: UROLOGY | Facility: AMBULATORY SURGERY CENTER | Age: 42
End: 2022-02-16
Payer: COMMERCIAL

## 2022-02-16 VITALS
WEIGHT: 182.6 LBS | HEART RATE: 82 BPM | OXYGEN SATURATION: 94 % | SYSTOLIC BLOOD PRESSURE: 122 MMHG | BODY MASS INDEX: 32.36 KG/M2 | HEIGHT: 63 IN | TEMPERATURE: 98.6 F | DIASTOLIC BLOOD PRESSURE: 80 MMHG | RESPIRATION RATE: 16 BRPM

## 2022-02-16 VITALS
DIASTOLIC BLOOD PRESSURE: 78 MMHG | WEIGHT: 181 LBS | HEIGHT: 63 IN | HEART RATE: 81 BPM | SYSTOLIC BLOOD PRESSURE: 128 MMHG | BODY MASS INDEX: 32.07 KG/M2

## 2022-02-16 DIAGNOSIS — K21.9 GASTROESOPHAGEAL REFLUX DISEASE WITHOUT ESOPHAGITIS: ICD-10-CM

## 2022-02-16 DIAGNOSIS — M79.7 FIBROMYALGIA: ICD-10-CM

## 2022-02-16 DIAGNOSIS — N28.89 RENAL MASS, LEFT: Primary | ICD-10-CM

## 2022-02-16 DIAGNOSIS — F41.9 ANXIETY: ICD-10-CM

## 2022-02-16 DIAGNOSIS — N28.89 LEFT RENAL MASS: Primary | ICD-10-CM

## 2022-02-16 PROCEDURE — 99204 OFFICE O/P NEW MOD 45 MIN: CPT | Performed by: UROLOGY

## 2022-02-16 PROCEDURE — 99214 OFFICE O/P EST MOD 30 MIN: CPT | Performed by: FAMILY MEDICINE

## 2022-02-16 RX ORDER — ERGOCALCIFEROL 1.25 MG/1
50000 CAPSULE ORAL WEEKLY
COMMUNITY
Start: 2022-02-07

## 2022-02-16 RX ORDER — PANTOPRAZOLE SODIUM 40 MG/1
40 TABLET, DELAYED RELEASE ORAL
Qty: 30 TABLET | Refills: 5 | Status: SHIPPED | OUTPATIENT
Start: 2022-02-16 | End: 2022-04-01 | Stop reason: SDUPTHER

## 2022-02-16 NOTE — ASSESSMENT & PLAN NOTE
Patient has a 7-8 mm enhancing left renal lesion  Typically this would be very concerning for a small renal malignancy  However as she has had this for over 10 years without any significant change in size it could still represent malignancy but the stability over many years suggests a benign process  We discussed options of continued observation versus biopsy versus percutaneous ablation versus surgery (partial nephrectomy)  Given her young age and its stability I would recommend observation or perhaps biopsy with simultaneous ablation  She would like to observe  Plan to reimage in 1 year (would normally get 6 month imaging but again given its longstanding stability think 1 year is reasonable)      I also asked her bring in a CD of her [facility] CT scans with can upload to our system for evaluation and future comparison

## 2022-02-16 NOTE — ASSESSMENT & PLAN NOTE
An 8 mm enhancing left renal lesion suspicious for a small renal cell   Carcinoma on CT scan done on 2/15/2022 at [facility]  Denies hematuria  Referral to urology   Spoke to surgical oncologist with [facility] and he recommended to refer to urology and no need for biopsy at this time due to the small size that urology can remove it surgically

## 2022-02-16 NOTE — TELEPHONE ENCOUNTER
Please Triage -   New Patient- Michel    What is the reason for the patients appointment? DR Caal(PCP) office called stating patient was in ER yesterday with abdominal pain and she  had ct scan of abdomen and it showed  An 8 mm enhancing left renal lesion suspicious for a small renal cell   carcinoma  2   No acute findings in the abdomen or pelvis  Patient is to be seen as soon as possible  If appointment available for this week  Patient can be contacted directly  Patient does speak english  Imaging/Lab Results:      Do we accept the patient's insurance or is the patient Self-Pay? Provider & Plan: Lakeisha Simms   Member ID#: Has the patient had any previous urologist(s)?no        Have patient records been requested?in epic        Has the patient had any outside testing done?   In epic     Does the patient have a personal history of cancer?no       Patient can be reached at :

## 2022-02-16 NOTE — TELEPHONE ENCOUNTER
Called megha and notified her that we had an opening today at [facility] - she said she will make that appointment    Also notified Dr Alonso Javier and his MA

## 2022-02-16 NOTE — PROGRESS NOTES
Assessment/Plan:    Left renal mass  Patient has a 7-8 mm enhancing left renal lesion  Typically this would be very concerning for a small renal malignancy  However as she has had this for over 10 years without any significant change in size it could still represent malignancy but the stability over many years suggests a benign process  We discussed options of continued observation versus biopsy versus percutaneous ablation versus surgery (partial nephrectomy)  Given her young age and its stability I would recommend observation or perhaps biopsy with simultaneous ablation  She would like to observe  Plan to reimage in 1 year (would normally get 6 month imaging but again given its longstanding stability think 1 year is reasonable)  I also asked her bring in a CD of her [facility] CT scans with can upload to our system for evaluation and future comparison          Subjective:      Patient ID: Virgie Capone is a 37 y o  female  HPI    45-year-old female coming in for CT finding of <1cm indeterminate lesion left lower pole  Patient was in emergency room at [facility] with abdominal pain and CT scan was obtained showing an 8 mm left lower pole renal lesion  Review of our images at [facility] shows a persistent 7 mm lesion for the last 12 years    The last CT scan was obtained in May 2021 for chest pain and showed a 7 mm left lower pole lesion  She had a prior CT scan in 2014 and in 2010 (per report) that also showed indeterminate 7 mm left lower pole stable lesion      Past Surgical History:   Procedure Laterality Date    EXPLORATORY LAPAROTOMY      WISDOM TOOTH EXTRACTION          Past Medical History:   Diagnosis Date    Allergic rhinitis     Anemia     Anxiety     Chondromalacia of both patellae     Closed fracture of left distal fibula 8/27/2020    Depression     Fibromyalgia     Fibromyalgia     GERD (gastroesophageal reflux disease)     Hematochezia     Herpes simplex infection     HPV (human papilloma virus) infection     11/2013    Hypertension     IBS (irritable bowel syndrome)     Insomnia     Mental status change     Migraine     Obesity 8/6/2015    Scoliosis     Scoliosis              Review of Systems   Constitutional: Negative for chills and fever  HENT: Negative for ear pain and sore throat  Eyes: Negative for pain and visual disturbance  Respiratory: Negative for cough and shortness of breath  Cardiovascular: Negative for chest pain and palpitations  Gastrointestinal: Positive for abdominal pain  Negative for vomiting  Genitourinary: Negative for dysuria and hematuria  Musculoskeletal: Negative for arthralgias and back pain  Skin: Negative for color change and rash  Neurological: Negative for seizures and syncope  All other systems reviewed and are negative  Objective:      /78 (BP Location: Left arm, Patient Position: Sitting, Cuff Size: Adult)   Pulse 81   Ht 5' 3" (1 6 m)   Wt 82 1 kg (181 lb)   BMI 32 06 kg/m²          Physical Exam  Vitals reviewed  Constitutional:       General: She is not in acute distress  Appearance: Normal appearance  She is not ill-appearing, toxic-appearing or diaphoretic  HENT:      Head: Normocephalic and atraumatic  Eyes:      Extraocular Movements: Extraocular movements intact  Pupils: Pupils are equal, round, and reactive to light  Pulmonary:      Effort: Pulmonary effort is normal    Abdominal:      General: Abdomen is flat  There is no distension  Palpations: Abdomen is soft  There is no mass  Tenderness: There is no abdominal tenderness  There is no right CVA tenderness, left CVA tenderness, guarding or rebound  Hernia: No hernia is present  Comments: Mild ttp of abdomen appears chronic (pt says she has IBS and fibromyalgia and endometriosis)   Musculoskeletal:      Right lower leg: No edema  Left lower leg: No edema     Skin: General: Skin is warm  Neurological:      General: No focal deficit present  Mental Status: She is alert and oriented to person, place, and time  Mental status is at baseline  Psychiatric:         Mood and Affect: Mood normal          Behavior: Behavior normal          Thought Content: Thought content normal            CTA - CHEST, ABDOMEN AND PELVIS - WITHOUT AND WITH IV CONTRAST     INDICATION:   Chest pain or back pain, aortic dissection suspected  CP, left arm numbness      COMPARISON:  CT abdomen/pelvis dated September 12, 2014       KIDNEYS/URETERS:  No hydronephrosis  A previously described exophytic indeterminate lesion at the left renal lower pole is stable measuring approximately 7 mm       IMPRESSION:     No aortic aneurysm or dissection  No intramural hematoma      No infiltrate or pleural effusion      No acute intra-abdominal abnormality      Stable 7 mm indeterminate lesion at the left renal lower pole           Workstation performed: [id number]      CT 2010:  KIDNEYS/URETERS-  Indeterminate 7 mm exophytic lesion from the lower   pole the left kidney identified  This is indeterminate but stable from   the prior study  No nephrolithiasis or hydronephrosis  STRUCTURES-  No acute fracture or destructive osseous lesion  IMPRESSION-     No acute intra-abdominal abnormality  No free air, free fluid,   mesenteric inflammatory process, or bowel thickening  7 mm indeterminate lesion lower pole left kidney stable dating back to   November 2010  Orders  Orders Placed This Encounter   Procedures    CT abdomen pelvis w wo contrast     Standing Status:   Future     Standing Expiration Date:   2/16/2026     Order Specific Question:   What is the patient's sedation requirement? Answer:   No Sedation     Order Specific Question:   Contrast information:     Answer:   IV     Order Specific Question:   Did the patient ever have a reaction to x-ray dye?  If yes, please verify the type of allergy and order the contrast allergy prep  Answer:   No     Order Specific Question:   Release to patient through Identify     Answer:   Immediate     Order Specific Question:   Reason for Exam (FREE TEXT)     Answer:   pt with known 7mm left lower pole renal lesion apparently stable over 10 years  please reassess     Order Specific Question:   Is the patient pregnant? Answer:    No

## 2022-02-16 NOTE — PROGRESS NOTES
Assessment/Plan:    Endometriosis  Continued to have pain   To f/u with her GYN    Acid reflux disease  Having symptoms currently due to recent stressors  Started protonix today  Avoid triggers    Anxiety  Worsening due to her recent health issues  Restarted her on zoloft today as it worked for her prior    Fibromyalgia  Stable   Continue to monitor    Renal mass, left  An 8 mm enhancing left renal lesion suspicious for a small renal cell   Carcinoma on CT scan done on 2/15/2022 at [facility]  Denies hematuria  Referral to urology   Spoke to surgical oncologist with [facility] and he recommended to refer to urology and no need for biopsy at this time due to the small size that urology can remove it surgically        Diagnoses and all orders for this visit:    Renal mass, left  -     Ambulatory referral to Urology    Anxiety  -     sertraline (Zoloft) 50 mg tablet; Take 1 tablet (50 mg total) by mouth daily    Gastroesophageal reflux disease without esophagitis  -     pantoprazole (PROTONIX) 40 mg tablet; Take 1 tablet (40 mg total) by mouth daily before breakfast    Fibromyalgia    Other orders  -     ergocalciferol (VITAMIN D2) 50,000 units; Take 50,000 Units by mouth once a week          Subjective:      Patient ID: Adelina Finnegan is a 37 y o  female  HPI     Here for ER follow up  Went to ER for pelvic pain yesterday   Had ultrasound and CT abdomen and pelvic  Ct scan abdomen and pelvis showed 1   An 8 mm enhancing left renal lesion suspicious for a small renal cell   carcinoma  2   No acute findings in the abdomen or pelvis  No other acute findings  Continue to have pelvic pain   Percocet is taking the edge off       The following portions of the patient's history were reviewed and updated as appropriate: allergies, current medications, past family history, past medical history, past social history, past surgical history and problem list     Review of Systems   Constitutional: Positive for fatigue  Gastrointestinal: Positive for abdominal pain  Psychiatric/Behavioral: Positive for decreased concentration  Negative for self-injury  The patient is nervous/anxious  Objective:      /80 (BP Location: Left arm, Patient Position: Sitting, Cuff Size: Adult)   Pulse 82   Temp 98 6 °F (37 °C) (Tympanic)   Resp 16   Ht 5' 3" (1 6 m)   Wt 82 8 kg (182 lb 9 6 oz)   SpO2 94%   BMI 32 35 kg/m²          Physical Exam  Constitutional:       Appearance: Normal appearance  Cardiovascular:      Rate and Rhythm: Normal rate and regular rhythm  Pulses: Normal pulses  Heart sounds: Normal heart sounds  Pulmonary:      Effort: Pulmonary effort is normal       Breath sounds: Normal breath sounds  Abdominal:      Tenderness: There is abdominal tenderness  There is no guarding or rebound  Comments: Diffuse mainly on epigastric and pelvic region   Neurological:      General: No focal deficit present  Mental Status: She is alert and oriented to person, place, and time     Psychiatric:         Mood and Affect: Mood normal          Behavior: Behavior normal

## 2022-02-17 ENCOUNTER — TELEPHONE (OUTPATIENT)
Dept: OTHER | Facility: OTHER | Age: 42
End: 2022-02-17

## 2022-02-17 DIAGNOSIS — G43.709 CHRONIC MIGRAINE WITHOUT AURA WITHOUT STATUS MIGRAINOSUS, NOT INTRACTABLE: ICD-10-CM

## 2022-02-17 RX ORDER — SUMATRIPTAN 25 MG/1
25 TABLET, FILM COATED ORAL ONCE AS NEEDED
Qty: 3 TABLET | Refills: 0 | Status: SHIPPED | OUTPATIENT
Start: 2022-02-17 | End: 2022-03-15

## 2022-02-17 NOTE — TELEPHONE ENCOUNTER
Patient referred to Hem Onc post [facility] ED visit on 2/15/2022  Jazmine Pennington called to have diagnostic testing ordered for patient for Left renal lesion found before seeing "new patient"  Jazmine Pennington can be reached at phone number enclosed and with instructions to choose option for New Patient Scheduling

## 2022-02-21 ENCOUNTER — TELEPHONE (OUTPATIENT)
Dept: UROLOGY | Facility: MEDICAL CENTER | Age: 42
End: 2022-02-21

## 2022-02-21 DIAGNOSIS — F41.9 ANXIETY: ICD-10-CM

## 2022-02-21 NOTE — TELEPHONE ENCOUNTER
Call taken from voice mail  Patient left a message that she would need authorization for medication that was prescribed  Reviewed last  Office visit   No medication was prescribed  Patient states she has so many Dr Nisreen Reyes and she called the wrong office

## 2022-02-22 RX ORDER — CLONAZEPAM 0.5 MG/1
TABLET ORAL
Qty: 30 TABLET | Refills: 0 | Status: SHIPPED | OUTPATIENT
Start: 2022-02-22 | End: 2022-06-08 | Stop reason: SDUPTHER

## 2022-02-25 ENCOUNTER — HOSPITAL ENCOUNTER (EMERGENCY)
Facility: HOSPITAL | Age: 42
Discharge: HOME | End: 2022-02-25
Attending: EMERGENCY MEDICINE | Admitting: EMERGENCY MEDICINE
Payer: COMMERCIAL

## 2022-02-25 ENCOUNTER — APPOINTMENT (EMERGENCY)
Dept: ULTRASOUND IMAGING | Facility: HOSPITAL | Age: 42
End: 2022-02-25
Payer: COMMERCIAL

## 2022-02-25 VITALS
HEART RATE: 76 BPM | OXYGEN SATURATION: 100 % | SYSTOLIC BLOOD PRESSURE: 147 MMHG | DIASTOLIC BLOOD PRESSURE: 80 MMHG | RESPIRATION RATE: 18 BRPM | TEMPERATURE: 98.4 F

## 2022-02-25 DIAGNOSIS — R10.30 LOWER ABDOMINAL PAIN: Primary | ICD-10-CM

## 2022-02-25 DIAGNOSIS — R11.0 NAUSEA: ICD-10-CM

## 2022-02-25 LAB
ALBUMIN SERPL BCP-MCNC: 3.9 G/DL (ref 3.5–5)
ALP SERPL-CCNC: 58 U/L (ref 46–116)
ALT SERPL W P-5'-P-CCNC: 60 U/L (ref 12–78)
ANION GAP SERPL CALCULATED.3IONS-SCNC: 9 MMOL/L (ref 4–13)
AST SERPL W P-5'-P-CCNC: 25 U/L (ref 5–45)
BACTERIA UR QL AUTO: ABNORMAL /HPF
BASOPHILS # BLD AUTO: 0.07 THOUSANDS/ΜL (ref 0–0.1)
BASOPHILS NFR BLD AUTO: 1 % (ref 0–1)
BILIRUB SERPL-MCNC: 0.3 MG/DL (ref 0.2–1)
BILIRUB UR QL STRIP: NEGATIVE
BUN SERPL-MCNC: 11 MG/DL (ref 5–25)
CALCIUM SERPL-MCNC: 8.8 MG/DL (ref 8.3–10.1)
CHLORIDE SERPL-SCNC: 101 MMOL/L (ref 100–108)
CLARITY UR: ABNORMAL
CO2 SERPL-SCNC: 27 MMOL/L (ref 21–32)
COLOR UR: YELLOW
CREAT SERPL-MCNC: 0.86 MG/DL (ref 0.6–1.3)
EOSINOPHIL # BLD AUTO: 0.33 THOUSAND/ΜL (ref 0–0.61)
EOSINOPHIL NFR BLD AUTO: 5 % (ref 0–6)
ERYTHROCYTE [DISTWIDTH] IN BLOOD BY AUTOMATED COUNT: 12.6 % (ref 11.6–15.1)
GFR SERPL CREATININE-BSD FRML MDRD: 84 ML/MIN/1.73SQ M
GLUCOSE SERPL-MCNC: 97 MG/DL (ref 65–140)
GLUCOSE UR STRIP-MCNC: NEGATIVE MG/DL
HCT VFR BLD AUTO: 39.9 % (ref 34.8–46.1)
HGB BLD-MCNC: 13.6 G/DL (ref 11.5–15.4)
HGB UR QL STRIP.AUTO: NEGATIVE
HOLD SPECIMEN: NORMAL
IMM GRANULOCYTES # BLD AUTO: 0.03 THOUSAND/UL (ref 0–0.2)
IMM GRANULOCYTES NFR BLD AUTO: 0 % (ref 0–2)
KETONES UR STRIP-MCNC: NEGATIVE MG/DL
LEUKOCYTE ESTERASE UR QL STRIP: ABNORMAL
LIPASE SERPL-CCNC: 131 U/L (ref 73–393)
LYMPHOCYTES # BLD AUTO: 1.87 THOUSANDS/ΜL (ref 0.6–4.47)
LYMPHOCYTES NFR BLD AUTO: 26 % (ref 14–44)
MCH RBC QN AUTO: 31.6 PG (ref 26.8–34.3)
MCHC RBC AUTO-ENTMCNC: 34.1 G/DL (ref 31.4–37.4)
MCV RBC AUTO: 93 FL (ref 82–98)
MONOCYTES # BLD AUTO: 0.59 THOUSAND/ΜL (ref 0.17–1.22)
MONOCYTES NFR BLD AUTO: 8 % (ref 4–12)
MUCOUS THREADS UR QL AUTO: ABNORMAL
NEUTROPHILS # BLD AUTO: 4.41 THOUSANDS/ΜL (ref 1.85–7.62)
NEUTS SEG NFR BLD AUTO: 60 % (ref 43–75)
NITRITE UR QL STRIP: NEGATIVE
NON-SQ EPI CELLS URNS QL MICRO: ABNORMAL /HPF
NRBC BLD AUTO-RTO: 0 /100 WBCS
OTHER STN SPEC: ABNORMAL
PH UR STRIP.AUTO: 6 [PH] (ref 4.5–8)
PLATELET # BLD AUTO: 235 THOUSANDS/UL (ref 149–390)
PMV BLD AUTO: 10.7 FL (ref 8.9–12.7)
POTASSIUM SERPL-SCNC: 3.5 MMOL/L (ref 3.5–5.3)
PROT SERPL-MCNC: 7.8 G/DL (ref 6.4–8.2)
PROT UR STRIP-MCNC: NEGATIVE MG/DL
RBC # BLD AUTO: 4.3 MILLION/UL (ref 3.81–5.12)
RBC #/AREA URNS AUTO: ABNORMAL /HPF
SODIUM SERPL-SCNC: 137 MMOL/L (ref 136–145)
SP GR UR STRIP.AUTO: 1.02 (ref 1–1.03)
UROBILINOGEN UR QL STRIP.AUTO: 0.2 E.U./DL
WBC # BLD AUTO: 7.3 THOUSAND/UL (ref 4.31–10.16)
WBC #/AREA URNS AUTO: ABNORMAL /HPF

## 2022-02-25 PROCEDURE — 99285 EMERGENCY DEPT VISIT HI MDM: CPT | Performed by: EMERGENCY MEDICINE

## 2022-02-25 PROCEDURE — 87591 N.GONORRHOEAE DNA AMP PROB: CPT

## 2022-02-25 PROCEDURE — 76830 TRANSVAGINAL US NON-OB: CPT

## 2022-02-25 PROCEDURE — 87491 CHLMYD TRACH DNA AMP PROBE: CPT

## 2022-02-25 PROCEDURE — 85025 COMPLETE CBC W/AUTO DIFF WBC: CPT | Performed by: EMERGENCY MEDICINE

## 2022-02-25 PROCEDURE — 81001 URINALYSIS AUTO W/SCOPE: CPT

## 2022-02-25 PROCEDURE — 80053 COMPREHEN METABOLIC PANEL: CPT | Performed by: EMERGENCY MEDICINE

## 2022-02-25 PROCEDURE — 99284 EMERGENCY DEPT VISIT MOD MDM: CPT

## 2022-02-25 PROCEDURE — 76856 US EXAM PELVIC COMPLETE: CPT

## 2022-02-25 PROCEDURE — 36415 COLL VENOUS BLD VENIPUNCTURE: CPT

## 2022-02-25 PROCEDURE — 96372 THER/PROPH/DIAG INJ SC/IM: CPT

## 2022-02-25 PROCEDURE — 83690 ASSAY OF LIPASE: CPT | Performed by: EMERGENCY MEDICINE

## 2022-02-25 RX ORDER — ONDANSETRON 4 MG/1
4 TABLET, ORALLY DISINTEGRATING ORAL EVERY 6 HOURS PRN
Qty: 20 TABLET | Refills: 0 | Status: SHIPPED | OUTPATIENT
Start: 2022-02-25

## 2022-02-25 RX ORDER — HYDROMORPHONE HCL/PF 1 MG/ML
0.5 SYRINGE (ML) INJECTION ONCE
Status: COMPLETED | OUTPATIENT
Start: 2022-02-25 | End: 2022-02-25

## 2022-02-25 RX ORDER — KETOROLAC TROMETHAMINE 30 MG/ML
30 INJECTION, SOLUTION INTRAMUSCULAR; INTRAVENOUS ONCE
Status: COMPLETED | OUTPATIENT
Start: 2022-02-25 | End: 2022-02-25

## 2022-02-25 RX ADMIN — HYDROMORPHONE HYDROCHLORIDE 0.5 MG: 1 INJECTION, SOLUTION INTRAMUSCULAR; INTRAVENOUS; SUBCUTANEOUS at 21:15

## 2022-02-25 RX ADMIN — KETOROLAC TROMETHAMINE 30 MG: 30 INJECTION, SOLUTION INTRAMUSCULAR at 21:15

## 2022-02-26 NOTE — ED ATTENDING ATTESTATION
2/25/2022  INancy DO, saw and evaluated the patient  I have discussed the patient with the resident/non-physician practitioner and agree with the resident's/non-physician practitioner's findings, Plan of Care, and MDM as documented in the resident's/non-physician practitioner's note, except where noted  All available labs and Radiology studies were reviewed  I was present for key portions of any procedure(s) performed by the resident/non-physician practitioner and I was immediately available to provide assistance  At this point I agree with the current assessment done in the Emergency Department  I have conducted an independent evaluation of this patient a history and physical is as follows:    ED Course   37year old female presents with 2 week history of abdominal and pelvic pain  Patient states that she developed pelvic pain after her last menstrual cycle and her menses have stopped however the pain continues  Pain is associated with nausea  No fevers or chills  No vomiting  Patient is having normal bowel movements  Patient seen at [address] [place] [address] 1 week ago and had CT a/p and pelvic u/s, etiology of pain unclear and she was discharged with percocet  She continues to take percocet with little relief  She saw her OBGYN and follow-up with the 2nd OBGYN today to discuss hysterectomy  She has been resistant to try medications that were recommended by the gyn team   Patient did agree to have a diagnostic laparoscopy as she does have a history of endometriosis but this has not been scheduled yet  PmhX: fibromyalgia, endometriosis, migraines, hypertension    Physical exam:  Patient appears anxious and tearful  Mucous membranes are moist   Neck is supple  Heart is regular rate and rhythm without ectopy  Lungs are clear to auscultation bilaterally  Abdomen is soft and tender diffusely without rebound or guarding noted  No CVA tenderness  5/5 strength in all 4 extremities    Speech is clear and appropriate  Plan:  Patient presents with persistent pain despite recent normal workup and evaluations by gyn  Imaging at [facility] was not able to identify the right ovary  Will check pelvic ultrasound to evaluate right ovary  Will provide pain medication    If workup is unremarkable will have patient follow-up with her OBGYN as scheduled for diagnostic laparoscopy      Critical Care Time  Procedures

## 2022-02-26 NOTE — ED PROVIDER NOTES
History  Chief Complaint   Patient presents with    Abdominal Pain     Patient c/o pelvic pain, abd pain, painful urination, and nausea for approx 14 days  Patient states that she had endometrosis and that she saw her pcp and went to the er and they prescribed pain medication  Patient took percet approx 4 hrs ago   Pelvic Pain     Patient is a 62-year-old female with a past medical history of fibromyalgia, migraines and hypertension who presents to the emergency department with complaint of abdominal and pelvic pain  Patient reports that this pain began approximately 2 weeks ago and has progressively worsened over that time  She describes her pain as sharp stabbing pain primarily located in her left lower and right lower quadrants  She also endorses dysuria with nausea  Patient states that she was seen at [facility] approximately a week ago and was given a prescription for Percocet for pain  She says that that prescription has not helped with her pain at all  She says that she took Percocet approximately 6 hours ago but her pain is not resolved  She also endorses mild headache but denies lightheadedness, change in vision, chest pain, syncope, shortness of breath, vomiting, diarrhea, hematuria or fever  Prior to Admission Medications   Prescriptions Last Dose Informant Patient Reported? Taking?    SUMAtriptan (IMITREX) 25 mg tablet   No No   Sig: TAKE 1 TABLET (25 MG TOTAL) BY MOUTH ONCE AS NEEDED FOR MIGRAINE FOR UP TO 3 DOSES MAY REPEAT AFTER 2 HOURS AS NEEDED   albuterol (PROVENTIL HFA,VENTOLIN HFA) 90 mcg/act inhaler   No No   Sig: INHALE 2 PUFFS EVERY 6 (SIX) HOURS AS NEEDED FOR SHORTNESS OF BREATH   clonazePAM (KlonoPIN) 0 5 mg tablet   No No   Sig: TAKE 1 TABLET BY MOUTH EVERY DAY   dicyclomine (BENTYL) 10 mg capsule   No No   Sig: Take 1 capsule (10 mg total) by mouth 4 (four) times a day (before meals and at bedtime)   ergocalciferol (VITAMIN D2) 50,000 units   Yes No   Sig: Take 50,000 Units by mouth once a week   levonorgestrel (MIRENA) 20 MCG/24HR IUD  Self Yes No   Sig: Mirena 20 MCG/24HR Intrauterine Intrauterine Device  Refills: 0  Active   oxyCODONE-acetaminophen (Percocet) 5-325 mg per tablet   No No   Sig: Take 1 tablet by mouth daily as needed for severe pain Max Daily Amount: 1 tablet   pantoprazole (PROTONIX) 40 mg tablet   No No   Sig: Take 1 tablet (40 mg total) by mouth daily before breakfast   sertraline (Zoloft) 50 mg tablet   No No   Sig: Take 1 tablet (50 mg total) by mouth daily      Facility-Administered Medications: None       Past Medical History:   Diagnosis Date    Allergic rhinitis     Anemia     Anxiety     Chondromalacia of both patellae     Closed fracture of left distal fibula 8/27/2020    Depression     Fibromyalgia     Fibromyalgia     GERD (gastroesophageal reflux disease)     Hematochezia     Herpes simplex infection     HPV (human papilloma virus) infection     11/2013    Hypertension     IBS (irritable bowel syndrome)     Insomnia     Mental status change     Migraine     Obesity 8/6/2015    Scoliosis     Scoliosis        Past Surgical History:   Procedure Laterality Date    EXPLORATORY LAPAROTOMY      WISDOM TOOTH EXTRACTION         Family History   Problem Relation Age of Onset    Stroke Mother     Hypertension Mother     Psoriasis Mother     Hepatitis Father         B     Breast cancer Family         maternal aunt - 27 's     Diabetes type I Son     Diabetes Son     No Known Problems Brother     No Known Problems Daughter     No Known Problems Maternal Grandmother     No Known Problems Maternal Grandfather     No Known Problems Paternal Grandmother     No Known Problems Paternal Grandfather     No Known Problems Brother     No Known Problems Daughter      I have reviewed and agree with the history as documented      E-Cigarette/Vaping     E-Cigarette/Vaping Substances     Social History     Tobacco Use  Smoking status: Former Smoker     Types: Cigarettes    Smokeless tobacco: Never Used    Tobacco comment: quit 2018   Substance Use Topics    Alcohol use: Yes     Alcohol/week: 3 0 standard drinks     Types: 1 Glasses of wine, 1 Cans of beer, 1 Shots of liquor per week     Comment: social     Drug use: Yes     Frequency: 3 0 times per week     Types: Marijuana     Comment: few times a week         Review of Systems   Constitutional: Negative for appetite change, chills, fatigue and fever  HENT: Negative for congestion, ear pain, rhinorrhea and sore throat  Eyes: Negative for pain and visual disturbance  Respiratory: Negative for cough and shortness of breath  Cardiovascular: Negative for chest pain and palpitations  Gastrointestinal: Positive for abdominal pain and nausea  Negative for abdominal distention, blood in stool, constipation, diarrhea and vomiting  Endocrine: Negative  Genitourinary: Positive for dysuria  Negative for frequency, hematuria and urgency  Musculoskeletal: Negative for arthralgias, back pain, neck pain and neck stiffness  Skin: Negative for color change and rash  Allergic/Immunologic: Negative  Neurological: Positive for headaches  Negative for dizziness, seizures, syncope, weakness, light-headedness and numbness  Hematological: Negative  Psychiatric/Behavioral: Negative  All other systems reviewed and are negative        Physical Exam  ED Triage Vitals   Temperature Pulse Respirations Blood Pressure SpO2   02/25/22 1921 02/25/22 1921 02/25/22 1921 02/25/22 1921 02/25/22 1921   98 4 °F (36 9 °C) 76 18 147/80 100 %      Temp Source Heart Rate Source Patient Position - Orthostatic VS BP Location FiO2 (%)   02/25/22 1921 02/25/22 1921 02/25/22 1921 02/25/22 1921 --   Oral Monitor Sitting Left arm       Pain Score       02/25/22 2115       10 - Worst Possible Pain             Orthostatic Vital Signs  Vitals:    02/25/22 1921   BP: 147/80   Pulse: 76 Patient Position - Orthostatic VS: Sitting       Physical Exam  Vitals and nursing note reviewed  Constitutional:       General: She is not in acute distress  Appearance: She is well-developed and normal weight  She is ill-appearing  She is not diaphoretic  HENT:      Head: Normocephalic and atraumatic  Mouth/Throat:      Mouth: Mucous membranes are moist       Pharynx: Oropharynx is clear  Eyes:      Extraocular Movements: Extraocular movements intact  Conjunctiva/sclera: Conjunctivae normal       Pupils: Pupils are equal, round, and reactive to light  Cardiovascular:      Rate and Rhythm: Normal rate and regular rhythm  Heart sounds: Normal heart sounds  No murmur heard  No friction rub  Pulmonary:      Effort: Pulmonary effort is normal  No respiratory distress  Breath sounds: Normal breath sounds  Abdominal:      General: Abdomen is flat  Bowel sounds are normal       Palpations: Abdomen is soft  There is no fluid wave, hepatomegaly, splenomegaly or pulsatile mass  Tenderness: There is generalized abdominal tenderness and tenderness in the right lower quadrant, suprapubic area and left lower quadrant  There is no right CVA tenderness, left CVA tenderness, guarding or rebound  Hernia: No hernia is present  Comments: Patient was diffusely tender throughout her abdomen with severe tenderness in her left lower quadrant, suprapubic quadrant and right lower quadrant  Musculoskeletal:      Cervical back: Neck supple  Skin:     General: Skin is warm and dry  Capillary Refill: Capillary refill takes less than 2 seconds  Coloration: Skin is not jaundiced  Findings: No erythema or rash  Neurological:      General: No focal deficit present  Mental Status: She is alert and oriented to person, place, and time  Cranial Nerves: No cranial nerve deficit  Motor: No weakness           ED Medications  Medications   ketorolac (TORADOL) injection 30 mg (30 mg Intramuscular Given 2/25/22 2115)   HYDROmorphone (DILAUDID) injection 0 5 mg (0 5 mg Intramuscular Given 2/25/22 2115)       Diagnostic Studies  Results Reviewed     Procedure Component Value Units Date/Time    Urine Microscopic [[id number]]  (Abnormal) Collected: 02/25/22 2126    Lab Status: Final result Specimen: Urine, Clean Catch Updated: 02/25/22 2158     RBC, UA 1-2 /hpf      WBC, UA 4-10 /hpf      Epithelial Cells Innumerable /hpf      Bacteria, UA Moderate /hpf      OTHER OBSERVATIONS Yeast Cells Present     MUCUS THREADS Occasional    Chlamydia/GC amplified DNA by PCR [[id number]] Collected: 02/25/22 2123    Lab Status: In process Specimen: Urine, Other Updated: 02/25/22 2136    Urine Macroscopic, POC [[id number]]  (Abnormal) Collected: 02/25/22 2126    Lab Status: Final result Specimen: Urine Updated: 02/25/22 2127     Color, UA Yellow     Clarity, UA Turbid     pH, UA 6 0     Leukocytes, UA Trace     Nitrite, UA Negative     Protein, UA Negative mg/dl      Glucose, UA Negative mg/dl      Ketones, UA Negative mg/dl      Urobilinogen, UA 0 2 E U /dl      Bilirubin, UA Negative     Blood, UA Negative     Specific Gravity, UA 1 020    Narrative:      CLINITEK RESULT    [place] draw [[id number]] Collected: 02/25/22 1925    Lab Status: Final result Specimen: Blood from Arm, Right Updated: 02/25/22 2101    Narrative: The following orders were created for panel order [place] draw  Procedure                               Abnormality         Status                     ---------                               -----------         ------                     Marrian Kehr Top on YPJO[[id number]]                           Final result               Gold top on YBAM[[id number]]                                 Final result               Green / Black tube on AKJT[[id number]]                       Final result                 Please view results for these tests on the individual orders      Comprehensive metabolic panel [[id number]] Collected: 02/25/22 1925    Lab Status: Final result Specimen: Blood from Arm, Right Updated: 02/25/22 2007     Sodium 137 mmol/L      Potassium 3 5 mmol/L      Chloride 101 mmol/L      CO2 27 mmol/L      ANION GAP 9 mmol/L      BUN 11 mg/dL      Creatinine 0 86 mg/dL      Glucose 97 mg/dL      Calcium 8 8 mg/dL      AST 25 U/L      ALT 60 U/L      Alkaline Phosphatase 58 U/L      Total Protein 7 8 g/dL      Albumin 3 9 g/dL      Total Bilirubin 0 30 mg/dL      eGFR 84 ml/min/1 73sq m     Narrative:      National Kid[employer] Disease Foundation guidelines for Chronic Kidney Disease (CKD):     Stage 1 with normal or high GFR (GFR > 90 mL/min/1 73 square meters)    Stage 2 Mild CKD (GFR = 60-89 mL/min/1 73 square meters)    Stage 3A Moderate CKD (GFR = 45-59 mL/min/1 73 square meters)    Stage 3B Moderate CKD (GFR = 30-44 mL/min/1 73 square meters)    Stage 4 Severe CKD (GFR = 15-29 mL/min/1 73 square meters)    Stage 5 End Stage CKD (GFR <15 mL/min/1 73 square meters)  Note: GFR calculation is accurate only with a steady state creatinine    Lipase [[id number]]  (Normal) Collected: 02/25/22 1925    Lab Status: Final result Specimen: Blood from Arm, Right Updated: 02/25/22 2007     Lipase 131 u/L     CBC and differential [[id number]] Collected: 02/25/22 1925    Lab Status: Final result Specimen: Blood from Arm, Right Updated: 02/25/22 1932     WBC 7 30 Thousand/uL      RBC 4 30 Million/uL      Hemoglobin 13 6 g/dL      Hematocrit 39 9 %      MCV 93 fL      MCH 31 6 pg      MCHC 34 1 g/dL      RDW 12 6 %      MPV 10 7 fL      Platelets 897 Thousands/uL      nRBC 0 /100 WBCs      Neutrophils Relative 60 %      Immat GRANS % 0 %      Lymphocytes Relative 26 %      Monocytes Relative 8 %      Eosinophils Relative 5 %      Basophils Relative 1 %      Neutrophils Absolute 4 41 Thousands/µL      Immature Grans Absolute 0 03 Thousand/uL      Lymphocytes Absolute 1 87 Thousands/µL      Monocytes Absolute 0 59 Thousand/µL      Eosinophils Absolute 0 33 Thousand/µL      Basophils Absolute 0 07 Thousands/µL                  US pelvis complete w transvaginal   Final Result by Lamon Curling, MD (02/25 2319)       1  No sonographic evidence of ovarian torsion  2   Intrauterine device in appropriate orientation  Workstation performed: [id number]               Procedures  Procedures      ED Course  ED Course as of 02/25/22 2333   Fri Feb 25, 2022   2220 Bacteria, UA(!): Moderate  There is moderate bacteria in the urine but there is also innumerable epithelial cells indicating probable contamination  2305 I reassessed the patient she reports that her pain is significantly improved  2321 Transvaginal US indicated:  1  No sonographic evidence of ovarian torsion  2   Intrauterine device in appropriate orientation   2321 Patient says that her pain is well controlled and the transvaginal ultrasound was unremarkable  She will be discharged for follow-up with her PCP on an outpatient basis  Further instructions per discharge order  MDM  Number of Diagnoses or Management Options  Diagnosis management comments: Patient is a 52-year-old female with a past medical history of fibromyalgia, migraines and hypertension who presents to the emergency department with complaint of abdominal and pelvic pain  Upon further investigation this patient was seen at a gynecology clinic earlier today requesting a hysterectomy and pain medication  An ultrasound was conducted and the patient has received CT imaging of her abdomen at [facility]  She also saw a 2nd gynecologist for a 2nd opinion  On physical exam the patient appeared to be diffusely tender throughout her abdomen and was standing up walking around her patient room  The remainder of physical exam was unremarkable        Disposition  Final diagnoses:   Lower abdominal pain   Nausea     Time reflects when diagnosis was documented in both MDM as applicable and the Disposition within this note     Time User Action Codes Description Comment    2/25/2022 11:22 PM Evorn Going Add [R10 30] Lower abdominal pain     2/25/2022 11:22 PM Evorn Going Add [R11 0] Nausea       ED Disposition     ED Disposition Condition Date/Time Comment    Discharge Stable Fri Feb 25, 2022 11:26 PM Arpan Vasques discharge to home/self care  Follow-up Information    None         Patient's Medications   Discharge Prescriptions    ONDANSETRON (ZOFRAN ODT) 4 MG DISINTEGRATING TABLET    Take 1 tablet (4 mg total) by mouth every 6 (six) hours as needed for nausea or vomiting       Start Date: 2/25/2022 End Date: --       Order Dose: 4 mg       Quantity: 20 tablet    Refills: 0     No discharge procedures on file  PDMP Review       Value Time User    PDMP Reviewed  Yes 12/9/2019  7:35 AM Dasia Henson MD           ED Provider  Attending physically available and evaluated Arpan Vasques I managed the patient along with the ED Attending      Electronically Signed by         Candy Joseph DO  02/25/22 [id number]

## 2022-02-26 NOTE — DISCHARGE INSTRUCTIONS
Transvaginal ultrasound was unremarkable  Patient has been instructed to follow-up with her PCP on an outpatient basis in regards to her nausea continuing abdominal pain  She has been instructed to continue taking her Percocet as prescribed and she has also been given a prescription for Zofran to be taken every 8 hours as needed for nausea  She may also take Tylenol or ibuprofen every 6 hours as needed for pain instructed to take those with small meals to avoid upset stomach  The patient instructed return to the emergency department should she develop chest pain, shortness of breath, syncope or any other symptoms she finds concerning

## 2022-02-27 DIAGNOSIS — K58.1 IRRITABLE BOWEL SYNDROME WITH CONSTIPATION: ICD-10-CM

## 2022-02-27 LAB
C TRACH DNA SPEC QL NAA+PROBE: NEGATIVE
N GONORRHOEA DNA SPEC QL NAA+PROBE: NEGATIVE

## 2022-02-27 RX ORDER — DICYCLOMINE HYDROCHLORIDE 10 MG/1
CAPSULE ORAL
Qty: 30 CAPSULE | Refills: 0 | Status: SHIPPED | OUTPATIENT
Start: 2022-02-27

## 2022-03-15 DIAGNOSIS — G43.709 CHRONIC MIGRAINE WITHOUT AURA WITHOUT STATUS MIGRAINOSUS, NOT INTRACTABLE: ICD-10-CM

## 2022-03-15 RX ORDER — SUMATRIPTAN 25 MG/1
TABLET, FILM COATED ORAL
Qty: 3 TABLET | Refills: 0 | Status: SHIPPED | OUTPATIENT
Start: 2022-03-15 | End: 2022-04-01 | Stop reason: SDUPTHER

## 2022-04-01 ENCOUNTER — OFFICE VISIT (OUTPATIENT)
Dept: FAMILY MEDICINE CLINIC | Facility: CLINIC | Age: 42
End: 2022-04-01
Payer: COMMERCIAL

## 2022-04-01 VITALS
HEIGHT: 63 IN | SYSTOLIC BLOOD PRESSURE: 128 MMHG | HEART RATE: 72 BPM | RESPIRATION RATE: 16 BRPM | DIASTOLIC BLOOD PRESSURE: 80 MMHG | WEIGHT: 176.8 LBS | BODY MASS INDEX: 31.33 KG/M2 | OXYGEN SATURATION: 98 % | TEMPERATURE: 97.9 F

## 2022-04-01 DIAGNOSIS — G43.709 CHRONIC MIGRAINE WITHOUT AURA WITHOUT STATUS MIGRAINOSUS, NOT INTRACTABLE: ICD-10-CM

## 2022-04-01 DIAGNOSIS — K58.1 IRRITABLE BOWEL SYNDROME WITH CONSTIPATION: ICD-10-CM

## 2022-04-01 DIAGNOSIS — F41.9 ANXIETY: ICD-10-CM

## 2022-04-01 DIAGNOSIS — N80.9 ENDOMETRIOSIS: ICD-10-CM

## 2022-04-01 DIAGNOSIS — K21.9 GASTROESOPHAGEAL REFLUX DISEASE WITHOUT ESOPHAGITIS: ICD-10-CM

## 2022-04-01 DIAGNOSIS — Z00.00 ANNUAL PHYSICAL EXAM: Primary | ICD-10-CM

## 2022-04-01 PROCEDURE — 99396 PREV VISIT EST AGE 40-64: CPT | Performed by: FAMILY MEDICINE

## 2022-04-01 RX ORDER — PANTOPRAZOLE SODIUM 40 MG/1
40 TABLET, DELAYED RELEASE ORAL
Qty: 30 TABLET | Refills: 5 | Status: SHIPPED | OUTPATIENT
Start: 2022-04-01

## 2022-04-01 RX ORDER — SUMATRIPTAN 25 MG/1
25 TABLET, FILM COATED ORAL ONCE AS NEEDED
Qty: 9 TABLET | Refills: 0 | Status: SHIPPED | OUTPATIENT
Start: 2022-04-01 | End: 2022-04-20

## 2022-04-01 RX ORDER — BUPROPION HYDROCHLORIDE 150 MG/1
1 TABLET ORAL EVERY MORNING
COMMUNITY
Start: 2021-12-16 | End: 2022-08-03

## 2022-04-01 RX ORDER — TOPIRAMATE 25 MG/1
25 TABLET ORAL 2 TIMES DAILY
COMMUNITY
Start: 2022-02-26

## 2022-04-01 RX ORDER — SENNOSIDES 8.6 MG
8.6 TABLET ORAL
Qty: 30 TABLET | Refills: 0 | Status: SHIPPED | OUTPATIENT
Start: 2022-04-01 | End: 2022-04-20

## 2022-04-01 RX ORDER — LINACLOTIDE 145 UG/1
145 CAPSULE, GELATIN COATED ORAL DAILY
Qty: 30 CAPSULE | Refills: 0 | Status: SHIPPED | OUTPATIENT
Start: 2022-04-01 | End: 2022-08-03

## 2022-04-01 NOTE — PATIENT INSTRUCTIONS

## 2022-04-01 NOTE — PROGRESS NOTES
[address] [facility]    NAME: Nieves Vieira  AGE: 37 y o  SEX: female  : 1980     DATE: 2022     Assessment and Plan:     Problem List Items Addressed This Visit        Digestive    Acid reflux disease    Relevant Medications    pantoprazole (PROTONIX) 40 mg tablet    Irritable bowel syndrome    Relevant Medications    senna (SENOKOT) 8 6 mg    linaCLOtide (Linzess) 145 MCG CAPS       Cardiovascular and Mediastinum    Migraine headache     Stable on current meds  Continue to monitor         Relevant Medications    buPROPion (WELLBUTRIN XL) 150 mg 24 hr tablet    topiramate (TOPAMAX) 25 mg tablet    SUMAtriptan (IMITREX) 25 mg tablet    sertraline (Zoloft) 50 mg tablet       Other    Anxiety    Relevant Medications    buPROPion (WELLBUTRIN XL) 150 mg 24 hr tablet    sertraline (Zoloft) 50 mg tablet    Endometriosis     Medical marijuana is helping   Sees pain management at [facility]           Other Visit Diagnoses     Annual physical exam    -  Primary    Relevant Orders    Lipid Panel with Direct LDL reflex          Immunizations and preventive care screenings were discussed with patient today  Appropriate education was printed on patient's after visit summary  Counseling:  Alcohol/drug use: discussed moderation in alcohol intake, the recommendations for healthy alcohol use, and avoidance of illicit drug use  Dental Health: discussed importance of regular tooth brushing, flossing, and dental visits  Injury prevention: discussed safety/seat belts, safety helmets, smoke detectors, carbon dioxide detectors, and smoking near bedding or upholstery  Sexual health: discussed sexually transmitted diseases, partner selection, use of condoms, avoidance of unintended pregnancy, and contraceptive alternatives  · Exercise: the importance of regular exercise/physical activity was discussed   Recommend exercise 3-5 times per week for at least 30 minutes  No follow-ups on file  Chief Complaint:     Chief Complaint   Patient presents with    Physical Exam     Patient is here today for an annual exam       History of Present Illness:     Adult Annual Physical   Patient here for a comprehensive physical exam  The patient reports problems - continued to have endometrial pain   Diet and Physical Activity  · Diet/Nutrition: well balanced diet and consuming 3-5 servings of fruits/vegetables daily  · Exercise: walking  Depression Screening  PHQ-2/9 Depression Screening         General Health  · Sleep: sleeps well and gets 7-8 hours of sleep on average  · Hearing: normal - bilateral   · Vision: goes for regular eye exams and wears glasses  · Dental: regular dental visits and brushes teeth twice daily  /GYN Health  · Patient is: premenopausal  · Last menstrual period: yesterday  · Contraceptive method: IUD placement  Review of Systems:     Review of Systems   Constitutional: Negative  HENT: Negative  Eyes: Negative  Respiratory: Negative  Cardiovascular: Negative  Gastrointestinal: Positive for abdominal pain and constipation  Negative for diarrhea and nausea  Endocrine: Negative  Genitourinary: Negative  Musculoskeletal: Negative  Skin: Negative  Allergic/Immunologic: Negative  Neurological: Negative  Hematological: Negative  Psychiatric/Behavioral: Negative         Past Medical History:     Past Medical History:   Diagnosis Date    Allergic rhinitis     Anemia     Anxiety     Chondromalacia of both patellae     Closed fracture of left distal fibula 8/27/2020    Depression     Fibromyalgia     Fibromyalgia     GERD (gastroesophageal reflux disease)     Hematochezia     Herpes simplex infection     HPV (human papilloma virus) infection     11/2013    Hypertension     IBS (irritable bowel syndrome)     Insomnia     Mental status change     Migraine     Obesity 8/6/2015    Scoliosis     Scoliosis       Past Surgical History:     Past Surgical History:   Procedure Laterality Date    EXPLORATORY LAPAROTOMY      WISDOM TOOTH EXTRACTION        Social History:     Social History     Socioeconomic History    Marital status: Single     Spouse name: None    Number of children: None    Years of education: completed 12th grade    Highest education level: None   Occupational History    Occupation: home health aide   Tobacco Use    Smoking status: Former Smoker     Types: Cigarettes    Smokeless tobacco: Never Used    Tobacco comment: quit 2018   Substance and Sexual Activity    Alcohol use: Yes     Alcohol/week: 3 0 standard drinks     Types: 1 Glasses of wine, 1 Cans of beer, 1 Shots of liquor per week     Comment: social     Drug use: Yes     Frequency: 3 0 times per week     Types: Marijuana     Comment: few times a week     Sexual activity: Yes     Partners: Male     Birth control/protection: I U D     Other Topics Concern    None   Social History Narrative    None     Social Determinants of Health     Financial Resource Strain: Not on file   Food Insecurity: Not on file   Transportation Needs: Not on file   Physical Activity: Not on file   Stress: Not on file   Social Connections: Not on file   Intimate Partner Violence: Not on file   Housing Stability: Not on file      Family History:     Family History   Problem Relation Age of Onset    Stroke Mother     Hypertension Mother     Psoriasis Mother     Hepatitis Father         B     Breast cancer Family         maternal aunt - 27 's     Diabetes type I Son     Diabetes Son     No Known Problems Brother     No Known Problems Daughter     No Known Problems Maternal Grandmother     No Known Problems Maternal Grandfather     No Known Problems Paternal Grandmother     No Known Problems Paternal Grandfather     No Known Problems Brother     No Known Problems Daughter       Current Medications:     Current Outpatient Medications   Medication Sig Dispense Refill    albuterol (PROVENTIL HFA,VENTOLIN HFA) 90 mcg/act inhaler INHALE 2 PUFFS EVERY 6 (SIX) HOURS AS NEEDED FOR SHORTNESS OF BREATH 8 g 0    buPROPion (WELLBUTRIN XL) 150 mg 24 hr tablet Take 1 tablet by mouth every morning      clonazePAM (KlonoPIN) 0 5 mg tablet TAKE 1 TABLET BY MOUTH EVERY DAY 30 tablet 0    dicyclomine (BENTYL) 10 mg capsule TAKE 1 CAPSULE BY MOUTH 4 TIMES A DAY (BEFORE MEALS AND AT BEDTIME) 30 capsule 0    ergocalciferol (VITAMIN D2) 50,000 units Take 50,000 Units by mouth once a week      levonorgestrel (MIRENA) 20 MCG/24HR IUD Mirena 20 MCG/24HR Intrauterine Intrauterine Device  Refills: 0  Active      ondansetron (Zofran ODT) 4 mg disintegrating tablet Take 1 tablet (4 mg total) by mouth every 6 (six) hours as needed for nausea or vomiting 20 tablet 0    oxyCODONE-acetaminophen (Percocet) 5-325 mg per tablet Take 1 tablet by mouth daily as needed for severe pain Max Daily Amount: 1 tablet 10 tablet 0    pantoprazole (PROTONIX) 40 mg tablet Take 1 tablet (40 mg total) by mouth daily before breakfast 30 tablet 5    sertraline (Zoloft) 50 mg tablet Take 1 tablet (50 mg total) by mouth daily 30 tablet 5    SUMAtriptan (IMITREX) 25 mg tablet Take 1 tablet (25 mg total) by mouth once as needed for migraine for up to 1 dose 9 tablet 0    linaCLOtide (Linzess) 145 MCG CAPS Take 1 capsule (145 mcg total) by mouth in the morning 30 capsule 0    senna (SENOKOT) 8 6 mg Take 1 tablet (8 6 mg total) by mouth daily at bedtime 30 tablet 0    topiramate (TOPAMAX) 25 mg tablet Take 25 mg by mouth 2 (two) times a day       No current facility-administered medications for this visit  Allergies:      Allergies   Allergen Reactions    Cymbalta [Duloxetine Hcl] Nausea Only and GI Intolerance     Stomach upset      Physical Exam:     /80 (BP Location: Left arm, Patient Position: Sitting, Cuff Size: Adult)   Pulse 72   Temp 97 9 °F (36 6 °C) (Skin)   Resp 16   Ht 5' 2 64" (1 591 m)   Wt 80 2 kg (176 lb 12 8 oz)   SpO2 98%   BMI 31 68 kg/m²     Physical Exam  Constitutional:       Appearance: She is well-developed  HENT:      Head: Normocephalic and atraumatic  Right Ear: Tympanic membrane normal       Left Ear: Tympanic membrane normal       Nose: Nose normal       Mouth/Throat:      Mouth: Mucous membranes are moist    Eyes:      Pupils: Pupils are equal, round, and reactive to light  Cardiovascular:      Rate and Rhythm: Normal rate and regular rhythm  Pulses: Normal pulses  Heart sounds: Normal heart sounds  Pulmonary:      Effort: Pulmonary effort is normal  No respiratory distress  Breath sounds: Normal breath sounds  No wheezing  Abdominal:      General: Bowel sounds are normal       Palpations: Abdomen is soft  Musculoskeletal:         General: No deformity  Normal range of motion  Cervical back: Normal range of motion and neck supple  Skin:     General: Skin is warm  Capillary Refill: Capillary refill takes less than 2 seconds  Neurological:      General: No focal deficit present  Mental Status: She is alert and oriented to person, place, and time     Psychiatric:         Mood and Affect: Mood normal          Behavior: Behavior normal           Griffin Chin MD  [id number] [facility]

## 2022-04-19 DIAGNOSIS — K58.1 IRRITABLE BOWEL SYNDROME WITH CONSTIPATION: ICD-10-CM

## 2022-04-19 DIAGNOSIS — G43.709 CHRONIC MIGRAINE WITHOUT AURA WITHOUT STATUS MIGRAINOSUS, NOT INTRACTABLE: ICD-10-CM

## 2022-04-20 RX ORDER — SUMATRIPTAN 25 MG/1
25 TABLET, FILM COATED ORAL ONCE AS NEEDED
Qty: 9 TABLET | Refills: 0 | Status: SHIPPED | OUTPATIENT
Start: 2022-04-20 | End: 2022-06-08

## 2022-04-20 RX ORDER — SENNOSIDES 8.6 MG/1
TABLET, COATED ORAL
Qty: 30 TABLET | Refills: 0 | Status: SHIPPED | OUTPATIENT
Start: 2022-04-20 | End: 2022-06-08 | Stop reason: SDUPTHER

## 2022-05-26 ENCOUNTER — OFFICE VISIT (OUTPATIENT)
Dept: URGENT CARE | Facility: MEDICAL CENTER | Age: 42
End: 2022-05-26
Payer: COMMERCIAL

## 2022-05-26 VITALS
TEMPERATURE: 98 F | HEART RATE: 72 BPM | DIASTOLIC BLOOD PRESSURE: 78 MMHG | SYSTOLIC BLOOD PRESSURE: 131 MMHG | WEIGHT: 173 LBS | RESPIRATION RATE: 12 BRPM | BODY MASS INDEX: 30.65 KG/M2 | OXYGEN SATURATION: 98 % | HEIGHT: 63 IN

## 2022-05-26 DIAGNOSIS — J06.9 ACUTE URI: Primary | ICD-10-CM

## 2022-05-26 PROCEDURE — 99213 OFFICE O/P EST LOW 20 MIN: CPT | Performed by: PHYSICIAN ASSISTANT

## 2022-05-26 PROCEDURE — U0003 INFECTIOUS AGENT DETECTION BY NUCLEIC ACID (DNA OR RNA); SEVERE ACUTE RESPIRATORY SYNDROME CORONAVIRUS 2 (SARS-COV-2) (CORONAVIRUS DISEASE [COVID-19]), AMPLIFIED PROBE TECHNIQUE, MAKING USE OF HIGH THROUGHPUT TECHNOLOGIES AS DESCRIBED BY CMS-2020-01-R: HCPCS | Performed by: PHYSICIAN ASSISTANT

## 2022-05-26 PROCEDURE — U0005 INFEC AGEN DETEC AMPLI PROBE: HCPCS | Performed by: PHYSICIAN ASSISTANT

## 2022-05-26 RX ORDER — BENZONATATE 200 MG/1
200 CAPSULE ORAL 3 TIMES DAILY PRN
Qty: 20 CAPSULE | Refills: 0 | Status: SHIPPED | OUTPATIENT
Start: 2022-05-26

## 2022-05-26 NOTE — PROGRESS NOTES
[facility]        NAME: Gricelda Diego is a 37 y o  female  : 1980    MRN: [id number]  DATE: May 26, 2022  TIME: 1:16 PM    Assessment and Plan   Acute URI [J06 9]  1  Acute URI  COVID Only -Office Collect    benzonatate (TESSALON) 200 MG capsule         Patient Instructions   1  Over-the-counter ibuprofen and/or acetaminophen as needed for pain or fever  2  Oxymetazoline nasal spray 2 sprays in each nostril every 12 hours for no more than the next 5 days as needed for nasal congestion  3  Over-the-counter guaifenesin as needed for mucus relief  4  Gargle salt water as needed for sore throat relief  5  Increase oral fluid consumption  6  Follow-up with primary care provider in 7 days for any persistent symptoms  7  Quarantine pending the results of your COVID test           Chief Complaint   No chief complaint on file  History of Present Illness       29-year-old female patient with a 1 day history of nasal congestion, mild cough, mild rhinorrhea, body aches  She denies any fever, chills  No GI symptoms  No shortness of breath or chest pain  Review of Systems   Review of Systems   Constitutional: Negative for fever  HENT: Positive for congestion and rhinorrhea  Negative for facial swelling, sinus pain and sore throat  Respiratory: Positive for cough  Cardiovascular: Negative for chest pain  Gastrointestinal: Negative for abdominal pain  Musculoskeletal: Positive for myalgias  Skin: Negative for rash           Current Medications       Current Outpatient Medications:     benzonatate (TESSALON) 200 MG capsule, Take 1 capsule (200 mg total) by mouth 3 (three) times a day as needed for cough, Disp: 20 capsule, Rfl: 0    albuterol (PROVENTIL HFA,VENTOLIN HFA) 90 mcg/act inhaler, INHALE 2 PUFFS EVERY 6 (SIX) HOURS AS NEEDED FOR SHORTNESS OF BREATH, Disp: 8 g, Rfl: 0    buPROPion (WELLBUTRIN XL) 150 mg 24 hr tablet, Take 1 tablet by mouth every morning, Disp: , Rfl:   clonazePAM (KlonoPIN) 0 5 mg tablet, TAKE 1 TABLET BY MOUTH EVERY DAY, Disp: 30 tablet, Rfl: 0    dicyclomine (BENTYL) 10 mg capsule, TAKE 1 CAPSULE BY MOUTH 4 TIMES A DAY (BEFORE MEALS AND AT BEDTIME), Disp: 30 capsule, Rfl: 0    ergocalciferol (VITAMIN D2) 50,000 units, Take 50,000 Units by mouth once a week, Disp: , Rfl:     levonorgestrel (MIRENA) 20 MCG/24HR IUD, Mirena 20 MCG/24HR Intrauterine Intrauterine Device  Refills: 0  Active, Disp: , Rfl:     linaCLOtide (Linzess) 145 MCG CAPS, Take 1 capsule (145 mcg total) by mouth in the morning, Disp: 30 capsule, Rfl: 0    ondansetron (Zofran ODT) 4 mg disintegrating tablet, Take 1 tablet (4 mg total) by mouth every 6 (six) hours as needed for nausea or vomiting, Disp: 20 tablet, Rfl: 0    oxyCODONE-acetaminophen (Percocet) 5-325 mg per tablet, Take 1 tablet by mouth daily as needed for severe pain Max Daily Amount: 1 tablet, Disp: 10 tablet, Rfl: 0    pantoprazole (PROTONIX) 40 mg tablet, Take 1 tablet (40 mg total) by mouth daily before breakfast, Disp: 30 tablet, Rfl: 5    Senna-Time 8 6 MG tablet, TAKE 1 TABLET (8 6 MG TOTAL) BY MOUTH DAILY AT BEDTIME, Disp: 30 tablet, Rfl: 0    sertraline (Zoloft) 50 mg tablet, Take 1 tablet (50 mg total) by mouth daily, Disp: 30 tablet, Rfl: 5    SUMAtriptan (IMITREX) 25 mg tablet, TAKE 1 TABLET (25 MG TOTAL) BY MOUTH ONCE AS NEEDED FOR MIGRAINE FOR UP TO 1 DOSE, Disp: 9 tablet, Rfl: 0    topiramate (TOPAMAX) 25 mg tablet, Take 25 mg by mouth 2 (two) times a day, Disp: , Rfl:     Current Allergies     Allergies as of 05/26/2022 - Reviewed 04/01/2022   Allergen Reaction Noted    Cymbalta [duloxetine hcl] Nausea Only and GI Intolerance 11/18/2015            The following portions of the patient's history were reviewed and updated as appropriate: allergies, current medications, past family history, past medical history, past social history, past surgical history and problem list      Past Medical History: Diagnosis Date    Allergic rhinitis     Anemia     Anxiety     Chondromalacia of both patellae     Closed fracture of left distal fibula 8/27/2020    Depression     Fibromyalgia     Fibromyalgia     GERD (gastroesophageal reflux disease)     Hematochezia     Herpes simplex infection     HPV (human papilloma virus) infection     11/2013    Hypertension     IBS (irritable bowel syndrome)     Insomnia     Mental status change     Migraine     Obesity 8/6/2015    Scoliosis     Scoliosis        Past Surgical History:   Procedure Laterality Date    EXPLORATORY LAPAROTOMY      WISDOM TOOTH EXTRACTION         Family History   Problem Relation Age of Onset    Stroke Mother     Hypertension Mother     Psoriasis Mother     Hepatitis Father         B     Breast cancer Family         maternal aunt - 27 's     Diabetes type I Son     Diabetes Son     No Known Problems Brother     No Known Problems Daughter     No Known Problems Maternal Grandmother     No Known Problems Maternal Grandfather     No Known Problems Paternal Grandmother     No Known Problems Paternal Grandfather     No Known Problems Brother     No Known Problems Daughter          Medications have been verified  Objective   /78   Pulse 72   Temp 98 °F (36 7 °C) (Temporal)   Resp 12   Ht 5' 3" (1 6 m)   Wt 78 5 kg (173 lb)   SpO2 98%   BMI 30 65 kg/m²        Physical Exam     Physical Exam  Vitals and nursing note reviewed  Constitutional:       General: She is not in acute distress  Appearance: Normal appearance  She is not ill-appearing or toxic-appearing  HENT:      Head: Normocephalic  Right Ear: Tympanic membrane normal       Left Ear: Tympanic membrane normal       Nose: Congestion and rhinorrhea present  Mouth/Throat:      Mouth: Mucous membranes are moist    Eyes:      Conjunctiva/sclera: Conjunctivae normal       Pupils: Pupils are equal, round, and reactive to light  Cardiovascular:      Rate and Rhythm: Normal rate and regular rhythm  Pulses: Normal pulses  Pulmonary:      Effort: Pulmonary effort is normal       Breath sounds: Normal breath sounds  Abdominal:      Tenderness: There is no abdominal tenderness  Musculoskeletal:         General: Normal range of motion  Cervical back: Normal range of motion  Skin:     General: Skin is warm and dry  Neurological:      General: No focal deficit present  Mental Status: She is alert and oriented to person, place, and time     Psychiatric:         Mood and Affect: Mood normal          Behavior: Behavior normal

## 2022-05-26 NOTE — PATIENT INSTRUCTIONS
1  Over-the-counter ibuprofen and/or acetaminophen as needed for pain or fever  2  Oxymetazoline nasal spray 2 sprays in each nostril every 12 hours for no more than the next 5 days as needed for nasal congestion  3  Over-the-counter guaifenesin as needed for mucus relief  4  Gargle salt water as needed for sore throat relief  5  Increase oral fluid consumption  6  Follow-up with primary care provider in 7 days for any persistent symptoms       7  Quarantine pending the results of your COVID test

## 2022-05-27 LAB — SARS-COV-2 RNA RESP QL NAA+PROBE: NEGATIVE

## 2022-06-08 DIAGNOSIS — F41.9 ANXIETY: ICD-10-CM

## 2022-06-08 DIAGNOSIS — G43.709 CHRONIC MIGRAINE WITHOUT AURA WITHOUT STATUS MIGRAINOSUS, NOT INTRACTABLE: ICD-10-CM

## 2022-06-08 DIAGNOSIS — K58.1 IRRITABLE BOWEL SYNDROME WITH CONSTIPATION: ICD-10-CM

## 2022-06-08 RX ORDER — SENNA PLUS 8.6 MG/1
1 TABLET ORAL
Qty: 30 TABLET | Refills: 0 | Status: SHIPPED | OUTPATIENT
Start: 2022-06-08 | End: 2022-07-16

## 2022-06-08 RX ORDER — CLONAZEPAM 0.5 MG/1
0.5 TABLET ORAL DAILY
Qty: 30 TABLET | Refills: 0 | Status: SHIPPED | OUTPATIENT
Start: 2022-06-08 | End: 2022-07-17

## 2022-06-08 RX ORDER — SUMATRIPTAN 25 MG/1
25 TABLET, FILM COATED ORAL ONCE AS NEEDED
Qty: 9 TABLET | Refills: 0 | Status: SHIPPED | OUTPATIENT
Start: 2022-06-08

## 2022-07-15 DIAGNOSIS — K58.1 IRRITABLE BOWEL SYNDROME WITH CONSTIPATION: ICD-10-CM

## 2022-07-15 DIAGNOSIS — F41.9 ANXIETY: ICD-10-CM

## 2022-07-16 RX ORDER — SENNOSIDES 8.6 MG
TABLET ORAL
Qty: 30 TABLET | Refills: 0 | Status: SHIPPED | OUTPATIENT
Start: 2022-07-16 | End: 2022-09-15

## 2022-07-17 RX ORDER — CLONAZEPAM 0.5 MG/1
TABLET ORAL
Qty: 30 TABLET | Refills: 0 | Status: SHIPPED | OUTPATIENT
Start: 2022-07-17 | End: 2022-09-16

## 2022-08-03 ENCOUNTER — OFFICE VISIT (OUTPATIENT)
Dept: FAMILY MEDICINE CLINIC | Facility: CLINIC | Age: 42
End: 2022-08-03
Payer: COMMERCIAL

## 2022-08-03 VITALS
TEMPERATURE: 97.2 F | WEIGHT: 178.2 LBS | SYSTOLIC BLOOD PRESSURE: 120 MMHG | OXYGEN SATURATION: 100 % | HEART RATE: 68 BPM | RESPIRATION RATE: 16 BRPM | HEIGHT: 63 IN | BODY MASS INDEX: 31.57 KG/M2 | DIASTOLIC BLOOD PRESSURE: 72 MMHG

## 2022-08-03 DIAGNOSIS — F41.9 ANXIETY: ICD-10-CM

## 2022-08-03 DIAGNOSIS — G43.709 CHRONIC MIGRAINE WITHOUT AURA WITHOUT STATUS MIGRAINOSUS, NOT INTRACTABLE: ICD-10-CM

## 2022-08-03 DIAGNOSIS — N80.9 ENDOMETRIOSIS: Primary | ICD-10-CM

## 2022-08-03 DIAGNOSIS — M79.7 FIBROMYALGIA: ICD-10-CM

## 2022-08-03 DIAGNOSIS — K58.1 IRRITABLE BOWEL SYNDROME WITH CONSTIPATION: ICD-10-CM

## 2022-08-03 DIAGNOSIS — N28.89 LEFT RENAL MASS: ICD-10-CM

## 2022-08-03 DIAGNOSIS — E66.09 CLASS 1 OBESITY DUE TO EXCESS CALORIES WITHOUT SERIOUS COMORBIDITY WITH BODY MASS INDEX (BMI) OF 31.0 TO 31.9 IN ADULT: ICD-10-CM

## 2022-08-03 DIAGNOSIS — Z11.1 SCREENING-PULMONARY TB: ICD-10-CM

## 2022-08-03 DIAGNOSIS — K21.9 GASTROESOPHAGEAL REFLUX DISEASE WITHOUT ESOPHAGITIS: ICD-10-CM

## 2022-08-03 DIAGNOSIS — Z11.59 NEED FOR HEPATITIS C SCREENING TEST: ICD-10-CM

## 2022-08-03 PROBLEM — T83.32XA IUD THREADS LOST: Status: RESOLVED | Noted: 2019-04-30 | Resolved: 2022-08-03

## 2022-08-03 PROBLEM — J01.90 SUBACUTE SINUSITIS: Status: RESOLVED | Noted: 2021-11-30 | Resolved: 2022-08-03

## 2022-08-03 PROCEDURE — 99214 OFFICE O/P EST MOD 30 MIN: CPT | Performed by: FAMILY MEDICINE

## 2022-08-03 NOTE — PROGRESS NOTES
Assessment/Plan:    Obesity  Diet and exercise encouraged       Irritable bowel syndrome  Bentyl PRN       Endometriosis  s/p diagnostic laparoscopy for pelvic pain on 7/18/2022 with normal findings  Sees Dr Kole Ramon for this   Thinking about scheduling hysterectomy       Migraine headache  topamax is helping   imitrex PRN     Acid reflux disease  Taking pantoprazole as needed   To avoid triggers     Fibromyalgia  Stable  Naproxen PRN     Anxiety  Stable on current meds     Left renal mass  Seen by nephrologist  Repeat CT scan this year        Diagnoses and all orders for this visit:    Endometriosis  -     Comprehensive metabolic panel  -     CBC and differential    Need for hepatitis C screening test  -     Hepatitis C Antibody ([facility]); Future    Class 1 obesity due to excess calories without serious comorbidity with body mass index (BMI) of 31 0 to 31 9 in adult  -     Lipid Panel with Direct LDL reflex; Future  -     Comprehensive metabolic panel  -     CBC and differential    Irritable bowel syndrome with constipation    Chronic migraine without aura without status migrainosus, not intractable    Gastroesophageal reflux disease without esophagitis    Fibromyalgia    Anxiety    Screening-pulmonary TB  -     Quantiferon TB Gold Plus; Future    Left renal mass    Other orders  -     NON FORMULARY; Take 1 Dose by mouth Medical marijuana        Subjective:      Patient ID: Vibha Henson is a 37 y o  female  HPI  Here for follow up   Still having pelvic pain with periods and Bowel movements  Going on vacations and having fun         The following portions of the patient's history were reviewed and updated as appropriate: allergies, current medications, past family history, past medical history, past social history, past surgical history and problem list     Review of Systems   Constitutional: Negative  HENT: Negative  Eyes: Negative  Respiratory: Negative  Cardiovascular: Negative  Gastrointestinal: Negative  Endocrine: Negative  Genitourinary: Negative  Negative for menstrual problem  Musculoskeletal: Positive for arthralgias  Allergic/Immunologic: Negative  Neurological: Negative  Hematological: Negative  Psychiatric/Behavioral: Negative  Objective:      /72 (BP Location: Left arm, Patient Position: Sitting, Cuff Size: Adult)   Pulse 68   Temp (!) 97 2 °F (36 2 °C) (Skin)   Resp 16   Ht 5' 3" (1 6 m)   Wt 80 8 kg (178 lb 3 2 oz)   SpO2 100%   BMI 31 57 kg/m²          Physical Exam  Vitals reviewed  Constitutional:       Appearance: Normal appearance  HENT:      Mouth/Throat:      Mouth: Mucous membranes are moist       Pharynx: No oropharyngeal exudate or posterior oropharyngeal erythema  Cardiovascular:      Rate and Rhythm: Normal rate and regular rhythm  Pulses: Normal pulses  Heart sounds: Normal heart sounds  Pulmonary:      Effort: Pulmonary effort is normal       Breath sounds: Normal breath sounds  Musculoskeletal:      Cervical back: Normal range of motion  Neurological:      General: No focal deficit present  Mental Status: She is alert and oriented to person, place, and time     Psychiatric:         Mood and Affect: Mood normal          Behavior: Behavior normal

## 2022-08-03 NOTE — ASSESSMENT & PLAN NOTE
s/p diagnostic laparoscopy for pelvic pain on 7/18/2022 with normal findings  Sees Dr Alfonso Ruiz for this   Thinking about scheduling hysterectomy

## 2022-08-14 DIAGNOSIS — G43.709 CHRONIC MIGRAINE WITHOUT AURA WITHOUT STATUS MIGRAINOSUS, NOT INTRACTABLE: ICD-10-CM

## 2022-08-14 RX ORDER — SUMATRIPTAN 25 MG/1
25 TABLET, FILM COATED ORAL ONCE AS NEEDED
Qty: 9 TABLET | Refills: 0 | Status: SHIPPED | OUTPATIENT
Start: 2022-08-14

## 2022-08-16 ENCOUNTER — APPOINTMENT (OUTPATIENT)
Dept: LAB | Facility: MEDICAL CENTER | Age: 42
End: 2022-08-16
Payer: COMMERCIAL

## 2022-08-16 DIAGNOSIS — Z11.59 NEED FOR HEPATITIS C SCREENING TEST: ICD-10-CM

## 2022-08-16 DIAGNOSIS — Z11.1 SCREENING-PULMONARY TB: ICD-10-CM

## 2022-08-16 LAB
ALBUMIN SERPL BCP-MCNC: 3.9 G/DL (ref 3.5–5)
ALP SERPL-CCNC: 72 U/L (ref 46–116)
ALT SERPL W P-5'-P-CCNC: 47 U/L (ref 12–78)
ANION GAP SERPL CALCULATED.3IONS-SCNC: 3 MMOL/L (ref 4–13)
AST SERPL W P-5'-P-CCNC: 32 U/L (ref 5–45)
BASOPHILS # BLD AUTO: 0.04 THOUSANDS/ΜL (ref 0–0.1)
BASOPHILS NFR BLD AUTO: 1 % (ref 0–1)
BILIRUB SERPL-MCNC: 0.56 MG/DL (ref 0.2–1)
BUN SERPL-MCNC: 14 MG/DL (ref 5–25)
CALCIUM SERPL-MCNC: 9.5 MG/DL (ref 8.3–10.1)
CHLORIDE SERPL-SCNC: 105 MMOL/L (ref 96–108)
CO2 SERPL-SCNC: 28 MMOL/L (ref 21–32)
CREAT SERPL-MCNC: 0.94 MG/DL (ref 0.6–1.3)
EOSINOPHIL # BLD AUTO: 0.08 THOUSAND/ΜL (ref 0–0.61)
EOSINOPHIL NFR BLD AUTO: 1 % (ref 0–6)
ERYTHROCYTE [DISTWIDTH] IN BLOOD BY AUTOMATED COUNT: 12.4 % (ref 11.6–15.1)
GFR SERPL CREATININE-BSD FRML MDRD: 76 ML/MIN/1.73SQ M
GLUCOSE P FAST SERPL-MCNC: 96 MG/DL (ref 65–99)
HCT VFR BLD AUTO: 43.4 % (ref 34.8–46.1)
HCV AB SER QL: NORMAL
HGB BLD-MCNC: 14 G/DL (ref 11.5–15.4)
IMM GRANULOCYTES # BLD AUTO: 0.03 THOUSAND/UL (ref 0–0.2)
IMM GRANULOCYTES NFR BLD AUTO: 0 % (ref 0–2)
LYMPHOCYTES # BLD AUTO: 1.36 THOUSANDS/ΜL (ref 0.6–4.47)
LYMPHOCYTES NFR BLD AUTO: 19 % (ref 14–44)
MCH RBC QN AUTO: 32.1 PG (ref 26.8–34.3)
MCHC RBC AUTO-ENTMCNC: 32.3 G/DL (ref 31.4–37.4)
MCV RBC AUTO: 100 FL (ref 82–98)
MONOCYTES # BLD AUTO: 0.41 THOUSAND/ΜL (ref 0.17–1.22)
MONOCYTES NFR BLD AUTO: 6 % (ref 4–12)
NEUTROPHILS # BLD AUTO: 5.2 THOUSANDS/ΜL (ref 1.85–7.62)
NEUTS SEG NFR BLD AUTO: 73 % (ref 43–75)
NRBC BLD AUTO-RTO: 0 /100 WBCS
PLATELET # BLD AUTO: 225 THOUSANDS/UL (ref 149–390)
PMV BLD AUTO: 11.4 FL (ref 8.9–12.7)
POTASSIUM SERPL-SCNC: 4.1 MMOL/L (ref 3.5–5.3)
PROT SERPL-MCNC: 7.5 G/DL (ref 6.4–8.4)
RBC # BLD AUTO: 4.36 MILLION/UL (ref 3.81–5.12)
SODIUM SERPL-SCNC: 136 MMOL/L (ref 135–147)
WBC # BLD AUTO: 7.12 THOUSAND/UL (ref 4.31–10.16)

## 2022-08-16 PROCEDURE — 86480 TB TEST CELL IMMUN MEASURE: CPT

## 2022-08-16 PROCEDURE — 36415 COLL VENOUS BLD VENIPUNCTURE: CPT | Performed by: FAMILY MEDICINE

## 2022-08-16 PROCEDURE — 80053 COMPREHEN METABOLIC PANEL: CPT | Performed by: FAMILY MEDICINE

## 2022-08-16 PROCEDURE — 85025 COMPLETE CBC W/AUTO DIFF WBC: CPT | Performed by: FAMILY MEDICINE

## 2022-08-16 PROCEDURE — 86803 HEPATITIS C AB TEST: CPT

## 2022-08-18 LAB
GAMMA INTERFERON BACKGROUND BLD IA-ACNC: 0.03 IU/ML
M TB IFN-G BLD-IMP: NEGATIVE
M TB IFN-G CD4+ BCKGRND COR BLD-ACNC: 0.01 IU/ML
M TB IFN-G CD4+ BCKGRND COR BLD-ACNC: 0.01 IU/ML
MITOGEN IGNF BCKGRD COR BLD-ACNC: >10 IU/ML

## 2022-09-15 DIAGNOSIS — F41.9 ANXIETY: ICD-10-CM

## 2022-09-15 DIAGNOSIS — K58.1 IRRITABLE BOWEL SYNDROME WITH CONSTIPATION: ICD-10-CM

## 2022-09-15 RX ORDER — SENNOSIDES 8.6 MG
TABLET ORAL
Qty: 30 TABLET | Refills: 0 | Status: SHIPPED | OUTPATIENT
Start: 2022-09-15

## 2022-09-16 RX ORDER — CLONAZEPAM 0.5 MG/1
TABLET ORAL
Qty: 30 TABLET | Refills: 0 | Status: SHIPPED | OUTPATIENT
Start: 2022-09-16

## 2022-11-09 DIAGNOSIS — K58.1 IRRITABLE BOWEL SYNDROME WITH CONSTIPATION: ICD-10-CM

## 2022-11-09 DIAGNOSIS — F41.9 ANXIETY: ICD-10-CM

## 2022-11-09 DIAGNOSIS — G43.709 CHRONIC MIGRAINE WITHOUT AURA WITHOUT STATUS MIGRAINOSUS, NOT INTRACTABLE: ICD-10-CM

## 2022-11-09 RX ORDER — SUMATRIPTAN 25 MG/1
25 TABLET, FILM COATED ORAL ONCE AS NEEDED
Qty: 9 TABLET | Refills: 0 | Status: SHIPPED | OUTPATIENT
Start: 2022-11-09 | End: 2022-11-14 | Stop reason: SDUPTHER

## 2022-11-09 RX ORDER — SENNOSIDES 8.6 MG
TABLET ORAL
Qty: 30 TABLET | Refills: 0 | Status: SHIPPED | OUTPATIENT
Start: 2022-11-09

## 2022-11-10 RX ORDER — CLONAZEPAM 0.5 MG/1
TABLET ORAL
Qty: 30 TABLET | Refills: 0 | Status: SHIPPED | OUTPATIENT
Start: 2022-11-10

## 2022-11-14 DIAGNOSIS — M79.7 FIBROMYALGIA: Primary | ICD-10-CM

## 2022-11-14 DIAGNOSIS — G43.709 CHRONIC MIGRAINE WITHOUT AURA WITHOUT STATUS MIGRAINOSUS, NOT INTRACTABLE: ICD-10-CM

## 2022-11-14 RX ORDER — METHOCARBAMOL 500 MG/1
500 TABLET, FILM COATED ORAL 4 TIMES DAILY PRN
Qty: 30 TABLET | Refills: 0 | Status: SHIPPED | OUTPATIENT
Start: 2022-11-14 | End: 2022-11-28

## 2022-11-14 RX ORDER — SUMATRIPTAN 25 MG/1
25 TABLET, FILM COATED ORAL ONCE AS NEEDED
Qty: 9 TABLET | Refills: 0 | Status: SHIPPED | OUTPATIENT
Start: 2022-11-14

## 2022-11-28 DIAGNOSIS — M79.7 FIBROMYALGIA: ICD-10-CM

## 2022-11-28 RX ORDER — METHOCARBAMOL 500 MG/1
TABLET, FILM COATED ORAL
Qty: 30 TABLET | Refills: 0 | Status: SHIPPED | OUTPATIENT
Start: 2022-11-28

## 2022-12-12 ENCOUNTER — HOSPITAL ENCOUNTER (OUTPATIENT)
Dept: RADIOLOGY | Facility: MEDICAL CENTER | Age: 42
Discharge: HOME | End: 2022-12-12

## 2022-12-12 VITALS — HEIGHT: 63 IN | WEIGHT: 178.2 LBS | BODY MASS INDEX: 31.57 KG/M2

## 2022-12-12 DIAGNOSIS — Z12.31 ENCOUNTER FOR SCREENING MAMMOGRAM FOR MALIGNANT NEOPLASM OF BREAST: ICD-10-CM

## 2022-12-12 DIAGNOSIS — Z12.31 VISIT FOR SCREENING MAMMOGRAM: ICD-10-CM

## 2022-12-19 ENCOUNTER — APPOINTMENT (OUTPATIENT)
Dept: LAB | Facility: CLINIC | Age: 42
End: 2022-12-19

## 2022-12-19 DIAGNOSIS — Z00.00 ANNUAL PHYSICAL EXAM: ICD-10-CM

## 2022-12-19 DIAGNOSIS — E66.09 CLASS 1 OBESITY DUE TO EXCESS CALORIES WITHOUT SERIOUS COMORBIDITY WITH BODY MASS INDEX (BMI) OF 31.0 TO 31.9 IN ADULT: ICD-10-CM

## 2022-12-19 LAB
CHOLEST SERPL-MCNC: 198 MG/DL
HDLC SERPL-MCNC: 60 MG/DL
LDLC SERPL CALC-MCNC: 114 MG/DL (ref 0–100)
TRIGL SERPL-MCNC: 118 MG/DL

## 2022-12-23 ENCOUNTER — OFFICE VISIT (OUTPATIENT)
Dept: URGENT CARE | Age: 42
End: 2022-12-23

## 2022-12-23 VITALS
SYSTOLIC BLOOD PRESSURE: 141 MMHG | HEART RATE: 73 BPM | DIASTOLIC BLOOD PRESSURE: 62 MMHG | RESPIRATION RATE: 20 BRPM | TEMPERATURE: 97.7 F | OXYGEN SATURATION: 99 %

## 2022-12-23 DIAGNOSIS — G43.709 CHRONIC MIGRAINE WITHOUT AURA WITHOUT STATUS MIGRAINOSUS, NOT INTRACTABLE: ICD-10-CM

## 2022-12-23 DIAGNOSIS — G43.919 INTRACTABLE MIGRAINE WITHOUT STATUS MIGRAINOSUS, UNSPECIFIED MIGRAINE TYPE: Primary | ICD-10-CM

## 2022-12-23 RX ORDER — ONDANSETRON 4 MG/1
4 TABLET, ORALLY DISINTEGRATING ORAL ONCE
Status: COMPLETED | OUTPATIENT
Start: 2022-12-23 | End: 2022-12-23

## 2022-12-23 RX ORDER — SUMATRIPTAN 25 MG/1
25 TABLET, FILM COATED ORAL ONCE AS NEEDED
Qty: 9 TABLET | Refills: 2 | Status: SHIPPED | OUTPATIENT
Start: 2022-12-23

## 2022-12-23 RX ORDER — KETOROLAC TROMETHAMINE 30 MG/ML
30 INJECTION, SOLUTION INTRAMUSCULAR; INTRAVENOUS ONCE
Status: COMPLETED | OUTPATIENT
Start: 2022-12-23 | End: 2022-12-23

## 2022-12-23 RX ORDER — ONDANSETRON 4 MG/1
4 TABLET, FILM COATED ORAL EVERY 8 HOURS PRN
Qty: 20 TABLET | Refills: 0 | Status: SHIPPED | OUTPATIENT
Start: 2022-12-23

## 2022-12-23 RX ORDER — METHYLPREDNISOLONE 4 MG/1
TABLET ORAL
Qty: 21 TABLET | Refills: 0 | Status: SHIPPED | OUTPATIENT
Start: 2022-12-23 | End: 2022-12-29

## 2022-12-23 RX ADMIN — ONDANSETRON 4 MG: 4 TABLET, ORALLY DISINTEGRATING ORAL at 13:39

## 2022-12-23 RX ADMIN — KETOROLAC TROMETHAMINE 30 MG: 30 INJECTION, SOLUTION INTRAMUSCULAR; INTRAVENOUS at 13:38

## 2022-12-23 NOTE — PROGRESS NOTES
[facility]        NAME: Davis Justin is a 40 y o  female  : 1980    MRN: [id number]  DATE: 2022  TIME: 2:08 PM    Assessment and Plan   Intractable migraine without status migrainosus, unspecified migraine type [G43 [id number]]  1  Intractable migraine without status migrainosus, unspecified migraine type  ketorolac (TORADOL) injection 30 mg    ondansetron (ZOFRAN) 4 mg tablet    ondansetron (ZOFRAN-ODT) dispersible tablet 4 mg    methylPREDNISolone 4 MG tablet therapy pack      55-year-old female presents for evaluation of migraine, 30 mg dose of IM Toradol given in office along with ODT Zofran, will send home with as needed prescription  Medrol Dosepak ordered, patient advised to follow-up with neurologist as soon as possible for worsening headaches  Patient Instructions     Migraine Headache   WHAT YOU NEED TO KNOW:   A migraine is a severe headache  The pain can be so severe that it interferes with your daily activities  A migraine can last a few hours up to several days  The exact cause of migraines is not known  DISCHARGE INSTRUCTIONS:   Call your local emergency number (911 in the [address] [place] [address],[address]) or have someone call if:   • You feel like you are going to faint, you become confused, or you have a seizure         Return to the emergency department if:   • You have a headache that seems different or much worse than your usual migraine headache      • You have a severe headache with a fever or a stiff neck      • You have new problems with speech, vision, balance, or movement      Call your doctor or neurologist if:   • Your migraines interfere with your daily activities      • Your medicines or treatments stop working      • You have questions or concerns about your condition or care      Medicines:  Some medicines may only be given while you are in the emergency department  You may also need medicines later to manage migraines or other health problems they can cause   Take medicine as soon as you feel a migraine begin, or as directed  • Migraine medicines  are used to help prevent or stop a migraine      • NSAIDs  help decrease swelling and pain or fever  This medicine is available with or without a doctor's order  NSAIDs can cause stomach bleeding or kidney problems in certain people  If you take blood thinner medicine, always ask your healthcare provider if NSAIDs are safe for you  Always read the medicine label and follow directions      • Acetaminophen  decreases pain and fever  It is available without a doctor's order  Ask how much to take and how often to take it  Follow directions  Read the labels of all other medicines you are using to see if they also contain acetaminophen, or ask your doctor or pharmacist  Acetaminophen can cause liver damage if not taken correctly  Do not use more than 4 grams (4,000 milligrams) total of acetaminophen in one day       • Prescription pain medicine  may be given  Ask your healthcare provider how to take this medicine safely  Some prescription pain medicines contain acetaminophen  Do not take other medicines that contain acetaminophen without talking to your healthcare provider  Too much acetaminophen may cause liver damage  Prescription pain medicine may cause constipation  Ask your healthcare provider how to prevent or treat constipation       • Antinausea medicine  may be given to calm your stomach and to help prevent vomiting  This medicine can also help relieve pain      • Steroids  may be given to prevent a migraine from coming back right away      • Take your medicine as directed  Contact your healthcare provider if you think your medicine is not helping or if you have side effects  Tell him or her if you are allergic to any medicine  Keep a list of the medicines, vitamins, and herbs you take  Include the amounts, and when and why you take them  Bring the list or the pill bottles to follow-up visits   Carry your medicine list with you in case of an emergency      Manage your symptoms:   • Rest in a dark, quiet room  This will help decrease your pain  Sleep may also help relieve the pain      • Apply ice to decrease pain  Use an ice pack, or put crushed ice in a plastic bag  Cover the ice pack with a towel and place it on your head  Apply ice for 15 to 20 minutes every hour      • Apply heat to decrease pain and muscle spasms  Use a small towel dampened with warm water or a heating pad, or sit in a warm bath  Apply heat on the area for 20 to 30 minutes every 2 hours  You may alternate heat and ice      • Keep a migraine record  Write down when your migraines start and stop  Include your symptoms and what you were doing when a migraine began  Record what you ate or drank for 24 hours before the migraine started  Keep track of what you did to treat your migraine and if it worked  Bring the migraine record with you to visits with your healthcare provider      Common triggers for a migraine include the following:   • Stress, eye strain, oversleeping, or not getting enough sleep     • Hormone changes in women from birth control pills, pregnancy, menopause, or during a monthly period     • Skipping meals, going too long without eating, or not drinking enough liquids     • Certain foods or drinks such as chocolate, hard cheese, alcohol, or drinks that contain caffeine     • Foods that contain gluten, nitrates, MSG, or artificial sweeteners     • Sunlight, bright or flashing lights, loud noises, smoke, or strong smells     • Heat, humidity, or changes in the weather     Prevent another migraine:   • Prevent a medicine overuse headache  Take pain medicines only as long as directed  A medicine may be limited to a certain amount each month  Your healthcare provider can help you create a plan so you get a safe amount each month      • Do not smoke  Nicotine and other chemicals in cigarettes and cigars can trigger a migraine or make it worse   Ask your healthcare provider for information if you currently smoke and need help to quit  E-cigarettes or smokeless tobacco still contain nicotine  Talk to your healthcare provider before you use these products      • Do not drink alcohol  Alcohol can trigger a migraine  It can also keep medicines used to treat your migraines from working      • Be physically active  Physical activity, such as exercise, may help prevent migraines  Talk to your healthcare provider about the best activity plan for you  Try to get at least 30 minutes of physical activity on most days           • Manage stress  Stress may trigger a migraine  Learn new ways to relax, such as deep breathing      • Create a sleep schedule  Go to bed and get up at the same times each day  Do not watch television before bed      • Eat a variety of healthy foods  Include healthy foods such as fruit, vegetables, whole-grain breads, low-fat dairy products, beans, lean meat, and fish  Do not have food or drinks that trigger your migraines           • Drink more liquids to prevent dehydration  Your healthcare provider can tell you how much liquid to drink each day and which liquids are best for you      Follow up with your doctor or neurologist as directed:  Bring your migraine record with you  Write down your questions so you remember to ask them during your visits  © Copyright [employer] 2022 Information is for End User's use only and may not be sold, redistributed or otherwise used for commercial purposes  All illustrations and images included in CareNotes® are the copyrighted property of [facility] [employer]  or [facility] Aditya Huerta   The above information is an  only  It is not intended as medical advice for individual conditions or treatments  Talk to your doctor, nurse or pharmacist before following any medical regimen to see if it is safe and effective for you             Follow up with PCP in 3-5 days  Proceed to  ER if symptoms worsen      Chief Complaint Chief Complaint   Patient presents with   • Migraine   • Nausea     Migraine headache, nausea, neck pain radiating down to left arm for 2 weeks  Patient stated she took Excedrin 2 hour ago and it's not helping  History of Present Illness       Patient is a 30-year-old female past medical history significant for migraines, fibromyalgia, who presents for evaluation of intractable migraine since this morning  She reports a frontal headache which radiates down her left arm and left back, as well as photosensitivity  This is a typical migraine for her  She usually takes Imitrex, but has run out, although she notes it does not usually help very much  She took Excedrin migraine with little relief  She reports nausea and notes vomiting once today  She denies vision changes, dizziness/lightheadedness/syncope, chest pain, palpitations, shortness of breath  Migraine  Associated symptoms include nausea and photophobia  Pertinent negatives include no back pain, coughing, diarrhea, dizziness, ear pain, eye pain, eye redness, fever, neck pain, numbness, rhinorrhea, seizures, sinus pressure, sore throat, vomiting or weakness  Nausea  Associated symptoms include headaches and nausea  Pertinent negatives include no arthralgias, chest pain, congestion, coughing, fatigue, fever, myalgias, neck pain, numbness, rash, sore throat, vomiting or weakness  Review of Systems   Review of Systems   Constitutional: Negative for fatigue and fever  HENT: Negative for congestion, ear discharge, ear pain, postnasal drip, rhinorrhea, sinus pressure, sinus pain, sneezing and sore throat  Eyes: Positive for photophobia  Negative for pain, discharge, redness, itching and visual disturbance  Respiratory: Negative  Negative for apnea, cough, choking, chest tightness, shortness of breath, wheezing and stridor  Cardiovascular: Negative  Negative for chest pain and palpitations     Gastrointestinal: Positive for nausea  Negative for diarrhea and vomiting  Endocrine: Negative  Negative for polydipsia, polyphagia and polyuria  Genitourinary: Negative  Negative for decreased urine volume and flank pain  Musculoskeletal: Negative  Negative for arthralgias, back pain, myalgias, neck pain and neck stiffness  Skin: Negative  Negative for color change and rash  Allergic/Immunologic: Negative  Negative for environmental allergies  Neurological: Positive for headaches  Negative for dizziness, tremors, seizures, syncope, facial asymmetry, speech difficulty, weakness, light-headedness and numbness  Hematological: Negative  Negative for adenopathy  Psychiatric/Behavioral: Negative  Current Medications       Current Outpatient Medications:   •  methylPREDNISolone 4 MG tablet therapy pack, Take 6 tablets (24 mg total) by mouth daily for 1 day, THEN 5 tablets (20 mg total) daily for 1 day, THEN 4 tablets (16 mg total) daily for 1 day, THEN 3 tablets (12 mg total) daily for 1 day, THEN 2 tablets (8 mg total) daily for 1 day, THEN 1 tablet (4 mg total) daily for 1 day  Use as directed on package  , Disp: 21 tablet, Rfl: 0  •  ondansetron (ZOFRAN) 4 mg tablet, Take 1 tablet (4 mg total) by mouth every 8 (eight) hours as needed for nausea or vomiting, Disp: 20 tablet, Rfl: 0  •  albuterol (PROVENTIL HFA,VENTOLIN HFA) 90 mcg/act inhaler, INHALE 2 PUFFS EVERY 6 (SIX) HOURS AS NEEDED FOR SHORTNESS OF BREATH, Disp: 8 g, Rfl: 0  •  benzonatate (TESSALON) 200 MG capsule, Take 1 capsule (200 mg total) by mouth 3 (three) times a day as needed for cough, Disp: 20 capsule, Rfl: 0  •  clonazePAM (KlonoPIN) 0 5 mg tablet, TAKE 1 TABLET BY MOUTH EVERY DAY, Disp: 30 tablet, Rfl: 0  •  dicyclomine (BENTYL) 10 mg capsule, TAKE 1 CAPSULE BY MOUTH 4 TIMES A DAY (BEFORE MEALS AND AT BEDTIME) (Patient taking differently: Take by mouth 4 (four) times a day (before meals and at bedtime)), Disp: 30 capsule, Rfl: 0  •  ergocalciferol (VITAMIN D2) 50,000 units, Take 50,000 Units by mouth once a week, Disp: , Rfl:   •  levonorgestrel (MIRENA) 20 MCG/24HR IUD, Mirena 20 MCG/24HR Intrauterine Intrauterine Device  Refills: 0  Active, Disp: , Rfl:   •  methocarbamol (ROBAXIN) 500 mg tablet, TAKE 1 TABLET BY MOUTH 4 TIMES A DAY AS NEEDED FOR MUSCLE SPASMS , Disp: 30 tablet, Rfl: 0  •  NON FORMULARY, Take 1 Dose by mouth Medical marijuana, Disp: , Rfl:   •  pantoprazole (PROTONIX) 40 mg tablet, Take 1 tablet (40 mg total) by mouth daily before breakfast, Disp: 30 tablet, Rfl: 5  •  senna (SENOKOT) 8 6 mg, TAKE 1 TABLET (8 6 MG TOTAL) BY MOUTH DAILY AT BEDTIME, Disp: 30 tablet, Rfl: 0  •  sertraline (Zoloft) 50 mg tablet, Take 1 tablet (50 mg total) by mouth daily, Disp: 30 tablet, Rfl: 5  •  SUMAtriptan (IMITREX) 25 mg tablet, Take 1 tablet (25 mg total) by mouth once as needed for migraine for up to 1 dose, Disp: 9 tablet, Rfl: 2  •  topiramate (TOPAMAX) 25 mg tablet, Take 25 mg by mouth 2 (two) times a day, Disp: , Rfl:   No current facility-administered medications for this visit      Current Allergies     Allergies as of 12/23/2022 - Reviewed 12/23/2022   Allergen Reaction Noted   • Cymbalta [duloxetine hcl] Nausea Only and GI Intolerance 11/18/2015            The following portions of the patient's history were reviewed and updated as appropriate: allergies, current medications, past family history, past medical history, past social history, past surgical history and problem list      Past Medical History:   Diagnosis Date   • Allergic rhinitis    • Anemia    • Anxiety    • Chondromalacia of both patellae    • Closed fracture of left distal fibula 8/27/2020   • Depression    • Fibromyalgia    • Fibromyalgia    • GERD (gastroesophageal reflux disease)    • Hematochezia    • Herpes simplex infection    • HPV (human papilloma virus) infection     11/2013   • Hypertension    • IBS (irritable bowel syndrome)    • Insomnia    • Mental status change    • Migraine    • Obesity 8/6/2015   • Scoliosis    • Scoliosis        Past Surgical History:   Procedure Laterality Date   • BREAST BIOPSY Left 12/10/2013   • EXPLORATORY LAPAROTOMY     • WISDOM TOOTH EXTRACTION         Family History   Problem Relation Age of Onset   • Stroke Mother    • Hypertension Mother    • Psoriasis Mother    • Hepatitis Father         B    • No Known Problems Daughter    • No Known Problems Daughter    • No Known Problems Maternal Grandmother    • No Known Problems Maternal Grandfather    • No Known Problems Paternal Grandmother    • No Known Problems Paternal Grandfather    • No Known Problems Brother    • No Known Problems Brother    • Diabetes type I Son    • Diabetes Son    • Breast cancer Family         maternal aunt - 27 's    • Breast cancer Maternal Aunt 30   • Breast cancer Cousin 30         Medications have been verified  Objective   /62   Pulse 73   Temp 97 7 °F (36 5 °C) (Temporal)   Resp 20   SpO2 99%        Physical Exam     Physical Exam  Vitals and nursing note reviewed  Constitutional:       General: She is not in acute distress  Appearance: Normal appearance  She is not ill-appearing, toxic-appearing or diaphoretic  Interventions: She is not intubated  HENT:      Head: Normocephalic and atraumatic  Right Ear: External ear normal       Left Ear: External ear normal       Nose: Nose normal  No congestion or rhinorrhea  Mouth/Throat:      Mouth: Mucous membranes are moist       Pharynx: No oropharyngeal exudate or posterior oropharyngeal erythema  Eyes:      Extraocular Movements: Extraocular movements intact  Conjunctiva/sclera: Conjunctivae normal       Pupils: Pupils are equal, round, and reactive to light  Cardiovascular:      Rate and Rhythm: Normal rate and regular rhythm  Pulses: Normal pulses  Heart sounds: Normal heart sounds, S1 normal and S2 normal  Heart sounds not distant  No murmur heard  No friction rub  No gallop  Pulmonary:      Effort: Pulmonary effort is normal  No tachypnea, bradypnea, accessory muscle usage, prolonged expiration, respiratory distress or retractions  She is not intubated  Breath sounds: Normal breath sounds  No stridor, decreased air movement or transmitted upper airway sounds  No decreased breath sounds, wheezing, rhonchi or rales  Chest:      Chest wall: No tenderness  Abdominal:      General: Bowel sounds are normal       Palpations: Abdomen is soft  Tenderness: There is no abdominal tenderness  There is no guarding or rebound  Musculoskeletal:         General: Normal range of motion  Cervical back: Normal range of motion and neck supple  No rigidity or tenderness  Lymphadenopathy:      Cervical: No cervical adenopathy  Skin:     General: Skin is warm and dry  Capillary Refill: Capillary refill takes less than 2 seconds  Neurological:      General: No focal deficit present  Mental Status: She is alert and oriented to person, place, and time  Mental status is at baseline  GCS: GCS eye subscore is 4  GCS verbal subscore is 5  GCS motor subscore is 6  Cranial Nerves: No cranial nerve deficit  Sensory: Sensation is intact  Motor: Motor function is intact  Coordination: Coordination is intact  Gait: Gait is intact  Comments: PERRLA, 3 mm bilaterally, no nystagmus extraocular movements     Psychiatric:         Mood and Affect: Mood normal          Behavior: Behavior normal

## 2022-12-23 NOTE — TELEPHONE ENCOUNTER
Medication Refill Request     Name SUMAtriptan (IMITREX) 25 mg tablet  Dose/Frequency Take 1 tablet (25 mg total) by mouth once as needed for migraine for up to 1 dose  Quantity 9  Verified pharmacy   [x]  Verified ordering Provider   [x]  Does patient have enough for the next 3 days?  Yes [] No [x]

## 2022-12-31 DIAGNOSIS — K58.1 IRRITABLE BOWEL SYNDROME WITH CONSTIPATION: ICD-10-CM

## 2022-12-31 DIAGNOSIS — F41.9 ANXIETY: ICD-10-CM

## 2022-12-31 DIAGNOSIS — M79.7 FIBROMYALGIA: ICD-10-CM

## 2022-12-31 RX ORDER — SENNOSIDES 8.6 MG
TABLET ORAL
Qty: 30 TABLET | Refills: 0 | Status: SHIPPED | OUTPATIENT
Start: 2022-12-31

## 2023-01-01 RX ORDER — METHOCARBAMOL 500 MG/1
TABLET, FILM COATED ORAL
Qty: 30 TABLET | Refills: 0 | Status: SHIPPED | OUTPATIENT
Start: 2023-01-01 | End: 2023-01-10

## 2023-01-01 RX ORDER — CLONAZEPAM 0.5 MG/1
TABLET ORAL
Qty: 30 TABLET | Refills: 0 | Status: SHIPPED | OUTPATIENT
Start: 2023-01-01

## 2023-01-03 ENCOUNTER — OFFICE VISIT (OUTPATIENT)
Dept: FAMILY MEDICINE CLINIC | Facility: CLINIC | Age: 43
End: 2023-01-03

## 2023-01-03 VITALS
HEIGHT: 63 IN | SYSTOLIC BLOOD PRESSURE: 114 MMHG | WEIGHT: 182.4 LBS | DIASTOLIC BLOOD PRESSURE: 74 MMHG | TEMPERATURE: 98.5 F | RESPIRATION RATE: 14 BRPM | OXYGEN SATURATION: 98 % | BODY MASS INDEX: 32.32 KG/M2 | HEART RATE: 81 BPM

## 2023-01-03 DIAGNOSIS — G43.709 CHRONIC MIGRAINE WITHOUT AURA WITHOUT STATUS MIGRAINOSUS, NOT INTRACTABLE: ICD-10-CM

## 2023-01-03 DIAGNOSIS — B34.9 VIRAL ILLNESS: Primary | ICD-10-CM

## 2023-01-03 DIAGNOSIS — K58.1 IRRITABLE BOWEL SYNDROME WITH CONSTIPATION: ICD-10-CM

## 2023-01-03 RX ORDER — DICYCLOMINE HCL 20 MG
20 TABLET ORAL EVERY 6 HOURS
Qty: 20 TABLET | Refills: 0 | Status: SHIPPED | OUTPATIENT
Start: 2023-01-03

## 2023-01-03 RX ORDER — PROPRANOLOL HCL 60 MG
60 CAPSULE, EXTENDED RELEASE 24HR ORAL DAILY
Qty: 30 CAPSULE | Refills: 0 | Status: SHIPPED | OUTPATIENT
Start: 2023-01-03 | End: 2023-01-10

## 2023-01-03 RX ORDER — DEXAMETHASONE 4 MG/1
4 TABLET ORAL 2 TIMES DAILY WITH MEALS
Qty: 6 TABLET | Refills: 0 | Status: SHIPPED | OUTPATIENT
Start: 2023-01-03 | End: 2023-01-06

## 2023-01-03 NOTE — PROGRESS NOTES
Name: Ana Laura Alfaro      : 1980      MRN: [id number]  Encounter Provider: Jocelynn Sanon MD  Encounter Date: 1/3/2023   Encounter department: [facility]     1  Viral illness  -     Covid/Flu- Office Collect    2  Irritable bowel syndrome with constipation  -     dicyclomine (BENTYL) 20 mg tablet; Take 1 tablet (20 mg total) by mouth every 6 (six) hours    3  Chronic migraine without aura without status migrainosus, not intractable  Assessment & Plan:  Flare up due to viral illness  Dexamethasone as directed  Inderal as directed     Orders:  -     propranolol (INDERAL LA) 60 mg 24 hr capsule; Take 1 capsule (60 mg total) by mouth daily  -     dexamethasone (DECADRON) 4 mg tablet; Take 1 tablet (4 mg total) by mouth 2 (two) times a day with meals for 3 days           Subjective      URI   This is a new problem  Episode onset: 3-4 days ago  The problem has been waxing and waning  Associated symptoms include diarrhea, ear pain, headaches and joint pain  Pertinent negatives include no abdominal pain, chest pain, congestion, coughing, dysuria, joint swelling, nausea, neck pain, plugged ear sensation, rash, rhinorrhea, sinus pain, sneezing, sore throat, swollen glands, vomiting or wheezing  Review of Systems   HENT: Positive for ear pain  Negative for congestion, rhinorrhea, sinus pain, sneezing and sore throat  Respiratory: Negative for cough and wheezing  Cardiovascular: Negative for chest pain  Gastrointestinal: Positive for diarrhea  Negative for abdominal pain, nausea and vomiting  Genitourinary: Negative for dysuria  Musculoskeletal: Positive for joint pain  Negative for neck pain  Skin: Negative for rash  Neurological: Positive for headaches         Current Outpatient Medications on File Prior to Visit   Medication Sig   • albuterol (PROVENTIL HFA,VENTOLIN HFA) 90 mcg/act inhaler INHALE 2 PUFFS EVERY 6 (SIX) HOURS AS NEEDED FOR SHORTNESS OF BREATH   • benzonatate (TESSALON) 200 MG capsule Take 1 capsule (200 mg total) by mouth 3 (three) times a day as needed for cough   • clonazePAM (KlonoPIN) 0 5 mg tablet TAKE 1 TABLET BY MOUTH EVERY DAY   • [facility] Senna 8 6 MG tablet TAKE 1 TABLET (8 6 MG TOTAL) BY MOUTH DAILY AT BEDTIME   • ergocalciferol (VITAMIN D2) 50,000 units Take 50,000 Units by mouth once a week   • levonorgestrel (MIRENA) 20 MCG/24HR IUD Mirena 20 MCG/24HR Intrauterine Intrauterine Device  Refills: 0  Active   • methocarbamol (ROBAXIN) 500 mg tablet TAKE 1 TABLET BY MOUTH 4 TIMES A DAY AS NEEDED FOR MUSCLE SPASMS  • NON FORMULARY Take 1 Dose by mouth Medical marijuana   • ondansetron (ZOFRAN) 4 mg tablet Take 1 tablet (4 mg total) by mouth every 8 (eight) hours as needed for nausea or vomiting   • pantoprazole (PROTONIX) 40 mg tablet Take 1 tablet (40 mg total) by mouth daily before breakfast   • [DISCONTINUED] dicyclomine (BENTYL) 10 mg capsule TAKE 1 CAPSULE BY MOUTH 4 TIMES A DAY (BEFORE MEALS AND AT BEDTIME) (Patient taking differently: Take by mouth 4 (four) times a day (before meals and at bedtime))   • [DISCONTINUED] SUMAtriptan (IMITREX) 25 mg tablet Take 1 tablet (25 mg total) by mouth once as needed for migraine for up to 1 dose   • [DISCONTINUED] topiramate (TOPAMAX) 25 mg tablet Take 25 mg by mouth 2 (two) times a day   • sertraline (Zoloft) 50 mg tablet Take 1 tablet (50 mg total) by mouth daily (Patient not taking: Reported on 1/3/2023)       Objective     /74 (BP Location: Left arm, Patient Position: Sitting, Cuff Size: Large)   Pulse 81   Temp 98 5 °F (36 9 °C) (Tympanic)   Resp 14   Ht 5' 3" (1 6 m)   Wt 82 7 kg (182 lb 6 4 oz)   SpO2 98%   BMI 32 31 kg/m²     Physical Exam  Constitutional:       Appearance: Normal appearance  HENT:      Nose: Congestion present  No rhinorrhea  Cardiovascular:      Rate and Rhythm: Normal rate and regular rhythm  Pulses: Normal pulses        Heart sounds: Normal heart sounds  Pulmonary:      Effort: Pulmonary effort is normal       Breath sounds: Normal breath sounds  Abdominal:      General: Bowel sounds are normal  There is no distension  Palpations: There is no mass  Neurological:      General: No focal deficit present  Mental Status: She is alert and oriented to person, place, and time         Rajiv Bray MD

## 2023-01-04 LAB
FLUAV RNA RESP QL NAA+PROBE: NEGATIVE
FLUBV RNA RESP QL NAA+PROBE: NEGATIVE
SARS-COV-2 RNA RESP QL NAA+PROBE: NEGATIVE

## 2023-01-05 DIAGNOSIS — M79.7 FIBROMYALGIA: Primary | ICD-10-CM

## 2023-01-05 RX ORDER — GABAPENTIN 100 MG/1
100 CAPSULE ORAL 3 TIMES DAILY
Qty: 90 CAPSULE | Refills: 0 | Status: SHIPPED | OUTPATIENT
Start: 2023-01-05 | End: 2023-01-16

## 2023-01-09 ENCOUNTER — APPOINTMENT (EMERGENCY)
Dept: CT IMAGING | Facility: HOSPITAL | Age: 43
End: 2023-01-09

## 2023-01-09 ENCOUNTER — HOSPITAL ENCOUNTER (OUTPATIENT)
Facility: HOSPITAL | Age: 43
Setting detail: OBSERVATION
Discharge: HOME | End: 2023-01-10
Attending: EMERGENCY MEDICINE | Admitting: INTERNAL MEDICINE

## 2023-01-09 DIAGNOSIS — F41.9 ANXIETY: ICD-10-CM

## 2023-01-09 DIAGNOSIS — G43.919 INTRACTABLE MIGRAINE: Primary | ICD-10-CM

## 2023-01-09 DIAGNOSIS — G43.709 CHRONIC MIGRAINE WITHOUT AURA WITHOUT STATUS MIGRAINOSUS, NOT INTRACTABLE: ICD-10-CM

## 2023-01-09 LAB
ALBUMIN SERPL BCP-MCNC: 3.4 G/DL (ref 3.5–5)
ALP SERPL-CCNC: 43 U/L (ref 34–104)
ALT SERPL W P-5'-P-CCNC: 28 U/L (ref 7–52)
ANION GAP SERPL CALCULATED.3IONS-SCNC: 4 MMOL/L (ref 4–13)
AST SERPL W P-5'-P-CCNC: 15 U/L (ref 13–39)
BASOPHILS # BLD AUTO: 0.02 THOUSANDS/ÂΜL (ref 0–0.1)
BASOPHILS NFR BLD AUTO: 0 % (ref 0–1)
BILIRUB SERPL-MCNC: 0.26 MG/DL (ref 0.2–1)
BUN SERPL-MCNC: 11 MG/DL (ref 5–25)
CALCIUM ALBUM COR SERPL-MCNC: 8.4 MG/DL (ref 8.3–10.1)
CALCIUM SERPL-MCNC: 7.9 MG/DL (ref 8.4–10.2)
CHLORIDE SERPL-SCNC: 104 MMOL/L (ref 96–108)
CO2 SERPL-SCNC: 26 MMOL/L (ref 21–32)
CREAT SERPL-MCNC: 0.92 MG/DL (ref 0.6–1.3)
EOSINOPHIL # BLD AUTO: 0.29 THOUSAND/ÂΜL (ref 0–0.61)
EOSINOPHIL NFR BLD AUTO: 4 % (ref 0–6)
ERYTHROCYTE [DISTWIDTH] IN BLOOD BY AUTOMATED COUNT: 12.5 % (ref 11.6–15.1)
GFR SERPL CREATININE-BSD FRML MDRD: 77 ML/MIN/1.73SQ M
GLUCOSE SERPL-MCNC: 105 MG/DL (ref 65–140)
HCT VFR BLD AUTO: 40.2 % (ref 34.8–46.1)
HGB BLD-MCNC: 13.1 G/DL (ref 11.5–15.4)
IMM GRANULOCYTES # BLD AUTO: 0.04 THOUSAND/UL (ref 0–0.2)
IMM GRANULOCYTES NFR BLD AUTO: 1 % (ref 0–2)
LYMPHOCYTES # BLD AUTO: 1.96 THOUSANDS/ÂΜL (ref 0.6–4.47)
LYMPHOCYTES NFR BLD AUTO: 24 % (ref 14–44)
MCH RBC QN AUTO: 32 PG (ref 26.8–34.3)
MCHC RBC AUTO-ENTMCNC: 32.6 G/DL (ref 31.4–37.4)
MCV RBC AUTO: 98 FL (ref 82–98)
MONOCYTES # BLD AUTO: 0.38 THOUSAND/ÂΜL (ref 0.17–1.22)
MONOCYTES NFR BLD AUTO: 5 % (ref 4–12)
NEUTROPHILS # BLD AUTO: 5.36 THOUSANDS/ÂΜL (ref 1.85–7.62)
NEUTS SEG NFR BLD AUTO: 66 % (ref 43–75)
NRBC BLD AUTO-RTO: 0 /100 WBCS
PLATELET # BLD AUTO: 220 THOUSANDS/UL (ref 149–390)
PMV BLD AUTO: 9.9 FL (ref 8.9–12.7)
POTASSIUM SERPL-SCNC: 4 MMOL/L (ref 3.5–5.3)
PROT SERPL-MCNC: 5.9 G/DL (ref 6.4–8.4)
RBC # BLD AUTO: 4.1 MILLION/UL (ref 3.81–5.12)
SODIUM SERPL-SCNC: 134 MMOL/L (ref 135–147)
WBC # BLD AUTO: 8.05 THOUSAND/UL (ref 4.31–10.16)

## 2023-01-09 RX ORDER — DIPHENHYDRAMINE HYDROCHLORIDE 50 MG/ML
25 INJECTION INTRAMUSCULAR; INTRAVENOUS EVERY 8 HOURS
Status: COMPLETED | OUTPATIENT
Start: 2023-01-09 | End: 2023-01-10

## 2023-01-09 RX ORDER — METOCLOPRAMIDE HYDROCHLORIDE 5 MG/ML
10 INJECTION INTRAMUSCULAR; INTRAVENOUS EVERY 8 HOURS SCHEDULED
Status: COMPLETED | OUTPATIENT
Start: 2023-01-09 | End: 2023-01-10

## 2023-01-09 RX ORDER — ACETAMINOPHEN 325 MG/1
975 TABLET ORAL EVERY 8 HOURS SCHEDULED
Status: DISCONTINUED | OUTPATIENT
Start: 2023-01-09 | End: 2023-01-10 | Stop reason: HOSPADM

## 2023-01-09 RX ORDER — DICYCLOMINE HCL 20 MG
20 TABLET ORAL EVERY 6 HOURS
Status: DISCONTINUED | OUTPATIENT
Start: 2023-01-09 | End: 2023-01-10 | Stop reason: HOSPADM

## 2023-01-09 RX ORDER — PANTOPRAZOLE SODIUM 40 MG/1
40 TABLET, DELAYED RELEASE ORAL
Status: DISCONTINUED | OUTPATIENT
Start: 2023-01-10 | End: 2023-01-10 | Stop reason: HOSPADM

## 2023-01-09 RX ORDER — METOCLOPRAMIDE HYDROCHLORIDE 5 MG/ML
10 INJECTION INTRAMUSCULAR; INTRAVENOUS ONCE
Status: COMPLETED | OUTPATIENT
Start: 2023-01-09 | End: 2023-01-09

## 2023-01-09 RX ORDER — MAGNESIUM SULFATE HEPTAHYDRATE 40 MG/ML
2 INJECTION, SOLUTION INTRAVENOUS
Status: DISCONTINUED | OUTPATIENT
Start: 2023-01-10 | End: 2023-01-10

## 2023-01-09 RX ORDER — SODIUM CHLORIDE, SODIUM LACTATE, POTASSIUM CHLORIDE, CALCIUM CHLORIDE 600; 310; 30; 20 MG/100ML; MG/100ML; MG/100ML; MG/100ML
125 INJECTION, SOLUTION INTRAVENOUS CONTINUOUS
Status: DISPENSED | OUTPATIENT
Start: 2023-01-09 | End: 2023-01-10

## 2023-01-09 RX ORDER — GABAPENTIN 100 MG/1
100 CAPSULE ORAL 3 TIMES DAILY
Status: DISCONTINUED | OUTPATIENT
Start: 2023-01-09 | End: 2023-01-10 | Stop reason: HOSPADM

## 2023-01-09 RX ORDER — SENNOSIDES 8.6 MG
1 TABLET ORAL
Status: DISCONTINUED | OUTPATIENT
Start: 2023-01-09 | End: 2023-01-10 | Stop reason: HOSPADM

## 2023-01-09 RX ORDER — KETOROLAC TROMETHAMINE 30 MG/ML
30 INJECTION, SOLUTION INTRAMUSCULAR; INTRAVENOUS EVERY 8 HOURS
Status: COMPLETED | OUTPATIENT
Start: 2023-01-09 | End: 2023-01-10

## 2023-01-09 RX ORDER — KETOROLAC TROMETHAMINE 30 MG/ML
15 INJECTION, SOLUTION INTRAMUSCULAR; INTRAVENOUS ONCE
Status: COMPLETED | OUTPATIENT
Start: 2023-01-09 | End: 2023-01-09

## 2023-01-09 RX ORDER — DIPHENHYDRAMINE HYDROCHLORIDE 50 MG/ML
12.5 INJECTION INTRAMUSCULAR; INTRAVENOUS ONCE
Status: COMPLETED | OUTPATIENT
Start: 2023-01-09 | End: 2023-01-09

## 2023-01-09 RX ORDER — PROPRANOLOL HCL 60 MG
60 CAPSULE, EXTENDED RELEASE 24HR ORAL DAILY
Status: DISCONTINUED | OUTPATIENT
Start: 2023-01-10 | End: 2023-01-10

## 2023-01-09 RX ORDER — ONDANSETRON 2 MG/ML
4 INJECTION INTRAMUSCULAR; INTRAVENOUS ONCE
Status: COMPLETED | OUTPATIENT
Start: 2023-01-09 | End: 2023-01-09

## 2023-01-09 RX ORDER — METHOCARBAMOL 500 MG/1
500 TABLET, FILM COATED ORAL EVERY 6 HOURS PRN
Status: DISCONTINUED | OUTPATIENT
Start: 2023-01-09 | End: 2023-01-10

## 2023-01-09 RX ORDER — CLONAZEPAM 0.5 MG/1
0.5 TABLET ORAL DAILY
Status: DISCONTINUED | OUTPATIENT
Start: 2023-01-10 | End: 2023-01-10 | Stop reason: HOSPADM

## 2023-01-09 RX ADMIN — ONDANSETRON 4 MG: 2 INJECTION INTRAMUSCULAR; INTRAVENOUS at 19:54

## 2023-01-09 RX ADMIN — SODIUM CHLORIDE 1000 ML: 0.9 INJECTION, SOLUTION INTRAVENOUS at 18:05

## 2023-01-09 RX ADMIN — DIPHENHYDRAMINE HYDROCHLORIDE 12.5 MG: 50 INJECTION, SOLUTION INTRAMUSCULAR; INTRAVENOUS at 18:03

## 2023-01-09 RX ADMIN — MORPHINE SULFATE 2 MG: 2 INJECTION, SOLUTION INTRAMUSCULAR; INTRAVENOUS at 19:54

## 2023-01-09 RX ADMIN — METOCLOPRAMIDE 10 MG: 5 INJECTION, SOLUTION INTRAMUSCULAR; INTRAVENOUS at 18:03

## 2023-01-09 RX ADMIN — KETOROLAC TROMETHAMINE 15 MG: 30 INJECTION, SOLUTION INTRAMUSCULAR; INTRAVENOUS at 18:03

## 2023-01-09 NOTE — ED PROVIDER NOTES
History  Chief Complaint   Patient presents with   • Migraine     Pt reports constant migraine for weeks  Seen by pcp and urgent care medications imitrex, steroids, OTC steroids at home not working  This is a 46-year-old female patient with a longstanding history of migraines  Approximate 3 to 4 weeks ago she started with a headache that has not improved is associated with nausea and photophobia no vomiting  Been seen by family doctor multiple times and has been tried on steroids twice Toradol and Zofran another time and Inderal   States that her headache did not improve also had a "viral illness" last week with vomiting and diarrhea which is resolved which made the headache worse  She describes the headache to be in the frontal lobe and is throbbing  Nothing makes it better or worse no blurred vision double vision  No cough congestion sore throat no chest pain or shortness of breath no nausea vomiting diarrhea abdominal pain  Differential diagnosis includes not limited to complex migraine, space-occupying lesion, bleed less likely          Prior to Admission Medications   Prescriptions Last Dose Informant Patient Reported? Taking?    [facility] Senna 8 6 MG tablet   No No   Sig: TAKE 1 TABLET (8 6 MG TOTAL) BY MOUTH DAILY AT BEDTIME   NON FORMULARY   Yes No   Sig: Take 1 Dose by mouth Medical marijuana   albuterol (PROVENTIL HFA,VENTOLIN HFA) 90 mcg/act inhaler   No No   Sig: INHALE 2 PUFFS EVERY 6 (SIX) HOURS AS NEEDED FOR SHORTNESS OF BREATH   benzonatate (TESSALON) 200 MG capsule   No No   Sig: Take 1 capsule (200 mg total) by mouth 3 (three) times a day as needed for cough   clonazePAM (KlonoPIN) 0 5 mg tablet   No No   Sig: TAKE 1 TABLET BY MOUTH EVERY DAY   dicyclomine (BENTYL) 20 mg tablet   No No   Sig: Take 1 tablet (20 mg total) by mouth every 6 (six) hours   ergocalciferol (VITAMIN D2) 50,000 units   Yes No   Sig: Take 50,000 Units by mouth once a week   gabapentin (Neurontin) 100 mg capsule   No No Sig: Take 1 capsule (100 mg total) by mouth 3 (three) times a day Take one at bedtime x 3 days then increase to 2 tabs at bed time x 3 days then take 300 mg at bedtime   levonorgestrel (MIRENA) 20 MCG/24HR IUD   Yes No   Sig: Mirena 20 MCG/24HR Intrauterine Intrauterine Device  Refills: 0  Active   methocarbamol (ROBAXIN) 500 mg tablet   No No   Sig: TAKE 1 TABLET BY MOUTH 4 TIMES A DAY AS NEEDED FOR MUSCLE SPASMS     ondansetron (ZOFRAN) 4 mg tablet   No No   Sig: Take 1 tablet (4 mg total) by mouth every 8 (eight) hours as needed for nausea or vomiting   pantoprazole (PROTONIX) 40 mg tablet   No No   Sig: Take 1 tablet (40 mg total) by mouth daily before breakfast   propranolol (INDERAL LA) 60 mg 24 hr capsule   No No   Sig: Take 1 capsule (60 mg total) by mouth daily   sertraline (Zoloft) 50 mg tablet   No No   Sig: Take 1 tablet (50 mg total) by mouth daily   Patient not taking: Reported on 1/3/2023      Facility-Administered Medications: None       Past Medical History:   Diagnosis Date   • Allergic    • Allergic rhinitis    • Anemia    • Anxiety    • Chondromalacia of both patellae    • Closed fracture of left distal fibula 08/27/2020   • Depression    • Fibromyalgia    • Fibromyalgia    • GERD (gastroesophageal reflux disease)    • Headache(784 0)    • Hematochezia    • Herpes simplex infection    • HPV (human papilloma virus) infection     11/2013   • Hypertension    • IBS (irritable bowel syndrome)    • Insomnia    • Mental status change    • Migraine    • Obesity 08/06/2015   • Scoliosis    • Scoliosis        Past Surgical History:   Procedure Laterality Date   • BREAST BIOPSY Left 12/10/2013   • EXPLORATORY LAPAROTOMY     • WISDOM TOOTH EXTRACTION         Family History   Problem Relation Age of Onset   • Stroke Mother    • Hypertension Mother    • Psoriasis Mother    • Depression Mother    • Hepatitis Father         B    • Substance Abuse Father    • No Known Problems Daughter    • No Known Problems Daughter    • Dementia Maternal Grandmother    • No Known Problems Maternal Grandfather    • No Known Problems Paternal Grandmother    • Alcohol abuse Paternal Grandfather    • Asthma Brother    • No Known Problems Brother    • Diabetes type I Son    • Diabetes Son    • Breast cancer Family         maternal aunt - 27 's    • Breast cancer Maternal Aunt 27   • Breast cancer Cousin 27   • Cancer Cousin      I have reviewed and agree with the history as documented  E-Cigarette/Vaping     E-Cigarette/Vaping Substances     Social History     Tobacco Use   • Smoking status: Former     Types: Cigarettes   • Smokeless tobacco: Never   • Tobacco comments:     quit 2018   Substance Use Topics   • Alcohol use: Yes     Alcohol/week: 3 0 standard drinks     Types: 1 Glasses of wine, 1 Cans of beer, 1 Shots of liquor per week     Comment: social    • Drug use: Yes     Frequency: 3 0 times per week     Types: Marijuana     Comment: few times a week        Review of Systems   Constitutional: Negative for diaphoresis, fatigue and fever  HENT: Negative for congestion, ear pain, hearing loss, nosebleeds and sore throat  Eyes: Positive for photophobia  Negative for pain, discharge, redness, itching and visual disturbance  Respiratory: Negative for cough, choking, chest tightness, shortness of breath and wheezing  Cardiovascular: Negative for chest pain, palpitations and leg swelling  Gastrointestinal: Negative for abdominal distention, abdominal pain, diarrhea, nausea and vomiting  Endocrine: Negative for polydipsia and polyphagia  Genitourinary: Negative for dysuria, flank pain and frequency  Musculoskeletal: Negative for arthralgias, back pain, gait problem and joint swelling  Skin: Negative for color change, pallor and rash  Allergic/Immunologic: Negative for environmental allergies and food allergies  Neurological: Positive for headaches   Negative for dizziness, tremors, seizures, syncope, facial asymmetry, speech difficulty, weakness, light-headedness and numbness  Psychiatric/Behavioral: Negative for agitation, behavioral problems and confusion  The patient is not nervous/anxious  All other systems reviewed and are negative  Physical Exam  Physical Exam  Vitals and nursing note reviewed  Constitutional:       General: She is not in acute distress  Appearance: Normal appearance  She is not ill-appearing, toxic-appearing or diaphoretic  HENT:      Head: Normocephalic and atraumatic  Right Ear: Tympanic membrane, ear canal and external ear normal       Left Ear: Tympanic membrane, ear canal and external ear normal       Nose: Nose normal  No congestion or rhinorrhea  Mouth/Throat:      Mouth: Mucous membranes are moist       Pharynx: Oropharynx is clear  No oropharyngeal exudate or posterior oropharyngeal erythema  Eyes:      Extraocular Movements: Extraocular movements intact  Conjunctiva/sclera: Conjunctivae normal       Pupils: Pupils are equal, round, and reactive to light  Cardiovascular:      Rate and Rhythm: Normal rate and regular rhythm  Pulmonary:      Effort: Pulmonary effort is normal  No respiratory distress  Breath sounds: Normal breath sounds  Abdominal:      General: Bowel sounds are normal       Palpations: Abdomen is soft  Tenderness: There is no abdominal tenderness  Musculoskeletal:         General: Normal range of motion  Cervical back: Normal range of motion and neck supple  No rigidity or tenderness  Right lower leg: No edema  Left lower leg: No edema  Lymphadenopathy:      Cervical: No cervical adenopathy  Skin:     General: Skin is warm and dry  Capillary Refill: Capillary refill takes less than 2 seconds  Findings: No rash  Neurological:      General: No focal deficit present  Mental Status: She is alert and oriented to person, place, and time  Mental status is at baseline        Cranial Nerves: No cranial nerve deficit  Sensory: No sensory deficit  Motor: No weakness        Coordination: Coordination normal       Gait: Gait normal       Deep Tendon Reflexes: Reflexes normal    Psychiatric:         Mood and Affect: Mood normal          Behavior: Behavior normal          Vital Signs  ED Triage Vitals   Temperature Pulse Respirations Blood Pressure SpO2   01/09/23 1627 01/09/23 1627 01/09/23 1627 01/09/23 1627 01/09/23 1627   98 2 °F (36 8 °C) 59 18 123/66 99 %      Temp Source Heart Rate Source Patient Position - Orthostatic VS BP Location FiO2 (%)   01/09/23 1627 01/09/23 1627 -- -- --   Oral Monitor         Pain Score       01/09/23 1713       10 - Worst Possible Pain           Vitals:    01/09/23 1627   BP: 123/66   Pulse: 59         Visual Acuity      ED Medications  Medications   sodium chloride 0 9 % bolus 1,000 mL (1,000 mL Intravenous New Bag 1/9/23 1805)   metoclopramide (REGLAN) injection 10 mg (10 mg Intravenous Given 1/9/23 1803)   diphenhydrAMINE (BENADRYL) injection 12 5 mg (12 5 mg Intravenous Given 1/9/23 1803)   ketorolac (TORADOL) injection 15 mg (15 mg Intravenous Given 1/9/23 1803)   ondansetron (ZOFRAN) injection 4 mg (4 mg Intravenous Given 1/9/23 1954)   morphine injection 2 mg (2 mg Intravenous Given 1/9/23 1954)       Diagnostic Studies  Results Reviewed     Procedure Component Value Units Date/Time    Comprehensive metabolic panel [[id number]]  (Abnormal) Collected: 01/09/23 1802    Lab Status: Final result Specimen: Blood from Arm, Right Updated: 01/09/23 1829     Sodium 134 mmol/L      Potassium 4 0 mmol/L      Chloride 104 mmol/L      CO2 26 mmol/L      ANION GAP 4 mmol/L      BUN 11 mg/dL      Creatinine 0 92 mg/dL      Glucose 105 mg/dL      Calcium 7 9 mg/dL      Corrected Calcium 8 4 mg/dL      AST 15 U/L      ALT 28 U/L      Alkaline Phosphatase 43 U/L      Total Protein 5 9 g/dL      Albumin 3 4 g/dL      Total Bilirubin 0 26 mg/dL      eGFR 77 ml/min/1 73sq m Narrative:      National Kid[employer] Disease Foundation guidelines for Chronic Kidney Disease (CKD):   •  Stage 1 with normal or high GFR (GFR > 90 mL/min/1 73 square meters)  •  Stage 2 Mild CKD (GFR = 60-89 mL/min/1 73 square meters)  •  Stage 3A Moderate CKD (GFR = 45-59 mL/min/1 73 square meters)  •  Stage 3B Moderate CKD (GFR = 30-44 mL/min/1 73 square meters)  •  Stage 4 Severe CKD (GFR = 15-29 mL/min/1 73 square meters)  •  Stage 5 End Stage CKD (GFR <15 mL/min/1 73 square meters)  Note: GFR calculation is accurate only with a steady state creatinine    CBC and differential [[id number]] Collected: 01/09/23 1802    Lab Status: Final result Specimen: Blood from Arm, Right Updated: 01/09/23 1810     WBC 8 05 Thousand/uL      RBC 4 10 Million/uL      Hemoglobin 13 1 g/dL      Hematocrit 40 2 %      MCV 98 fL      MCH 32 0 pg      MCHC 32 6 g/dL      RDW 12 5 %      MPV 9 9 fL      Platelets 687 Thousands/uL      nRBC 0 /100 WBCs      Neutrophils Relative 66 %      Immat GRANS % 1 %      Lymphocytes Relative 24 %      Monocytes Relative 5 %      Eosinophils Relative 4 %      Basophils Relative 0 %      Neutrophils Absolute 5 36 Thousands/µL      Immature Grans Absolute 0 04 Thousand/uL      Lymphocytes Absolute 1 96 Thousands/µL      Monocytes Absolute 0 38 Thousand/µL      Eosinophils Absolute 0 29 Thousand/µL      Basophils Absolute 0 02 Thousands/µL                  CT head without contrast   Final Result by Joni Thomas MD (01/09 1900)      No acute intracranial abnormality  Workstation performed: [id number]                    Procedures  Procedures         ED Course  ED Course as of 01/09/23 2043   Evalee Kawasaki Jan 09, 2023 1958 Upon reevaluation patient does have some improvement but still has remaining headache will use morphine to see if we can jerry the headache  Medical Decision Making  4year-old female patient is in a migraine from 3 to 4 weeks  Does have a history of migraines the same pattern but more intense  Has been urgent care twice and had Toradol Zofran the first time and steroids twice  She has no systemic symptoms  No recent illness  No stiff neck she is nontoxic  Differential diagnose include not limited to migraine, atypical migraine, space-occupying lesion, head bleed    Intractable migraine: complicated acute illness or injury     Details: Given Toradol, Reglan, Benadryl, morphine without improvement  Amount and/or Complexity of Data Reviewed  Independent Historian:      Details: Patient  External Data Reviewed: notes  Details: Urgent care visits for headache  Labs: ordered  Decision-making details documented in ED Course  Details: All labs reviewed nothing that needs to be addressed  Radiology: ordered  Decision-making details documented in ED Course  Details: CT of head negative findings  Discussion of management or test interpretation with external provider(s): Patient presents with a migraine headache for several weeks refractory to the medications administered during this visit and previous from urgent care  CT is negative for acute findings  Patient be admitted for intractable headache for further evaluation and treatment she verbalizes understanding    Risk  Prescription drug management  Decision regarding hospitalization      Risk Details: Patient with intractable headache mild to moderate risk will be admitted for further treatment and evaluation all labs and studies reviewed by this provider        Disposition  Final diagnoses:   Intractable migraine     Time reflects when diagnosis was documented in both MDM as applicable and the Disposition within this note     Time User Action Codes Description Comment    1/9/2023  8:38 PM One [facility], [address] [place] [address] [G43 919] Intractable migraine       ED Disposition     ED Disposition   Admit    Condition   Stable    Date/Time   Mon Jan 9, 2023  8:41 PM    Comment   Case was discussed with Araceli Kong PA-C and the patient's admission status was agreed to be Admission Status: observation status to the service of Dr Castillo [place]   Follow-up Information    None         Patient's Medications   Discharge Prescriptions    No medications on file       No discharge procedures on file      PDMP Review       Value Time User    PDMP Reviewed  Yes 9/16/2022  7:27 AM Dasia Henson MD          ED Provider  Electronically Signed by           Eligio Teague PA-C  01/09/23 204

## 2023-01-10 VITALS
RESPIRATION RATE: 16 BRPM | OXYGEN SATURATION: 97 % | HEART RATE: 58 BPM | DIASTOLIC BLOOD PRESSURE: 43 MMHG | TEMPERATURE: 98.6 F | SYSTOLIC BLOOD PRESSURE: 98 MMHG

## 2023-01-10 LAB
ANION GAP SERPL CALCULATED.3IONS-SCNC: 5 MMOL/L (ref 4–13)
BUN SERPL-MCNC: 17 MG/DL (ref 5–25)
CALCIUM SERPL-MCNC: 7.7 MG/DL (ref 8.4–10.2)
CHLORIDE SERPL-SCNC: 107 MMOL/L (ref 96–108)
CO2 SERPL-SCNC: 23 MMOL/L (ref 21–32)
CREAT SERPL-MCNC: 0.85 MG/DL (ref 0.6–1.3)
ERYTHROCYTE [DISTWIDTH] IN BLOOD BY AUTOMATED COUNT: 12.7 % (ref 11.6–15.1)
GFR SERPL CREATININE-BSD FRML MDRD: 85 ML/MIN/1.73SQ M
GLUCOSE P FAST SERPL-MCNC: 111 MG/DL (ref 65–99)
GLUCOSE SERPL-MCNC: 111 MG/DL (ref 65–140)
HCT VFR BLD AUTO: 38 % (ref 34.8–46.1)
HGB BLD-MCNC: 12.4 G/DL (ref 11.5–15.4)
MCH RBC QN AUTO: 31.9 PG (ref 26.8–34.3)
MCHC RBC AUTO-ENTMCNC: 32.6 G/DL (ref 31.4–37.4)
MCV RBC AUTO: 98 FL (ref 82–98)
PLATELET # BLD AUTO: 208 THOUSANDS/UL (ref 149–390)
PMV BLD AUTO: 10.5 FL (ref 8.9–12.7)
POTASSIUM SERPL-SCNC: 4 MMOL/L (ref 3.5–5.3)
RBC # BLD AUTO: 3.89 MILLION/UL (ref 3.81–5.12)
SODIUM SERPL-SCNC: 135 MMOL/L (ref 135–147)
WBC # BLD AUTO: 8.63 THOUSAND/UL (ref 4.31–10.16)

## 2023-01-10 RX ORDER — AMITRIPTYLINE HYDROCHLORIDE 25 MG/1
25 TABLET, FILM COATED ORAL
Status: DISCONTINUED | OUTPATIENT
Start: 2023-01-10 | End: 2023-01-10 | Stop reason: HOSPADM

## 2023-01-10 RX ORDER — TIZANIDINE 2 MG/1
4 TABLET ORAL
Status: DISCONTINUED | OUTPATIENT
Start: 2023-01-10 | End: 2023-01-10

## 2023-01-10 RX ORDER — PROPRANOLOL HYDROCHLORIDE 20 MG/1
20 TABLET ORAL ONCE
Qty: 1 TABLET | Refills: 0 | Status: SHIPPED | OUTPATIENT
Start: 2023-01-11 | End: 2023-01-16 | Stop reason: ALTCHOICE

## 2023-01-10 RX ORDER — METHOCARBAMOL 500 MG/1
500 TABLET, FILM COATED ORAL 3 TIMES DAILY
Qty: 42 TABLET | Refills: 0 | Status: SHIPPED | OUTPATIENT
Start: 2023-01-10 | End: 2023-01-24

## 2023-01-10 RX ORDER — AMITRIPTYLINE HYDROCHLORIDE 25 MG/1
25 TABLET, FILM COATED ORAL
Qty: 30 TABLET | Refills: 0 | Status: SHIPPED | OUTPATIENT
Start: 2023-01-10 | End: 2023-02-09

## 2023-01-10 RX ORDER — METHOCARBAMOL 500 MG/1
500 TABLET, FILM COATED ORAL 3 TIMES DAILY
Status: DISCONTINUED | OUTPATIENT
Start: 2023-01-10 | End: 2023-01-10 | Stop reason: HOSPADM

## 2023-01-10 RX ORDER — PROPRANOLOL HYDROCHLORIDE 20 MG/1
20 TABLET ORAL ONCE
Status: DISCONTINUED | OUTPATIENT
Start: 2023-01-11 | End: 2023-01-10 | Stop reason: HOSPADM

## 2023-01-10 RX ADMIN — METOCLOPRAMIDE 10 MG: 5 INJECTION, SOLUTION INTRAMUSCULAR; INTRAVENOUS at 00:30

## 2023-01-10 RX ADMIN — CLONAZEPAM 0.5 MG: 0.5 TABLET ORAL at 08:49

## 2023-01-10 RX ADMIN — MAGNESIUM SULFATE HEPTAHYDRATE 2 G: 40 INJECTION, SOLUTION INTRAVENOUS at 08:50

## 2023-01-10 RX ADMIN — DIPHENHYDRAMINE HYDROCHLORIDE 25 MG: 50 INJECTION, SOLUTION INTRAMUSCULAR; INTRAVENOUS at 13:49

## 2023-01-10 RX ADMIN — ACETAMINOPHEN 975 MG: 325 TABLET, FILM COATED ORAL at 13:49

## 2023-01-10 RX ADMIN — DICYCLOMINE HYDROCHLORIDE 20 MG: 20 TABLET ORAL at 16:23

## 2023-01-10 RX ADMIN — GABAPENTIN 100 MG: 100 CAPSULE ORAL at 00:31

## 2023-01-10 RX ADMIN — METHOCARBAMOL 500 MG: 500 TABLET ORAL at 16:24

## 2023-01-10 RX ADMIN — KETOROLAC TROMETHAMINE 30 MG: 30 INJECTION, SOLUTION INTRAMUSCULAR; INTRAVENOUS at 00:29

## 2023-01-10 RX ADMIN — ACETAMINOPHEN 975 MG: 325 TABLET, FILM COATED ORAL at 06:12

## 2023-01-10 RX ADMIN — DICYCLOMINE HYDROCHLORIDE 20 MG: 20 TABLET ORAL at 00:29

## 2023-01-10 RX ADMIN — DIPHENHYDRAMINE HYDROCHLORIDE 25 MG: 50 INJECTION, SOLUTION INTRAMUSCULAR; INTRAVENOUS at 00:29

## 2023-01-10 RX ADMIN — KETOROLAC TROMETHAMINE 30 MG: 30 INJECTION, SOLUTION INTRAMUSCULAR; INTRAVENOUS at 06:12

## 2023-01-10 RX ADMIN — SODIUM CHLORIDE, SODIUM LACTATE, POTASSIUM CHLORIDE, AND CALCIUM CHLORIDE 125 ML/HR: .6; .31; .03; .02 INJECTION, SOLUTION INTRAVENOUS at 01:12

## 2023-01-10 RX ADMIN — METOCLOPRAMIDE 10 MG: 5 INJECTION, SOLUTION INTRAMUSCULAR; INTRAVENOUS at 13:49

## 2023-01-10 RX ADMIN — KETOROLAC TROMETHAMINE 30 MG: 30 INJECTION, SOLUTION INTRAMUSCULAR; INTRAVENOUS at 13:49

## 2023-01-10 RX ADMIN — DICYCLOMINE HYDROCHLORIDE 20 MG: 20 TABLET ORAL at 10:18

## 2023-01-10 RX ADMIN — GABAPENTIN 100 MG: 100 CAPSULE ORAL at 08:49

## 2023-01-10 RX ADMIN — METHOCARBAMOL 500 MG: 500 TABLET ORAL at 08:49

## 2023-01-10 RX ADMIN — ACETAMINOPHEN 975 MG: 325 TABLET, FILM COATED ORAL at 00:29

## 2023-01-10 RX ADMIN — PANTOPRAZOLE SODIUM 40 MG: 40 TABLET, DELAYED RELEASE ORAL at 06:12

## 2023-01-10 RX ADMIN — DIPHENHYDRAMINE HYDROCHLORIDE 25 MG: 50 INJECTION, SOLUTION INTRAMUSCULAR; INTRAVENOUS at 06:12

## 2023-01-10 RX ADMIN — METOCLOPRAMIDE 10 MG: 5 INJECTION, SOLUTION INTRAMUSCULAR; INTRAVENOUS at 06:12

## 2023-01-10 RX ADMIN — GABAPENTIN 100 MG: 100 CAPSULE ORAL at 16:24

## 2023-01-10 RX ADMIN — PROPRANOLOL HYDROCHLORIDE 60 MG: 60 CAPSULE, EXTENDED RELEASE ORAL at 08:50

## 2023-01-10 RX ADMIN — DICYCLOMINE HYDROCHLORIDE 20 MG: 20 TABLET ORAL at 06:12

## 2023-01-10 NOTE — UTILIZATION REVIEW
Initial Clinical Review    Admission: Date/Time/Statement:   Admission Orders (From admission, onward)     Ordered        01/09/23 2041  Place in Observation  Once                      Orders Placed This Encounter   Procedures   • Place in Observation     Standing Status:   Standing     Number of Occurrences:   1     Order Specific Question:   Level of Care     Answer:   Med Surg [16]     ED Arrival Information     Expected   -    Arrival   1/9/2023 15:57    Acuity   Urgent            Means of arrival   Walk-In    Escorted by   Self    Service   Hospitalist    Admission type   Emergency            Arrival complaint   Migraine              Chief Complaint   Patient presents with   • Migraine     Pt reports constant migraine for weeks  Seen by pcp and urgent care medications imitrex, steroids, OTC steroids at home not working  Initial Presentation: 40 y o  female with a PMH of migraine, obesity, anxiety, fibromyalgia who presents with ongoing migraine  Patient initially had a viral in December  She was evaluated at urgent care on 12/23 COVID test at the time  She was prescribed Toradol, Zofran and Medrol Dosepak without relief  She saw her PCP on 1/3 and was given propranolol and Decadron  She continues with frontal headache, photophobia, nausea  Head CT was negative  Plan: Observation for intractable migraine, fibromyalgia: migraine cocktail, gabapentin  1/10: Internal medicine: Patient still complains of headache, frontal, 7/10 in severity today, complains of mild photophobia  Will consult Neurology as there is no significant improvement       ED Triage Vitals   Temperature Pulse Respirations Blood Pressure SpO2   01/09/23 1627 01/09/23 1627 01/09/23 1627 01/09/23 1627 01/09/23 1627   98 2 °F (36 8 °C) 59 18 123/66 99 %      Temp Source Heart Rate Source Patient Position - Orthostatic VS BP Location FiO2 (%)   01/09/23 1627 01/09/23 1627 01/09/23 2251 01/09/23 2251 --   Oral Monitor Lying Right arm Pain Score       01/09/23 1713       10 - Worst Possible Pain          Wt Readings from Last 1 Encounters:   01/03/23 82 7 kg (182 lb 6 4 oz)     Additional Vital Signs:     Date/Time Temp Pulse Resp BP MAP (mmHg) SpO2 O2 Device   01/10/23 0743 98 3 °F (36 8 °C) 58 17 113/65 81 97 % None (Room air)   01/09/23 2251 98 2 °F (36 8 °C) 60 18 95/64 74 96 % None (Room air)   01/09/23 22:48:29 98 2 °F (36 8 °C) 61 -- 95/64 74 96 % --     Pertinent Labs/Diagnostic Test Results:   CT head without contrast   Final Result by Serena Gutierrez MD (01/09 1900)      No acute intracranial abnormality                    Workstation performed: [id number]           Results from last 7 days   Lab Units 01/03/23  1138   SARS-COV-2  Negative     Results from last 7 days   Lab Units 01/10/23  0552 01/09/23  1802   WBC Thousand/uL 8 63 8 05   HEMOGLOBIN g/dL 12 4 13 1   HEMATOCRIT % 38 0 40 2   PLATELETS Thousands/uL 208 220   NEUTROS ABS Thousands/µL  --  5 36         Results from last 7 days   Lab Units 01/10/23  0552 01/09/23  1802   SODIUM mmol/L 135 134*   POTASSIUM mmol/L 4 0 4 0   CHLORIDE mmol/L 107 104   CO2 mmol/L 23 26   ANION GAP mmol/L 5 4   BUN mg/dL 17 11   CREATININE mg/dL 0 85 0 92   EGFR ml/min/1 73sq m 85 77   CALCIUM mg/dL 7 7* 7 9*     Results from last 7 days   Lab Units 01/09/23  1802   AST U/L 15   ALT U/L 28   ALK PHOS U/L 43   TOTAL PROTEIN g/dL 5 9*   ALBUMIN g/dL 3 4*   TOTAL BILIRUBIN mg/dL 0 26         Results from last 7 days   Lab Units 01/10/23  0552 01/09/23  1802   GLUCOSE RANDOM mg/dL 111 105           Results from last 7 days   Lab Units 01/03/23  1138   INFLUENZA A PCR  Negative   INFLUENZA B PCR  Negative         ED Treatment:   Medication Administration from 01/09/2023 1557 to 01/09/2023 2250       Date/Time Order Dose Route Action     01/09/2023 1805 EST sodium chloride 0 9 % bolus 1,000 mL 1,000 mL Intravenous New Bag     01/09/2023 1800 EST metoclopramide (REGLAN) injection 10 mg 10 mg Intravenous Given     01/09/2023 1803 EST diphenhydrAMINE (BENADRYL) injection 12 5 mg 12 5 mg Intravenous Given     01/09/2023 1803 EST ketorolac (TORADOL) injection 15 mg 15 mg Intravenous Given     01/09/2023 1954 EST ondansetron (ZOFRAN) injection 4 mg 4 mg Intravenous Given     01/09/2023 1954 EST morphine injection 2 mg 2 mg Intravenous Given        Past Medical History:   Diagnosis Date   • Allergic    • Allergic rhinitis    • Anemia    • Anxiety    • Chondromalacia of both patellae    • Closed fracture of left distal fibula 08/27/2020   • Depression    • Fibromyalgia    • Fibromyalgia    • GERD (gastroesophageal reflux disease)    • Headache(784 0)    • Hematochezia    • Herpes simplex infection    • HPV (human papilloma virus) infection     11/2013   • Hypertension    • IBS (irritable bowel syndrome)    • Insomnia    • Mental status change    • Migraine    • Obesity 08/06/2015   • Scoliosis    • Scoliosis      Present on Admission:  • Acid reflux disease  • Anxiety  • Fibromyalgia      Admitting Diagnosis: Migraine [G43 909]  Intractable migraine [G43 919]  Age/Sex: 40 y o  female  Admission Orders:  Scheduled Medications:  acetaminophen, 975 mg, Oral, Q8H [address]  clonazePAM, 0 5 mg, Oral, Daily  dicyclomine, 20 mg, Oral, Q6H  diphenhydrAMINE, 25 mg, Intravenous, Q8H  gabapentin, 100 mg, Oral, TID  ketorolac, 30 mg, Intravenous, Q8H  magnesium sulfate, 2 g, Intravenous, Q24H MARIE  metoclopramide, 10 mg, Intravenous, Q8H MARIE  pantoprazole, 40 mg, Oral, Daily Before Breakfast  propranolol, 60 mg, Oral, Daily  senna, 1 tablet, Oral, HS      Continuous IV Infusions:  lactated ringers, 125 mL/hr, Intravenous, Continuous      PRN Meds:  methocarbamol, 500 mg, Oral, Q6H PRN        None    Network Utilization Review Department  ATTENTION: Please call with any questions or concerns to [phone] and carefully listen to the prompts so that you are directed to the right person   All voicemails are confidential   [place] Drape all requests for admission clinical reviews, approved or denied determinations and any other requests to dedicated fax number below belonging to the campus where the patient is receiving treatment   List of dedicated fax numbers for the Facilities:  [address] [place] [address] DENIALS (Administrative/Medical Necessity) [phone]   [address] (Maternity/NICU/Pediatrics) [phone]   [address] [phone]   [place] [address] [phone]   [address] [place] [address] [phone]   [address] [facility] [address] [facility] [phone]

## 2023-01-10 NOTE — CONSULTS
Consultation - Neurology   Héctorkatlin Walter 40 y o  female MRN: [id number]  Unit/Bed#: S -01 Encounter: [id number]      Assessment/Plan     * Intractable migraine  Assessment & Plan  40 y o  female with migraines, fibromyalgia, HTN, IBS, anxiety, and depression who presented to [facility] on 1/9/2022 due to intractable 10/10 bifrontal and occipital headache x4-5 weeks radiating down into the neck/shoulders associated with nausea, photophobia, and phonophobia  Patient took Excedrin 1-2 times per day x2 weeks as well as Imitrex every other day without improvement  She was seen in urgent care and by PCP and received 2 rounds of steroids, propranolol, Zofran, and Toradol with only minimal improvement for a few days  Headache was worsened by recent viral illness    Upon arrival to the ED, /66, but SBP as low as 95  Afebrile and no nuchal rigidity  CT head negative for acute intracranial abnormalities  CBC and CMP unremarkable  Patient has received Tylenol, Benadryl, Toradol, Reglan, magnesium sulfate, propanolol, gabapentin, Klonopin, Zofran, and morphine  This morning, patient continues to have headache and neck pain, but now rated as a 7/10 pain  Nonfocal neuro exam   High suspicion for medication overuse headache and cervicogenic headache given tight trapezius and paraspinal muscles  Patient has also had poor sleep and recent viral illness  This could also be a manifestation of the fibromyalgia  Plan:  - No further neuro imaging needed at this time  - Patient was previously taking Topamax, but this caused tingling in her fingers, so she has not taken this medication for several months     - Her PCP recently started her on propanolol ER 60 mg daily    However due to episodes of hypotension, will give propranolol 20 mg once tomorrow (1/11) and then discontinue propranolol  - Start Robaxin 500 mg TID  - GI intolerance to Cymbalta, so will start Elavil 25 mg QHS  - Started on gabapentin 100 mg TID (previoulsy on Lyrica, but insurance would not cover the medication)  - Discontinue IV medications  - Aqua K  - Massage therapy  - Continue to monitor and notify neurology with any changes  - Medical management and supportive care per primary team  Correction of any metabolic or infectious disturbances            Jose Ochoa will need follow up in in 6 weeks with headache attending or advance practitioner  She will not require outpatient neurological testing  History of Present Illness     Reason for Consult / Principal Problem: intractable migraine headache  Hx and PE limited by: N/A  HPI: Jose Ochoa is a 40 y o  female with migraines, fibromyalgia, HTN, IBS, anxiety, and depression who [facility]22 due to intractable bifrontal headache x4-5 weeks associated with nausea, photophobia, and phonophobia that has not improved with 2 rounds of steroids, propranolol, Zofran, or Toradol and worsened with recent viral illness  History obtained per chart review and patient  Since having COVID approximately 2 years ago, patient has had almost daily headaches  She occasionally gets migraine headaches and had been taking Topamax daily with as needed Imitrex  Patient developed a migraine headache in early December  Headache persisted for 2 weeks despite taking Excedrin several times per day and Imitrex every other day  She went to [facility] on 12/23/2022 for the headache and received 30 mg IM Toradol along with ODT Zofran while in the office  She was also started on medrol Dosepak  She followed up with her PCP on 1/3/2023 for a viral illness which was exacerbating her migraine  Patient stated that she was having diarrhea, vomiting, ear pain, joint pain, and headaches  Patient was started on propanolol 60 mg daily as well as Decadron 4 mg BID for 3 days  It appears that her Topamax discontinued    (Patient states that the Topamax caused tingling in her fingertips but has not taken this medication for several months)  The headache persisted, so she came to the emergency room on 1/9/2023  Patient described the headache as a bifrontal throbbing sensation associated with photophobia and nausea  Nothing seemed to make the headache better or worse  This is typical of her migraine headaches, however much more intense and longer duration than usual   Upon arrival to the ED, /66 and vitals stable  CT head negative for acute intracranial abnormalities  CBC and CMP unremarkable  Patient received Benadryl 12 5 mg, Toradol 15 mg, Reglan 10 mg, and IV fluids  Patient had minimal relief so received Zofran 4 mg and morphine 2 mg  This morning, patient continues to have headache and has received Tylenol, Benadryl, Toradol, Reglan, magnesium sulfate, propanolol, gabapentin, and Klonopin  Neurology was asked to evaluate due to persistent migraine despite the above medications  Per chart review, it does not appear that patient follows with a neurologist   She had an MRI brain without contrast in March 2014 which was read as normal     On exam, patient states that the headache is currently 7/10 throbbing pain across the forehead, at the back of the head, and down the neck into the shoulders  Initially the pain was 10/10, so the pain has slightly improved after receiving medications  Patient states that she has not been sleeping well, only getting 6-7 hours of sleep per night, waking up of and on throughout the night  She states massaging her neck helps and heat helps  No worsening or improvement with position changes  She drinks 1 cup of coffee every morning and denies any change in her caffeine intake  Denies any worsening stress levels  Inpatient consult to Neurology  Consult performed by: Tulio Esqueda PA-C  Consult ordered by: Allegra Sam MD          Review of Systems   Constitutional: Negative for fatigue and fever     HENT: Negative for trouble swallowing and voice change  Eyes: Positive for photophobia  Negative for visual disturbance  Respiratory: Negative for chest tightness and shortness of breath  Cardiovascular: Negative for chest pain and palpitations  Gastrointestinal: Negative for abdominal pain, constipation, diarrhea, nausea and vomiting  Genitourinary: Negative for difficulty urinating and dysuria  Musculoskeletal: Positive for back pain and neck pain  Negative for neck stiffness  Skin: Negative for color change and rash  Neurological: Positive for numbness (Intermittent numbness of bilateral upper extremities) and headaches  Negative for dizziness, tremors, facial asymmetry, speech difficulty, weakness and light-headedness  Psychiatric/Behavioral: Negative for confusion and decreased concentration         Historical Information   Past Medical History:   Diagnosis Date   • Allergic    • Allergic rhinitis    • Anemia    • Anxiety    • Chondromalacia of both patellae    • Closed fracture of left distal fibula 08/27/2020   • Depression    • Fibromyalgia    • Fibromyalgia    • GERD (gastroesophageal reflux disease)    • Headache(784 0)    • Hematochezia    • Herpes simplex infection    • HPV (human papilloma virus) infection     11/2013   • Hypertension    • IBS (irritable bowel syndrome)    • Insomnia    • Mental status change    • Migraine    • Obesity 08/06/2015   • Scoliosis    • Scoliosis      Past Surgical History:   Procedure Laterality Date   • BREAST BIOPSY Left 12/10/2013   • EXPLORATORY LAPAROTOMY     • WISDOM TOOTH EXTRACTION       Social History   Social History     Substance and Sexual Activity   Alcohol Use Yes   • Alcohol/week: 3 0 standard drinks   • Types: 1 Glasses of wine, 1 Cans of beer, 1 Shots of liquor per week    Comment: social      Social History     Substance and Sexual Activity   Drug Use Yes   • Frequency: 3 0 times per week   • Types: Marijuana    Comment: few times a week      E-Cigarette/Vaping E-Cigarette/Vaping Substances     Social History     Tobacco Use   Smoking Status Former   • Types: Cigarettes   Smokeless Tobacco Never   Tobacco Comments    quit 2018     Family History:   Family History   Problem Relation Age of Onset   • Stroke Mother    • Hypertension Mother    • Psoriasis Mother    • Depression Mother    • Hepatitis Father         B    • Substance Abuse Father    • No Known Problems Daughter    • No Known Problems Daughter    • Dementia Maternal Grandmother    • No Known Problems Maternal Grandfather    • No Known Problems Paternal Grandmother    • Alcohol abuse Paternal Grandfather    • Asthma Brother    • No Known Problems Brother    • Diabetes type I Son    • Diabetes Son    • Breast cancer Family         maternal aunt - 27 's    • Breast cancer Maternal Aunt 34   • Breast cancer Cousin 27   • Cancer Cousin        Review of previous medical records was completed   Reviewed prior notes, labs, CT head    Meds/Allergies   all current active meds have been reviewed, current meds:   Current Facility-Administered Medications   Medication Dose Route Frequency   • acetaminophen (TYLENOL) tablet 975 mg  975 mg Oral Q8H [address]   • clonazePAM (KlonoPIN) tablet 0 5 mg  0 5 mg Oral Daily   • dicyclomine (BENTYL) tablet 20 mg  20 mg Oral Q6H   • diphenhydrAMINE (BENADRYL) injection 25 mg  25 mg Intravenous Q8H   • gabapentin (NEURONTIN) capsule 100 mg  100 mg Oral TID   • ketorolac (TORADOL) injection 30 mg  30 mg Intravenous Q8H   • lactated ringers infusion  125 mL/hr Intravenous Continuous   • magnesium sulfate 2 g/50 mL IVPB (premix) 2 g  2 g Intravenous Q24H MARIE   • methocarbamol (ROBAXIN) tablet 500 mg  500 mg Oral Q6H PRN   • metoclopramide (REGLAN) injection 10 mg  10 mg Intravenous Q8H [address]   • pantoprazole (PROTONIX) EC tablet 40 mg  40 mg Oral Daily Before Breakfast   • propranolol (INDERAL LA) 24 hr capsule 60 mg  60 mg Oral Daily   • senna (SENOKOT) tablet 8 6 mg  1 tablet Oral HS    and PTA meds:   Prior to Admission Medications   Prescriptions Last Dose Informant Patient Reported? Taking? [facility] Senna 8 6 MG tablet   No No   Sig: TAKE 1 TABLET (8 6 MG TOTAL) BY MOUTH DAILY AT BEDTIME   NON FORMULARY   Yes No   Sig: Take 1 Dose by mouth Medical marijuana   albuterol (PROVENTIL HFA,VENTOLIN HFA) 90 mcg/act inhaler   No No   Sig: INHALE 2 PUFFS EVERY 6 (SIX) HOURS AS NEEDED FOR SHORTNESS OF BREATH   clonazePAM (KlonoPIN) 0 5 mg tablet   No No   Sig: TAKE 1 TABLET BY MOUTH EVERY DAY   dicyclomine (BENTYL) 20 mg tablet   No No   Sig: Take 1 tablet (20 mg total) by mouth every 6 (six) hours   ergocalciferol (VITAMIN D2) 50,000 units   Yes No   Sig: Take 50,000 Units by mouth once a week Every Thursday   gabapentin (Neurontin) 100 mg capsule   No No   Sig: Take 1 capsule (100 mg total) by mouth 3 (three) times a day Take one at bedtime x 3 days then increase to 2 tabs at bed time x 3 days then take 300 mg at bedtime   levonorgestrel (MIRENA) 20 MCG/24HR IUD   Yes No   Sig: Mirena 20 MCG/24HR Intrauterine Intrauterine Device  Refills: 0  Active   methocarbamol (ROBAXIN) 500 mg tablet   No No   Sig: TAKE 1 TABLET BY MOUTH 4 TIMES A DAY AS NEEDED FOR MUSCLE SPASMS     ondansetron (ZOFRAN) 4 mg tablet   No No   Sig: Take 1 tablet (4 mg total) by mouth every 8 (eight) hours as needed for nausea or vomiting   pantoprazole (PROTONIX) 40 mg tablet   No No   Sig: Take 1 tablet (40 mg total) by mouth daily before breakfast   propranolol (INDERAL LA) 60 mg 24 hr capsule   No No   Sig: Take 1 capsule (60 mg total) by mouth daily   sertraline (Zoloft) 50 mg tablet   No No   Sig: Take 1 tablet (50 mg total) by mouth daily   Patient not taking: Reported on 1/3/2023      Facility-Administered Medications: None       Allergies   Allergen Reactions   • Cymbalta [Duloxetine Hcl] Nausea Only and GI Intolerance     Stomach upset       Objective   Vitals:Blood pressure 113/65, pulse 58, temperature 98 3 °F (36 8 °C), temperature source Oral, resp  rate 17, SpO2 97 %, not currently breastfeeding  ,There is no height or weight on file to calculate BMI  Intake/Output Summary (Last 24 hours) at 1/10/2023 0909  Last data filed at 1/9/2023 2218  Gross per 24 hour   Intake 1000 ml   Output --   Net 1000 ml       Invasive Devices: Invasive Devices     Peripheral Intravenous Line  Duration           Peripheral IV 01/09/23 Right Antecubital <1 day                Physical Exam  Vitals and nursing note reviewed  Constitutional:       Appearance: Normal appearance  Comments: Patient lying comfortably in bed in no acute distress   HENT:      Head: Normocephalic and atraumatic  Mouth/Throat:      Mouth: Mucous membranes are moist       Pharynx: Oropharynx is clear  Eyes:      Extraocular Movements: Extraocular movements intact  Conjunctiva/sclera: Conjunctivae normal       Pupils: Pupils are equal, round, and reactive to light  Neck:      Comments: Tight paraspinal and trapezius muscles bilaterally  Muscles are tender to palpation  Pulmonary:      Effort: Pulmonary effort is normal    Musculoskeletal:         General: Normal range of motion  Skin:     General: Skin is warm and dry  Neurological:      General: No focal deficit present  Mental Status: She is alert and oriented to person, place, and time  Deep Tendon Reflexes:      Reflex Scores:       Bicep reflexes are 2+ on the right side and 2+ on the left side  Brachioradialis reflexes are 2+ on the right side and 2+ on the left side  Patellar reflexes are 2+ on the right side and 2+ on the left side  Achilles reflexes are 2+ on the right side and 2+ on the left side  Comments: See full neuro exam below       Neurologic Exam     Mental Status   Oriented to person, place, and time  Patient awake and alert  Oriented to person, place, month, and year  No dysarthria or aphasia  Able to follow simple midline and appendicular commands  Cranial Nerves     CN III, IV, VI   Pupils are equal, round, and reactive to light  Pupils 3 mm, round, reactive to light bilaterally  EOMs intact without nystagmus  No visual field deficits  Facial sensation to light touch intact throughout  Facial expressions full and symmetric  Tongue midline  Soft palate raises symmetrically  Hearing grossly intact  Motor Exam   Muscle bulk: normal  Overall muscle tone: normal  Right arm pronator drift: absent  Left arm pronator drift: absent  5/5 strength in bilateral upper and lower extremities  Sensory Exam   Sensation to light touch, temperature, and vibration intact throughout  Gait, Coordination, and Reflexes     Gait  Gait: (deferred for patient's safety)    Reflexes   Right brachioradialis: 2+  Left brachioradialis: 2+  Right biceps: 2+  Left biceps: 2+  Right patellar: 2+  Left patellar: 2+  Right achilles: 2+  Left achilles: 2+  No ataxia with finger to nose bilaterally  No resting or action tremor  No ankle clonus bilaterally  Downgoing toes bilaterally       Lab Results:   I have personally reviewed pertinent reports    , CBC:   Results from last 7 days   Lab Units 01/10/23  0552 01/09/23  1802   WBC Thousand/uL 8 63 8 05   RBC Million/uL 3 89 4 10   HEMOGLOBIN g/dL 12 4 13 1   HEMATOCRIT % 38 0 40 2   MCV fL 98 98   PLATELETS Thousands/uL 208 220   , BMP/CMP:   Results from last 7 days   Lab Units 01/10/23  0552 01/09/23  1802   SODIUM mmol/L 135 134*   POTASSIUM mmol/L 4 0 4 0   CHLORIDE mmol/L 107 104   CO2 mmol/L 23 26   BUN mg/dL 17 11   CREATININE mg/dL 0 85 0 92   CALCIUM mg/dL 7 7* 7 9*   AST U/L  --  15   ALT U/L  --  28   ALK PHOS U/L  --  43   EGFR ml/min/1 73sq m 85 77   , Vitamin B12:   , HgBA1C:   , TSH:   , Coagulation:   , Lipid Profile:   , Ammonia:   , Urinalysis:       Invalid input(s): URIBILINOGEN, Drug Screen:   , Medication Drug Levels:       Invalid input(s): CARBAMAZEPINE,  PHENOBARB, LACOSAMIDE, OXCARBAZEPINE  Imaging Studies: I have personally reviewed pertinent reports  and I have personally reviewed pertinent films in PACS  EKG, Pathology, and Other Studies: I have personally reviewed pertinent reports  and I have personally reviewed pertinent films in PACS  VTE Prophylaxis: Sequential compression device (Venodyne)     Code Status: Level 1 - Full Code  Advance Directive and Living Will:      Power of :    POLST:      Counseling / Coordination of Care  Total time spent today 76 minutes  Greater than 50% of total time was spent with the patient and/or family counseling and/or coordination of care  A description of the counseling/coordination of care:  Patient was seen and evaluated  Discussed with attending  Chart reviewed thoroughly including laboratory and imaging studies    Plan of care discussed with patient and primary team

## 2023-01-10 NOTE — ASSESSMENT & PLAN NOTE
· Recently prescribed gabapentin 100 mg 3 times daily however has not started yet    · Will start with first dose tonight

## 2023-01-10 NOTE — ASSESSMENT & PLAN NOTE
· Recently prescribed gabapentin 100 mg 3 times daily however has not started yet  · Patient states this medication is ready for pickup at [facility] pharmacy in [place]  Commended to  the prescription today and start taking it

## 2023-01-10 NOTE — ASSESSMENT & PLAN NOTE
· Patient was seen by urgent care and PCP  Initially treated on 12/23 with Toradol, Zofran, Medrol Dosepak without relief  Patient then saw PCP on 1/3 and was given propranolol and Decadron  · Continues with nausea, frontal headache, photophobia  · Head CT negative  · Migraine cocktail-slight improvement, today patient rates pain 7/10 and severe  · Will consult neurology as there is no significant improvement

## 2023-01-10 NOTE — ASSESSMENT & PLAN NOTE
· Patient was seen by urgent care and PCP  Initially treated on 12/23 with Toradol, Zofran, Medrol Dosepak without relief  Patient then saw PCP on 1/3 and was given propranolol and Decadron    · Continues with nausea, frontal headache, photophobia  · Head CT negative  · Migraine cocktail  · If no improvement consider neurology evaluation

## 2023-01-10 NOTE — PROGRESS NOTES
[facility]  Progress Note Lin Pantoja 1980, 40 y o  female MRN: [id number]  Unit/Bed#: S -01 Encounter: [id number]  Primary Care Provider: Hilaria Mccord MD   Date and time admitted to hospital: 1/9/2023  4:56 PM    * Intractable migraine  Assessment & Plan  · Patient was seen by urgent care and PCP  Initially treated on 12/23 with Toradol, Zofran, Medrol Dosepak without relief  Patient then saw PCP on 1/3 and was given propranolol and Decadron  · Continues with nausea, frontal headache, photophobia  · Head CT negative  · Migraine cocktail-slight improvement, today patient rates pain 7/10 and severe  · Will consult neurology as there is no significant improvement  Fibromyalgia  Assessment & Plan  · Recently prescribed gabapentin 100 mg 3 times daily however has not started yet  · This has been started in the hospital       [address]  · Stable on Klonopin 0 5 mg daily    Acid reflux disease  Assessment & Plan  · Stable on Protonix        VTE Pharmacologic Prophylaxis:   Pharmacologic: Low risk  Mechanical VTE Prophylaxis in Place: Yes    Patient Centered Rounds: I have performed bedside rounds with nursing staff today  Education and Discussions with Family / Patient: Patient    Time Spent for Care: 30 minutes  More than 50% of total time spent on counseling and coordination of care as described above  Current Length of Stay: 0 day(s)    Current Patient Status: Observation   Certification Statement: Patient requires further hospitalization secondary to ongoing migraine headaches, neurology consultation  Discharge Plan: 1/11/2023    Code Status: Level 1 - Full Code      Subjective:   Patient still complains of headache, frontal, 7/10 in severity today, complains of mild photophobia, denies any tinnitus, dizziness, nausea, vomiting today  Denies any fever, chills    Denies any nasal discharge, ear discharge, ear pain  Objective:     Vitals: Temp (24hrs), Av 2 °F (36 8 °C), Min:98 2 °F (36 8 °C), Max:98 3 °F (36 8 °C)    Temp:  [98 2 °F (36 8 °C)-98 3 °F (36 8 °C)] 98 3 °F (36 8 °C)  HR:  [58-61] 58  Resp:  [17-18] 17  BP: ()/(64-66) 113/65  SpO2:  [96 %-99 %] 97 %  There is no height or weight on file to calculate BMI  Input and Output Summary (last 24 hours): Intake/Output Summary (Last 24 hours) at 1/10/2023 0902  Last data filed at 2023 2218  Gross per 24 hour   Intake 1000 ml   Output --   Net 1000 ml       Physical Exam:     Physical Exam  General appearance:  Patient not in acute distress  Eyes:  Pupils equal reacting to light  ENT:  Moist oral mucous membranes  CVS:  S1-S2 heard, regular rate and rhythm, no pedal edema  Chest:  Bilateral air entry present, clear to auscultation  Abdomen:  Soft, nontender, bowel sounds present  CNS:  No focal neurological deficits  Genitourinary: deferred  Skin:  No acute rash   psychiatric:  No psychosis  Musculoskeletal:  No joint deformities      Additional Data:     Labs:    Results from last 7 days   Lab Units 01/10/23  0552 23  1802   WBC Thousand/uL 8 63 8 05   HEMOGLOBIN g/dL 12 4 13 1   HEMATOCRIT % 38 0 40 2   PLATELETS Thousands/uL 208 220   NEUTROS PCT %  --  66   LYMPHS PCT %  --  24   MONOS PCT %  --  5   EOS PCT %  --  4     Results from last 7 days   Lab Units 01/10/23  0552 23  1802   SODIUM mmol/L 135 134*   POTASSIUM mmol/L 4 0 4 0   CHLORIDE mmol/L 107 104   CO2 mmol/L 23 26   BUN mg/dL 17 11   CREATININE mg/dL 0 85 0 92   ANION GAP mmol/L 5 4   CALCIUM mg/dL 7 7* 7 9*   ALBUMIN g/dL  --  3 4*   TOTAL BILIRUBIN mg/dL  --  0 26   ALK PHOS U/L  --  43   ALT U/L  --  28   AST U/L  --  15   GLUCOSE RANDOM mg/dL 111 105                           * I Have Reviewed All Lab Data Listed Above  * Additional Pertinent Lab Tests Reviewed:  All [place] Admission Reviewed        Recent Cultures (last 7 days):           Last 24 Hours Medication List: Current Facility-Administered Medications   Medication Dose Route Frequency Provider Last Rate   • acetaminophen  975 mg Oral [facility] Baby Manner, CRNP     • clonazePAM  0 5 mg Oral Daily Baby Manner, CRNP     • dicyclomine  20 mg Oral Q6H Baby Manner, CRNP     • diphenhydrAMINE  25 mg Intravenous Q8H Baby Manner, CRNP     • gabapentin  100 mg Oral TID Baby Manner, CRNP     • ketorolac  30 mg Intravenous Q8H Baby Manner, CRNP     • lactated ringers  125 mL/hr Intravenous Continuous Baby Manner, CRNP 125 mL/hr (01/10/23 0112)   • magnesium sulfate  2 g Intravenous Q24H [facility] Baby Manner, CRNP     • methocarbamol  500 mg Oral Q6H PRN Baby Manner, CRNP     • metoclopramide  10 mg Intravenous [facility] Baby Manner, CRNP     • pantoprazole  40 mg Oral Daily Before Breakfast Baby Manner, CRNP     • propranolol  60 mg Oral Daily Baby Manner, CRNP     • senna  1 tablet Oral HS Baby Manner, CRNP          Today, Patient Was Seen By: Luc Bills MD    ** Please Note: Dictation voice to text software may have been used in the creation of this document   **

## 2023-01-10 NOTE — ASSESSMENT & PLAN NOTE
· Patient was seen by urgent care and PCP  Initially treated on 12/23 with Toradol, Zofran, Medrol Dosepak without relief  Patient then saw PCP on 1/3 and was given propranolol and Decadron  · Continues with nausea, frontal headache, photophobia  · Head CT negative  · Migraine cocktail-slight improvement, today patient rates pain 7/10 and severe  · Seen by neurology and recommended gabapentin, Robaxin, started on Elavil  · Per neurology to discontinue propranolol after taking 20 mg dose tomorrow  · Reevaluated patient around 4:15 PM and she would like to go home and she feels better than this morning

## 2023-01-10 NOTE — DISCHARGE SUMMARY
[facility]  Discharge- Gregorio Olivarez 1980, 40 y o  female MRN: [id number]  Unit/Bed#: S -01 Encounter: [id number]  Primary Care Provider: Sumaya Howe MD   Date and time admitted to hospital: 1/9/2023  4:56 PM    * Intractable migraine  Assessment & Plan  · Patient was seen by urgent care and PCP  Initially treated on 12/23 with Toradol, Zofran, Medrol Dosepak without relief  Patient then saw PCP on 1/3 and was given propranolol and Decadron  · Continues with nausea, frontal headache, photophobia  · Head CT negative  · Migraine cocktail-slight improvement, today patient rates pain 7/10 and severe  · Seen by neurology and recommended gabapentin, Robaxin, started on Elavil  · Per neurology to discontinue propranolol after taking 20 mg dose tomorrow  · Reevaluated patient around 4:15 PM and she would like to go home and she feels better than this morning  Fibromyalgia  Assessment & Plan  · Recently prescribed gabapentin 100 mg 3 times daily however has not started yet  · Patient states this medication is ready for pickup at [facility] pharmacy in [place]  Commended to  the prescription today and start taking it  Anxiety  Assessment & Plan  · Stable on Klonopin 0 5 mg daily              Medical Problems     Resolved Problems  Date Reviewed: 1/10/2023   None         Admission Date:   Admission Orders (From admission, onward)     Ordered        01/09/23 2041  Place in Observation  Once                        Admitting Diagnosis: Migraine [G43 909]  Intractable migraine [G43 919]    HPI: Gregorio Olivarez is a 40 y o  female with a PMH of migraine, obesity, anxiety, fibromyalgia who presents with ongoing migraine  Patient initially had a viral in December  She was evaluated at urgent care on 12/23 COVID test at the time  She was prescribed Toradol, Zofran and Medrol Dosepak without relief  She saw her PCP on 1/3 and was given propranolol and Decadron    She continues with frontal headache, photophobia, nausea  Head CT was negative  Labs are normal   Denies fever, chills  Viral symptoms resolved  Procedures Performed: No orders of the defined types were placed in this encounter  Summary of Hospital Course: admitted with intractable headache, initially received migraine cocktail with mild improvement, seen by neurology today and recommended above-mentioned medications  Discussed with neurology, cleared from neurology standpoint of view for discharge to home  I have reviewed the medication changes with the patient  Patient would like to go home today, recommended outpatient follow-up      Significant Findings, Care, Treatment and Services Provided: Mentioned above    Complications: None    Condition at Discharge: good         Discharge instructions/Information to patient and family:   See after visit summary for information provided to patient and family  Provisions for Follow-Up Care:  See after visit summary for information related to follow-up care and any pertinent home health orders  PCP: Jocelynn Sanon MD    Disposition: Home    Planned Readmission: No    Discharge Statement   I spent 35 minutes discharging the patient  This time was spent on the day of discharge  I had direct contact with the patient on the day of discharge  Additional documentation is required if more than 30 minutes were spent on discharge  Discharge Medications:  See after visit summary for reconciled discharge medications provided to patient and family

## 2023-01-10 NOTE — PLAN OF CARE
Problem: PAIN - ADULT  Goal: Verbalizes/displays adequate comfort level or baseline comfort level  Description: Interventions:  - Encourage patient to monitor pain and request assistance  - Assess pain using appropriate pain scale  - Administer analgesics based on type and severity of pain and evaluate response  - Implement non-pharmacological measures as appropriate and evaluate response  - Consider cultural and social influences on pain and pain management  - Notify physician/advanced practitioner if interventions unsuccessful or patient reports new pain  Outcome: Progressing     Problem: NEUROSENSORY - ADULT  Goal: Achieves stable or improved neurological status  Description: INTERVENTIONS  - Monitor and report changes in neurological status  - Monitor vital signs such as temperature, blood pressure, glucose, and any other labs ordered   - Initiate measures to prevent increased intracranial pressure  - Monitor for seizure activity and implement precautions if appropriate      Outcome: Progressing

## 2023-01-10 NOTE — ASSESSMENT & PLAN NOTE
· Recently prescribed gabapentin 100 mg 3 times daily however has not started yet    · This has been started in the hospital

## 2023-01-10 NOTE — ASSESSMENT & PLAN NOTE
40 y o  female with migraines, fibromyalgia, HTN, IBS, anxiety, and depression who presented to [facility] on 1/9/2022 due to intractable 10/10 bifrontal and occipital headache x4-5 weeks radiating down into the neck/shoulders associated with nausea, photophobia, and phonophobia  Patient took Excedrin 1-2 times per day x2 weeks as well as Imitrex every other day without improvement  She was seen in urgent care and by PCP and received 2 rounds of steroids, propranolol, Zofran, and Toradol with only minimal improvement for a few days  Headache was worsened by recent viral illness    Upon arrival to the ED, /66, but SBP as low as 95  Afebrile and no nuchal rigidity  CT head negative for acute intracranial abnormalities  CBC and CMP unremarkable  Patient has received Tylenol, Benadryl, Toradol, Reglan, magnesium sulfate, propanolol, gabapentin, Klonopin, Zofran, and morphine  This morning, patient continues to have headache and neck pain, but now rated as a 7/10 pain  Nonfocal neuro exam   High suspicion for medication overuse headache and cervicogenic headache given tight trapezius and paraspinal muscles  Patient has also had poor sleep and recent viral illness  This could also be a manifestation of the fibromyalgia  Plan:  - No further neuro imaging needed at this time  - Patient was previously taking Topamax, but this caused tingling in her fingers, so she has not taken this medication for several months     - Her PCP recently started her on propanolol ER 60 mg daily    However due to episodes of hypotension, will give propranolol 20 mg once tomorrow (1/11) and then discontinue propranolol  - Start Robaxin 500 mg TID  - GI intolerance to Cymbalta, so will start Elavil 25 mg QHS  - Started on gabapentin 100 mg TID (previoulsy on Lyrica, but insurance would not cover the medication)  - Discontinue IV medications  - Aqua K  - Massage therapy  - Continue to monitor and notify neurology with any changes     - Medical management and supportive care per primary team  Correction of any metabolic or infectious disturbances

## 2023-01-10 NOTE — PLAN OF CARE
Problem: PAIN - ADULT  Goal: Verbalizes/displays adequate comfort level or baseline comfort level  Description: Interventions:  - Encourage patient to monitor pain and request assistance  - Assess pain using appropriate pain scale  - Administer analgesics based on type and severity of pain and evaluate response  - Implement non-pharmacological measures as appropriate and evaluate response  - Consider cultural and social influences on pain and pain management  - Notify physician/advanced practitioner if interventions unsuccessful or patient reports new pain  1/10/2023 1626 by Cj Rodriguez RN  Outcome: Adequate for Discharge  1/10/2023 0906 by Cj Rodriguez RN  Outcome: Progressing     Problem: NEUROSENSORY - ADULT  Goal: Achieves stable or improved neurological status  Description: INTERVENTIONS  - Monitor and report changes in neurological status  - Monitor vital signs such as temperature, blood pressure, glucose, and any other labs ordered   - Initiate measures to prevent increased intracranial pressure  - Monitor for seizure activity and implement precautions if appropriate      1/10/2023 1626 by Cj Rodriguez RN  Outcome: Adequate for Discharge  1/10/2023 0906 by Cj Rodriguez RN  Outcome: Progressing

## 2023-01-10 NOTE — DISCHARGE INSTR - AVS FIRST PAGE
Dear Juanita Lynn,     It was our pleasure to care for you here at [facility]  It is our hope that we were always able to exceed the expected standards for your care during your stay  You were hospitalized due to migraine, uncontrolled pain  You were cared for on the 3rd floor under the service of Rica Crane MD with the [facility] who covers for your primary care physician (PCP), Hilaria Mccord MD, while you were hospitalized  If you have any questions or concerns related to this hospitalization, you may contact us at [phone]  For follow up as well as medication refills, we recommend that you follow up with your primary care physician  A registered nurse will reach out to you by phone within a few days after your discharge to answer any additional questions that you may have after going home  However, at this time we provide for you here, the most important instructions / recommendations at discharge:     Notable Medication Adjustments -   Started on Elavil 25 mg daily  Robaxin 500 mg TID  Continue gabapentine home dose  Stop propranolol after taking 20 mg dose on 1/11/23  Testing Required after Discharge -   none  Important follow up information -   Neurology followup in 6 weeks  Other Instructions -   none  Please review this entire after visit summary as additional general instructions including medication list, appointments, activity, diet, any pertinent wound care, and other additional recommendations from your care team that may be provided for you        Sincerely,     Rica Crane MD

## 2023-01-11 ENCOUNTER — TRANSITIONAL CARE MANAGEMENT (OUTPATIENT)
Dept: FAMILY MEDICINE CLINIC | Facility: CLINIC | Age: 43
End: 2023-01-11

## 2023-01-12 ENCOUNTER — HOSPITAL ENCOUNTER (EMERGENCY)
Facility: HOSPITAL | Age: 43
Discharge: HOME | End: 2023-01-12
Attending: EMERGENCY MEDICINE

## 2023-01-12 VITALS
OXYGEN SATURATION: 99 % | SYSTOLIC BLOOD PRESSURE: 117 MMHG | TEMPERATURE: 97.9 F | RESPIRATION RATE: 17 BRPM | HEART RATE: 65 BPM | DIASTOLIC BLOOD PRESSURE: 68 MMHG

## 2023-01-12 DIAGNOSIS — R51.9 HEADACHE: Primary | ICD-10-CM

## 2023-01-12 RX ORDER — DEXAMETHASONE SODIUM PHOSPHATE 10 MG/ML
10 INJECTION, SOLUTION INTRAMUSCULAR; INTRAVENOUS ONCE
Status: COMPLETED | OUTPATIENT
Start: 2023-01-12 | End: 2023-01-12

## 2023-01-12 RX ORDER — MAGNESIUM SULFATE HEPTAHYDRATE 40 MG/ML
2 INJECTION, SOLUTION INTRAVENOUS ONCE
Status: COMPLETED | OUTPATIENT
Start: 2023-01-12 | End: 2023-01-12

## 2023-01-12 RX ORDER — KETOROLAC TROMETHAMINE 30 MG/ML
15 INJECTION, SOLUTION INTRAMUSCULAR; INTRAVENOUS ONCE
Status: COMPLETED | OUTPATIENT
Start: 2023-01-12 | End: 2023-01-12

## 2023-01-12 RX ORDER — DIPHENHYDRAMINE HYDROCHLORIDE 50 MG/ML
50 INJECTION INTRAMUSCULAR; INTRAVENOUS ONCE
Status: COMPLETED | OUTPATIENT
Start: 2023-01-12 | End: 2023-01-12

## 2023-01-12 RX ORDER — ACETAMINOPHEN 325 MG/1
975 TABLET ORAL ONCE
Status: COMPLETED | OUTPATIENT
Start: 2023-01-12 | End: 2023-01-12

## 2023-01-12 RX ORDER — METOCLOPRAMIDE HYDROCHLORIDE 5 MG/ML
10 INJECTION INTRAMUSCULAR; INTRAVENOUS ONCE
Status: COMPLETED | OUTPATIENT
Start: 2023-01-12 | End: 2023-01-12

## 2023-01-12 RX ADMIN — DIPHENHYDRAMINE HYDROCHLORIDE 50 MG: 50 INJECTION, SOLUTION INTRAMUSCULAR; INTRAVENOUS at 16:42

## 2023-01-12 RX ADMIN — METOCLOPRAMIDE 10 MG: 5 INJECTION, SOLUTION INTRAMUSCULAR; INTRAVENOUS at 16:42

## 2023-01-12 RX ADMIN — ACETAMINOPHEN 975 MG: 325 TABLET, FILM COATED ORAL at 16:41

## 2023-01-12 RX ADMIN — KETOROLAC TROMETHAMINE 15 MG: 30 INJECTION, SOLUTION INTRAMUSCULAR; INTRAVENOUS at 16:42

## 2023-01-12 RX ADMIN — MAGNESIUM SULFATE HEPTAHYDRATE 2 G: 40 INJECTION, SOLUTION INTRAVENOUS at 17:00

## 2023-01-12 RX ADMIN — DEXAMETHASONE SODIUM PHOSPHATE 10 MG: 10 INJECTION, SOLUTION INTRAMUSCULAR; INTRAVENOUS at 16:42

## 2023-01-12 NOTE — ED PROVIDER NOTES
History  Chief Complaint   Patient presents with   • Headache     Pt reports constant headache since 12/23  Pt was hospitalized last week for HA  +N/dizziness/blurred vision since 1523     40year-old female with history of primary headaches presents with headache  Patient states headache has been ongoing since December  Was evaluated in ED on 1/9/23 and admitted for observation for intractable headache  Patient states upon discharge her headache was minimal and she felt well enough to go home  Since discharge her mild headache has persisted but became acutely worse last night  Headache described as diffuse, pounding, severe, constant, associated with photophobia, right-sided neck pain, bilateral paresthesias in the shoulders, and similar in location, character, and severity to prior headaches  Patient was initially trialed on propranolol but was taken off for hypotension  Patient was also prescribed amitriptyline and gabapentin after recent hospitalization but stopped taking them due to GI side effects  Denies fever, head trauma, neck trauma, weakness, dizziness          Prior to Admission Medications   Prescriptions Last Dose Informant Patient Reported? Taking?    [facility] Senna 8 6 MG tablet   No No   Sig: TAKE 1 TABLET (8 6 MG TOTAL) BY MOUTH DAILY AT BEDTIME   NON FORMULARY   Yes No   Sig: Take 1 Dose by mouth Medical marijuana   albuterol (PROVENTIL HFA,VENTOLIN HFA) 90 mcg/act inhaler   No No   Sig: INHALE 2 PUFFS EVERY 6 (SIX) HOURS AS NEEDED FOR SHORTNESS OF BREATH   amitriptyline (ELAVIL) 25 mg tablet   No No   Sig: Take 1 tablet (25 mg total) by mouth daily at bedtime   clonazePAM (KlonoPIN) 0 5 mg tablet   No No   Sig: TAKE 1 TABLET BY MOUTH EVERY DAY   dicyclomine (BENTYL) 20 mg tablet   No No   Sig: Take 1 tablet (20 mg total) by mouth every 6 (six) hours   ergocalciferol (VITAMIN D2) 50,000 units   Yes No   Sig: Take 50,000 Units by mouth once a week Every Thursday   gabapentin (Neurontin) 100 mg capsule   No No   Sig: Take 1 capsule (100 mg total) by mouth 3 (three) times a day Take one at bedtime x 3 days then increase to 2 tabs at bed time x 3 days then take 300 mg at bedtime   levonorgestrel (MIRENA) 20 MCG/24HR IUD   Yes No   Sig: Mirena 20 MCG/24HR Intrauterine Intrauterine Device  Refills: 0  Active   methocarbamol (ROBAXIN) 500 mg tablet   No No   Sig: Take 1 tablet (500 mg total) by mouth 3 (three) times a day for 14 days   ondansetron (ZOFRAN) 4 mg tablet   No No   Sig: Take 1 tablet (4 mg total) by mouth every 8 (eight) hours as needed for nausea or vomiting   pantoprazole (PROTONIX) 40 mg tablet   No No   Sig: Take 1 tablet (40 mg total) by mouth daily before breakfast   propranolol (INDERAL) 20 mg tablet   No No   Sig: Take 1 tablet (20 mg total) by mouth 1 (one) time for 1 dose Do not start before January 11, 2023        Facility-Administered Medications: None       Past Medical History:   Diagnosis Date   • Allergic    • Allergic rhinitis    • Anemia    • Anxiety    • Chondromalacia of both patellae    • Closed fracture of left distal fibula 08/27/2020   • Depression    • Fibromyalgia    • Fibromyalgia    • GERD (gastroesophageal reflux disease)    • Headache(784 0)    • Hematochezia    • Herpes simplex infection    • HPV (human papilloma virus) infection     11/2013   • Hypertension    • IBS (irritable bowel syndrome)    • Insomnia    • Mental status change    • Migraine    • Obesity 08/06/2015   • Scoliosis    • Scoliosis        Past Surgical History:   Procedure Laterality Date   • BREAST BIOPSY Left 12/10/2013   • EXPLORATORY LAPAROTOMY     • WISDOM TOOTH EXTRACTION         Family History   Problem Relation Age of Onset   • Stroke Mother    • Hypertension Mother    • Psoriasis Mother    • Depression Mother    • Hepatitis Father         B    • Substance Abuse Father    • No Known Problems Daughter    • No Known Problems Daughter    • Dementia Maternal Grandmother    • No Known Problems Maternal Grandfather    • No Known Problems Paternal Grandmother    • Alcohol abuse Paternal Grandfather    • Asthma Brother    • No Known Problems Brother    • Diabetes type I Son    • Diabetes Son    • Breast cancer Family         maternal aunt - 27 's    • Breast cancer Maternal Aunt 27   • Breast cancer Cousin 27   • Cancer Cousin      I have reviewed and agree with the history as documented  E-Cigarette/Vaping     E-Cigarette/Vaping Substances     Social History     Tobacco Use   • Smoking status: Former     Types: Cigarettes   • Smokeless tobacco: Never   • Tobacco comments:     quit 2018   Substance Use Topics   • Alcohol use: Yes     Alcohol/week: 3 0 standard drinks     Types: 1 Glasses of wine, 1 Cans of beer, 1 Shots of liquor per week     Comment: social    • Drug use: Yes     Frequency: 3 0 times per week     Types: Marijuana     Comment: few times a week         Review of Systems   Constitutional: Negative for chills and fever  HENT: Negative for ear pain and sore throat  Eyes: Negative for pain and visual disturbance  Respiratory: Negative for cough and shortness of breath  Cardiovascular: Negative for chest pain and palpitations  Gastrointestinal: Negative for abdominal pain and vomiting  Genitourinary: Negative for dysuria and hematuria  Musculoskeletal: Negative for arthralgias and back pain  Skin: Negative for color change and rash  Neurological: Negative for seizures and syncope  All other systems reviewed and are negative        Physical Exam  ED Triage Vitals   Temperature Pulse Respirations Blood Pressure SpO2   01/12/23 1527 01/12/23 1525 01/12/23 1525 01/12/23 1525 01/12/23 1525   97 9 °F (36 6 °C) 59 18 154/84 100 %      Temp Source Heart Rate Source Patient Position - Orthostatic VS BP Location FiO2 (%)   01/12/23 1527 01/12/23 1525 01/12/23 1735 01/12/23 1735 --   Oral Monitor Sitting Right arm       Pain Score       01/12/23 1525       10 - Worst Possible Pain Orthostatic Vital Signs  Vitals:    01/12/23 1525 01/12/23 1735   BP: 154/84 117/68   Pulse: 59 65   Patient Position - Orthostatic VS:  Sitting       Physical Exam  Vitals and nursing note reviewed  Constitutional:       General: She is in acute distress  Appearance: Normal appearance  She is normal weight  She is not ill-appearing, toxic-appearing or diaphoretic  HENT:      Head: Normocephalic and atraumatic  Right Ear: External ear normal       Left Ear: External ear normal       Nose: Nose normal       Mouth/Throat:      Mouth: Mucous membranes are moist       Pharynx: Oropharynx is clear  Eyes:      General:         Right eye: No discharge  Left eye: No discharge  Extraocular Movements: Extraocular movements intact  Conjunctiva/sclera: Conjunctivae normal       Pupils: Pupils are equal, round, and reactive to light  Cardiovascular:      Rate and Rhythm: Normal rate and regular rhythm  Pulses: Normal pulses  Heart sounds: Normal heart sounds  No murmur heard  No friction rub  Pulmonary:      Effort: Pulmonary effort is normal  No respiratory distress  Abdominal:      General: Abdomen is flat  Musculoskeletal:         General: Normal range of motion  Cervical back: Normal range of motion and neck supple  No rigidity or tenderness  Skin:     General: Skin is warm and dry  Capillary Refill: Capillary refill takes less than 2 seconds  Coloration: Skin is not pale  Neurological:      General: No focal deficit present  Mental Status: She is alert and oriented to person, place, and time  Cranial Nerves: No cranial nerve deficit  Sensory: No sensory deficit  Motor: No weakness        Coordination: Coordination normal    Psychiatric:         Mood and Affect: Mood normal          Behavior: Behavior normal          ED Medications  Medications   acetaminophen (TYLENOL) tablet 975 mg (975 mg Oral Given 1/12/23 1641) ketorolac (TORADOL) injection 15 mg (15 mg Intravenous Given 1/12/23 1642)   diphenhydrAMINE (BENADRYL) injection 50 mg (50 mg Intravenous Given 1/12/23 1642)   metoclopramide (REGLAN) injection 10 mg (10 mg Intravenous Given 1/12/23 1642)   dexamethasone (PF) (DECADRON) injection 10 mg (10 mg Intravenous Given 1/12/23 1642)   magnesium sulfate 2 g/50 mL IVPB (premix) 2 g (0 g Intravenous Stopped 1/12/23 1859)       Diagnostic Studies  Results Reviewed     None                 No orders to display         Procedures  Procedures      ED Course                                       Medical Decision Making  This patient presents with a headache most consistent with primary headache  Differential diagnosis includes migraine versus tension type headache  No headache red flags  Neurologic exam without evidence of meningismus, focal neurologic findings  Presentation not consistent with acute intracranial bleed to include SAH (lack of risk factors, headache history)  Presentation not consistent with acute CNS infection to include meningitis or brain abscess, Temporal arteritis unlikely, as is acute angle closure glaucoma given history and physical findings  Presentation not consistent with other acute, emergent causes of headache at this time  Plan to treat symptomatically with pain medication  No indication for imaging/LP at this time  Plan: pain medication, serial reassessment    Patient reports significant improvement in symptoms after treatment  Feels well for discharge  Headache: acute illness or injury  Risk  OTC drugs  Prescription drug management              Disposition  Final diagnoses:   Headache     Time reflects when diagnosis was documented in both MDM as applicable and the Disposition within this note     Time User Action Codes Description Comment    1/12/2023  6:13 PM Erniegianrickie QuintanillaEvie Add [R51 9] Headache       ED Disposition     ED Disposition   Discharge    Condition   Stable    Date/Time   Thu Jan 12, 2023  6:13 PM    [facility] discharge to home/self care                 Follow-up Information    None         Discharge Medication List as of 1/12/2023  6:15 PM      CONTINUE these medications which have NOT CHANGED    Details   albuterol (PROVENTIL HFA,VENTOLIN HFA) 90 mcg/act inhaler INHALE 2 PUFFS EVERY 6 (SIX) HOURS AS NEEDED FOR SHORTNESS OF BREATH, Normal      amitriptyline (ELAVIL) 25 mg tablet Take 1 tablet (25 mg total) by mouth daily at bedtime, Starting Tue 1/10/2023, Until Thu 2/9/2023, Normal      clonazePAM (KlonoPIN) 0 5 mg tablet TAKE 1 TABLET BY MOUTH EVERY DAY, Normal      [employer] Senna 8 6 MG tablet TAKE 1 TABLET (8 6 MG TOTAL) BY MOUTH DAILY AT BEDTIME, Normal      dicyclomine (BENTYL) 20 mg tablet Take 1 tablet (20 mg total) by mouth every 6 (six) hours, Starting Tue 1/3/2023, Normal      ergocalciferol (VITAMIN D2) 50,000 units Take 50,000 Units by mouth once a week Every Thursday, Starting Mon 2/7/2022, Historical Med      gabapentin (Neurontin) 100 mg capsule Take 1 capsule (100 mg total) by mouth 3 (three) times a day Take one at bedtime x 3 days then increase to 2 tabs at bed time x 3 days then take 300 mg at bedtime, Starting Thu 1/5/2023, Normal      levonorgestrel (MIRENA) 20 MCG/24HR IUD Mirena 20 MCG/24HR Intrauterine Intrauterine Device  Refills: 0  Active, Historical Med      methocarbamol (ROBAXIN) 500 mg tablet Take 1 tablet (500 mg total) by mouth 3 (three) times a day for 14 days, Starting Tue 1/10/2023, Until Tue 1/24/2023, Normal      NON FORMULARY Take 1 Dose by mouth Medical marijuana, Historical Med      ondansetron (ZOFRAN) 4 mg tablet Take 1 tablet (4 mg total) by mouth every 8 (eight) hours as needed for nausea or vomiting, Starting Fri 12/23/2022, Normal      pantoprazole (PROTONIX) 40 mg tablet Take 1 tablet (40 mg total) by mouth daily before breakfast, Starting Fri 4/1/2022, Normal      propranolol (INDERAL) 20 mg tablet Take 1 tablet (20 mg total) by mouth 1 (one) time for 1 dose Do not start before January 11, 2023 , Starting Wed 1/11/2023, Normal           No discharge procedures on file  PDMP Review       Value Time User    PDMP Reviewed  Yes 1/9/2023 10:43 PM Erasmo Rodrigues, Deneen Vaz           ED Provider  Attending physically available and evaluated Imelda Faustin  NABILA managed the patient along with the ED Attending      Electronically Signed by         Real Griffin MD  01/13/23 8767

## 2023-01-12 NOTE — DISCHARGE INSTRUCTIONS
You have been evaluated in the Emergency Department today for headache  Your evaluation did not show evidence of medical conditions requiring emergent intervention at this time, and your pain improved with medication in the ED  We recommend you take 600mg ibuprofen every 6 hours and tylenol 650mg every 6 hours as needed for pain  If needed, you can alternate these medications so that you take one medication every 3 hours  For instance, at noon take ibuprofen, then at 3pm take tylenol, then at 6pm take ibuprofen  Please follow up with your primary care physician within two days  Return to the Emergency Department if you experience worsening or uncontrolled pain, vision changes, recurrent vomiting, difficulty with normal activities, abnormal behavior, difficulty walking, numbness, weakness, or any other concerning symptoms

## 2023-01-13 ENCOUNTER — HOSPITAL ENCOUNTER (OUTPATIENT)
Dept: ULTRASOUND IMAGING | Facility: CLINIC | Age: 43
End: 2023-01-13

## 2023-01-13 ENCOUNTER — HOSPITAL ENCOUNTER (OUTPATIENT)
Dept: MAMMOGRAPHY | Facility: CLINIC | Age: 43
End: 2023-01-13

## 2023-01-13 VITALS — WEIGHT: 182 LBS | HEIGHT: 63 IN | BODY MASS INDEX: 32.25 KG/M2

## 2023-01-13 DIAGNOSIS — R92.8 ABNORMAL SCREENING MAMMOGRAM: ICD-10-CM

## 2023-01-15 DIAGNOSIS — R92.8 ABNORMAL MAMMOGRAM: Primary | ICD-10-CM

## 2023-01-16 ENCOUNTER — OFFICE VISIT (OUTPATIENT)
Dept: FAMILY MEDICINE CLINIC | Facility: CLINIC | Age: 43
End: 2023-01-16

## 2023-01-16 VITALS
HEART RATE: 65 BPM | DIASTOLIC BLOOD PRESSURE: 80 MMHG | RESPIRATION RATE: 16 BRPM | BODY MASS INDEX: 32.96 KG/M2 | HEIGHT: 63 IN | OXYGEN SATURATION: 95 % | SYSTOLIC BLOOD PRESSURE: 120 MMHG | TEMPERATURE: 98.5 F | WEIGHT: 186 LBS

## 2023-01-16 DIAGNOSIS — Z09 HOSPITAL DISCHARGE FOLLOW-UP: Primary | ICD-10-CM

## 2023-01-16 DIAGNOSIS — G43.019 INTRACTABLE MIGRAINE WITHOUT AURA AND WITHOUT STATUS MIGRAINOSUS: ICD-10-CM

## 2023-01-16 DIAGNOSIS — K21.9 GASTROESOPHAGEAL REFLUX DISEASE WITHOUT ESOPHAGITIS: ICD-10-CM

## 2023-01-16 DIAGNOSIS — F41.9 ANXIETY: ICD-10-CM

## 2023-01-16 DIAGNOSIS — M79.7 FIBROMYALGIA: ICD-10-CM

## 2023-01-16 RX ORDER — GABAPENTIN 100 MG/1
CAPSULE ORAL
Qty: 90 CAPSULE | Refills: 0 | Status: SHIPPED | OUTPATIENT
Start: 2023-01-16

## 2023-01-16 RX ORDER — BUTALBITAL, ACETAMINOPHEN AND CAFFEINE 300; 40; 50 MG/1; MG/1; MG/1
1 CAPSULE ORAL ONCE AS NEEDED
Qty: 10 CAPSULE | Refills: 0 | Status: SHIPPED | OUTPATIENT
Start: 2023-01-16

## 2023-01-16 NOTE — PROGRESS NOTES
Assessment & Plan     1  Hospital discharge follow-up    2  Intractable migraine without aura and without status migrainosus  Comments:  seeing neurology this week   Assessment & Plan: To add magnesium 500 mg at bedtime   b2 400 mg in AM   Continue gabapentin and amitriptyline at bedtime     Orders:  -     Butalbital-APAP-Caffeine (Fioricet) -40 MG CAPS; Take 1 tablet by mouth once as needed (migraines) for up to 1 dose    3  Fibromyalgia  -     gabapentin (Neurontin) 100 mg capsule; Take one at bedtime x 3 days then increase to 2 tabs at bed time x 3 days then take 300 mg at bedtime    4  Anxiety  Assessment & Plan:  klonapin as needed       5  Gastroesophageal reflux disease without esophagitis  Assessment & Plan:  protonix as directed           Subjective     Transitional Care Management Review:   Venkatesh Ruffin is a 40 y o  female here for TCM follow up  During the TCM phone call patient stated:  TCM Call     Date and time call was made  1/11/2023  2:57 PM    Patient was hospitialized at  [facility]    Date of Admission  01/09/23    Date of discharge  01/10/23    Diagnosis  intractable migraine    Disposition  Home    Were the patients medications reviewed and updated  No    Current Symptoms  Headache      TCM Call     Should patient be enrolled in anticoag monitoring? No    Scheduled for follow up?   Yes    Did you obtain your prescribed medications  Yes    Do you need help managing your prescriptions or medications  No    Is transportation to your appointment needed  No    I have advised the patient to call PCP with any new or worsening symptoms  Apolonia Monroe,     Living Arrangements  Spouse or Significiant other    Are you recieving any outpatient services  No    Are you recieving home care services  No    Are you using any community resources  No    Current waiver services  No    Have you fallen in the last 12 months  No    Interperter language line needed  No        HPI   Here for hospital follow up  Was admitted for intractable migraines  Given migraines cocktail in the hospital which put her to sleep but didn't help much with her headaches per patient  Stopped propranolol due to low BP  Still feel like she is hung over all day   Still has shoulder and upper back pain   Headaches 5/10 today   Taking all her meds as directed per patient         Review of Systems   Constitutional: Negative  HENT: Negative  Respiratory: Negative  Cardiovascular: Negative  Gastrointestinal: Negative  Musculoskeletal: Positive for arthralgias  Negative for myalgias  Neurological: Positive for headaches  Hematological: Negative  Objective     /80 (BP Location: Left arm, Patient Position: Sitting, Cuff Size: Adult)   Pulse 65   Temp 98 5 °F (36 9 °C) (Tympanic)   Resp 16   Ht 5' 3" (1 6 m)   Wt 84 4 kg (186 lb)   SpO2 95%   BMI 32 95 kg/m²      Physical Exam  Vitals and nursing note reviewed  Constitutional:       Appearance: Normal appearance  HENT:      Head: Normocephalic and atraumatic  Right Ear: Tympanic membrane normal       Left Ear: Tympanic membrane normal    Cardiovascular:      Rate and Rhythm: Normal rate and regular rhythm  Pulses: Normal pulses  Pulmonary:      Effort: Pulmonary effort is normal       Breath sounds: Normal breath sounds  Skin:     Capillary Refill: Capillary refill takes less than 2 seconds  Neurological:      General: No focal deficit present  Mental Status: She is alert and oriented to person, place, and time     Psychiatric:         Mood and Affect: Mood normal          Behavior: Behavior normal        Medications have been reviewed by provider in current encounter    Hong Dodd MD

## 2023-01-16 NOTE — ED ATTENDING ATTESTATION
1/12/2023  IAlberto MD, saw and evaluated the patient  I have discussed the patient with the resident/non-physician practitioner and agree with the resident's/non-physician practitioner's findings, Plan of Care, and MDM as documented in the resident's/non-physician practitioner's note, except where noted  All available labs and Radiology studies were reviewed  I was present for key portions of any procedure(s) performed by the resident/non-physician practitioner and I was immediately available to provide assistance  At this point I agree with the current assessment done in the Emergency Department  I have conducted an independent evaluation of this patient a history and physical is as follows:    History Source: Patient      HPI: 31-year-old female with a history of primary headache disorder presents with headache  Patient states headache has been ongoing since December  Patient was recently seen in emergency department 1/9/2023 and evaluate for observation for intractable migraine headache  Upon discharge headache was minimal and she felt well up to be discharged  This discharge mild headache has persisted became acutely worse last night  Headache describes diffuse pounding severe constant associate with photophobia and right-sided neck pain  Patient also describes numbness in her shoulders headache per patient has been similar location character and severity since prior headaches  Patient was tried initially on propranolol however took her self off secondary hypertension  Patient is currently taking amitriptyline gabapentin after recent hospitalization but also stopped them secondary to side effects  Patient denies any new trauma fevers chills or other symptoms  Prior medical records reviewed: Hospitalization/ED records 1/9/2023 described as above    PE:    Vitals reviewed  General no acute distress  Heart regular rate and rhythm without murmurs rubs or gallops    Lungs clear to auscultation bilaterally  Abdomen soft nontender nondistended normal bowel sounds  No signs of peritonitis   Extremities normal pulses, no edema no tenderness  Neuro: Cranial nerves Grossly intact  Strength 5/5 B/L extremities  Normal speech  Normal Gait  No limb or truncal ataxia  Impression/Plan: Acute exacerbation of migraine headache        The patient presents with acute exacerbation of migraine headache presentation symptoms appear consistent with patient's primary headache disorder  Patient has a reassuring neurologic examination headache is not thunderclap no focal neurologic deficits concerns for subarachnoid hemorrhage, ICH, mass meningitis, abscess no indication for CT LP at this time  I will treat with migraine cocktail reassess  ED Course   Patient received Tylenol, Toradol, diphenhydramine, Reglan, dexamethasone, magnesium sulfate for headache on reassessment patient reports symptoms have improved markedly patient feels well enough for discharge to home    Patient to follow-up with neurology as outpatient return precautions given      Critical Care Time  Procedures

## 2023-01-16 NOTE — ASSESSMENT & PLAN NOTE
To add magnesium 500 mg at bedtime   b2 400 mg in AM   Continue gabapentin and amitriptyline at bedtime

## 2023-01-17 ENCOUNTER — TELEPHONE (OUTPATIENT)
Dept: ADMINISTRATIVE | Facility: OTHER | Age: 43
End: 2023-01-17

## 2023-01-17 NOTE — TELEPHONE ENCOUNTER
----- Message from Ashok Mcfarlane sent at 1/16/2023  2:50 PM EST -----  Regarding: care gap request  01/16/23 2:50 PM    Hello, our patient attached above has had Pap Smear (HPV) aka Cervical Cancer Screening completed/performed  Please assist in updating the patient chart by pulling the Care Everywhere (CE) document  The date of service is 3/22/2021       Thank you,  Ashok [facility]

## 2023-01-17 NOTE — TELEPHONE ENCOUNTER
Upon review of the In Basket request we were able to locate, review, and update the patient chart as requested for Pap Smear (HPV) aka Cervical Cancer Screening  Any additional questions or concerns should be emailed to the Practice Liaisons via the appropriate education email address, please do not reply via In Basket      Thank you  Renata Jarvis MA

## 2023-01-31 ENCOUNTER — OFFICE VISIT (OUTPATIENT)
Dept: FAMILY MEDICINE CLINIC | Facility: CLINIC | Age: 43
End: 2023-01-31

## 2023-01-31 ENCOUNTER — TELEPHONE (OUTPATIENT)
Dept: OTHER | Facility: OTHER | Age: 43
End: 2023-01-31

## 2023-01-31 VITALS
DIASTOLIC BLOOD PRESSURE: 78 MMHG | SYSTOLIC BLOOD PRESSURE: 118 MMHG | WEIGHT: 185.2 LBS | OXYGEN SATURATION: 94 % | RESPIRATION RATE: 16 BRPM | HEIGHT: 63 IN | TEMPERATURE: 98.2 F | BODY MASS INDEX: 32.82 KG/M2 | HEART RATE: 65 BPM

## 2023-01-31 DIAGNOSIS — G43.709 CHRONIC MIGRAINE WITHOUT AURA WITHOUT STATUS MIGRAINOSUS, NOT INTRACTABLE: ICD-10-CM

## 2023-01-31 DIAGNOSIS — M54.12 CERVICAL RADICULOPATHY: Primary | ICD-10-CM

## 2023-01-31 DIAGNOSIS — F40.240 FEAR OF CLOSED SPACES: ICD-10-CM

## 2023-01-31 RX ORDER — KETOROLAC TROMETHAMINE 30 MG/ML
30 INJECTION, SOLUTION INTRAMUSCULAR; INTRAVENOUS ONCE
Status: COMPLETED | OUTPATIENT
Start: 2023-01-31 | End: 2023-01-31

## 2023-01-31 RX ORDER — LORAZEPAM 0.5 MG/1
TABLET ORAL
Qty: 1 TABLET | Refills: 0 | Status: SHIPPED | OUTPATIENT
Start: 2023-01-31

## 2023-01-31 RX ADMIN — KETOROLAC TROMETHAMINE 30 MG: 30 INJECTION, SOLUTION INTRAMUSCULAR; INTRAVENOUS at 16:42

## 2023-01-31 NOTE — PROGRESS NOTES
Name: Reyes Salon      : 1980      MRN: [id number]  Encounter Provider: Dwayne Hollins MD  Encounter Date: 2023   Encounter department: [place] [address]  Cervical radiculopathy  Comments:  to f/u with her pain management   to increase amitriptyline to 50 mg at bedtime  Orders:  -     ketorolac (TORADOL) injection 30 mg  -     Ambulatory Referral to Physical Therapy; Future    2  Fear of closed spaces  Comments:  for MRI brain   Orders:  -     LORazepam (Ativan) 0 5 mg tablet; Take 1 tab 1 hour prior to procedure    3  Chronic migraine without aura without status migrainosus, not intractable  Assessment & Plan:  Improving  Sees neurology             Subjective    40 F h/o chronic neck and back pain c/o worsening neck and right shoulder down to her hand for the last 3 days   No injury or trauma that she could recall       Neck Pain   This is a chronic problem  The current episode started more than 1 year ago  The problem has been gradually worsening  The pain is associated with nothing  The pain is present in the midline and right side  The symptoms are aggravated by twisting and bending  The pain is same all the time  Pertinent negatives include no chest pain, fever, headaches, leg pain, numbness, pain with swallowing, paresis, photophobia, syncope, tingling, trouble swallowing, visual change or weakness  She has tried acetaminophen for the symptoms  The treatment provided no relief  Review of Systems   Constitutional: Negative for fever  HENT: Negative for trouble swallowing  Eyes: Negative for photophobia  Cardiovascular: Negative for chest pain and syncope  Musculoskeletal: Positive for neck pain  Neurological: Negative for tingling, weakness, numbness and headaches         Current Outpatient Medications on File Prior to Visit   Medication Sig   • albuterol (PROVENTIL HFA,VENTOLIN HFA) 90 mcg/act inhaler INHALE 2 PUFFS EVERY 6 (SIX) HOURS AS NEEDED FOR SHORTNESS OF BREATH   • amitriptyline (ELAVIL) 25 mg tablet Take 1 tablet (25 mg total) by mouth daily at bedtime   • clonazePAM (KlonoPIN) 0 5 mg tablet TAKE 1 TABLET BY MOUTH EVERY DAY   • [facility] Senna 8 6 MG tablet TAKE 1 TABLET (8 6 MG TOTAL) BY MOUTH DAILY AT BEDTIME   • dicyclomine (BENTYL) 20 mg tablet Take 1 tablet (20 mg total) by mouth every 6 (six) hours   • ergocalciferol (VITAMIN D2) 50,000 units Take 50,000 Units by mouth once a week Every Thursday   • gabapentin (Neurontin) 100 mg capsule Take one at bedtime x 3 days then increase to 2 tabs at bed time x 3 days then take 300 mg at bedtime   • levonorgestrel (MIRENA) 20 MCG/24HR IUD Mirena 20 MCG/24HR Intrauterine Intrauterine Device  Refills: 0  Active   • methocarbamol (ROBAXIN) 500 mg tablet Take 1 tablet (500 mg total) by mouth 3 (three) times a day for 14 days   • NON FORMULARY Take 1 Dose by mouth Medical marijuana   • ondansetron (ZOFRAN) 4 mg tablet Take 1 tablet (4 mg total) by mouth every 8 (eight) hours as needed for nausea or vomiting   • pantoprazole (PROTONIX) 40 mg tablet Take 1 tablet (40 mg total) by mouth daily before breakfast   • Butalbital-APAP-Caffeine (Fioricet) -40 MG CAPS Take 1 tablet by mouth once as needed (migraines) for up to 1 dose (Patient not taking: Reported on 1/31/2023)       Objective     /78 (BP Location: Left arm, Patient Position: Sitting, Cuff Size: Adult)   Pulse 65   Temp 98 2 °F (36 8 °C) (Tympanic)   Resp 16   Ht 5' 3" (1 6 m)   Wt 84 kg (185 lb 3 2 oz)   SpO2 94%   BMI 32 81 kg/m²     Physical Exam  Constitutional:       Appearance: Normal appearance  Cardiovascular:      Rate and Rhythm: Normal rate and regular rhythm  Pulses: Normal pulses  Heart sounds: Normal heart sounds  Pulmonary:      Effort: Pulmonary effort is normal       Breath sounds: Normal breath sounds  Musculoskeletal:         General: Tenderness present        Comments: Lower cervical spine and spasm bilateral trapezius muscles   Neurological:      General: No focal deficit present  Mental Status: She is alert and oriented to person, place, and time         Martine De Los Santos MD

## 2023-01-31 NOTE — TELEPHONE ENCOUNTER
Can she come into the office today to see Liz Friedman today to see what is going on with the pain?     Dr Alexander Tony

## 2023-01-31 NOTE — TELEPHONE ENCOUNTER
"Chief Complaint  Establish Care and Tourette Syndrome    Subjective    History of Present Illness        Domenic Sandoval presents to [facility] for   Sudden Behavior Change:   2/17/2021- The patient and his mother are here today and she states that for the last 3 weeks he has been having Auditory phrases and noises and movements that have become louder and more boisterous. She states that the last 2 weeks have been harder as her sons conditions has worsened. She states that it started with Tongue clicking and whistling and she states that this has turned in to Loud sounds and phrases. She states that there are days that he says new things and things that she has not heard him say prior to this and while he has been delaing with it. She states that he has some Neck twitching and she states that when this happens it usually is followed by some loud and or some type of words or phrases. She states that one of his phrases is ( B and tongue clicking Breakfast ). She states that he has as well had times that he will throw the middle finger up at times and she states that he starts winking. She states that this has become very uncontrollable and she states that this is a huge adjustment for the home. She states that he was always a perfectly normal child and she states that he always had a clean bill of health and she states that she is wondering as to why this has happened all the sudden. She states that she has another child and she states that they have told her he has a TIC (  He clears his throat) but she said his pediatrician says he is fine.        Objective   Vital Signs:   Visit Vitals  /86   Pulse 113   Temp 98.2 °F (36.8 °C)   Resp 18   Ht 165.1 cm (65\")   Wt 78.7 kg (173 lb 9.6 oz)   SpO2 98%   BMI 28.89 kg/m²       Physical Exam  Constitutional:       Appearance: He is normal weight.   HENT:      Head: Normocephalic.      Nose: Nose normal.   Eyes:      Pupils: Pupils are " Did she try to reach out to her neurologist regarding this as they just saw her for migraines last week?     Dr Glen Melendez equal, round, and reactive to light.   Skin:     General: Skin is warm.   Neurological:      General: No focal deficit present.      Mental Status: He is alert and oriented to person, place, and time.      Comments: Involuntary twitches and auditory tics.            Result Review :                    Assessment and Plan      Diagnoses and all orders for this visit:    1. Tourette's syndrome (Primary)  Assessment & Plan:  Psychological condition is worsening.  Referral neurology   This is a clinical diagnosis based on symptoms.  We will order blood work as well as a CT scan and EEG.  Psychological condition  will be reassessed at the next regular appointment.    Orders:  -     CT Head Without Contrast; Future  -     CBC & Differential  -     Comprehensive Metabolic Panel  -     Vitamin B12  -     Vitamin D 25 Hydroxy  -     Folate    2. Twitching  Assessment & Plan:  The worsening twitching has likely due to symptoms of Tourette's syndrome, which has been undiagnosed.    Orders:  -     CT Head Without Contrast; Future  -     CBC & Differential  -     Comprehensive Metabolic Panel  -     Vitamin B12  -     Vitamin D 25 Hydroxy  -     Folate  -     EEG    3. Encounter to establish care    4. Screening for hyperlipidemia  -     CBC & Differential  -     Comprehensive Metabolic Panel  -     Lipid Panel With / Chol / HDL Ratio    5. Screening for hypothyroidism  -     TSH       I spent 30 minutes caring for Domenic on this date of service. This time includes time spent by me in the following activities:preparing for the visit, counseling and educating the patient/family/caregiver, ordering medications, tests, or procedures and care coordination    Follow Up   No follow-ups on file.  Patient was given instructions and counseling regarding his condition or for health maintenance advice. Please see specific information pulled into the AVS if appropriate.

## 2023-01-31 NOTE — TELEPHONE ENCOUNTER
Pt states that she is having migraine pain and pain in her arm and she would like to know if Dr Drea Marie can send something in for her  Please call pt and advise

## 2023-02-03 DIAGNOSIS — F41.9 ANXIETY: ICD-10-CM

## 2023-02-03 DIAGNOSIS — K58.1 IRRITABLE BOWEL SYNDROME WITH CONSTIPATION: ICD-10-CM

## 2023-02-03 RX ORDER — SENNOSIDES 8.6 MG
TABLET ORAL
Qty: 30 TABLET | Refills: 0 | Status: SHIPPED | OUTPATIENT
Start: 2023-02-03

## 2023-02-03 RX ORDER — CLONAZEPAM 0.5 MG/1
TABLET ORAL
Qty: 30 TABLET | Refills: 0 | Status: SHIPPED | OUTPATIENT
Start: 2023-02-03

## 2023-02-08 ENCOUNTER — TELEMEDICINE (OUTPATIENT)
Dept: FAMILY MEDICINE CLINIC | Facility: CLINIC | Age: 43
End: 2023-02-08

## 2023-02-08 DIAGNOSIS — J06.9 UPPER RESPIRATORY TRACT INFECTION, UNSPECIFIED TYPE: Primary | ICD-10-CM

## 2023-02-08 DIAGNOSIS — K59.03 DRUG INDUCED CONSTIPATION: ICD-10-CM

## 2023-02-08 RX ORDER — DOCUSATE SODIUM 100 MG/1
100 CAPSULE, LIQUID FILLED ORAL DAILY
Qty: 30 CAPSULE | Refills: 0 | Status: SHIPPED | OUTPATIENT
Start: 2023-02-08

## 2023-02-08 NOTE — PROGRESS NOTES
Virtual Regular Visit    Verification of patient location:    Patient is located in the following state in which I hold an active license [place]      Assessment/Plan:    Problem List Items Addressed This Visit    None  Visit Diagnoses     Upper respiratory tract infection, unspecified type    -  Primary    3 days of URI symptoms  Likely viral  Will r/o covid and flu  Coming in later this afternoon  Supportive care with flonase  Call if symptoms persist or worsen  Relevant Orders    Covid/Flu- Office Collect    Drug induced constipation        Since starting elevil  Did explain the anticholinergic effects and would reccomend adding colace to senna    Relevant Medications    docusate sodium (COLACE) 100 mg capsule               Reason for visit is No chief complaint on file  Encounter provider Katelyn Alvarado MD    Provider located at [address] [address]      Recent Visits  No visits were found meeting these conditions  Showing recent visits within past 7 days and meeting all other requirements  Today's Visits  Date Type Provider Dept   02/08/23 Telemedicine Katelyn Alvarado MD [facility] today's visits and meeting all other requirements  Future Appointments  No visits were found meeting these conditions  Showing future appointments within next 150 days and meeting all other requirements       The patient was identified by name and date of birth  Yovany Matson was informed that this is a telemedicine visit and that the visit is being conducted through the [employer]  She agrees to proceed     My office door was closed  The patient was notified the following individuals were present in the room Raisa Hollingsworth  She acknowledged consent and understanding of privacy and security of the video platform  The patient has agreed to participate and understands they can discontinue the visit at any time      Patient is aware this is a billable service  Deonte Boykin is a 40 y o  female seen virtually with sore throat, congestion, body ache, sinus pressure, headache, and cough x 3 days  Headaches are chronic  History allergies and fibromyalgia  Been constipation and taking senna  No nausea, vomiting, facial tenderness,  and fevers         Past Medical History:   Diagnosis Date   • Allergic    • Allergic rhinitis    • Anemia    • Anxiety    • Chondromalacia of both patellae    • Closed fracture of left distal fibula 08/27/2020   • Depression    • Fibromyalgia    • Fibromyalgia    • GERD (gastroesophageal reflux disease)    • Headache(784 0)    • Hematochezia    • Herpes simplex infection    • HPV (human papilloma virus) infection     11/2013   • Hypertension    • IBS (irritable bowel syndrome)    • Insomnia    • Mental status change    • Migraine    • Obesity 08/06/2015   • Scoliosis    • Scoliosis        Past Surgical History:   Procedure Laterality Date   • BREAST BIOPSY Left 12/10/2013   • EXPLORATORY LAPAROTOMY     • WISDOM TOOTH EXTRACTION         Current Outpatient Medications   Medication Sig Dispense Refill   • albuterol (PROVENTIL HFA,VENTOLIN HFA) 90 mcg/act inhaler INHALE 2 PUFFS EVERY 6 (SIX) HOURS AS NEEDED FOR SHORTNESS OF BREATH 8 g 0   • amitriptyline (ELAVIL) 25 mg tablet Take 1 tablet (25 mg total) by mouth daily at bedtime 30 tablet 0   • clonazePAM (KlonoPIN) 0 5 mg tablet TAKE 1 TABLET BY MOUTH EVERY DAY 30 tablet 0   • [facility] Senna 8 6 MG tablet TAKE 1 TABLET (8 6 MG TOTAL) BY MOUTH DAILY AT BEDTIME 30 tablet 0   • dicyclomine (BENTYL) 20 mg tablet Take 1 tablet (20 mg total) by mouth every 6 (six) hours 20 tablet 0   • docusate sodium (COLACE) 100 mg capsule Take 1 capsule (100 mg total) by mouth in the morning 30 capsule 0   • levonorgestrel (MIRENA) 20 MCG/24HR IUD Mirena 20 MCG/24HR Intrauterine Intrauterine Device  Refills: 0  Active     • LORazepam (Ativan) 0 5 mg tablet Take 1 tab 1 hour prior to procedure 1 tablet 0   • methocarbamol (ROBAXIN) 500 mg tablet Take 1 tablet (500 mg total) by mouth 3 (three) times a day for 14 days 42 tablet 0   • NON FORMULARY Take 1 Dose by mouth Medical marijuana     • ondansetron (ZOFRAN) 4 mg tablet Take 1 tablet (4 mg total) by mouth every 8 (eight) hours as needed for nausea or vomiting 20 tablet 0   • pantoprazole (PROTONIX) 40 mg tablet Take 1 tablet (40 mg total) by mouth daily before breakfast 30 tablet 5   • Butalbital-APAP-Caffeine (Fioricet) -40 MG CAPS Take 1 tablet by mouth once as needed (migraines) for up to 1 dose (Patient not taking: Reported on 1/31/2023) 10 capsule 0   • ergocalciferol (VITAMIN D2) 50,000 units Take 50,000 Units by mouth once a week Every Thursday (Patient not taking: Reported on 2/8/2023)     • gabapentin (Neurontin) 100 mg capsule Take one at bedtime x 3 days then increase to 2 tabs at bed time x 3 days then take 300 mg at bedtime (Patient not taking: Reported on 2/8/2023) 90 capsule 0     No current facility-administered medications for this visit  Allergies   Allergen Reactions   • Cymbalta [Duloxetine Hcl] Nausea Only and GI Intolerance     Stomach upset       Review of Systems   Constitutional: Negative for chills and fever  HENT: Positive for congestion, postnasal drip, rhinorrhea, sinus pressure, sinus pain and sore throat  Respiratory: Negative  Cardiovascular: Negative  Gastrointestinal: Positive for constipation  Musculoskeletal: Negative for myalgias  Video Exam    Vitals:       Physical Exam  Constitutional:       General: She is not in acute distress  Appearance: Normal appearance  She is not ill-appearing or toxic-appearing  Comments: Driving    Eyes:      Extraocular Movements: Extraocular movements intact  Pulmonary:      Effort: Pulmonary effort is normal  No respiratory distress  Breath sounds: No stridor  Skin:     Coloration: Skin is not pale     Neurological: Mental Status: She is alert and oriented to person, place, and time     Psychiatric:         Mood and Affect: Mood normal          Behavior: Behavior normal           I spent 7 minutes directly with the patient during this visit

## 2023-02-10 ENCOUNTER — TELEPHONE (OUTPATIENT)
Dept: OTHER | Facility: OTHER | Age: 43
End: 2023-02-10

## 2023-02-10 NOTE — TELEPHONE ENCOUNTER
Patient states she called a chiropractor office to get an appointment but was told she needs a "referral" from her provider or she cannot be seen  Please call her

## 2023-02-10 NOTE — TELEPHONE ENCOUNTER
Left message for Pt to call back re:    Does Pt need an insurance referral or an Ambulatory referral?  What is the reason for being seen by that provider (chief complaint)?

## 2023-02-15 ENCOUNTER — TELEMEDICINE (OUTPATIENT)
Dept: FAMILY MEDICINE CLINIC | Facility: CLINIC | Age: 43
End: 2023-02-15

## 2023-02-15 DIAGNOSIS — M79.7 FIBROMYALGIA: Primary | ICD-10-CM

## 2023-02-15 DIAGNOSIS — U07.1 COVID-19: Primary | ICD-10-CM

## 2023-02-15 RX ORDER — KETOROLAC TROMETHAMINE 10 MG/1
10 TABLET, FILM COATED ORAL EVERY 8 HOURS PRN
Qty: 9 TABLET | Refills: 0 | Status: SHIPPED | OUTPATIENT
Start: 2023-02-15

## 2023-02-15 NOTE — PROGRESS NOTES
COVID-19 Outpatient Progress Note    Assessment/Plan:    Problem List Items Addressed This Visit    None  Visit Diagnoses     COVID-19    -  Primary    Day 9 of illness  Tested positive on 2/14  Symptoms improving  Complains of body ache  Also requesting pain medicine for pinch nerve  PCP has been managing and is on multiple medications for pain  States she is taking large amounts of NSAIDS and APAP  I advised her to contact PCP regarding pain management  As for covid, she has 1 more day of strict masking  Outside the window for antivirals  Toradol ordered for body aches  Advised not to take with other NSAIDs and to take with food/fluids  Follow up as needed  Relevant Medications    ketorolac (TORADOL) 10 mg tablet         Disposition:     Discussed symptom directed medication options with patient  Discussed vitamin D, vitamin C, and/or zinc supplementation with patient  I have spent 12 minutes directly with the patient  Greater than 50% of this time was spent in counseling/coordination of care regarding: risks and benefits of treatment options, instructions for management, patient and family education, risk factor reductions and impressions  Encounter provider: Mine Hoover MD     Provider located at: [address] [address]     Recent Visits  Date Type Provider Dept   02/08/23 Telemedicine Mine Hoover MD [address] recent visits within past 7 days and meeting all other requirements  Today's Visits  Date Type Provider Dept   02/15/23 Telemedicine MD Hardik Jackson today's visits and meeting all other requirements  Future Appointments  No visits were found meeting these conditions    Showing future appointments within next 150 days and meeting all other requirements     This virtual check-in was done via [address] and patient was informed that this is a secure, HIPAA-compliant platform  She agrees to proceed  Patient agrees to participate in a virtual check in via telephone or video visit instead of presenting to the office to address urgent/immediate medical needs  Patient is aware this is a billable service  She acknowledged consent and understanding of privacy and security of the video platform  The patient has agreed to participate and understands they can discontinue the visit at any time  After connecting through [facility], the patient was identified by name and date of birth  rDe Virgen was informed that this was a telemedicine visit and that the exam was being conducted confidentially over secure lines  My office door was closed  Dre Virgen acknowledged consent and understanding of privacy and security of the telemedicine visit  I informed the patient that I have reviewed her record in Epic and presented the opportunity for her to ask any questions regarding the visit today  The patient agreed to participate  Verification of patient location:  Patient is located in the following state in which I hold an active license: [place]    Subjective:   Dre Virgen is a 40 y o  female who is concerned about COVID-19  Patient's symptoms include fatigue, nasal congestion, rhinorrhea, sore throat, cough (dry), abdominal pain, myalgias and headache  Patient denies fever, chills, anosmia, loss of taste, shortness of breath, chest tightness, nausea, vomiting and diarrhea  - Date of symptom onset: 2/7/2023      COVID-19 vaccination status: Fully vaccinated with booster    Day 9 of illness   Vaccinated   Taking OTC cold and flu meds  Went to the Ed with neck and shoulder pain  States she was told she needed to do 6 weeks of PT  PCP managing   Stopped gabapentin in hopes of starting a controlled med    Lab Results   Component Value Date    SARSCOV2 Negative 01/03/2023    SARSCOV2 Detected (A) 12/09/2020    [facility] [address] Not Detected 11/29/2021       Review of Systems Constitutional: Positive for fatigue  Negative for chills and fever  HENT: Positive for congestion, rhinorrhea and sore throat  Respiratory: Positive for cough (dry)  Negative for chest tightness and shortness of breath  Gastrointestinal: Positive for abdominal pain  Negative for diarrhea, nausea and vomiting  Musculoskeletal: Positive for myalgias  Neurological: Positive for headaches       Current Outpatient Medications on File Prior to Visit   Medication Sig   • albuterol (PROVENTIL HFA,VENTOLIN HFA) 90 mcg/act inhaler INHALE 2 PUFFS EVERY 6 (SIX) HOURS AS NEEDED FOR SHORTNESS OF BREATH   • amitriptyline (ELAVIL) 25 mg tablet Take 1 tablet (25 mg total) by mouth daily at bedtime   • Butalbital-APAP-Caffeine (Fioricet) -40 MG CAPS Take 1 tablet by mouth once as needed (migraines) for up to 1 dose (Patient not taking: Reported on 1/31/2023)   • clonazePAM (KlonoPIN) 0 5 mg tablet TAKE 1 TABLET BY MOUTH EVERY DAY   • [facility] Senna 8 6 MG tablet TAKE 1 TABLET (8 6 MG TOTAL) BY MOUTH DAILY AT BEDTIME   • dicyclomine (BENTYL) 20 mg tablet Take 1 tablet (20 mg total) by mouth every 6 (six) hours   • docusate sodium (COLACE) 100 mg capsule Take 1 capsule (100 mg total) by mouth in the morning   • ergocalciferol (VITAMIN D2) 50,000 units Take 50,000 Units by mouth once a week Every Thursday (Patient not taking: Reported on 2/8/2023)   • gabapentin (Neurontin) 100 mg capsule Take one at bedtime x 3 days then increase to 2 tabs at bed time x 3 days then take 300 mg at bedtime (Patient not taking: Reported on 2/8/2023)   • levonorgestrel (MIRENA) 20 MCG/24HR IUD Mirena 20 MCG/24HR Intrauterine Intrauterine Device  Refills: 0  Active   • LORazepam (Ativan) 0 5 mg tablet Take 1 tab 1 hour prior to procedure   • methocarbamol (ROBAXIN) 500 mg tablet Take 1 tablet (500 mg total) by mouth 3 (three) times a day for 14 days   • NON FORMULARY Take 1 Dose by mouth Medical marijuana   • ondansetron (ZOFRAN) 4 mg tablet Take 1 tablet (4 mg total) by mouth every 8 (eight) hours as needed for nausea or vomiting   • pantoprazole (PROTONIX) 40 mg tablet Take 1 tablet (40 mg total) by mouth daily before breakfast       Objective: There were no vitals taken for this visit  Physical Exam  Vitals reviewed  Constitutional:       General: She is not in acute distress  Appearance: Normal appearance  HENT:      Head: Normocephalic and atraumatic  Eyes:      Extraocular Movements: Extraocular movements intact  Pulmonary:      Effort: Pulmonary effort is normal  No respiratory distress  Skin:     Coloration: Skin is not pale  Neurological:      Mental Status: She is alert and oriented to person, place, and time  Psychiatric:         Mood and Affect: Affect is blunt and flat  Speech: Speech normal          Behavior: Behavior is withdrawn           Cognition and Memory: Cognition and memory normal        Jonny Nieves MD

## 2023-02-23 ENCOUNTER — OFFICE VISIT (OUTPATIENT)
Age: 43
End: 2023-02-23

## 2023-02-23 VITALS
OXYGEN SATURATION: 94 % | HEART RATE: 77 BPM | SYSTOLIC BLOOD PRESSURE: 113 MMHG | HEIGHT: 63 IN | BODY MASS INDEX: 32.78 KG/M2 | WEIGHT: 185 LBS | DIASTOLIC BLOOD PRESSURE: 78 MMHG

## 2023-02-23 DIAGNOSIS — M54.12 CERVICAL RADICULOPATHY: ICD-10-CM

## 2023-02-23 DIAGNOSIS — M54.2 NECK PAIN: Primary | ICD-10-CM

## 2023-02-23 DIAGNOSIS — M79.18 MYOFASCIAL PAIN: ICD-10-CM

## 2023-02-23 NOTE — PROGRESS NOTES
HPI:  Neck Pain   This is a recurrent problem  The current episode started more than 1 month ago  The problem occurs intermittently  The problem has been waxing and waning  The pain is associated with nothing  The pain is present in the midline and right side  The quality of the pain is described as aching, shooting and burning  The pain is at a severity of 5/10  The pain is moderate  The symptoms are aggravated by twisting and position  Associated symptoms include headaches  She has tried heat, home exercises, muscle relaxants and NSAIDs for the symptoms  The treatment provided mild relief  Jared Cobos is a 40 y o  female who presents for evaluation and possible treatment in regards to recent acute exacerbation of chronic neck and right arm pain  Elliot Claire relates rather chronic history of on and off neck symptoms over the years no significant incident of causation or precipitating factor  Most recently however she had an intense episode of right arm pain numbness and tingling  Since that time she has had a good deal of symptomatology and is currently in physical therapy reporting ongoing symptoms  In addition in December of last year she reported an acute episode of migraine headaches which along with her chief complaint has kept her from working out which she feels is overall exacerbating the situation  She reports neck pain right shoulder blade pain with symptoms radiating into the right upper extremity  Pain is exacerbated by reaching bending twisting and turning alleviated by lying down and rest   She does not report restorative sleep at this time  She reports ongoing headaches  of the migraine as well as cervicogenic variety  She is employed as a home health aide  Unfortunately she has not been in the gym since December of last year        Chief Complaint   Patient presents with   • Neck Pain     Feels at a 5 today, was told she has a pinched nerve, referred by pcp    • Back Pain     Whole back has been in pain for years 5/6 today      Past Medical History:   Diagnosis Date   • Allergic    • Allergic rhinitis    • Anemia    • Anxiety    • Chondromalacia of both patellae    • Closed fracture of left distal fibula 08/27/2020   • Depression    • Fibromyalgia    • Fibromyalgia    • GERD (gastroesophageal reflux disease)    • Headache(784 0)    • Hematochezia    • Herpes simplex infection    • HPV (human papilloma virus) infection     11/2013   • Hypertension    • IBS (irritable bowel syndrome)    • Insomnia    • Mental status change    • Migraine    • Obesity 08/06/2015   • Scoliosis    • Scoliosis       Past Surgical History:   Procedure Laterality Date   • BREAST BIOPSY Left 12/10/2013   • EXPLORATORY LAPAROTOMY     • WISDOM TOOTH EXTRACTION       Family History   Problem Relation Age of Onset   • Stroke Mother    • Hypertension Mother    • Psoriasis Mother    • Depression Mother    • Hepatitis Father         B    • Substance Abuse Father    • No Known Problems Daughter    • No Known Problems Daughter    • Dementia Maternal Grandmother    • No Known Problems Maternal Grandfather    • No Known Problems Paternal Grandmother    • Alcohol abuse Paternal Grandfather    • Asthma Brother    • No Known Problems Brother    • Diabetes type I Son    • Diabetes Son    • Breast cancer Family         maternal aunt - 27 's    • Breast cancer Maternal Aunt 30   • Breast cancer Cousin 27   • Cancer Cousin      Social History     Socioeconomic History   • Marital status: Single     Spouse name: None   • Number of children: None   • Years of education: completed 12th grade   • Highest education level: None   Occupational History   • Occupation: home health aide   Tobacco Use   • Smoking status: Former     Types: Cigarettes   • Smokeless tobacco: Never   • Tobacco comments:     quit 2018   Substance and Sexual Activity   • Alcohol use:  Yes     Alcohol/week: 3 0 standard drinks     Types: 1 Glasses of wine, 1 Cans of beer, 1 Shots of liquor per week     Comment: social    • Drug use: Yes     Frequency: 3 0 times per week     Types: Marijuana     Comment: few times a week    • Sexual activity: Yes     Partners: Male     Birth control/protection: I U D  Other Topics Concern   • None   Social History Narrative   • None     Social Determinants of Health     Financial Resource Strain: Not on file   Food Insecurity: Not on file   Transportation Needs: No Transportation Needs   • Lack of Transportation (Medical): No   • Lack of Transportation (Non-Medical):  No   Physical Activity: Not on file   Stress: Not on file   Social Connections: Not on file   Intimate Partner Violence: Not on file   Housing Stability: Not on file       Current Outpatient Medications:   •  albuterol (PROVENTIL HFA,VENTOLIN HFA) 90 mcg/act inhaler, INHALE 2 PUFFS EVERY 6 (SIX) HOURS AS NEEDED FOR SHORTNESS OF BREATH, Disp: 8 g, Rfl: 0  •  amitriptyline (ELAVIL) 25 mg tablet, Take 1 tablet (25 mg total) by mouth daily at bedtime, Disp: 30 tablet, Rfl: 0  •  Butalbital-APAP-Caffeine (Fioricet) -40 MG CAPS, Take 1 tablet by mouth once as needed (migraines) for up to 1 dose (Patient not taking: Reported on 1/31/2023), Disp: 10 capsule, Rfl: 0  •  clonazePAM (KlonoPIN) 0 5 mg tablet, TAKE 1 TABLET BY MOUTH EVERY DAY, Disp: 30 tablet, Rfl: 0  •  [facility] Senna 8 6 MG tablet, TAKE 1 TABLET (8 6 MG TOTAL) BY MOUTH DAILY AT BEDTIME, Disp: 30 tablet, Rfl: 0  •  dicyclomine (BENTYL) 20 mg tablet, Take 1 tablet (20 mg total) by mouth every 6 (six) hours, Disp: 20 tablet, Rfl: 0  •  docusate sodium (COLACE) 100 mg capsule, Take 1 capsule (100 mg total) by mouth in the morning, Disp: 30 capsule, Rfl: 0  •  ergocalciferol (VITAMIN D2) 50,000 units, Take 50,000 Units by mouth once a week Every Thursday (Patient not taking: Reported on 2/8/2023), Disp: , Rfl:   •  gabapentin (Neurontin) 100 mg capsule, Take one at bedtime x 3 days then increase to 2 tabs at bed time x 3 days then take 300 mg at bedtime (Patient not taking: Reported on 2/8/2023), Disp: 90 capsule, Rfl: 0  •  ketorolac (TORADOL) 10 mg tablet, Take 1 tablet (10 mg total) by mouth every 8 (eight) hours as needed for moderate pain or severe pain, Disp: 9 tablet, Rfl: 0  •  levonorgestrel (MIRENA) 20 MCG/24HR IUD, Mirena 20 MCG/24HR Intrauterine Intrauterine Device  Refills: 0  Active, Disp: , Rfl:   •  LORazepam (Ativan) 0 5 mg tablet, Take 1 tab 1 hour prior to procedure, Disp: 1 tablet, Rfl: 0  •  methocarbamol (ROBAXIN) 500 mg tablet, Take 1 tablet (500 mg total) by mouth 3 (three) times a day for 14 days, Disp: 42 tablet, Rfl: 0  •  NON FORMULARY, Take 1 Dose by mouth Medical marijuana, Disp: , Rfl:   •  ondansetron (ZOFRAN) 4 mg tablet, Take 1 tablet (4 mg total) by mouth every 8 (eight) hours as needed for nausea or vomiting, Disp: 20 tablet, Rfl: 0  •  pantoprazole (PROTONIX) 40 mg tablet, Take 1 tablet (40 mg total) by mouth daily before breakfast, Disp: 30 tablet, Rfl: 5  Allergies as of 02/23/2023 - Reviewed 02/23/2023   Allergen Reaction Noted   • Cymbalta [duloxetine hcl] Nausea Only and GI Intolerance 11/18/2015         The following portions of the patient's history were reviewed and updated as appropriate: allergies, current medications, past family history, past medical history, past social history, past surgical history, and problem list     Review of Systems   Constitutional: Negative  Musculoskeletal: Positive for myalgias, neck pain and neck stiffness  Neurological: Positive for headaches  Psychiatric/Behavioral: Negative  Physical Exam:  Physical Exam  Vitals reviewed  Constitutional:       Appearance: Normal appearance  Cardiovascular:      Rate and Rhythm: Normal rate  Pulses: Normal pulses  Pulmonary:      Effort: Pulmonary effort is normal    Musculoskeletal:      Cervical back: Spasms and tenderness present  Pain with movement present  Decreased range of motion          Back: Comments: Palpable right scalene superior trapezius and deep paracervical spasm noted  Joint dysfunction C5-C6  Skin:     General: Skin is warm and dry  Neurological:      General: No focal deficit present  Mental Status: She is alert and oriented to person, place, and time  Sensory: Sensation is intact  Motor: Motor function is intact  Comments: Spurling's maneuver today will reproduce symptomatology into the right upper extremity   Psychiatric:         Mood and Affect: Mood normal          Behavior: Behavior normal          Thought Content: Thought content normal      Diagnostics:      CERVICAL SPINE     INDICATION: Neck pain radiating to both shoulder blades      COMPARISON: March 30, 2016     VIEWS:  AP, lateral and open mouth projections;  3 images     FINDINGS:     Alignment of the cervical spine remains stable  Vertebral body height is maintained       The intervertebral disc spaces are preserved       The prevertebral soft tissues are within normal limits        The lung apices are intact      IMPRESSION:     Unremarkable cervical spine  CT BRAIN - WITHOUT CONTRAST     INDICATION:   intractable headache x3 weeks      COMPARISON:  CT brain dated May 2, 2021      TECHNIQUE:  CT examination of the brain was performed  In addition to axial images, sagittal and coronal 2D reformatted images were created and submitted for interpretation      Radiation dose length product (DLP) for this visit:  965 mGy-cm   This examination, like all CT scans performed in the [facility], was performed utilizing techniques to minimize radiation dose exposure, including the use of iterative   reconstruction and automated exposure control        IMAGE QUALITY:  Diagnostic      FINDINGS:     PARENCHYMA:  No intracranial mass, mass effect or midline shift  No CT signs of acute infarction    No acute parenchymal hemorrhage      VENTRICLES AND EXTRA-AXIAL SPACES:  Normal for the patient's age      VISUALIZED ORBITS AND PARANASAL SINUSES:  Normal visualized orbits  Normal visualized paranasal sinuses      CALVARIUM AND EXTRACRANIAL SOFT TISSUES:  Normal      IMPRESSION:     No acute intracranial abnormality      Assessment/Plan      Treatment:  84126  Manipulation today to the C5-C6 level utilizing a seated stairstepping maneuver no HVLA this was preceded pretreat GT right paracervical spine and shoulder blade  Discussion:  I discussed case in detail with the patient today we reviewed past treatment notes  Our treatment plan will be to apply myofascial techniques to the neck upper back area and effort to decrease muscle spasm  We will look at increasing her overall functional range of motion  She will continue with physical therapy and home exercise program   We will reassess her in 4 weeks  At that time if symptoms are not resolved she will have been in physical therapy as well as receiving Keppra treatment for a period of 6 weeks I would recommend MRI evaluation of the cervical spine  No follow-ups on file

## 2023-02-24 DIAGNOSIS — K58.1 IRRITABLE BOWEL SYNDROME WITH CONSTIPATION: ICD-10-CM

## 2023-02-24 DIAGNOSIS — K64.4 EXTERNAL HEMORRHOIDS: Primary | ICD-10-CM

## 2023-02-24 RX ORDER — LACTULOSE 20 G/30ML
20 SOLUTION ORAL 2 TIMES DAILY
Qty: 237 ML | Refills: 0 | Status: SHIPPED | OUTPATIENT
Start: 2023-02-24

## 2023-03-02 ENCOUNTER — TELEMEDICINE (OUTPATIENT)
Dept: FAMILY MEDICINE CLINIC | Facility: CLINIC | Age: 43
End: 2023-03-02

## 2023-03-02 VITALS — BODY MASS INDEX: 32.78 KG/M2 | HEIGHT: 63 IN | WEIGHT: 185 LBS

## 2023-03-02 DIAGNOSIS — J02.9 ACUTE PHARYNGITIS, UNSPECIFIED ETIOLOGY: Primary | ICD-10-CM

## 2023-03-02 DIAGNOSIS — G43.709 CHRONIC MIGRAINE WITHOUT AURA WITHOUT STATUS MIGRAINOSUS, NOT INTRACTABLE: ICD-10-CM

## 2023-03-02 DIAGNOSIS — F41.9 ANXIETY: ICD-10-CM

## 2023-03-02 DIAGNOSIS — K21.9 GASTROESOPHAGEAL REFLUX DISEASE WITHOUT ESOPHAGITIS: ICD-10-CM

## 2023-03-02 RX ORDER — AMOXICILLIN 500 MG/1
500 CAPSULE ORAL EVERY 8 HOURS SCHEDULED
Qty: 30 CAPSULE | Refills: 0 | Status: SHIPPED | OUTPATIENT
Start: 2023-03-02 | End: 2023-03-12

## 2023-03-02 NOTE — PROGRESS NOTES
Virtual Regular Visit    Verification of patient location:    Patient is located in the following state in which I hold an active license [place]      Assessment/Plan:    Problem List Items Addressed This Visit        Digestive    Acid reflux disease     To restart Protonix as her throat burning might be a sign of GERD as well            Cardiovascular and Mediastinum    Migraine headache     She is off of amitriptyline as she doesn't want to gain weight   To continue magnesium and b2 daily             Other    Anxiety     Stable on Clonazapem PRN        Other Visit Diagnoses     Acute pharyngitis, unspecified etiology    -  Primary    Relevant Medications    amoxicillin (AMOXIL) 500 mg capsule               Reason for visit is   Chief Complaint   Patient presents with   • Diarrhea     X1 day   • Nausea     X1 day   • Sore Throat     X1 day   • Headache     X1 day   • Abdominal Pain     X1 day   • Bodyache     X1 day   • Virtual Regular Visit        Encounter provider Kathleen Talavera MD    Provider located at [address] [id number]      Recent Visits  No visits were found meeting these conditions  Showing recent visits within past 7 days and meeting all other requirements  Today's Visits  Date Type Provider Dept   03/02/23 Telemedicine Kathleen Talavera MD [address] [facility] today's visits and meeting all other requirements  Future Appointments  No visits were found meeting these conditions  Showing future appointments within next 150 days and meeting all other requirements       The patient was identified by name and date of birth  Felicia Marshall was informed that this is a telemedicine visit and that the visit is being conducted through the [facility] platform  She agrees to proceed     My office door was closed  No one else was in the room  She acknowledged consent and understanding of privacy and security of the video platform   The patient has agreed to participate and understands they can discontinue the visit at any time  Patient is aware this is a billable service  Taryn Kirby is a 40 y o  female    Sore Throat   This is a new problem  The current episode started yesterday  There has been no fever  Associated symptoms include abdominal pain, congestion and trouble swallowing  Pertinent negatives include no coughing, diarrhea, drooling, ear discharge, ear pain, headaches, hoarse voice, plugged ear sensation, neck pain, shortness of breath, stridor, swollen glands or vomiting  Associated symptoms comments: diarrhea          Past Medical History:   Diagnosis Date   • Allergic    • Allergic rhinitis    • Anemia    • Anxiety    • Chondromalacia of both patellae    • Closed fracture of left distal fibula 08/27/2020   • Depression    • Fibromyalgia    • Fibromyalgia    • GERD (gastroesophageal reflux disease)    • Headache(784 0)    • Hematochezia    • Herpes simplex infection    • HPV (human papilloma virus) infection     11/2013   • Hypertension    • IBS (irritable bowel syndrome)    • Insomnia    • Mental status change    • Migraine    • Obesity 08/06/2015   • Scoliosis    • Scoliosis        Past Surgical History:   Procedure Laterality Date   • BREAST BIOPSY Left 12/10/2013   • EXPLORATORY LAPAROTOMY     • WISDOM TOOTH EXTRACTION         Current Outpatient Medications   Medication Sig Dispense Refill   • albuterol (PROVENTIL HFA,VENTOLIN HFA) 90 mcg/act inhaler INHALE 2 PUFFS EVERY 6 (SIX) HOURS AS NEEDED FOR SHORTNESS OF BREATH 8 g 0   • amoxicillin (AMOXIL) 500 mg capsule Take 1 capsule (500 mg total) by mouth every 8 (eight) hours for 10 days 30 capsule 0   • clonazePAM (KlonoPIN) 0 5 mg tablet TAKE 1 TABLET BY MOUTH EVERY DAY 30 tablet 0   • [facility] Senna 8 6 MG tablet TAKE 1 TABLET (8 6 MG TOTAL) BY MOUTH DAILY AT BEDTIME 30 tablet 0   • dicyclomine (BENTYL) 20 mg tablet Take 1 tablet (20 mg total) by mouth every 6 (six) hours 20 tablet 0   • levonorgestrel (MIRENA) 20 MCG/24HR IUD Mirena 20 MCG/24HR Intrauterine Intrauterine Device  Refills: 0  Active     • linaCLOtide 145 MCG CAPS Take 1 capsule (145 mcg total) by mouth in the morning (Patient not taking: Reported on 3/2/2023) 90 capsule 0   • NON FORMULARY Take 1 Dose by mouth Medical marijuana     • ondansetron (ZOFRAN) 4 mg tablet Take 1 tablet (4 mg total) by mouth every 8 (eight) hours as needed for nausea or vomiting 20 tablet 0   • Butalbital-APAP-Caffeine (Fioricet) -40 MG CAPS Take 1 tablet by mouth once as needed (migraines) for up to 1 dose (Patient not taking: Reported on 1/31/2023) 10 capsule 0   • docusate sodium (COLACE) 100 mg capsule Take 1 capsule (100 mg total) by mouth in the morning (Patient not taking: Reported on 3/2/2023) 30 capsule 0   • ergocalciferol (VITAMIN D2) 50,000 units Take 50,000 Units by mouth once a week Every Thursday (Patient not taking: Reported on 2/8/2023)     • ketorolac (TORADOL) 10 mg tablet Take 1 tablet (10 mg total) by mouth every 8 (eight) hours as needed for moderate pain or severe pain (Patient not taking: Reported on 3/2/2023) 9 tablet 0   • lactulose 20 g/30 mL Take 30 mL (20 g total) by mouth 2 (two) times a day (Patient not taking: Reported on 3/2/2023) 237 mL 0   • LORazepam (Ativan) 0 5 mg tablet Take 1 tab 1 hour prior to procedure (Patient not taking: Reported on 3/2/2023) 1 tablet 0   • methocarbamol (ROBAXIN) 500 mg tablet Take 1 tablet (500 mg total) by mouth 3 (three) times a day for 14 days 42 tablet 0   • pantoprazole (PROTONIX) 40 mg tablet Take 1 tablet (40 mg total) by mouth daily before breakfast (Patient not taking: Reported on 3/2/2023) 30 tablet 5     No current facility-administered medications for this visit          Allergies   Allergen Reactions   • Cymbalta [Duloxetine Hcl] Nausea Only and GI Intolerance     Stomach upset       Review of Systems   HENT: Positive for congestion, sore throat and trouble swallowing  Negative for drooling, ear discharge, ear pain and hoarse voice  Respiratory: Negative for cough, shortness of breath and stridor  Gastrointestinal: Positive for abdominal pain  Negative for diarrhea and vomiting  Musculoskeletal: Negative for neck pain  Neurological: Negative for headaches  Video Exam    Vitals:    03/02/23 1433   Weight: 83 9 kg (185 lb)   Height: 5' 3" (1 6 m)       Physical Exam  Constitutional:       Appearance: She is well-developed  HENT:      Mouth/Throat:      Pharynx: Pharyngeal swelling and posterior oropharyngeal erythema present  Neck:      Comments: Per patient's exam  Pulmonary:      Effort: Pulmonary effort is normal  No respiratory distress  Lymphadenopathy:      Cervical: Cervical adenopathy present  Neurological:      General: No focal deficit present  Mental Status: She is alert and oriented to person, place, and time            I spent 20 minutes directly with the patient during this visit

## 2023-03-03 ENCOUNTER — PROCEDURE VISIT (OUTPATIENT)
Age: 43
End: 2023-03-03

## 2023-03-03 VITALS
HEART RATE: 71 BPM | SYSTOLIC BLOOD PRESSURE: 122 MMHG | DIASTOLIC BLOOD PRESSURE: 80 MMHG | HEIGHT: 63 IN | WEIGHT: 185 LBS | OXYGEN SATURATION: 100 % | BODY MASS INDEX: 32.78 KG/M2

## 2023-03-03 DIAGNOSIS — M54.12 CERVICAL RADICULOPATHY: ICD-10-CM

## 2023-03-03 DIAGNOSIS — M54.2 NECK PAIN: Primary | ICD-10-CM

## 2023-03-03 DIAGNOSIS — M79.18 MYOFASCIAL PAIN: ICD-10-CM

## 2023-03-03 NOTE — PROGRESS NOTES
HPI:  Patricia Whitfield returns today for treatment of neck pain and right cervical radiculopathy ongoing symptomatology  Pain is a 5 on a visual pain analog scale  The following portions of the patient's history were reviewed and updated as appropriate: allergies, current medications, past family history, past medical history, past social history, past surgical history, and problem list     Review of Systems    Physical Exam:  Exam today reveals right-sided paracervical spasm active myofascial involvement of the bilateral trapezius, rhomboid, levator scapula musculature  Cervical range of motion reduced on extension right lateral bending right rotation joint dysfunction C5-C6  Assessment:   Diagnosis ICD-10-CM Associated Orders   1  Neck pain  M54 2       2  Cervical radiculopathy  M54 12       3  Myofascial pain  M79 18             Treatment: 11788  Manipulation to the C5 level utilizing a seated stairstepping technique well-tolerated  Pretreat GT neck upper back  Discussion:  She is to continue home-based stretching application of moist heat be for hand icing for cooldown after activity 15 minutes see her back next week for treatment

## 2023-03-06 ENCOUNTER — TELEPHONE (OUTPATIENT)
Dept: OTHER | Facility: OTHER | Age: 43
End: 2023-03-06

## 2023-03-06 DIAGNOSIS — N76.0 ACUTE VAGINITIS: Primary | ICD-10-CM

## 2023-03-06 RX ORDER — FLUCONAZOLE 150 MG/1
150 TABLET ORAL ONCE
Qty: 1 TABLET | Refills: 0 | Status: SHIPPED | OUTPATIENT
Start: 2023-03-06 | End: 2023-03-06

## 2023-03-06 NOTE — TELEPHONE ENCOUNTER
Patient called and is asking if the office can give her a call to schedule a new patient appointment

## 2023-03-08 DIAGNOSIS — J02.9 ACUTE PHARYNGITIS, UNSPECIFIED ETIOLOGY: ICD-10-CM

## 2023-03-08 DIAGNOSIS — N76.0 ACUTE VAGINITIS: Primary | ICD-10-CM

## 2023-03-08 RX ORDER — BENZONATATE 200 MG/1
200 CAPSULE ORAL 3 TIMES DAILY PRN
Qty: 20 CAPSULE | Refills: 0 | Status: SHIPPED | OUTPATIENT
Start: 2023-03-08 | End: 2023-09-05 | Stop reason: ALTCHOICE

## 2023-03-13 ENCOUNTER — TELEPHONE (OUTPATIENT)
Dept: INTERNAL MEDICINE CLINIC | Facility: CLINIC | Age: 43
End: 2023-03-13

## 2023-03-22 ENCOUNTER — PROCEDURE VISIT (OUTPATIENT)
Age: 43
End: 2023-03-22

## 2023-03-22 VITALS
DIASTOLIC BLOOD PRESSURE: 83 MMHG | HEART RATE: 72 BPM | SYSTOLIC BLOOD PRESSURE: 118 MMHG | WEIGHT: 185 LBS | BODY MASS INDEX: 32.78 KG/M2 | HEIGHT: 63 IN | OXYGEN SATURATION: 95 %

## 2023-03-22 DIAGNOSIS — M54.2 NECK PAIN: Primary | ICD-10-CM

## 2023-03-22 DIAGNOSIS — M79.18 MYOFASCIAL PAIN: ICD-10-CM

## 2023-03-22 DIAGNOSIS — M54.12 CERVICAL RADICULOPATHY: ICD-10-CM

## 2023-03-27 NOTE — PROGRESS NOTES
HPI:  Linda Mcmahon returns today for treatment of neck pain and right cervical radiculopathy ongoing symptomatology  Pain is a 2-3 on a visual pain analog scale  The following portions of the patient's history were reviewed and updated as appropriate: allergies, current medications, past family history, past medical history, past social history, past surgical history, and problem list     Review of Systems    Physical Exam:  Exam today reveals right-sided paracervical spasm active myofascial involvement of the bilateral trapezius, rhomboid, levator scapula musculature  Cervical range of motion reduced on extension right lateral bending right rotation joint dysfunction C5-C6  Assessment:   Diagnosis ICD-10-CM Associated Orders   1  Neck pain  M54 2       2  Cervical radiculopathy  M54 12       3  Myofascial pain  M79 18             Treatment: 70770  Manipulation to the C5 level utilizing a seated stairstepping technique well-tolerated  Pretreat GT neck upper back  Discussion:  She is to continue home-based stretching application of moist heat be for hand icing for cooldown after activity 15 minutes see her back next week for treatment

## 2023-04-10 PROBLEM — Z91.09 ALLERGY TO ENVIRONMENTAL FACTORS: Status: ACTIVE | Noted: 2023-04-10

## 2023-04-11 DIAGNOSIS — J02.0 STREP THROAT: Primary | ICD-10-CM

## 2023-04-11 RX ORDER — CEFUROXIME AXETIL 500 MG/1
500 TABLET ORAL EVERY 12 HOURS SCHEDULED
Qty: 20 TABLET | Refills: 0 | Status: SHIPPED | OUTPATIENT
Start: 2023-04-11 | End: 2023-04-21

## 2023-04-11 RX ORDER — FLUCONAZOLE 150 MG/1
150 TABLET ORAL ONCE
Qty: 1 TABLET | Refills: 0 | Status: SHIPPED | OUTPATIENT
Start: 2023-04-11 | End: 2023-08-24

## 2023-04-19 ENCOUNTER — PROCEDURE VISIT (OUTPATIENT)
Age: 43
End: 2023-04-19

## 2023-04-19 VITALS
DIASTOLIC BLOOD PRESSURE: 82 MMHG | HEART RATE: 70 BPM | BODY MASS INDEX: 33.31 KG/M2 | OXYGEN SATURATION: 98 % | WEIGHT: 188 LBS | HEIGHT: 63 IN | SYSTOLIC BLOOD PRESSURE: 132 MMHG

## 2023-04-19 DIAGNOSIS — M79.18 MYOFASCIAL PAIN: ICD-10-CM

## 2023-04-19 DIAGNOSIS — M54.12 CERVICAL RADICULOPATHY: ICD-10-CM

## 2023-04-19 DIAGNOSIS — M54.2 NECK PAIN: Primary | ICD-10-CM

## 2023-04-19 NOTE — PROGRESS NOTES
HPI:  Candice Pretty returns today for treatment of neck pain and right cervical radiculopathy ongoing symptomatology  Pain is a 3 on a visual pain analog scale  The following portions of the patient's history were reviewed and updated as appropriate: allergies, current medications, past family history, past medical history, past social history, past surgical history, and problem list     Review of Systems    Physical Exam:  Exam today reveals right-sided paracervical spasm active myofascial involvement of the bilateral trapezius, rhomboid, levator scapula musculature  Cervical range of motion reduced on extension right lateral bending right rotation joint dysfunction C5-C6  Assessment:   Diagnosis ICD-10-CM Associated Orders   1  Neck pain  M54 2       2  Cervical radiculopathy  M54 12       3  Myofascial pain  M79 18             Treatment: 29404  Manipulation to the C5 level utilizing a seated stairstepping technique well-tolerated  Pretreat GT neck upper back  Discussion:  She is to continue home-based stretching application of moist heat be for hand icing for cooldown after activity 15 minutes see her back next week for treatment

## 2023-04-20 ENCOUNTER — HOSPITAL ENCOUNTER (OUTPATIENT)
Dept: CT IMAGING | Facility: HOSPITAL | Age: 43
Discharge: HOME | End: 2023-04-20
Attending: UROLOGY

## 2023-04-20 DIAGNOSIS — N28.89 LEFT RENAL MASS: ICD-10-CM

## 2023-04-20 RX ADMIN — IOHEXOL 120 ML: 350 INJECTION, SOLUTION INTRAVENOUS at 14:23

## 2023-05-01 ENCOUNTER — PROCEDURE VISIT (OUTPATIENT)
Age: 43
End: 2023-05-01

## 2023-05-01 VITALS
BODY MASS INDEX: 33.7 KG/M2 | HEIGHT: 63 IN | DIASTOLIC BLOOD PRESSURE: 81 MMHG | OXYGEN SATURATION: 95 % | HEART RATE: 64 BPM | SYSTOLIC BLOOD PRESSURE: 131 MMHG | WEIGHT: 190.2 LBS

## 2023-05-01 DIAGNOSIS — M54.12 CERVICAL RADICULOPATHY: ICD-10-CM

## 2023-05-01 DIAGNOSIS — M54.2 NECK PAIN: Primary | ICD-10-CM

## 2023-05-01 DIAGNOSIS — M79.18 MYOFASCIAL PAIN: ICD-10-CM

## 2023-05-01 NOTE — PROGRESS NOTES
HPI:  Aly English returns today for treatment of neck pain and right cervical radiculopathy ongoing symptomatology  Pain is a 6 on a visual pain analog scale  The following portions of the patient's history were reviewed and updated as appropriate: allergies, current medications, past family history, past medical history, past social history, past surgical history, and problem list     Review of Systems    Physical Exam:  Exam today reveals right-sided paracervical spasm active myofascial involvement of the bilateral trapezius, rhomboid, levator scapula musculature  Cervical range of motion reduced on extension right lateral bending right rotation joint dysfunction C5-C6  Assessment:   Diagnosis ICD-10-CM Associated Orders   1  Neck pain  M54 2       2  Cervical radiculopathy  M54 12       3  Myofascial pain  M79 18             Treatment: 72609  Manipulation to the C5 level utilizing a seated stairstepping technique well-tolerated  Pretreat GT neck upper back  Discussion:  She is to continue home-based stretching application of moist heat prior and icing for cooldown after activity 15 minutes see her back next week for treatment

## 2023-05-05 DIAGNOSIS — G43.919 INTRACTABLE MIGRAINE WITHOUT STATUS MIGRAINOSUS, UNSPECIFIED MIGRAINE TYPE: ICD-10-CM

## 2023-05-05 DIAGNOSIS — F41.9 ANXIETY: ICD-10-CM

## 2023-05-05 DIAGNOSIS — F40.240 FEAR OF CLOSED SPACES: ICD-10-CM

## 2023-05-06 DIAGNOSIS — F41.9 ANXIETY: Primary | ICD-10-CM

## 2023-05-08 RX ORDER — LORAZEPAM 0.5 MG/1
TABLET ORAL
Qty: 1 TABLET | Refills: 0 | Status: SHIPPED | OUTPATIENT
Start: 2023-05-08

## 2023-05-08 RX ORDER — CLONAZEPAM 0.5 MG/1
TABLET ORAL
Qty: 30 TABLET | Refills: 0 | Status: SHIPPED | OUTPATIENT
Start: 2023-05-08

## 2023-05-11 ENCOUNTER — TELEPHONE (OUTPATIENT)
Dept: UROLOGY | Facility: AMBULATORY SURGERY CENTER | Age: 43
End: 2023-05-11

## 2023-05-11 NOTE — TELEPHONE ENCOUNTER
Per recommendations from Dr Donald Arredondo she should schedule a follow up with him or an AP to review cat scan  She is scheduled in [place] with AVTAR Guerra 7/18/2023

## 2023-05-11 NOTE — TELEPHONE ENCOUNTER
----- Message from Adebayo Sanchez MD sent at 5/9/2023 11:29 AM EDT -----  Please call patient and schedule her for a visit with myself or an AP to discuss her recent CT scan      Based on my review the images and the report we will plan for continued observation of small left lower pole lesion with indeterminate characteristics, by repeat CT scan or ultrasound in 1 year

## 2023-05-30 ENCOUNTER — PROCEDURE VISIT (OUTPATIENT)
Age: 43
End: 2023-05-30

## 2023-05-30 VITALS
SYSTOLIC BLOOD PRESSURE: 119 MMHG | WEIGHT: 190 LBS | HEIGHT: 63 IN | HEART RATE: 69 BPM | BODY MASS INDEX: 33.66 KG/M2 | DIASTOLIC BLOOD PRESSURE: 74 MMHG

## 2023-05-30 DIAGNOSIS — M54.12 CERVICAL RADICULOPATHY: ICD-10-CM

## 2023-05-30 DIAGNOSIS — M54.2 NECK PAIN: Primary | ICD-10-CM

## 2023-05-30 DIAGNOSIS — M79.18 MYOFASCIAL PAIN: ICD-10-CM

## 2023-05-30 NOTE — PROGRESS NOTES
HPI:  Mary Mcfarland returns today for treatment of neck pain and right cervical radiculopathy ongoing symptomatology  Pain is a 4 on a visual pain analog scale  The following portions of the patient's history were reviewed and updated as appropriate: allergies, current medications, past family history, past medical history, past social history, past surgical history, and problem list     Review of Systems    Physical Exam:  Exam today reveals right-sided paracervical spasm active myofascial involvement of the bilateral trapezius, rhomboid, levator scapula musculature  Cervical range of motion reduced on extension right lateral bending right rotation joint dysfunction C5-C6  Assessment:   Diagnosis ICD-10-CM Associated Orders   1  Neck pain  M54 2       2  Cervical radiculopathy  M54 12       3  Myofascial pain  M79 18             Treatment: 22621  Manipulation to the C5 level utilizing a seated stairstepping technique well-tolerated  Pretreat GT neck upper back  Discussion:  She is to continue home-based stretching application of moist heat prior and icing for cooldown after activity 15 minutes see her back next week for treatment

## 2023-06-29 DIAGNOSIS — F41.9 ANXIETY: ICD-10-CM

## 2023-06-29 DIAGNOSIS — K58.1 IRRITABLE BOWEL SYNDROME WITH CONSTIPATION: ICD-10-CM

## 2023-06-29 RX ORDER — SENNOSIDES 8.6 MG
TABLET ORAL
Qty: 30 TABLET | Refills: 0 | Status: SHIPPED | OUTPATIENT
Start: 2023-06-29

## 2023-06-29 RX ORDER — CLONAZEPAM 0.5 MG/1
TABLET ORAL
Qty: 30 TABLET | Refills: 0 | Status: SHIPPED | OUTPATIENT
Start: 2023-06-29

## 2023-07-11 RX ORDER — BUPROPION HYDROCHLORIDE 150 MG/1
150 TABLET ORAL EVERY MORNING
COMMUNITY
Start: 2023-06-16

## 2023-07-11 RX ORDER — FLUOCINONIDE 0.5 MG/G
OINTMENT TOPICAL
COMMUNITY
Start: 2023-06-02

## 2023-07-11 RX ORDER — TOPIRAMATE 25 MG/1
25 TABLET ORAL 2 TIMES DAILY
COMMUNITY
Start: 2023-06-16

## 2023-07-13 ENCOUNTER — HOSPITAL ENCOUNTER (OUTPATIENT)
Dept: MAMMOGRAPHY | Facility: CLINIC | Age: 43
End: 2023-07-13
Payer: COMMERCIAL

## 2023-07-13 VITALS — BODY MASS INDEX: 33.66 KG/M2 | HEIGHT: 63 IN | WEIGHT: 190 LBS

## 2023-07-13 DIAGNOSIS — R92.8 ABNORMAL MAMMOGRAM: ICD-10-CM

## 2023-07-13 PROCEDURE — 77065 DX MAMMO INCL CAD UNI: CPT

## 2023-07-13 PROCEDURE — G0279 TOMOSYNTHESIS, MAMMO: HCPCS

## 2023-07-16 DIAGNOSIS — Z91.09 ALLERGY TO ENVIRONMENTAL FACTORS: ICD-10-CM

## 2023-07-16 RX ORDER — AZELASTINE HYDROCHLORIDE 137 UG/1
SPRAY, METERED NASAL
Qty: 30 ML | Refills: 0 | Status: SHIPPED | OUTPATIENT
Start: 2023-07-16

## 2023-07-18 ENCOUNTER — OFFICE VISIT (OUTPATIENT)
Dept: UROLOGY | Facility: AMBULATORY SURGERY CENTER | Age: 43
End: 2023-07-18
Payer: COMMERCIAL

## 2023-07-18 VITALS
OXYGEN SATURATION: 98 % | WEIGHT: 190 LBS | RESPIRATION RATE: 18 BRPM | DIASTOLIC BLOOD PRESSURE: 82 MMHG | BODY MASS INDEX: 33.66 KG/M2 | SYSTOLIC BLOOD PRESSURE: 126 MMHG | HEIGHT: 63 IN | HEART RATE: 69 BPM

## 2023-07-18 DIAGNOSIS — N28.89 RENAL MASS: Primary | ICD-10-CM

## 2023-07-18 PROCEDURE — 99214 OFFICE O/P EST MOD 30 MIN: CPT | Performed by: NURSE PRACTITIONER

## 2023-07-18 NOTE — PROGRESS NOTES
7/18/2023      Chief Complaint   Patient presents with   • left renal mass     Assessment and Plan    40 y.o. female managed by our office    1. Renal Mass  · CT renal protocol performed 4/20/2023 with 1 cm exophytic lesion in the inferior pole of the left kidney with some internal enhancement. Recommendation for continued yearly follow-up is provided. · CT abdomen with and without contrast-renal mass protocol ordered  · BMP ordered  · Per previous recommendation given age and no significant change in size of renal mass continue with surveillance imaging annually. Previous discussion regarding observation versus biopsy versus percutaneous ablation versus surgery was provided  · Follow-up in the office in 1 year    History of Present Illness  Bella Zhao is a 40 y.o. female here for follow up evaluation of left indeterminate lower pole renal mass. Patient was initially diagnosed at [facility] emergency department after complaints of abdominal pain and CT of the abdomen pelvis obtained. She returns to the office today for discussion of results of CT renal protocol. Results below. Review of Systems   Constitutional: Negative for chills and fever. Respiratory: Negative for cough and shortness of breath. Cardiovascular: Negative for chest pain. Gastrointestinal: Negative for abdominal distention, abdominal pain, blood in stool, nausea and vomiting. Genitourinary: Negative for difficulty urinating, dysuria, enuresis, flank pain, frequency, hematuria and urgency. Skin: Negative for rash.                   Past Medical History  Past Medical History:   Diagnosis Date   • Allergic    • Allergic rhinitis    • Anemia    • Anxiety    • Chondromalacia of both patellae    • Closed fracture of left distal fibula 08/27/2020   • Depression    • Fibromyalgia    • Fibromyalgia    • GERD (gastroesophageal reflux disease)    • Headache(784.0)    • Hematochezia    • Herpes simplex infection    • HPV (human papilloma virus) infection     11/2013   • Hypertension    • IBS (irritable bowel syndrome)    • Insomnia    • Mental status change    • Migraine    • Obesity 08/06/2015   • Scoliosis    • Scoliosis        Past Social History  Past Surgical History:   Procedure Laterality Date   • BREAST BIOPSY Left 12/10/2013   • EXPLORATORY LAPAROTOMY     • WISDOM TOOTH EXTRACTION       Social History     Tobacco Use   Smoking Status Former   • Types: Cigarettes   Smokeless Tobacco Never   Tobacco Comments    quit 2018       Past Family History  Family History   Problem Relation Age of Onset   • Stroke Mother    • Hypertension Mother    • Psoriasis Mother    • Depression Mother    • Hepatitis Father         B    • Substance Abuse Father    • No Known Problems Daughter    • No Known Problems Daughter    • Dementia Maternal Grandmother    • No Known Problems Maternal Grandfather    • No Known Problems Paternal Grandmother    • Alcohol abuse Paternal Grandfather    • Asthma Brother    • No Known Problems Brother    • Diabetes type I Son    • Diabetes Son    • Breast cancer Family         maternal aunt - 27 's    • Breast cancer Maternal Aunt 30   • Breast cancer Cousin 27   • Cancer Cousin        Past Social history  Social History     Socioeconomic History   • Marital status: Single     Spouse name: Not on file   • Number of children: Not on file   • Years of education: completed 12th grade   • Highest education level: Not on file   Occupational History   • Occupation: home health aide   Tobacco Use   • Smoking status: Former     Types: Cigarettes   • Smokeless tobacco: Never   • Tobacco comments:     quit 2018   Substance and Sexual Activity   • Alcohol use:  Yes     Alcohol/week: 3.0 standard drinks of alcohol     Types: 1 Glasses of wine, 1 Cans of beer, 1 Shots of liquor per week     Comment: social    • Drug use: Yes     Frequency: 3.0 times per week     Types: Marijuana     Comment: few times a week    • Sexual activity: Yes Partners: Male     Birth control/protection: I.U.D. Other Topics Concern   • Not on file   Social History Narrative   • Not on file     Social Determinants of Health     Financial Resource Strain: Not on file   Food Insecurity: Not on file   Transportation Needs: No Transportation Needs (1/9/2023)    PRAPARE - Transportation    • Lack of Transportation (Medical): No    • Lack of Transportation (Non-Medical): No   Physical Activity: Not on file   Stress: Not on file   Social Connections: Not on file   Intimate Partner Violence: Not on file   Housing Stability: Not on file       Current Medications  Current Outpatient Medications   Medication Sig Dispense Refill   • albuterol (PROVENTIL HFA,VENTOLIN HFA) 90 mcg/act inhaler INHALE 2 PUFFS EVERY 6 (SIX) HOURS AS NEEDED FOR SHORTNESS OF BREATH 8 g 0   • Azelastine HCl 137 MCG/SPRAY SOLN 1 SPRAY INTO EACH NOSTRIL 2 (TWO) TIMES A DAY USE IN EACH NOSTRIL AS DIRECTED 30 mL 0   • brompheniramine-pseudoephedrine-DM 30-2-10 MG/5ML syrup Take 5 mL by mouth 4 (four) times a day as needed for cough 200 mL 0   • buPROPion (WELLBUTRIN XL) 150 mg 24 hr tablet Take 150 mg by mouth every morning     • clonazePAM (KlonoPIN) 0.5 mg tablet TAKE 1 TABLET BY MOUTH EVERY DAY 30 tablet 0   • [facility] Senna 8.6 MG tablet TAKE 1 TABLET (8.6 MG TOTAL) BY MOUTH DAILY AT BEDTIME 30 tablet 0   • dicyclomine (BENTYL) 20 mg tablet Take 1 tablet (20 mg total) by mouth every 6 (six) hours 20 tablet 0   • fluocinonide (LIDEX) 0.05 % ointment APPLY THIN LAYER TO SCAR AREA ON BACK TWICE DAILY EVERY OTHER DAY.      • fluticasone (FLONASE) 50 mcg/act nasal spray 2 sprays into each nostril daily 16 g 0   • levonorgestrel (MIRENA) 20 MCG/24HR IUD Mirena 20 MCG/24HR Intrauterine Intrauterine Device  Refills: 0  Active     • linaCLOtide 145 MCG CAPS Take 1 capsule (145 mcg total) by mouth in the morning (Patient not taking: Reported on 3/2/2023) 90 capsule 0   • LORazepam (ATIVAN) 0.5 mg tablet TAKE 1 TABLET 1 HOUR PRIOR TO PROCEDURE 1 tablet 0   • montelukast (SINGULAIR) 10 mg tablet Take 1 tablet (10 mg total) by mouth daily at bedtime 90 tablet 3   • NON FORMULARY Take 1 Dose by mouth Medical marijuana     • pantoprazole (PROTONIX) 40 mg tablet Take 1 tablet (40 mg total) by mouth daily before breakfast 30 tablet 5   • sertraline (ZOLOFT) 50 mg tablet TAKE 1 TABLET BY MOUTH EVERY DAY 30 tablet 5   • topiramate (TOPAMAX) 25 mg tablet Take 25 mg by mouth 2 (two) times a day     • benzonatate (TESSALON) 200 MG capsule Take 1 capsule (200 mg total) by mouth 3 (three) times a day as needed for cough (Patient not taking: Reported on 4/10/2023) 20 capsule 0   • Butalbital-APAP-Caffeine (Fioricet) -40 MG CAPS Take 1 tablet by mouth once as needed (migraines) for up to 1 dose (Patient not taking: Reported on 1/31/2023) 10 capsule 0   • docusate sodium (COLACE) 100 mg capsule Take 1 capsule (100 mg total) by mouth in the morning (Patient not taking: Reported on 3/2/2023) 30 capsule 0   • ergocalciferol (VITAMIN D2) 50,000 units Take 50,000 Units by mouth once a week Every Thursday (Patient not taking: Reported on 2/8/2023)     • lactulose 20 g/30 mL Take 30 mL (20 g total) by mouth 2 (two) times a day (Patient not taking: Reported on 3/2/2023) 237 mL 0   • methocarbamol (ROBAXIN) 500 mg tablet Take 1 tablet (500 mg total) by mouth 3 (three) times a day for 14 days 42 tablet 0   • ondansetron (ZOFRAN) 4 mg tablet Take 1 tablet (4 mg total) by mouth every 8 (eight) hours as needed for nausea or vomiting (Patient not taking: Reported on 4/10/2023) 20 tablet 0     No current facility-administered medications for this visit. Allergies  Allergies   Allergen Reactions   • Cymbalta [Duloxetine Hcl] Nausea Only and GI Intolerance     Stomach upset   • Other Other (See Comments)     Tomatoes, black walnuts, cherries, peaches gives her itchy throat  Other reaction(s):  Other , Other          The following portions of the patient's history were reviewed and updated as appropriate: allergies, current medications, past medical history, past social history, past surgical history and problem list.      Vitals  Vitals:    07/18/23 1437   BP: 126/82   BP Location: Right arm   Patient Position: Sitting   Cuff Size: Standard   Pulse: 69   Resp: 18   SpO2: 98%   Weight: 86.2 kg (190 lb)   Height: 5' 3" (1.6 m)           Physical Exam  Physical Exam  Vitals reviewed. Constitutional:       General: She is not in acute distress. Appearance: Normal appearance. Cardiovascular:      Heart sounds: Normal heart sounds. Pulmonary:      Effort: Pulmonary effort is normal. No respiratory distress. Breath sounds: Normal breath sounds. Musculoskeletal:         General: Normal range of motion. Skin:     General: Skin is warm and dry. Neurological:      General: No focal deficit present. Mental Status: She is alert. Psychiatric:         Mood and Affect: Mood normal.         Behavior: Behavior normal.           Results  No results found for this or any previous visit (from the past 1 hour(s)). ]  No results found for: "PSA"  Lab Results   Component Value Date    GLUCOSE 91 08/13/2015    CALCIUM 7.7 (L) 01/10/2023     08/13/2015    K 4.0 01/10/2023    CO2 23 01/10/2023     01/10/2023    BUN 17 01/10/2023    CREATININE 0.85 01/10/2023     Lab Results   Component Value Date    WBC 8.63 01/10/2023    HGB 12.4 01/10/2023    HCT 38.0 01/10/2023    MCV 98 01/10/2023     01/10/2023     CT ABDOMEN AND PELVIS WITH AND WITHOUT IV CONTRAST     INDICATION:   N28.89:  Other specified disorders of kidney and ureter.     COMPARISON:  5/2/2021.     TECHNIQUE: Initial CT of the abdomen and pelvis was performed without intravenous contrast.  Subsequent dynamic CT evaluation of the abdomen and pelvis was performed after the administration of intravenous contrast in both nephrographic and delayed   phases after the administration of intravenous contrast.   Multiplanar 2D reformatted images were created from the source data.     Radiation dose length product (DLP) for this visit:  0314 mGy-cm . This examination, like all CT scans performed in the [facility], was performed utilizing techniques to minimize radiation dose exposure, including the use of iterative   reconstruction and automated exposure control.     IV Contrast:  120 mL of iohexol (OMNIPAQUE)  Enteric Contrast:  Enteric contrast was not administered.     FINDINGS:     ABDOMEN     RIGHT KIDNEY AND URETER:  No solid renal mass. No detectable urothelial mass. No hydronephrosis or hydroureter. No urinary tract calculi. No perinephric collection.     LEFT KIDNEY AND URETER:  1 cm exophytic isodense lesion at the inferior pole left kidney (HU 38)  with minimal enhancement on post contrast imaging (HU 95). No detectable urothelial mass. No hydronephrosis or hydroureter. No urinary tract calculi. No perinephric collection.     URINARY BLADDER:  No bladder wall mass. No calculi.        LOWER CHEST:  No clinically significant abnormality identified in the visualized lower chest.     LIVER/BILIARY TREE:  Unremarkable.     GALLBLADDER:  No calcified gallstones. No pericholecystic inflammatory change.     SPLEEN:  Unremarkable.     PANCREAS:  Unremarkable.     ADRENAL GLANDS:  Unremarkable.     STOMACH AND BOWEL:  Unremarkable.     APPENDIX:  No findings to suggest appendicitis.     ABDOMINOPELVIC CAVITY:  No ascites. No free intraperitoneal air.  No lymphadenopathy.     VESSELS:  Unremarkable for patient's age.     PELVIS     REPRODUCTIVE ORGANS:  IUD in place     ABDOMINAL WALL/INGUINAL REGIONS:  Unremarkable.     OSSEOUS STRUCTURES:  No acute fracture or destructive osseous lesion.     IMPRESSION:     Indeterminate 1 cm exophytic lesion at the inferior pole of the left kidney demonstrates some internal enhancement, and has shown minimal enlargement in size since CT examination of 11/29/10 when it measured 6 mm. Differential considerations include a   slow growing neoplasm such as a papillary neoplasm. Continued yearly follow up is recommended.          Orders  Orders Placed This Encounter   Procedures   • CT abdomen w wo contrast     Standing Status:   Future     Standing Expiration Date:   7/18/2027     Scheduling Instructions: For procedures with both oral (PO) and IV contrast:            The patient will need to drink barium for this test. Barium needs to be picked up in the registration area at least one day prior to your study. For out of network (non-[facility]) orders please bring your prescription when picking up oral contrast. For AM Appointments: Drink one bottle of barium before bed time the evening before your scheduled test.  Drink 1/2 of the second bottle one hour prior to your test. Please bring other 1/2 bottle with you to drink at the time of your study. For PM Appointments: Drink one bottle of barium before 9:00am on the day of your test. Drink 1/2 of the second bottle one hour prior to your test. Please bring other 1/2 bottle with you to drink at the time of your study. Nothing to eat 3 hours prior to your test. In addition to the barium, clear liquids are also permitted up until the time of the scan. Clear liquids includes water, black coffee or tea, apple juice or clear broth. If possible wear clothing without any metal in the abdomen area. Sweat suit,       sports bra or bra without underwire may eliminate the need to change. Please bring your insurance cards, a form of photo ID and a list of your medications with you. Arrive 15 minutes prior to your appointment time in order to register. On the day of your test, please bring any prior CT or MRI studies of this area with you that were not performed at a [facility] facility.             For procedures with ONLY IV contrast:            Nothing to eat 3 hours prior to your test. Clear liquids are permitted up until the time of the scan. Clear liquids includes water, black coffee or tea, apple juice or clear broth. If possible wear clothing without any metal in the abdomen area. Sweat suit, sports bra or bra without underwire may eliminate the need to change. Please bring your insurance cards, a form of photo ID and a list of your medications with you. Arrive 15 minutes prior to your appointment time in order to register. On the day of your test, please bring any prior CT or MRI studies of this area with you that were not performed at a [facility]. To schedule this appointment, please contact Central Scheduling at [phone]. Order Specific Question:   Is the patient pregnant? Answer:   No     Order Specific Question:   What is the patient's sedation requirement? If Medication for Claustrophobia is selected, order medication at this point. Answer:   No Sedation     Order Specific Question:   Did the patient ever have bariatric surgery? Answer:   No     Order Specific Question:   Contrast information:     Answer:   IV     Order Specific Question:   Did the patient ever have a reaction to x-ray dye? If yes, please verify the type of allergy and order the contrast allergy prep. Answer:   No     Order Specific Question:   [facility]     Answer:   Immediate     Order Specific Question:   Reason for Exam (FREE TEXT)     Answer:   renal mass   • Basic metabolic panel     This is a patient instruction: Patient fasting for 8 hours or longer recommended.      Standing Status:   Future     Standing Expiration Date:   7/18/2024       AVTAR Suresh

## 2023-08-03 RX ORDER — ONDANSETRON 4 MG/1
TABLET, FILM COATED ORAL
Qty: 20 TABLET | Refills: 0 | OUTPATIENT
Start: 2023-08-03

## 2023-08-24 DIAGNOSIS — J02.0 STREP THROAT: ICD-10-CM

## 2023-08-24 DIAGNOSIS — F41.9 ANXIETY: ICD-10-CM

## 2023-08-24 DIAGNOSIS — K58.1 IRRITABLE BOWEL SYNDROME WITH CONSTIPATION: ICD-10-CM

## 2023-08-24 RX ORDER — SENNOSIDES 8.6 MG
TABLET ORAL
Qty: 30 TABLET | Refills: 0 | Status: SHIPPED | OUTPATIENT
Start: 2023-08-24

## 2023-08-24 RX ORDER — FLUCONAZOLE 150 MG/1
150 TABLET ORAL ONCE
Qty: 1 TABLET | Refills: 0 | Status: SHIPPED | OUTPATIENT
Start: 2023-08-24 | End: 2023-08-24

## 2023-08-24 RX ORDER — CLONAZEPAM 0.5 MG/1
TABLET ORAL
Qty: 30 TABLET | Refills: 0 | Status: SHIPPED | OUTPATIENT
Start: 2023-08-24

## 2023-09-05 ENCOUNTER — OFFICE VISIT (OUTPATIENT)
Dept: FAMILY MEDICINE CLINIC | Facility: CLINIC | Age: 43
End: 2023-09-05
Payer: COMMERCIAL

## 2023-09-05 VITALS
HEART RATE: 69 BPM | HEIGHT: 61 IN | WEIGHT: 181.4 LBS | TEMPERATURE: 97.9 F | DIASTOLIC BLOOD PRESSURE: 70 MMHG | BODY MASS INDEX: 34.25 KG/M2 | RESPIRATION RATE: 16 BRPM | SYSTOLIC BLOOD PRESSURE: 114 MMHG | OXYGEN SATURATION: 95 %

## 2023-09-05 DIAGNOSIS — K58.1 IRRITABLE BOWEL SYNDROME WITH CONSTIPATION: ICD-10-CM

## 2023-09-05 DIAGNOSIS — Z13.6 SCREENING FOR HEART DISEASE: ICD-10-CM

## 2023-09-05 DIAGNOSIS — Z11.1 SCREENING-PULMONARY TB: ICD-10-CM

## 2023-09-05 DIAGNOSIS — Z00.00 ANNUAL PHYSICAL EXAM: Primary | ICD-10-CM

## 2023-09-05 DIAGNOSIS — Z13.1 SCREENING FOR DIABETES MELLITUS: ICD-10-CM

## 2023-09-05 PROCEDURE — 86580 TB INTRADERMAL TEST: CPT

## 2023-09-05 PROCEDURE — 99396 PREV VISIT EST AGE 40-64: CPT | Performed by: NURSE PRACTITIONER

## 2023-09-05 RX ORDER — SEMAGLUTIDE 0.25 MG/.5ML
INJECTION, SOLUTION SUBCUTANEOUS
COMMUNITY
Start: 2023-08-25

## 2023-09-05 NOTE — ASSESSMENT & PLAN NOTE
She says she has tried fodmap diet and finds it challenging. She uses senna currently. Recommend trying peppermint oil capsules tid.

## 2023-09-05 NOTE — ASSESSMENT & PLAN NOTE
Unremarkable exam of adult female. Vaccines up to date. Continue routine gyn care and mammograms. Will get a1c. Lipid panel. Reinforce healthy diet and regular exercise. Continue with routine dental and eye exams.

## 2023-09-05 NOTE — PROGRESS NOTES
[facility]    NAME: Sana Garcia  AGE: 44 y.o. SEX: female  : 1980     DATE: 2023     Assessment and Plan:     Problem List Items Addressed This Visit        Digestive    Irritable bowel syndrome     She says she has tried fodmap diet and finds it challenging. She uses senna currently. Recommend trying peppermint oil capsules tid. Other    Annual physical exam - Primary     Unremarkable exam of adult female. Vaccines up to date. Continue routine gyn care and mammograms. Will get a1c. Lipid panel. Reinforce healthy diet and regular exercise. Continue with routine dental and eye exams. Relevant Orders    Lipid panel    Hemoglobin A1C   Other Visit Diagnoses     Screening-pulmonary TB        Relevant Orders    TB Skin Test    Screening for diabetes mellitus        Relevant Orders    Hemoglobin A1C    Screening for heart disease        Relevant Orders    Lipid panel          Immunizations and preventive care screenings were discussed with patient today. Appropriate education was printed on patient's after visit summary. Counseling:  Dental Health: discussed importance of regular tooth brushing, flossing, and dental visits. Injury prevention: discussed safety/seat belts, safety helmets, smoke detectors, carbon dioxide detectors, and smoking near bedding or upholstery. · Exercise: the importance of regular exercise/physical activity was discussed. Recommend exercise 3-5 times per week for at least 30 minutes. BMI Counseling: Body mass index is 34.28 kg/m². The BMI is above normal. Nutrition recommendations include decreasing portion sizes, encouraging healthy choices of fruits and vegetables, consuming healthier snacks, moderation in carbohydrate intake, increasing intake of lean protein, reducing intake of saturated and trans fat and reducing intake of cholesterol.  Exercise recommendations include moderate physical activity 150 minutes/week, exercising 3-5 times per week and strength training exercises. No pharmacotherapy was ordered. Rationale for BMI follow-up plan is due to patient being overweight or obese. Depression Screening and Follow-up Plan: Patient was screened for depression during today's encounter. They screened negative with a PHQ-2 score of 1. No follow-ups on file. Chief Complaint:     Chief Complaint   Patient presents with   • Physical Exam     Patient being seen for physical      History of Present Illness:     Adult Annual Physical   Patient here for a comprehensive physical exam. The patient reports no problems. Diet and Physical Activity  · Diet/Nutrition: well balanced diet, limited junk food, adequate fiber intake and adequate whole grain intake. · Exercise: moderate cardiovascular exercise, strength training exercises and 3-4 times a week on average. Depression Screening  PHQ-2/9 Depression Screening    Little interest or pleasure in doing things: 1 - several days  Feeling down, depressed, or hopeless: 0 - not at all  PHQ-2 Score: 1  PHQ-2 Interpretation: Negative depression screen       General Health  · Sleep: gets 7-8 hours of sleep on average. · Hearing: normal - bilateral.  · Vision: no vision problems, goes for regular eye exams and wears glasses. · Dental: regular dental visits, brushes teeth twice daily and flosses teeth occasionally. /GYN Health  · Patient is: premenopausal  · Last menstrual period: does not get on mirena   · Contraceptive method: IUD placement. Review of Systems:     Review of Systems   Constitutional: Negative for activity change, appetite change, chills, diaphoresis, fatigue, fever and unexpected weight change. HENT: Negative for hearing loss. Eyes: Negative for visual disturbance. Respiratory: Negative for cough, chest tightness and shortness of breath.     Cardiovascular: Negative for chest pain, palpitations and leg swelling. Gastrointestinal: Positive for abdominal pain and constipation. Negative for diarrhea, nausea and vomiting. Chronic   Genitourinary: Negative for difficulty urinating, dysuria, frequency and menstrual problem. Musculoskeletal: Negative for arthralgias and myalgias. Allergic/Immunologic: Negative for environmental allergies. Neurological: Negative for dizziness, weakness, light-headedness, numbness and headaches. Psychiatric/Behavioral: Negative for dysphoric mood, self-injury, sleep disturbance and suicidal ideas. The patient is not nervous/anxious. Past Medical History:     Past Medical History:   Diagnosis Date   • Allergic    • Allergic rhinitis    • Anemia    • Anxiety    • Chondromalacia of both patellae    • Closed fracture of left distal fibula 08/27/2020   • Depression    • Fibromyalgia    • Fibromyalgia    • GERD (gastroesophageal reflux disease)    • Headache(784.0)    • Hematochezia    • Herpes simplex infection    • HPV (human papilloma virus) infection     11/2013   • Hypertension    • IBS (irritable bowel syndrome)    • Insomnia    • Mental status change    • Migraine    • Obesity 08/06/2015   • Scoliosis    • Scoliosis       Past Surgical History:     Past Surgical History:   Procedure Laterality Date   • BREAST BIOPSY Left 12/10/2013   • EXPLORATORY LAPAROTOMY     • WISDOM TOOTH EXTRACTION        Social History:     Social History     Socioeconomic History   • Marital status: Single     Spouse name: None   • Number of children: None   • Years of education: completed 12th grade   • Highest education level: None   Occupational History   • Occupation: home health aide   Tobacco Use   • Smoking status: Former     Types: Cigarettes   • Smokeless tobacco: Never   • Tobacco comments:     quit 2018   Vaping Use   • Vaping Use: Never used   Substance and Sexual Activity   • Alcohol use:  Yes     Alcohol/week: 3.0 standard drinks of alcohol     Types: 1 Glasses of wine, 1 Cans of beer, 1 Shots of liquor per week     Comment: social    • Drug use: Yes     Frequency: 3.0 times per week     Types: Marijuana     Comment: few times a week    • Sexual activity: Yes     Partners: Male     Birth control/protection: I.U.D. Other Topics Concern   • None   Social History Narrative   • None     Social Determinants of Health     Financial Resource Strain: Not on file   Food Insecurity: Not on file   Transportation Needs: No Transportation Needs (1/9/2023)    PRAPARE - Transportation    • Lack of Transportation (Medical): No    • Lack of Transportation (Non-Medical):  No   Physical Activity: Not on file   Stress: Not on file   Social Connections: Not on file   Intimate Partner Violence: Not on file   Housing Stability: Not on file      Family History:     Family History   Problem Relation Age of Onset   • Stroke Mother    • Hypertension Mother    • Psoriasis Mother    • Depression Mother    • Hepatitis Father         B    • Substance Abuse Father    • No Known Problems Daughter    • No Known Problems Daughter    • Dementia Maternal Grandmother    • No Known Problems Maternal Grandfather    • No Known Problems Paternal Grandmother    • Alcohol abuse Paternal Grandfather    • Asthma Brother    • No Known Problems Brother    • Diabetes type I Son    • Diabetes Son    • Breast cancer Family         maternal aunt - 27 's    • Breast cancer Maternal Aunt 30   • Breast cancer Cousin 30   • Cancer Cousin       Current Medications:     Current Outpatient Medications   Medication Sig Dispense Refill   • albuterol (PROVENTIL HFA,VENTOLIN HFA) 90 mcg/act inhaler INHALE 2 PUFFS EVERY 6 (SIX) HOURS AS NEEDED FOR SHORTNESS OF BREATH 8 g 0   • Azelastine HCl 137 MCG/SPRAY SOLN 1 SPRAY INTO EACH NOSTRIL 2 (TWO) TIMES A DAY USE IN EACH NOSTRIL AS DIRECTED 30 mL 0   • brompheniramine-pseudoephedrine-DM 30-2-10 MG/5ML syrup Take 5 mL by mouth 4 (four) times a day as needed for cough 200 mL 0   • buPROPion (WELLBUTRIN XL) 150 mg 24 hr tablet Take 150 mg by mouth every morning     • clonazePAM (KlonoPIN) 0.5 mg tablet TAKE 1 TABLET BY MOUTH EVERY DAY 30 tablet 0   • [facility] Senna 8.6 MG tablet TAKE 1 TABLET (8.6 MG TOTAL) BY MOUTH DAILY AT BEDTIME 30 tablet 0   • dicyclomine (BENTYL) 20 mg tablet Take 1 tablet (20 mg total) by mouth every 6 (six) hours (Patient taking differently: Take 20 mg by mouth every 6 (six) hours as needed) 20 tablet 0   • fluocinonide (LIDEX) 0.05 % ointment APPLY THIN LAYER TO SCAR AREA ON BACK TWICE DAILY EVERY OTHER DAY.      • fluticasone (FLONASE) 50 mcg/act nasal spray 2 sprays into each nostril daily 16 g 0   • levonorgestrel (MIRENA) 20 MCG/24HR IUD Mirena 20 MCG/24HR Intrauterine Intrauterine Device  Refills: 0  Active     • linaCLOtide 145 MCG CAPS Take 1 capsule (145 mcg total) by mouth in the morning (Patient not taking: Reported on 3/2/2023) 90 capsule 0   • montelukast (SINGULAIR) 10 mg tablet Take 1 tablet (10 mg total) by mouth daily at bedtime 90 tablet 3   • NON FORMULARY Take 1 Dose by mouth Medical marijuana     • pantoprazole (PROTONIX) 40 mg tablet Take 1 tablet (40 mg total) by mouth daily before breakfast 30 tablet 5   • topiramate (TOPAMAX) 25 mg tablet Take 25 mg by mouth 2 (two) times a day     • Wegovy 0.25 MG/0.5ML INJECT 0.25MG (0.5ML) UNDER THE SKIN WEEKLY     • Butalbital-APAP-Caffeine (Fioricet) -40 MG CAPS Take 1 tablet by mouth once as needed (migraines) for up to 1 dose (Patient not taking: Reported on 1/31/2023) 10 capsule 0   • docusate sodium (COLACE) 100 mg capsule Take 1 capsule (100 mg total) by mouth in the morning (Patient not taking: Reported on 3/2/2023) 30 capsule 0   • ergocalciferol (VITAMIN D2) 50,000 units Take 50,000 Units by mouth once a week Every Thursday (Patient not taking: Reported on 2/8/2023)     • lactulose 20 g/30 mL Take 30 mL (20 g total) by mouth 2 (two) times a day (Patient not taking: Reported on 3/2/2023) 237 mL 0 • methocarbamol (ROBAXIN) 500 mg tablet Take 1 tablet (500 mg total) by mouth 3 (three) times a day for 14 days 42 tablet 0   • ondansetron (ZOFRAN) 4 mg tablet Take 1 tablet (4 mg total) by mouth every 8 (eight) hours as needed for nausea or vomiting (Patient not taking: Reported on 4/10/2023) 20 tablet 0   • sertraline (ZOLOFT) 50 mg tablet TAKE 1 TABLET BY MOUTH EVERY DAY (Patient not taking: Reported on 9/5/2023) 30 tablet 5     No current facility-administered medications for this visit. Allergies: Allergies   Allergen Reactions   • Cymbalta [Duloxetine Hcl] Nausea Only and GI Intolerance     Stomach upset   • Other Other (See Comments)     Tomatoes, black walnuts, cherries, peaches gives her itchy throat  Other reaction(s): Other , Other       Physical Exam:     /70 (BP Location: Left arm, Patient Position: Sitting, Cuff Size: Standard)   Pulse 69   Temp 97.9 °F (36.6 °C) (Tympanic)   Resp 16   Ht 5' 1" (1.549 m)   Wt 82.3 kg (181 lb 6.4 oz)   SpO2 95%   BMI 34.28 kg/m²     Physical Exam  Vitals reviewed. Constitutional:       General: She is awake. She is not in acute distress. Appearance: Normal appearance. She is well-developed and well-groomed. She is not ill-appearing. HENT:      Head: Normocephalic. Right Ear: Hearing, tympanic membrane, ear canal and external ear normal.      Left Ear: Hearing, tympanic membrane, ear canal and external ear normal.      Nose: Nose normal.      Mouth/Throat:      Lips: Pink. Mouth: Mucous membranes are moist.      Pharynx: Oropharynx is clear. Uvula midline. Eyes:      General: Lids are normal.      Conjunctiva/sclera: Conjunctivae normal.      Pupils: Pupils are equal, round, and reactive to light. Neck:      Thyroid: No thyroid mass or thyromegaly. Vascular: Normal carotid pulses. No carotid bruit or JVD. Cardiovascular:      Rate and Rhythm: Normal rate and regular rhythm. Pulses: Normal pulses.       Heart sounds: Normal heart sounds, S1 normal and S2 normal. No murmur heard. Pulmonary:      Effort: Pulmonary effort is normal.      Breath sounds: Normal breath sounds. Abdominal:      General: Abdomen is flat. Bowel sounds are normal.      Palpations: Abdomen is soft. Tenderness: There is no abdominal tenderness. Musculoskeletal:         General: Normal range of motion. Cervical back: Full passive range of motion without pain. Right lower leg: No edema. Left lower leg: No edema. Lymphadenopathy:      Head:      Right side of head: No submental, submandibular, tonsillar, preauricular, posterior auricular or occipital adenopathy. Left side of head: No submental, submandibular, tonsillar, preauricular, posterior auricular or occipital adenopathy. Cervical: No cervical adenopathy. Skin:     General: Skin is warm and dry. Neurological:      Mental Status: She is alert and oriented to person, place, and time. Sensory: No sensory deficit. Motor: Motor function is intact. Psychiatric:         Attention and Perception: Attention normal.         Mood and Affect: Mood normal.         Speech: Speech normal.         Behavior: Behavior normal. Behavior is cooperative. Thought Content:  Thought content normal.         Cognition and Memory: Cognition normal.         Judgment: Judgment normal.          Lynnette Tyler, [address] [facility]

## 2023-09-07 ENCOUNTER — CLINICAL SUPPORT (OUTPATIENT)
Dept: FAMILY MEDICINE CLINIC | Facility: CLINIC | Age: 43
End: 2023-09-07

## 2023-09-07 DIAGNOSIS — Z11.1 ENCOUNTER FOR PPD SKIN TEST READING: Primary | ICD-10-CM

## 2023-09-07 LAB
INDURATION: 0 MM
TB SKIN TEST: NEGATIVE

## 2023-09-07 NOTE — PROGRESS NOTES
Name: Richi Nelson      : 1980      MRN: [id number]  Encounter Provider: Dayo Monge  Encounter Date: 2023   Encounter department: [facility]     1. Encounter for PPD skin test reading           Subjective      HPI  Review of Systems    Current Outpatient Medications on File Prior to Visit   Medication Sig   • linaCLOtide 145 MCG CAPS Take 1 capsule (145 mcg total) by mouth in the morning (Patient not taking: Reported on 3/2/2023)   • albuterol (PROVENTIL HFA,VENTOLIN HFA) 90 mcg/act inhaler INHALE 2 PUFFS EVERY 6 (SIX) HOURS AS NEEDED FOR SHORTNESS OF BREATH   • Azelastine HCl 137 MCG/SPRAY SOLN 1 SPRAY INTO EACH NOSTRIL 2 (TWO) TIMES A DAY USE IN EACH NOSTRIL AS DIRECTED   • brompheniramine-pseudoephedrine-DM 30-2-10 MG/5ML syrup Take 5 mL by mouth 4 (four) times a day as needed for cough   • buPROPion (WELLBUTRIN XL) 150 mg 24 hr tablet Take 150 mg by mouth every morning   • Butalbital-APAP-Caffeine (Fioricet) -40 MG CAPS Take 1 tablet by mouth once as needed (migraines) for up to 1 dose (Patient not taking: Reported on 2023)   • clonazePAM (KlonoPIN) 0.5 mg tablet TAKE 1 TABLET BY MOUTH EVERY DAY   • [facility] Senna 8.6 MG tablet TAKE 1 TABLET (8.6 MG TOTAL) BY MOUTH DAILY AT BEDTIME   • dicyclomine (BENTYL) 20 mg tablet Take 1 tablet (20 mg total) by mouth every 6 (six) hours (Patient taking differently: Take 20 mg by mouth every 6 (six) hours as needed)   • docusate sodium (COLACE) 100 mg capsule Take 1 capsule (100 mg total) by mouth in the morning (Patient not taking: Reported on 3/2/2023)   • ergocalciferol (VITAMIN D2) 50,000 units Take 50,000 Units by mouth once a week Every Thursday (Patient not taking: Reported on 2023)   • fluocinonide (LIDEX) 0.05 % ointment APPLY THIN LAYER TO SCAR AREA ON BACK TWICE DAILY EVERY OTHER DAY.    • fluticasone (FLONASE) 50 mcg/act nasal spray 2 sprays into each nostril daily   • lactulose 20 g/30 mL Take 30 mL (20 g total) by mouth 2 (two) times a day (Patient not taking: Reported on 3/2/2023)   • levonorgestrel (MIRENA) 20 MCG/24HR IUD Mirena 20 MCG/24HR Intrauterine Intrauterine Device  Refills: 0  Active   • methocarbamol (ROBAXIN) 500 mg tablet Take 1 tablet (500 mg total) by mouth 3 (three) times a day for 14 days   • montelukast (SINGULAIR) 10 mg tablet Take 1 tablet (10 mg total) by mouth daily at bedtime   • NON FORMULARY Take 1 Dose by mouth Medical marijuana   • ondansetron (ZOFRAN) 4 mg tablet Take 1 tablet (4 mg total) by mouth every 8 (eight) hours as needed for nausea or vomiting (Patient not taking: Reported on 4/10/2023)   • pantoprazole (PROTONIX) 40 mg tablet Take 1 tablet (40 mg total) by mouth daily before breakfast   • sertraline (ZOLOFT) 50 mg tablet TAKE 1 TABLET BY MOUTH EVERY DAY (Patient not taking: Reported on 9/5/2023)   • topiramate (TOPAMAX) 25 mg tablet Take 25 mg by mouth 2 (two) times a day   • Wegovy 0.25 MG/0.5ML INJECT 0.25MG (0.5ML) UNDER THE SKIN WEEKLY       Objective     There were no vitals taken for this visit.     [facility]

## 2023-10-02 ENCOUNTER — PROCEDURE VISIT (OUTPATIENT)
Age: 43
End: 2023-10-02
Payer: COMMERCIAL

## 2023-10-02 VITALS
BODY MASS INDEX: 34.17 KG/M2 | SYSTOLIC BLOOD PRESSURE: 111 MMHG | DIASTOLIC BLOOD PRESSURE: 78 MMHG | HEIGHT: 61 IN | WEIGHT: 181 LBS

## 2023-10-02 DIAGNOSIS — M79.18 MYOFASCIAL PAIN: ICD-10-CM

## 2023-10-02 DIAGNOSIS — M54.6 ACUTE BILATERAL THORACIC BACK PAIN: ICD-10-CM

## 2023-10-02 DIAGNOSIS — M54.2 NECK PAIN: Primary | ICD-10-CM

## 2023-10-02 DIAGNOSIS — M54.12 CERVICAL RADICULOPATHY: ICD-10-CM

## 2023-10-02 PROCEDURE — 98940 CHIROPRACT MANJ 1-2 REGIONS: CPT | Performed by: CHIROPRACTOR

## 2023-10-02 NOTE — PROGRESS NOTES
HPI:  Charmaine Chow returns today for treatment of neck pain and right cervical radiculopathy ongoing symptomatology. She was last seen in May of this year. Today she returns reports her neck is definitely better but between the shoulder blades has been giving her a lot of difficulty sharp stabbing pains. VPAS  7    The following portions of the patient's history were reviewed and updated as appropriate: allergies, current medications, past family history, past medical history, past social history, past surgical history, and problem list.    Review of Systems    Physical Exam:  Exam today reveals paracervical hypertonicity noted on deep palpation she does have active myofascial involvement of the left trapezius left levator scapula at the insertion point. Cervical range of motion is reduced on full forward flexion right lateral bending right rotation biomechanically joint dysfunction C1-C2 C5-C6. Examination of the thoracic spine reveals tenderness upon palpation a good deal of myofascial involvement noted bilateral trapezii levator scapula and rhomboid. Joint dysfunction T4-T5,     Assessment:   Diagnosis ICD-10-CM Associated Orders   1. Neck pain  M54.2       2. Cervical radiculopathy  M54.12       3. Myofascial pain  M79.18       4. Acute bilateral thoracic back pain  M54.6             Treatment: 14908  Manipulation to the C5 level utilizing a seated stairstepping technique well-tolerated. Manipulation T4 producing good joint release C5 utilizing stairstepping technique well-tolerated. Discussion:  She is to continue home-based stretching application of moist heat prior and icing for cooldown after activity 15 minutes see her back next week for treatment.

## 2023-10-13 ENCOUNTER — OFFICE VISIT (OUTPATIENT)
Dept: URGENT CARE | Age: 43
End: 2023-10-13
Payer: COMMERCIAL

## 2023-10-13 VITALS
TEMPERATURE: 95.9 F | BODY MASS INDEX: 32.78 KG/M2 | HEIGHT: 63 IN | HEART RATE: 75 BPM | OXYGEN SATURATION: 99 % | RESPIRATION RATE: 16 BRPM | WEIGHT: 185 LBS

## 2023-10-13 DIAGNOSIS — M54.50 ACUTE LEFT-SIDED LOW BACK PAIN WITHOUT SCIATICA: Primary | ICD-10-CM

## 2023-10-13 PROCEDURE — 99213 OFFICE O/P EST LOW 20 MIN: CPT | Performed by: STUDENT IN AN ORGANIZED HEALTH CARE EDUCATION/TRAINING PROGRAM

## 2023-10-13 RX ORDER — NAPROXEN 500 MG/1
500 TABLET ORAL 2 TIMES DAILY WITH MEALS
Qty: 10 TABLET | Refills: 0 | Status: SHIPPED | OUTPATIENT
Start: 2023-10-13 | End: 2023-10-18

## 2023-10-13 RX ORDER — METHOCARBAMOL 500 MG/1
500 TABLET, FILM COATED ORAL
Qty: 5 TABLET | Refills: 0 | Status: SHIPPED | OUTPATIENT
Start: 2023-10-13 | End: 2023-10-18

## 2023-10-13 NOTE — LETTER
October 13, 2023     Patient: Corbin Hanley   YOB: 1980   Date of Visit: 10/13/2023       To Whom It May Concern: It is my medical opinion that Corbin Hanley may return to work on 10/14/2023 . If you have any questions or concerns, please don't hesitate to call.          Sincerely,        Bryon Salmeron DO    CC: No Recipients

## 2023-11-12 DIAGNOSIS — F41.9 ANXIETY: ICD-10-CM

## 2023-11-13 RX ORDER — CLONAZEPAM 0.5 MG/1
TABLET ORAL
Qty: 30 TABLET | Refills: 0 | Status: SHIPPED | OUTPATIENT
Start: 2023-11-13

## 2023-11-13 NOTE — TELEPHONE ENCOUNTER
Medication:  PDMP    08/24/2023 08/24/2023 clonazePAM (Tablet) 30.0 30 0.5 MG AMPARO GUTIERREZ      Active agreement on file -No

## 2023-11-14 ENCOUNTER — VBI (OUTPATIENT)
Dept: ADMINISTRATIVE | Facility: OTHER | Age: 43
End: 2023-11-14

## 2023-12-13 ENCOUNTER — OFFICE VISIT (OUTPATIENT)
Dept: FAMILY MEDICINE CLINIC | Facility: CLINIC | Age: 43
End: 2023-12-13
Payer: COMMERCIAL

## 2023-12-13 VITALS
SYSTOLIC BLOOD PRESSURE: 120 MMHG | DIASTOLIC BLOOD PRESSURE: 80 MMHG | WEIGHT: 192 LBS | HEART RATE: 88 BPM | TEMPERATURE: 97.3 F | HEIGHT: 63 IN | BODY MASS INDEX: 34.02 KG/M2

## 2023-12-13 DIAGNOSIS — Z91.09 ALLERGY TO ENVIRONMENTAL FACTORS: ICD-10-CM

## 2023-12-13 DIAGNOSIS — G43.019 INTRACTABLE MIGRAINE WITHOUT AURA AND WITHOUT STATUS MIGRAINOSUS: ICD-10-CM

## 2023-12-13 DIAGNOSIS — M79.7 FIBROMYALGIA: ICD-10-CM

## 2023-12-13 DIAGNOSIS — J34.89 STUFFY AND RUNNY NOSE: ICD-10-CM

## 2023-12-13 DIAGNOSIS — M54.50 ACUTE LEFT-SIDED LOW BACK PAIN WITHOUT SCIATICA: ICD-10-CM

## 2023-12-13 DIAGNOSIS — J30.89 NON-SEASONAL ALLERGIC RHINITIS, UNSPECIFIED TRIGGER: ICD-10-CM

## 2023-12-13 DIAGNOSIS — J01.00 ACUTE MAXILLARY SINUSITIS, RECURRENCE NOT SPECIFIED: Primary | ICD-10-CM

## 2023-12-13 PROBLEM — Z00.00 ANNUAL PHYSICAL EXAM: Status: RESOLVED | Noted: 2023-09-05 | Resolved: 2023-12-13

## 2023-12-13 PROCEDURE — 87635 SARS-COV-2 COVID-19 AMP PRB: CPT | Performed by: FAMILY MEDICINE

## 2023-12-13 PROCEDURE — 99214 OFFICE O/P EST MOD 30 MIN: CPT | Performed by: FAMILY MEDICINE

## 2023-12-13 RX ORDER — BUTALBITAL, ACETAMINOPHEN AND CAFFEINE 300; 40; 50 MG/1; MG/1; MG/1
1 CAPSULE ORAL ONCE AS NEEDED
Qty: 10 CAPSULE | Refills: 0 | Status: SHIPPED | OUTPATIENT
Start: 2023-12-13

## 2023-12-13 RX ORDER — FLUTICASONE PROPIONATE 50 MCG
2 SPRAY, SUSPENSION (ML) NASAL DAILY
Qty: 16 G | Refills: 0 | Status: SHIPPED | OUTPATIENT
Start: 2023-12-13

## 2023-12-13 RX ORDER — METHOCARBAMOL 500 MG/1
500 TABLET, FILM COATED ORAL
Qty: 10 TABLET | Refills: 0 | Status: SHIPPED | OUTPATIENT
Start: 2023-12-13 | End: 2023-12-23

## 2023-12-13 RX ORDER — KETOROLAC TROMETHAMINE 30 MG/ML
30 INJECTION, SOLUTION INTRAMUSCULAR; INTRAVENOUS ONCE
Status: COMPLETED | OUTPATIENT
Start: 2023-12-13 | End: 2023-12-13

## 2023-12-13 RX ORDER — NAPROXEN 500 MG/1
500 TABLET ORAL 2 TIMES DAILY WITH MEALS
Qty: 20 TABLET | Refills: 0 | Status: SHIPPED | OUTPATIENT
Start: 2023-12-13 | End: 2023-12-23

## 2023-12-13 RX ORDER — AMOXICILLIN AND CLAVULANATE POTASSIUM 875; 125 MG/1; MG/1
1 TABLET, FILM COATED ORAL EVERY 12 HOURS SCHEDULED
Qty: 20 TABLET | Refills: 0 | Status: SHIPPED | OUTPATIENT
Start: 2023-12-13 | End: 2023-12-23

## 2023-12-13 RX ADMIN — KETOROLAC TROMETHAMINE 30 MG: 30 INJECTION, SOLUTION INTRAMUSCULAR; INTRAVENOUS at 10:55

## 2023-12-13 NOTE — PROGRESS NOTES
Name: Corbin Hanley      : 1980      MRN: [id number]  Encounter Provider: Jayant Gray MD  Encounter Date: 2023   Encounter department: [facility]     1. Acute maxillary sinusitis, recurrence not specified  -     amoxicillin-clavulanate (AUGMENTIN) 875-125 mg per tablet; Take 1 tablet by mouth every 12 (twelve) hours for 10 days    2. Stuffy and runny nose  -     COVID Only- Office Collect    3. Acute left-sided low back pain without sciatica  -     naproxen (Naprosyn) 500 mg tablet; Take 1 tablet (500 mg total) by mouth 2 (two) times a day with meals for 10 days  -     methocarbamol (ROBAXIN) 500 mg tablet; Take 1 tablet (500 mg total) by mouth daily at bedtime for 10 days  -     ketorolac (TORADOL) injection 30 mg    4. Allergy to environmental factors  -     fluticasone (FLONASE) 50 mcg/act nasal spray; 2 sprays into each nostril daily    5. Non-seasonal allergic rhinitis, unspecified trigger  -     fluticasone (FLONASE) 50 mcg/act nasal spray; 2 sprays into each nostril daily    6. Intractable migraine without aura and without status migrainosus  Comments:  stable  Orders:  -     Butalbital-APAP-Caffeine (Fioricet) -40 MG CAPS; Take 1 tablet by mouth once as needed (migraines) for up to 1 dose    7. Fibromyalgia  Assessment & Plan:  Fibro flare up due to recent illness  Toradol injection given today    Orders:  -     ketorolac (TORADOL) injection 30 mg           Subjective   Worsening back pain for the last few days     URI   This is a new problem. The current episode started in the past 7 days. The problem has been waxing and waning. There has been no fever. Associated symptoms include congestion, coughing, headaches, joint pain, rhinorrhea and sinus pain.  Pertinent negatives include no abdominal pain, chest pain, diarrhea, dysuria, ear pain, joint swelling, nausea, neck pain, plugged ear sensation, rash, sneezing, sore throat, swollen glands, vomiting or wheezing. She has tried NSAIDs for the symptoms. The treatment provided no relief. Review of Systems   HENT:  Positive for congestion, rhinorrhea and sinus pain. Negative for ear pain, sneezing and sore throat. Respiratory:  Positive for cough. Negative for wheezing. Cardiovascular:  Negative for chest pain. Gastrointestinal:  Negative for abdominal pain, diarrhea, nausea and vomiting. Genitourinary:  Negative for dysuria. Musculoskeletal:  Positive for back pain and joint pain. Negative for neck pain. Skin:  Negative for rash. Neurological:  Positive for headaches.        Past Medical History:   Diagnosis Date   • Allergic    • Allergic rhinitis    • Anemia    • Anxiety    • Chondromalacia of both patellae    • Closed fracture of left distal fibula 08/27/2020   • Depression    • Fibromyalgia    • Fibromyalgia    • GERD (gastroesophageal reflux disease)    • Headache(784.0)    • Hematochezia    • Herpes simplex infection    • HPV (human papilloma virus) infection     11/2013   • Hypertension    • IBS (irritable bowel syndrome)    • Insomnia    • Mental status change    • Migraine    • Obesity 08/06/2015   • Scoliosis    • Scoliosis      Past Surgical History:   Procedure Laterality Date   • BREAST BIOPSY Left 12/10/2013   • EXPLORATORY LAPAROTOMY     • WISDOM TOOTH EXTRACTION       Family History   Problem Relation Age of Onset   • Stroke Mother    • Hypertension Mother    • Psoriasis Mother    • Depression Mother    • Hepatitis Father         B    • Substance Abuse Father    • No Known Problems Daughter    • No Known Problems Daughter    • Dementia Maternal Grandmother    • No Known Problems Maternal Grandfather    • No Known Problems Paternal Grandmother    • Alcohol abuse Paternal Grandfather    • Asthma Brother    • No Known Problems Brother    • Diabetes type I Son    • Diabetes Son    • Breast cancer Family         maternal aunt - 27 's    • Breast cancer Maternal Aunt 30   • Breast cancer Cousin 27   • Cancer Cousin      Social History     Socioeconomic History   • Marital status: Single     Spouse name: None   • Number of children: None   • Years of education: completed 12th grade   • Highest education level: None   Occupational History   • Occupation: home health aide   Tobacco Use   • Smoking status: Former     Types: Cigarettes   • Smokeless tobacco: Never   • Tobacco comments:     quit 2018   Vaping Use   • Vaping status: Never Used   Substance and Sexual Activity   • Alcohol use: Yes     Alcohol/week: 3.0 standard drinks of alcohol     Types: 1 Glasses of wine, 1 Cans of beer, 1 Shots of liquor per week     Comment: social    • Drug use: Yes     Frequency: 3.0 times per week     Types: Marijuana     Comment: few times a week    • Sexual activity: Yes     Partners: Male     Birth control/protection: I.U.D. Other Topics Concern   • None   Social History Narrative   • None     Social Determinants of Health     Financial Resource Strain: Not on file   Food Insecurity: Not on file   Transportation Needs: No Transportation Needs (1/9/2023)    PRAPARE - Transportation    • Lack of Transportation (Medical): No    • Lack of Transportation (Non-Medical):  No   Physical Activity: Not on file   Stress: Not on file   Social Connections: Not on file   Intimate Partner Violence: Not on file   Housing Stability: Not on file     Current Outpatient Medications on File Prior to Visit   Medication Sig   • albuterol (PROVENTIL HFA,VENTOLIN HFA) 90 mcg/act inhaler INHALE 2 PUFFS EVERY 6 (SIX) HOURS AS NEEDED FOR SHORTNESS OF BREATH   • Azelastine HCl 137 MCG/SPRAY SOLN 1 SPRAY INTO EACH NOSTRIL 2 (TWO) TIMES A DAY USE IN EACH NOSTRIL AS DIRECTED   • clonazePAM (KlonoPIN) 0.5 mg tablet TAKE 1 TABLET BY MOUTH EVERY DAY   • [facility] Senna 8.6 MG tablet TAKE 1 TABLET (8.6 MG TOTAL) BY MOUTH DAILY AT BEDTIME   • dicyclomine (BENTYL) 20 mg tablet Take 1 tablet (20 mg total) by mouth every 6 (six) hours (Patient taking differently: Take 20 mg by mouth every 6 (six) hours as needed)   • docusate sodium (COLACE) 100 mg capsule Take 1 capsule (100 mg total) by mouth in the morning   • fluocinonide (LIDEX) 0.05 % ointment APPLY THIN LAYER TO SCAR AREA ON BACK TWICE DAILY EVERY OTHER DAY.    • levonorgestrel (MIRENA) 20 MCG/24HR IUD Mirena 20 MCG/24HR Intrauterine Intrauterine Device  Refills: 0  Active   • montelukast (SINGULAIR) 10 mg tablet Take 1 tablet (10 mg total) by mouth daily at bedtime   • NON FORMULARY Take 1 Dose by mouth Medical marijuana   • ondansetron (ZOFRAN) 4 mg tablet Take 1 tablet (4 mg total) by mouth every 8 (eight) hours as needed for nausea or vomiting   • pantoprazole (PROTONIX) 40 mg tablet Take 1 tablet (40 mg total) by mouth daily before breakfast   • sertraline (ZOLOFT) 50 mg tablet TAKE 1 TABLET BY MOUTH EVERY DAY   • topiramate (TOPAMAX) 25 mg tablet Take 25 mg by mouth 2 (two) times a day   • ergocalciferol (VITAMIN D2) 50,000 units Take 50,000 Units by mouth once a week Every Thursday (Patient not taking: Reported on 12/13/2023)   • lactulose 20 g/30 mL Take 30 mL (20 g total) by mouth 2 (two) times a day (Patient not taking: Reported on 3/2/2023)   • methocarbamol (ROBAXIN) 500 mg tablet Take 1 tablet (500 mg total) by mouth 3 (three) times a day for 14 days (Patient not taking: Reported on 12/13/2023)   • [DISCONTINUED] brompheniramine-pseudoephedrine-DM 30-2-10 MG/5ML syrup Take 5 mL by mouth 4 (four) times a day as needed for cough (Patient not taking: Reported on 12/13/2023)   • [DISCONTINUED] buPROPion (WELLBUTRIN XL) 150 mg 24 hr tablet Take 150 mg by mouth every morning (Patient not taking: Reported on 12/13/2023)   • [DISCONTINUED] Butalbital-APAP-Caffeine (Fioricet) -40 MG CAPS Take 1 tablet by mouth once as needed (migraines) for up to 1 dose (Patient not taking: Reported on 12/13/2023)   • [DISCONTINUED] fluticasone (FLONASE) 50 mcg/act nasal spray 2 sprays into each nostril daily (Patient not taking: Reported on 12/13/2023)   • [DISCONTINUED] linaCLOtide 145 MCG CAPS Take 1 capsule (145 mcg total) by mouth in the morning (Patient not taking: Reported on 3/2/2023)   • [DISCONTINUED] methocarbamol (ROBAXIN) 500 mg tablet Take 1 tablet (500 mg total) by mouth daily at bedtime for 5 days (Patient not taking: Reported on 12/13/2023)   • [DISCONTINUED] naproxen (Naprosyn) 500 mg tablet Take 1 tablet (500 mg total) by mouth 2 (two) times a day with meals for 5 days (Patient not taking: Reported on 12/13/2023)   • [DISCONTINUED] Wegovy 0.25 MG/0.5ML INJECT 0.25MG (0.5ML) UNDER THE SKIN WEEKLY (Patient not taking: Reported on 12/13/2023)     Allergies   Allergen Reactions   • Cymbalta [Duloxetine Hcl] Nausea Only and GI Intolerance     Stomach upset   • Other Other (See Comments)     Tomatoes, black walnuts, cherries, peaches gives her itchy throat  Other reaction(s): Other , Other      Immunization History   Administered Date(s) Administered   • INFLUENZA 10/13/2015, 11/07/2017   • Influenza Quadrivalent Preservative Free 3 years and older IM 10/13/2015, 11/07/2017   • Influenza, seasonal, injectable 10/08/2013   • Tdap 10/08/2013   • Tuberculin Skin Test-PPD Intradermal 11/13/2017, 01/15/2019, 05/05/2021, 09/05/2023, 09/05/2023       Objective     /80 (BP Location: Left arm, Patient Position: Sitting, Cuff Size: Standard)   Pulse 88   Temp (!) 97.3 °F (36.3 °C) (Tympanic)   Ht 5' 3" (1.6 m)   Wt 87.1 kg (192 lb)   BMI 34.01 kg/m²     Physical Exam  Constitutional:       General: She is not in acute distress. Appearance: Normal appearance. HENT:      Head: Normocephalic and atraumatic. Right Ear: Tympanic membrane normal.      Left Ear: Tympanic membrane normal.      Nose: Congestion present. No rhinorrhea. Mouth/Throat:      Pharynx: No oropharyngeal exudate or posterior oropharyngeal erythema. Eyes:      Extraocular Movements: Extraocular movements intact. Pupils: Pupils are equal, round, and reactive to light. Cardiovascular:      Rate and Rhythm: Normal rate and regular rhythm. Pulses: Normal pulses. Heart sounds: Normal heart sounds. Pulmonary:      Effort: Pulmonary effort is normal.      Breath sounds: Normal breath sounds. Musculoskeletal:         General: Tenderness present. No swelling. Comments: Lower mid lumbar   Neurological:      General: No focal deficit present. Mental Status: She is alert and oriented to person, place, and time.    Psychiatric:         Mood and Affect: Mood normal.         Behavior: Behavior normal.       Crystal Osorio MD

## 2023-12-14 LAB — SARS-COV-2 RNA RESP QL NAA+PROBE: NEGATIVE

## 2024-02-08 ENCOUNTER — OFFICE VISIT (OUTPATIENT)
Dept: RHEUMATOLOGY | Facility: CLINIC | Age: 44
End: 2024-02-08
Payer: COMMERCIAL

## 2024-02-08 VITALS
DIASTOLIC BLOOD PRESSURE: 74 MMHG | HEIGHT: 63 IN | HEART RATE: 75 BPM | SYSTOLIC BLOOD PRESSURE: 118 MMHG | WEIGHT: 181 LBS | OXYGEN SATURATION: 99 % | BODY MASS INDEX: 32.07 KG/M2

## 2024-02-08 DIAGNOSIS — R63.5 WEIGHT GAIN: ICD-10-CM

## 2024-02-08 DIAGNOSIS — M79.7 FIBROMYALGIA: Primary | ICD-10-CM

## 2024-02-08 PROCEDURE — 99244 OFF/OP CNSLTJ NEW/EST MOD 40: CPT | Performed by: STUDENT IN AN ORGANIZED HEALTH CARE EDUCATION/TRAINING PROGRAM

## 2024-02-08 RX ORDER — DICLOFENAC SODIUM 75 MG/1
75 TABLET, DELAYED RELEASE ORAL 2 TIMES DAILY PRN
COMMUNITY
Start: 2023-12-29 | End: 2024-12-28

## 2024-02-08 NOTE — PROGRESS NOTES
Rheumatology Outpatient Consult Note  2/8/2024       Toya Caal MD  [address] [facility]  [address] [place] [address]    Reason for referral: Fibromyalgia     Assessment: 43 y.o. female with past medical history of IBS-C,Fibromyalgia, anxiety referred for the above.  Patient states she has had ongoing fibromyalgia for many years but over the past four months she has been concerned with increased fatigue and weight gain. Endorses diffuse pain throughout upper and lower extremity sometimes associated with stiffness that resolves in less than 20 minutes. Pain improves with robaxin. She denies any symptoms consistent with inflammatory arthritis. Physical exam without synovitis of any joints. Previous rheumatologic work up including JUAN C, RF,CCP, CRP, Sed rate, SSA, SSB, HLA B27 all negative. Given history, physical exam findings, and previous work up; reassured that symptoms are not secondary to autoimmune or inflammatory joint disease. As patient is experiencing fatigue and weight loss would recommend patient to discuss with PCP for further work up for these symptoms.     No diagnosis found.    Plan:  - Follow up as needed  - Would recommend further work up for etiology of fatigue in association with weight gain.        History: Jolene Tierney is a(n) 43 y.o. female who is referred for the above.    Patient states she has been dealing with fibromyalgia for many years. She states that since October she has been feeling more fatigued and experiencing weight gain.  She states she has pain everywhere, but especially around her upper back. She has tried scheduled naproxen with no relief of symptoms. She was recently prescribed robaxin with some relief of her shoulder pains.     She notes pain and stiffness in her bilateral knees, bilateral pip, and elbows. She states the stiffness happens randomly and does not happen often. She notes that sometimes her hands and ankles get swollen. Patient states that all fingers of  hands will be swollen bilaterally.She has no color changes to fingers and she can still pinch her skin during the swelling. She endorses stiffness in her joints that includes neck and upper back that last for twenty minutes.     Endorses little red dots on her bilateral upper arms that has been happening for years. She receives a steroid cream for this which helps resolve the rash. Denies rashes on knuckles, around eyes, or on hips.     Denies dry eye, pain with eye movements, redness of eyes, or dry mouth, denies weakness in extremities. Denies fevers, chills, night sweats, no unexpected weight loss. Denies symptoms of diarrhea or hematemesis, hematochezia, melena. Denies color changes in the cold. No photosensitivity.  Denies pregnancy losses. Denies family history of auto immune disease.     Past Medical History:   Diagnosis Date    Allergic     Allergic rhinitis     Anemia     Anxiety     Chondromalacia of both patellae     Closed fracture of left distal fibula 08/27/2020    Depression     Fibromyalgia     Fibromyalgia     GERD (gastroesophageal reflux disease)     Headache(784.0)     Hematochezia     Herpes simplex infection     HPV (human papilloma virus) infection     11/2013    Hypertension     IBS (irritable bowel syndrome)     Insomnia     Mental status change     Migraine     Obesity 08/06/2015    Scoliosis     Scoliosis        Past Surgical History:   Procedure Laterality Date    BREAST BIOPSY Left 12/10/2013    EXPLORATORY LAPAROTOMY      WISDOM TOOTH EXTRACTION         No outpatient medications have been marked as taking for the 2/8/24 encounter (Appointment) with Pierre Tamayo DO.       Allergies as of 02/08/2024 - Reviewed 12/13/2023   Allergen Reaction Noted    Cymbalta [duloxetine hcl] Nausea Only and GI Intolerance 11/18/2015    Other Other (See Comments) 06/28/2022       Family History   Problem Relation Age of Onset    Stroke Mother     Hypertension Mother     Psoriasis Mother     Depression  Mother     Hepatitis Father         B     Substance Abuse Father     No Known Problems Daughter     No Known Problems Daughter     Dementia Maternal Grandmother     No Known Problems Maternal Grandfather     No Known Problems Paternal Grandmother     Alcohol abuse Paternal Grandfather     Asthma Brother     No Known Problems Brother     Diabetes type I Son     Diabetes Son     Breast cancer Family         maternal aunt - 30 's     Breast cancer Maternal Aunt 30    Breast cancer Cousin 30    Cancer Cousin        Social History:  Social History     Tobacco Use    Smoking status: Former     Types: Cigarettes    Smokeless tobacco: Never    Tobacco comments:     quit 2018   Vaping Use    Vaping status: Never Used   Substance Use Topics    Alcohol use: Yes     Alcohol/week: 3.0 standard drinks of alcohol     Types: 1 Glasses of wine, 1 Cans of beer, 1 Shots of liquor per week     Comment: social     Drug use: Yes     Frequency: 3.0 times per week     Types: Marijuana     Comment: few times a week        Review of Systems: Pertinent findings documented in HPI    ___________________________________    Physical Exam:    There were no vitals taken for this visit.    General: Well appearing, in no distress.   Eyes: EOMI  HENT: No oral ulcers. MMM.   Heart: Regular rate and rhythm, no murmurs.  Lungs: Lungs clear bilaterally without wheezes or crackles.   Extremities: Warm, well perfused, no edema.   Neuro: Alert and oriented.  Skin: No rashes.  Musculoskeletal exam: Diffuse tenderness of muscles throughout. No synovitis   ____________________________    Lab Result Review: relevant labs reviewed  6/25/19: JUAN C negative (1:40), RF negative.   8/13/15: HLA-B27, SSA, SSB, lyme antibody profile, RF, CCP, CRP, sed rate all negative.   Imaging Result Review: relevant images reviewed  11/6/2020 left ankle xray for evaluation of fracture:  No degenerative changes or erosive changes. Left foot with heel spur, no degenerative changes

## 2024-02-08 NOTE — PATIENT INSTRUCTIONS
You can talk with your PCP can consider medications such as pregabalin, gabapentin, and amitriptyline for FMS if felt to be safe and not interacting with your other medications.    I would recommend talking with your PCP about testing for other causes of fatigue/weight gain, as fibromyalgia is not the only thing that can cause these. Fibromyalgia itself should not cause weight gain.    Fibromyalgia    Fibromyalgia is a chronic condition that causes widespread pain. It affects 2-4% of people, usually women. People who have other rheumatic diseases are at higher risk of having fibromyalgia. Fibromyalgia is not an inflammatory or autoimmune disease. Research suggests that the nervous system is involved. Brain chemicals, like serotonin and norepinephrine, may be off balance, changing reactions to painful stimuli. Fibromyalgia may cause fatigue, poor sleep, and mood problems, like anxiety or stress. It does not cause any signs on x-rays or blood tests. There is currently no cure for fibromyalgia. Medications, exercise, and therapy help.    What Are the Signs/Symptoms?  Fibromyalgia symptoms are different for each person. The most common symptom is widespread pain. Severe fatigue and sleep problems are also common. Someone with fibromyalgia may not feel refreshed after sleeping all night. Other fibromyalgia signs and symptoms include:    Problems with memory or clear thinking, known as “fibro fog”  Depression or anxiety  Migraines or tension headaches  Digestive problems like IBS or heartburn  Irritable or overactive bladder  Pelvic pain  Jaw pain  Blood tests and x-rays may be used to rule out other causes, like thyroid problems or polymyalgia rheumatica.    What Are Common Treatments?  Exercise is the most effective treatment, including yoga, tyron chi, or other low-impact aerobic activity. Acupuncture, chiropractic, and massage may help ease symptoms. Psychotherapy may help patients manage stress and anxiety. A sleep  specialist may help patients address sleep disorders. Three drugs are FDA-approved for fibromyalgia: duloxetine (Cymbalta) and milnacipran (Savella) adjust brain chemicals to ease widespread pain, and pregabalin (Lyrica), which blocks overactive nerve cells involved in pain. Older drugs, such as amitryptiline (Elavil), cyclobenzaprine (Flexeril) and other antidepressants may be used too. Opioids and sleep medicines like zolpidem (Ambien) are not recommended for use in treating fibromyalgia symptoms.    Living with Fibromyalgia  Self-care is important to manage fibromyalgia symptoms and have a good quality of life. A healthy lifestyle, along with medications, can help reduce pain, improve sleep, and ease fatigue. Exercise often but be gentle with yourself as you are starting. Walking, swimming, stretching, and yoga are good for people with fibromyalgia. Add more movement to daily routines, like taking a flight of stairs instead of the elevator. Rest and relaxation are helpful too. Make time to relax each day. Deep breathing or medication can ease stress. Set regular sleep habits, like going to bed at the same time each night.    Updated February 2023 by Cory Holland MD, and reviewed by the American College [employer] Rhe[employer] Committee on Communications and Marketing.    This information is provided for general education only. Individuals should consult a qualified health care provider for professional medical advice, diagnosis and treatment of a medical or health condition.

## 2024-02-18 ENCOUNTER — APPOINTMENT (EMERGENCY)
Dept: RADIOLOGY | Facility: HOSPITAL | Age: 44
End: 2024-02-18
Payer: COMMERCIAL

## 2024-02-18 ENCOUNTER — APPOINTMENT (EMERGENCY)
Dept: CT IMAGING | Facility: HOSPITAL | Age: 44
End: 2024-02-18
Payer: COMMERCIAL

## 2024-02-18 ENCOUNTER — HOSPITAL ENCOUNTER (EMERGENCY)
Facility: HOSPITAL | Age: 44
Discharge: HOME | End: 2024-02-18
Attending: EMERGENCY MEDICINE
Payer: COMMERCIAL

## 2024-02-18 VITALS
RESPIRATION RATE: 16 BRPM | OXYGEN SATURATION: 98 % | TEMPERATURE: 98.1 F | SYSTOLIC BLOOD PRESSURE: 134 MMHG | DIASTOLIC BLOOD PRESSURE: 71 MMHG | HEART RATE: 80 BPM

## 2024-02-18 DIAGNOSIS — B34.9 ACUTE VIRAL DISEASE: Primary | ICD-10-CM

## 2024-02-18 DIAGNOSIS — R10.9 ABDOMINAL PAIN: ICD-10-CM

## 2024-02-18 DIAGNOSIS — R68.83 CHILLS: ICD-10-CM

## 2024-02-18 DIAGNOSIS — R19.7 DIARRHEA: ICD-10-CM

## 2024-02-18 DIAGNOSIS — R11.0 NAUSEA: ICD-10-CM

## 2024-02-18 LAB
ALBUMIN SERPL BCP-MCNC: 4.5 G/DL (ref 3.5–5)
ALP SERPL-CCNC: 53 U/L (ref 34–104)
ALT SERPL W P-5'-P-CCNC: 21 U/L (ref 7–52)
ANION GAP SERPL CALCULATED.3IONS-SCNC: 6 MMOL/L
AST SERPL W P-5'-P-CCNC: 16 U/L (ref 13–39)
ATRIAL RATE: 57 BPM
BACTERIA UR QL AUTO: ABNORMAL /HPF
BASOPHILS # BLD AUTO: 0.04 THOUSANDS/ÂΜL (ref 0–0.1)
BASOPHILS NFR BLD AUTO: 1 % (ref 0–1)
BILIRUB DIRECT SERPL-MCNC: 0.13 MG/DL (ref 0–0.2)
BILIRUB SERPL-MCNC: 0.71 MG/DL (ref 0.2–1)
BILIRUB UR QL STRIP: NEGATIVE
BUN SERPL-MCNC: 12 MG/DL (ref 5–25)
CALCIUM SERPL-MCNC: 9.2 MG/DL (ref 8.4–10.2)
CARDIAC TROPONIN I PNL SERPL HS: 2 NG/L
CHLORIDE SERPL-SCNC: 102 MMOL/L (ref 96–108)
CLARITY UR: ABNORMAL
CO2 SERPL-SCNC: 27 MMOL/L (ref 21–32)
COLOR UR: ABNORMAL
CREAT SERPL-MCNC: 0.94 MG/DL (ref 0.6–1.3)
EOSINOPHIL # BLD AUTO: 0.07 THOUSAND/ÂΜL (ref 0–0.61)
EOSINOPHIL NFR BLD AUTO: 2 % (ref 0–6)
ERYTHROCYTE [DISTWIDTH] IN BLOOD BY AUTOMATED COUNT: 13 % (ref 11.6–15.1)
EXT PREGNANCY TEST URINE: NEGATIVE
EXT. CONTROL: NORMAL
FLUAV RNA RESP QL NAA+PROBE: NEGATIVE
FLUBV RNA RESP QL NAA+PROBE: NEGATIVE
GFR SERPL CREATININE-BSD FRML MDRD: 75 ML/MIN/1.73SQ M
GLUCOSE SERPL-MCNC: 89 MG/DL (ref 65–140)
GLUCOSE UR STRIP-MCNC: NEGATIVE MG/DL
HCT VFR BLD AUTO: 42.1 % (ref 34.8–46.1)
HGB BLD-MCNC: 13.9 G/DL (ref 11.5–15.4)
HGB UR QL STRIP.AUTO: ABNORMAL
IMM GRANULOCYTES # BLD AUTO: 0.01 THOUSAND/UL (ref 0–0.2)
IMM GRANULOCYTES NFR BLD AUTO: 0 % (ref 0–2)
KETONES UR STRIP-MCNC: NEGATIVE MG/DL
LEUKOCYTE ESTERASE UR QL STRIP: NEGATIVE
LIPASE SERPL-CCNC: 39 U/L (ref 11–82)
LYMPHOCYTES # BLD AUTO: 1.31 THOUSANDS/ÂΜL (ref 0.6–4.47)
LYMPHOCYTES NFR BLD AUTO: 29 % (ref 14–44)
MCH RBC QN AUTO: 32.6 PG (ref 26.8–34.3)
MCHC RBC AUTO-ENTMCNC: 33 G/DL (ref 31.4–37.4)
MCV RBC AUTO: 99 FL (ref 82–98)
MONOCYTES # BLD AUTO: 0.4 THOUSAND/ÂΜL (ref 0.17–1.22)
MONOCYTES NFR BLD AUTO: 9 % (ref 4–12)
MUCOUS THREADS UR QL AUTO: ABNORMAL
NEUTROPHILS # BLD AUTO: 2.77 THOUSANDS/ÂΜL (ref 1.85–7.62)
NEUTS SEG NFR BLD AUTO: 59 % (ref 43–75)
NITRITE UR QL STRIP: NEGATIVE
NON-SQ EPI CELLS URNS QL MICRO: ABNORMAL /HPF
NRBC BLD AUTO-RTO: 0 /100 WBCS
P AXIS: 78 DEGREES
PH UR STRIP.AUTO: 6 [PH]
PLATELET # BLD AUTO: 198 THOUSANDS/UL (ref 149–390)
PMV BLD AUTO: 10.8 FL (ref 8.9–12.7)
POTASSIUM SERPL-SCNC: 3.2 MMOL/L (ref 3.5–5.3)
PR INTERVAL: 194 MS
PROT SERPL-MCNC: 7.3 G/DL (ref 6.4–8.4)
PROT UR STRIP-MCNC: ABNORMAL MG/DL
QRS AXIS: 71 DEGREES
QRSD INTERVAL: 106 MS
QT INTERVAL: 408 MS
QTC INTERVAL: 397 MS
RBC # BLD AUTO: 4.27 MILLION/UL (ref 3.81–5.12)
RBC #/AREA URNS AUTO: ABNORMAL /HPF
RSV RNA RESP QL NAA+PROBE: NEGATIVE
SARS-COV-2 RNA RESP QL NAA+PROBE: NEGATIVE
SODIUM SERPL-SCNC: 135 MMOL/L (ref 135–147)
SP GR UR STRIP.AUTO: 1.02 (ref 1–1.03)
T WAVE AXIS: 57 DEGREES
UROBILINOGEN UR STRIP-ACNC: <2 MG/DL
VENTRICULAR RATE: 57 BPM
WBC # BLD AUTO: 4.6 THOUSAND/UL (ref 4.31–10.16)
WBC #/AREA URNS AUTO: ABNORMAL /HPF

## 2024-02-18 PROCEDURE — 0241U HB NFCT DS VIR RESP RNA 4 TRGT: CPT

## 2024-02-18 PROCEDURE — 74177 CT ABD & PELVIS W/CONTRAST: CPT

## 2024-02-18 PROCEDURE — 81001 URINALYSIS AUTO W/SCOPE: CPT

## 2024-02-18 PROCEDURE — 83690 ASSAY OF LIPASE: CPT

## 2024-02-18 PROCEDURE — 99284 EMERGENCY DEPT VISIT MOD MDM: CPT

## 2024-02-18 PROCEDURE — 93005 ELECTROCARDIOGRAM TRACING: CPT

## 2024-02-18 PROCEDURE — 96374 THER/PROPH/DIAG INJ IV PUSH: CPT

## 2024-02-18 PROCEDURE — 80048 BASIC METABOLIC PNL TOTAL CA: CPT

## 2024-02-18 PROCEDURE — 84484 ASSAY OF TROPONIN QUANT: CPT

## 2024-02-18 PROCEDURE — 36415 COLL VENOUS BLD VENIPUNCTURE: CPT

## 2024-02-18 PROCEDURE — 80076 HEPATIC FUNCTION PANEL: CPT

## 2024-02-18 PROCEDURE — 99285 EMERGENCY DEPT VISIT HI MDM: CPT | Performed by: EMERGENCY MEDICINE

## 2024-02-18 PROCEDURE — 96361 HYDRATE IV INFUSION ADD-ON: CPT

## 2024-02-18 PROCEDURE — 81025 URINE PREGNANCY TEST: CPT

## 2024-02-18 PROCEDURE — 93010 ELECTROCARDIOGRAM REPORT: CPT | Performed by: INTERNAL MEDICINE

## 2024-02-18 PROCEDURE — 96375 TX/PRO/DX INJ NEW DRUG ADDON: CPT

## 2024-02-18 PROCEDURE — 71045 X-RAY EXAM CHEST 1 VIEW: CPT

## 2024-02-18 PROCEDURE — 85025 COMPLETE CBC W/AUTO DIFF WBC: CPT

## 2024-02-18 PROCEDURE — G1004 CDSM NDSC: HCPCS

## 2024-02-18 RX ORDER — POTASSIUM CHLORIDE 20 MEQ/1
40 TABLET, EXTENDED RELEASE ORAL ONCE
Status: COMPLETED | OUTPATIENT
Start: 2024-02-18 | End: 2024-02-18

## 2024-02-18 RX ORDER — ONDANSETRON 2 MG/ML
4 INJECTION INTRAMUSCULAR; INTRAVENOUS ONCE
Status: COMPLETED | OUTPATIENT
Start: 2024-02-18 | End: 2024-02-18

## 2024-02-18 RX ORDER — KETOROLAC TROMETHAMINE 30 MG/ML
15 INJECTION, SOLUTION INTRAMUSCULAR; INTRAVENOUS ONCE
Status: COMPLETED | OUTPATIENT
Start: 2024-02-18 | End: 2024-02-18

## 2024-02-18 RX ORDER — ONDANSETRON 4 MG/1
4 TABLET, FILM COATED ORAL EVERY 6 HOURS
Qty: 18 TABLET | Refills: 0 | Status: SHIPPED | OUTPATIENT
Start: 2024-02-18

## 2024-02-18 RX ADMIN — POTASSIUM CHLORIDE 40 MEQ: 1500 TABLET, EXTENDED RELEASE ORAL at 10:58

## 2024-02-18 RX ADMIN — KETOROLAC TROMETHAMINE 15 MG: 30 INJECTION, SOLUTION INTRAMUSCULAR; INTRAVENOUS at 10:07

## 2024-02-18 RX ADMIN — SODIUM CHLORIDE 1000 ML: 0.9 INJECTION, SOLUTION INTRAVENOUS at 10:05

## 2024-02-18 RX ADMIN — ONDANSETRON 4 MG: 2 INJECTION INTRAMUSCULAR; INTRAVENOUS at 10:06

## 2024-02-18 RX ADMIN — IOHEXOL 85 ML: 350 INJECTION, SOLUTION INTRAVENOUS at 11:13

## 2024-02-18 NOTE — ED PROVIDER NOTES
History  Chief Complaint   Patient presents with    Possible UTI     Pt reports frequent urination, lower abd and back pain, reports hx mass in kidney, burning with urination, vomiting,chills     43F w/ hx of fibromyalgia presenting with 4 days of abdominal pain, nausea, vomiting, and diarrhea. She states that she went to an urgent care 2 days ago and had a UA completed which showed blood and protein. She describes her abdominal pain to be in the epigastric region as well as lower quadrants and radiating to the lower back in a band like pattern. Patient denies fevers. She endorses chills, headaches, chest tightness, SOB, urinary symptoms including frequency and urgency for 1 week.     She uses medical marijuana and uses alcohol socially.      History provided by:  Patient   used: No        Prior to Admission Medications   Prescriptions Last Dose Informant Patient Reported? Taking?   Azelastine HCl 137 MCG/SPRAY SOLN  Self No No   Si SPRAY INTO EACH NOSTRIL 2 (TWO) TIMES A DAY USE IN EACH NOSTRIL AS DIRECTED   Butalbital-APAP-Caffeine (Fioricet) -40 MG CAPS   No No   Sig: Take 1 tablet by mouth once as needed (migraines) for up to 1 dose   [facility] Senna 8.6 MG tablet   No No   Sig: TAKE 1 TABLET (8.6 MG TOTAL) BY MOUTH DAILY AT BEDTIME   NON FORMULARY  Self Yes No   Sig: Take 1 Dose by mouth Medical marijuana   albuterol (PROVENTIL HFA,VENTOLIN HFA) 90 mcg/act inhaler  Self No No   Sig: INHALE 2 PUFFS EVERY 6 (SIX) HOURS AS NEEDED FOR SHORTNESS OF BREATH   clonazePAM (KlonoPIN) 0.5 mg tablet   No No   Sig: TAKE 1 TABLET BY MOUTH EVERY DAY   diclofenac (VOLTAREN) 75 mg EC tablet   Yes No   Sig: Take 75 mg by mouth 2 (two) times a day as needed   dicyclomine (BENTYL) 20 mg tablet  Self No No   Sig: Take 1 tablet (20 mg total) by mouth every 6 (six) hours   Patient taking differently: Take 20 mg by mouth every 6 (six) hours as needed   docusate sodium (COLACE) 100 mg capsule  Self No No   Sig:  Take 1 capsule (100 mg total) by mouth in the morning   ergocalciferol (VITAMIN D2) 50,000 units  Self Yes No   Sig: Take 50,000 Units by mouth once a week Every Thursday   Patient not taking: Reported on 2023   fluocinonide (LIDEX) 0.05 % ointment  Self Yes No   Sig: APPLY THIN LAYER TO SCAR AREA ON BACK TWICE DAILY EVERY OTHER DAY.   fluticasone (FLONASE) 50 mcg/act nasal spray   No No   Si sprays into each nostril daily   lactulose 20 g/30 mL  Self No No   Sig: Take 30 mL (20 g total) by mouth 2 (two) times a day   Patient not taking: Reported on 3/2/2023   levonorgestrel (MIRENA) 20 MCG/24HR IUD  Self Yes No   Sig: Mirena 20 MCG/24HR Intrauterine Intrauterine Device  Refills: 0  Active   methocarbamol (ROBAXIN) 500 mg tablet   No No   Sig: Take 1 tablet (500 mg total) by mouth 3 (three) times a day for 14 days   Patient not taking: Reported on 2023   methocarbamol (ROBAXIN) 500 mg tablet   No No   Sig: Take 1 tablet (500 mg total) by mouth daily at bedtime for 10 days   montelukast (SINGULAIR) 10 mg tablet  Self No No   Sig: Take 1 tablet (10 mg total) by mouth daily at bedtime   naproxen (Naprosyn) 500 mg tablet   No No   Sig: Take 1 tablet (500 mg total) by mouth 2 (two) times a day with meals for 10 days   ondansetron (ZOFRAN) 4 mg tablet  Self No No   Sig: Take 1 tablet (4 mg total) by mouth every 8 (eight) hours as needed for nausea or vomiting   pantoprazole (PROTONIX) 40 mg tablet  Self No No   Sig: Take 1 tablet (40 mg total) by mouth daily before breakfast   sertraline (ZOLOFT) 50 mg tablet  Self No No   Sig: TAKE 1 TABLET BY MOUTH EVERY DAY   Patient not taking: Reported on 2024   topiramate (TOPAMAX) 25 mg tablet  Self Yes No   Sig: Take 25 mg by mouth 2 (two) times a day PRN      Facility-Administered Medications: None       Past Medical History:   Diagnosis Date    Allergic     Allergic rhinitis     Anemia     Anxiety     Chondromalacia of both patellae     Closed fracture of  left distal fibula 08/27/2020    Depression     Fibromyalgia     Fibromyalgia     GERD (gastroesophageal reflux disease)     Headache(784.0)     Hematochezia     Herpes simplex infection     HPV (human papilloma virus) infection     11/2013    Hypertension     IBS (irritable bowel syndrome)     Insomnia     Mental status change     Migraine     Obesity 08/06/2015    Scoliosis     Scoliosis        Past Surgical History:   Procedure Laterality Date    BREAST BIOPSY Left 12/10/2013    EXPLORATORY LAPAROTOMY      WISDOM TOOTH EXTRACTION         Family History   Problem Relation Age of Onset    Stroke Mother     Hypertension Mother     Psoriasis Mother     Depression Mother     Hepatitis Father         B     Substance Abuse Father     No Known Problems Daughter     No Known Problems Daughter     Dementia Maternal Grandmother     No Known Problems Maternal Grandfather     No Known Problems Paternal Grandmother     Alcohol abuse Paternal Grandfather     Asthma Brother     No Known Problems Brother     Diabetes type I Son     Diabetes Son     Breast cancer Family         maternal aunt - 30 's     Breast cancer Maternal Aunt 30    Breast cancer Cousin 30    Cancer Cousin      I have reviewed and agree with the history as documented.    E-Cigarette/Vaping    E-Cigarette Use Never User      E-Cigarette/Vaping Substances    Nicotine No     THC No     CBD No     Flavoring No     Other No     Unknown No      Social History     Tobacco Use    Smoking status: Former     Types: Cigarettes    Smokeless tobacco: Never    Tobacco comments:     quit 2018   Vaping Use    Vaping status: Never Used   Substance Use Topics    Alcohol use: Yes     Alcohol/week: 3.0 standard drinks of alcohol     Types: 1 Glasses of wine, 1 Cans of beer, 1 Shots of liquor per week     Comment: social     Drug use: Yes     Frequency: 3.0 times per week     Types: Marijuana     Comment: few times a week         Review of Systems   Constitutional:  Positive for  chills and fatigue. Negative for activity change and fever.   Eyes:  Negative for visual disturbance.   Respiratory:  Positive for chest tightness and shortness of breath. Negative for wheezing.    Cardiovascular:  Negative for chest pain, palpitations and leg swelling.   Gastrointestinal:  Positive for abdominal pain, diarrhea, nausea and vomiting.   Genitourinary:  Positive for dysuria, frequency, hematuria and urgency.   Musculoskeletal:  Positive for back pain. Negative for arthralgias.   Neurological:  Positive for headaches. Negative for dizziness, weakness and light-headedness.   Psychiatric/Behavioral:  Negative for confusion.        Physical Exam  ED Triage Vitals [02/18/24 0911]   Temperature Pulse Respirations Blood Pressure SpO2   98.1 °F (36.7 °C) 78 18 142/75 99 %      Temp Source Heart Rate Source Patient Position - Orthostatic VS BP Location FiO2 (%)   Oral Monitor Sitting -- --      Pain Score       7             Orthostatic Vital Signs  Vitals:    02/18/24 0911 02/18/24 1030 02/18/24 1100 02/18/24 1230   BP: 142/75 123/69 137/63 134/71   Pulse: 78 62 77 80   Patient Position - Orthostatic VS: Sitting          Physical Exam  Vitals and nursing note reviewed.   Constitutional:       General: She is not in acute distress.     Appearance: Normal appearance. She is obese. She is not ill-appearing, toxic-appearing or diaphoretic.   HENT:      Head: Normocephalic and atraumatic.   Eyes:      General: No scleral icterus.     Extraocular Movements: Extraocular movements intact.   Cardiovascular:      Rate and Rhythm: Normal rate and regular rhythm.      Pulses: Normal pulses.      Heart sounds: Normal heart sounds. No murmur heard.     No friction rub. No gallop.   Pulmonary:      Effort: Pulmonary effort is normal. No respiratory distress.      Breath sounds: No wheezing, rhonchi or rales.   Abdominal:      General: There is no distension.      Tenderness: There is no abdominal tenderness. There is no  right CVA tenderness, left CVA tenderness, guarding or rebound.   Musculoskeletal:         General: Normal range of motion.      Cervical back: Normal range of motion.      Right lower leg: No edema.      Left lower leg: No edema.   Skin:     General: Skin is warm.      Coloration: Skin is not jaundiced.   Neurological:      General: No focal deficit present.      Mental Status: She is alert and oriented to person, place, and time.   Psychiatric:         Behavior: Behavior normal.         ED Medications  Medications   sodium chloride 0.9 % bolus 1,000 mL (0 mL Intravenous Stopped 2/18/24 1130)   ondansetron (ZOFRAN) injection 4 mg (4 mg Intravenous Given 2/18/24 1006)   ketorolac (TORADOL) injection 15 mg (15 mg Intravenous Given 2/18/24 1007)   potassium chloride (Klor-Con M20) CR tablet 40 mEq (40 mEq Oral Given 2/18/24 1058)   iohexol (OMNIPAQUE) 350 MG/ML injection (MULTI-DOSE) 85 mL (85 mL Intravenous Given 2/18/24 1113)       Diagnostic Studies  Results Reviewed       Procedure Component Value Units Date/Time    Urine Microscopic [[id number]]  (Abnormal) Collected: 02/18/24 1053    Lab Status: Final result Specimen: Urine, Clean Catch Updated: 02/18/24 1157     RBC, UA 1-2 /hpf      WBC, UA 4-10 /hpf      Epithelial Cells Occasional /hpf      Bacteria, UA None Seen /hpf      MUCUS THREADS Innumerable    UA w Reflex to Microscopic w Reflex to Culture [[id number]]  (Abnormal) Collected: 02/18/24 1053    Lab Status: Final result Specimen: Urine, Clean Catch Updated: 02/18/24 1106     Color, UA Light Yellow     Clarity, UA Turbid     Specific Gravity, UA 1.025     pH, UA 6.0     Leukocytes, UA Negative     Nitrite, UA Negative     Protein, UA Trace mg/dl      Glucose, UA Negative mg/dl      Ketones, UA Negative mg/dl      Urobilinogen, UA <2.0 mg/dl      Bilirubin, UA Negative     Occult Blood, UA Trace    POCT pregnancy, urine [[id number]]  (Normal) Resulted: 02/18/24 1059    Lab Status: Final result Updated:  02/18/24 1059     EXT Preg Test, Ur Negative     Control Valid    FLU/RSV/COVID - if FLU/RSV clinically relevant [[id number]]  (Normal) Collected: 02/18/24 0957    Lab Status: Final result Specimen: Nares from Nose Updated: 02/18/24 1049     SARS-CoV-2 Negative     INFLUENZA A PCR Negative     INFLUENZA B PCR Negative     RSV PCR Negative    Narrative:      FOR PEDIATRIC PATIENTS - copy/paste COVID Guidelines URL to browser: [email or web address]    SARS-CoV-2 assay is a Nucleic Acid Amplification assay intended for the  qualitative detection of nucleic acid from SARS-CoV-2 in nasopharyngeal  swabs. Results are for the presumptive identification of SARS-CoV-2 RNA.    Positive results are indicative of infection with SARS-CoV-2, the virus  causing COVID-19, but do not rule out bacterial infection or co-infection  with other viruses. Laboratories within the [place] and its  territories are required to report all positive results to the appropriate  public health authorities. Negative results do not preclude SARS-CoV-2  infection and should not be used as the sole basis for treatment or other  patient management decisions. Negative results must be combined with  clinical observations, patient history, and epidemiological information.  This test has not been FDA cleared or approved.    This test has been authorized by FDA under an Emergency Use Authorization  (EUA). This test is only authorized for the duration of time the  declaration that circumstances exist justifying the authorization of the  emergency use of an in vitro diagnostic tests for detection of SARS-CoV-2  virus and/or diagnosis of COVID-19 infection under section 564(b)(1) of  the Act, 21 U.S.C. 360bbb-3(b)(1), unless the authorization is terminated  or revoked sooner. The test has been validated but independent review by FDA  and CLIA is pending.    Test performed using [employer]: This  RT-PCR assay targets N2,  a region unique to SARS-CoV-2. A conserved region in the E-gene was chosen  for pan-Sarbecovirus detection which includes SARS-CoV-2.    According to CMS-2020-01-R, this platform meets the definition of high-throughput technology.    HS Troponin 0hr (reflex protocol) [[id number]]  (Normal) Collected: 02/18/24 0957    Lab Status: Final result Specimen: Blood from Arm, Left Updated: 02/18/24 1030     hs TnI 0hr 2 ng/L     Basic metabolic panel [[id number]]  (Abnormal) Collected: 02/18/24 0957    Lab Status: Final result Specimen: Blood from Arm, Left Updated: 02/18/24 1023     Sodium 135 mmol/L      Potassium 3.2 mmol/L      Chloride 102 mmol/L      CO2 27 mmol/L      ANION GAP 6 mmol/L      BUN 12 mg/dL      Creatinine 0.94 mg/dL      Glucose 89 mg/dL      Calcium 9.2 mg/dL      eGFR 75 ml/min/1.73sq m     Narrative:      National Kidney Disease Foundation guidelines for Chronic Kidney Disease (CKD):     Stage 1 with normal or high GFR (GFR > 90 mL/min/1.73 square meters)    Stage 2 Mild CKD (GFR = 60-89 mL/min/1.73 square meters)    Stage 3A Moderate CKD (GFR = 45-59 mL/min/1.73 square meters)    Stage 3B Moderate CKD (GFR = 30-44 mL/min/1.73 square meters)    Stage 4 Severe CKD (GFR = 15-29 mL/min/1.73 square meters)    Stage 5 End Stage CKD (GFR <15 mL/min/1.73 square meters)  Note: GFR calculation is accurate only with a steady state creatinine    Hepatic function panel [[id number]]  (Normal) Collected: 02/18/24 0957    Lab Status: Final result Specimen: Blood from Arm, Left Updated: 02/18/24 1023     Total Bilirubin 0.71 mg/dL      Bilirubin, Direct 0.13 mg/dL      Alkaline Phosphatase 53 U/L      AST 16 U/L      ALT 21 U/L      Total Protein 7.3 g/dL      Albumin 4.5 g/dL     Lipase [[id number]]  (Normal) Collected: 02/18/24 0957    Lab Status: Final result Specimen: Blood from Arm, Left Updated: 02/18/24 1023     Lipase 39 u/L     CBC and differential [[id number]]  (Abnormal) Collected:  02/18/24 0957    Lab Status: Final result Specimen: Blood from Arm, Left Updated: 02/18/24 1011     WBC 4.60 Thousand/uL      RBC 4.27 Million/uL      Hemoglobin 13.9 g/dL      Hematocrit 42.1 %      MCV 99 fL      MCH 32.6 pg      MCHC 33.0 g/dL      RDW 13.0 %      MPV 10.8 fL      Platelets 198 Thousands/uL      nRBC 0 /100 WBCs      Neutrophils Relative 59 %      Immat GRANS % 0 %      Lymphocytes Relative 29 %      Monocytes Relative 9 %      Eosinophils Relative 2 %      Basophils Relative 1 %      Neutrophils Absolute 2.77 Thousands/µL      Immature Grans Absolute 0.01 Thousand/uL      Lymphocytes Absolute 1.31 Thousands/µL      Monocytes Absolute 0.40 Thousand/µL      Eosinophils Absolute 0.07 Thousand/µL      Basophils Absolute 0.04 Thousands/µL                    CT abdomen pelvis with contrast   Final Result by Meredith Castano MD (02/18 1322)      No acute intra-abdominal pathology.   IUD in expected position.   Incidental findings, as per the body of the report.         Workstation performed: [id number]         XR chest 1 view portable   ED Interpretation by Chasity Childs MD (02/18 0974)   No acute findings            Procedures  ECG 12 Lead Documentation Only    Date/Time: 2/18/2024 10:06 AM    Performed by: Yousif Kim DO  Authorized by: Yousif Kim DO    ECG reviewed by me, the ED Provider: yes    Patient location:  ED  Previous ECG:     Previous ECG:  Compared to current  Interpretation:     Interpretation: normal    Rate:     ECG rate assessment: bradycardic    Rhythm:     Rhythm: sinus bradycardia    Ectopy:     Ectopy: PAC    QRS:     QRS axis:  Normal    QRS intervals:  Normal        ED Course  ED Course as of 02/18/24 1342   Sun Feb 18, 2024   1004 ECG 12 lead  Sinus bradycardia w/ PACs   1005 XR chest 1 view portable  No acute abnormality as per my read   1026 CBC and differential(!)  Unremarkable CBC   1027 Basic metabolic panel(!)  Decreased potassium, will replete   1027 Hepatic function  panel  Unremarkable Hepatic function panel   1027 Lipase  Normal lipase, r/o pancreatitis   1037 HS Troponin 0hr (reflex protocol)  Negative trops   1101 FLU/RSV/COVID - if FLU/RSV clinically relevant  Noted   1108 UA w Reflex to Microscopic w Reflex to Culture(!)  Unremarkable UA   1325 CT abdomen pelvis with contrast  No acute intra-abdominal pathology.  IUD in expected position.     1326 CT is normal, blood work is unremarkable. Will d/c patient at this time with sx treatment as sx are most likely due to viral syndrome.                      PERC Rule for PE      Flowsheet Row Most Recent Value   PERC Rule for PE    Age >=50 0 Filed at: 02/18/2024 0938   HR >=100 0 Filed at: 02/18/2024 0938   O2 Sat on room air < 95% 0 Filed at: 02/18/2024 0938   History of PE or DVT 0 Filed at: 02/18/2024 0938   Recent trauma or surgery 0 Filed at: 02/18/2024 0938   Hemoptysis 0 Filed at: 02/18/2024 0938   Exogenous estrogen 0 Filed at: 02/18/2024 0938   Unilateral leg swelling 0 Filed at: 02/18/2024 0938   PERC Rule for PE Results 0 Filed at: 02/18/2024 0938                    Wells' Criteria for PE      Flowsheet Row Most Recent Value   Wells' Criteria for PE    Clinical signs and symptoms of DVT 0 Filed at: 02/18/2024 0937   PE is primary diagnosis or equally likely 0 Filed at: 02/18/2024 0937   HR >100 0 Filed at: 02/18/2024 0937   Immobilization at least 3 days or Surgery in the previous 4 weeks 0 Filed at: 02/18/2024 0937   Previous, objectively diagnosed PE or DVT 0 Filed at: 02/18/2024 0937   Hemoptysis 0 Filed at: 02/18/2024 0937   Malignancy with treatment within 6 months or palliative 0 Filed at: 02/18/2024 0937   Wells' Criteria Total 0 Filed at: 02/18/2024 0937              Medical Decision Making  43F with PMHx of fibromyalgia presents with abdominal pain, n/v/d for 4 days.   Labs ordered.   CT Ab/pelvis ordered.  Did not meet wells criteria or PERC criteria, therefore no D-Dimer was necessary.   CT exam and  bloodwork was normal.   See ED Course for full documentation.   Patient diagnosed with acute viral syndrome. Patient is stable for discharge with follow up with her PCP and symptomatic treatment.     Amount and/or Complexity of Data Reviewed  External Data Reviewed: labs.  Labs: ordered. Decision-making details documented in ED Course.  Radiology: ordered and independent interpretation performed. Decision-making details documented in ED Course.  ECG/medicine tests:  Decision-making details documented in ED Course.    Risk  Prescription drug management.          Disposition  Final diagnoses:   Nausea   Acute viral disease   Chills   Diarrhea   Abdominal pain     Time reflects when diagnosis was documented in both MDM as applicable and the Disposition within this note       Time User Action Codes Description Comment    2/18/2024  1:30 PM Judy, Yousif Add [R11.0] Nausea     2/18/2024  1:31 PM Judy, Yousif Add [B34.9] Acute viral disease     2/18/2024  1:31 PM Judy, Yousif Modify [R11.0] Nausea     2/18/2024  1:31 PM Judy, Yousif Modify [B34.9] Acute viral disease     2/18/2024  1:32 PM Judy, Yousif Add [R68.83] Chills     2/18/2024  1:32 PM Judy, Yousif Add [R19.7] Diarrhea     2/18/2024  1:32 PM Judy, Yousif Add [R10.9] Abdominal pain           ED Disposition       ED Disposition   Discharge    Condition   Stable    Date/Time   Sun Feb 18, 2024 1330    Comment   Joleneace Tierney discharge to home/self care.                   Follow-up Information       Follow up With Specialties Details Why Contact Info    Toya Caal MD Family Medicine In 1 week  [address] [place] [address]  [phone]              Patient's Medications   Discharge Prescriptions    ONDANSETRON (ZOFRAN) 4 MG TABLET    Take 1 tablet (4 mg total) by mouth every 6 (six) hours       Start Date: 2/18/2024 End Date: --       Order Dose: 4 mg       Quantity: 18 tablet    Refills: 0     No discharge procedures on file.    PDMP Review          Value Time User    PDMP Reviewed  Yes 1/9/2023 10:43 PM AVTAR Fontaine             ED Provider  Attending physically available and evaluated Jolene Tierney. I managed the patient along with the ED Attending.    Electronically Signed by  DO Yousif Acosta DO  02/18/24 8180

## 2024-02-18 NOTE — ED ATTENDING ATTESTATION
2/18/2024  IChasity MD, saw and evaluated the patient. I have discussed the patient with the resident/non-physician practitioner and agree with the resident's/non-physician practitioner's findings, Plan of Care, and MDM as documented in the resident's/non-physician practitioner's note, except where noted. All available labs and Radiology studies were reviewed.  I was present for key portions of any procedure(s) performed by the resident/non-physician practitioner and I was immediately available to provide assistance.       At this point I agree with the current assessment done in the Emergency Department.  I have conducted an independent evaluation of this patient a history and physical is as follows:    43-year-old presenting to the ER with headache, chills, nausea, chest tightness, diarrhea.  On exam patient with nonfocal neuroexam, abdomen soft, tender throughout.  Normal cardiopulmonary exam.  Normal lower extremity exam.  Patient with likely viral syndrome.  Will check electrolytes, CT abdomen, chest x-ray, viral swab, cardiac workup.      ED Course  ED Course as of 02/18/24 1817   Sun Feb 18, 2024   0953 ECG shows rate 57, sinus, normal axis, normal QRS, no significant ST or T wave changes, normal intervals, PAC noted, independently interpreted by me         Critical Care Time  Procedures

## 2024-02-20 ENCOUNTER — OFFICE VISIT (OUTPATIENT)
Dept: FAMILY MEDICINE CLINIC | Facility: CLINIC | Age: 44
End: 2024-02-20
Payer: COMMERCIAL

## 2024-02-20 VITALS
HEIGHT: 63 IN | DIASTOLIC BLOOD PRESSURE: 70 MMHG | OXYGEN SATURATION: 100 % | SYSTOLIC BLOOD PRESSURE: 130 MMHG | TEMPERATURE: 96.5 F | RESPIRATION RATE: 16 BRPM | WEIGHT: 178.8 LBS | BODY MASS INDEX: 31.68 KG/M2 | HEART RATE: 68 BPM

## 2024-02-20 DIAGNOSIS — K52.9 GASTROENTERITIS: Primary | ICD-10-CM

## 2024-02-20 DIAGNOSIS — R39.9 UTI SYMPTOMS: ICD-10-CM

## 2024-02-20 PROCEDURE — 87086 URINE CULTURE/COLONY COUNT: CPT | Performed by: NURSE PRACTITIONER

## 2024-02-20 PROCEDURE — 99214 OFFICE O/P EST MOD 30 MIN: CPT | Performed by: NURSE PRACTITIONER

## 2024-02-20 NOTE — PROGRESS NOTES
Name: Jolene Tierney      : 1980      MRN: [id number]  Encounter Provider: AVTAR Monreal  Encounter Date: 2024   Encounter department: [facility]    Assessment & Plan     1. Gastroenteritis  Assessment & Plan:  Reviewed ER note including labs and CT.  Continue zofran, prescription for pepcid prn.  Recommend bland diet.  Will send urine culture given her complaint of voiding symptoms.  Disproportionate pain response to extremely gentle palpation.  Vitals wnl.  Pt instructed to call for reevaluation if sx worsen or persist, reinforced that a viral process must run its course.        2. UTI symptoms  Assessment & Plan:  Voiding symptoms as in HPI, will send urine culture.  Reviewed CT a/p which was unremarkable.  Reinforce hydration.  Will follow up pending result of culture.    Orders:  -     Urine culture           Subjective      Pt is a 42 yo female here today for ER follow up.  Seen at [facility] ER  with complaint of abdominal pain, n/v/d with chills, headache, chest tightness, sob, and urinary frequency/urgency.  She was negative for covid, flu, rsv.  CT of a/p negative for any acute pathology.  EKG showed sinus bradycardia, chest x-ray negative.  K was low, she was given 40 mEq replacement, ketorolac for pain, zofran, IV fluids.  Urine showed blood, protein; no infection.  She was advised symptoms were viral and d/c'd home.  Zofran helps a bit, she wakes up with reflux symptoms and her belly gurgling.  No longer vomiting but still nauseated.  She has abdominal pain, low back pain L>R.  She is staying hydrated but not eating much.        Review of Systems   Constitutional:  Positive for appetite change. Negative for chills and fever.   Respiratory:  Negative for shortness of breath.    Cardiovascular:  Negative for chest pain and palpitations.   Gastrointestinal:  Positive for abdominal pain and nausea. Negative for diarrhea and vomiting.   Genitourinary:   Positive for difficulty urinating, frequency, hematuria and vaginal bleeding (menses starting). Negative for decreased urine volume, dysuria, flank pain, pelvic pain, urgency and vaginal discharge.   Neurological:  Positive for dizziness, light-headedness and headaches.   Psychiatric/Behavioral:  Negative for confusion.        Current Outpatient Medications on File Prior to Visit   Medication Sig    albuterol (PROVENTIL HFA,VENTOLIN HFA) 90 mcg/act inhaler INHALE 2 PUFFS EVERY 6 (SIX) HOURS AS NEEDED FOR SHORTNESS OF BREATH    Azelastine HCl 137 MCG/SPRAY SOLN 1 SPRAY INTO EACH NOSTRIL 2 (TWO) TIMES A DAY USE IN EACH NOSTRIL AS DIRECTED    Butalbital-APAP-Caffeine (Fioricet) -40 MG CAPS Take 1 tablet by mouth once as needed (migraines) for up to 1 dose    clonazePAM (KlonoPIN) 0.5 mg tablet TAKE 1 TABLET BY MOUTH EVERY DAY    [facility] Senna 8.6 MG tablet TAKE 1 TABLET (8.6 MG TOTAL) BY MOUTH DAILY AT BEDTIME    diclofenac (VOLTAREN) 75 mg EC tablet Take 75 mg by mouth 2 (two) times a day as needed    dicyclomine (BENTYL) 20 mg tablet Take 1 tablet (20 mg total) by mouth every 6 (six) hours (Patient taking differently: Take 20 mg by mouth every 6 (six) hours as needed)    docusate sodium (COLACE) 100 mg capsule Take 1 capsule (100 mg total) by mouth in the morning    fluocinonide (LIDEX) 0.05 % ointment APPLY THIN LAYER TO SCAR AREA ON BACK TWICE DAILY EVERY OTHER DAY.    fluticasone (FLONASE) 50 mcg/act nasal spray 2 sprays into each nostril daily    levonorgestrel (MIRENA) 20 MCG/24HR IUD Mirena 20 MCG/24HR Intrauterine Intrauterine Device  Refills: 0  Active    methocarbamol (ROBAXIN) 500 mg tablet Take 1 tablet (500 mg total) by mouth 3 (three) times a day for 14 days    montelukast (SINGULAIR) 10 mg tablet Take 1 tablet (10 mg total) by mouth daily at bedtime    naproxen (Naprosyn) 500 mg tablet Take 1 tablet (500 mg total) by mouth 2 (two) times a day with meals for 10 days    NON FORMULARY Take 1 Dose by  "mouth Medical marijuana    ondansetron (ZOFRAN) 4 mg tablet Take 1 tablet (4 mg total) by mouth every 8 (eight) hours as needed for nausea or vomiting    pantoprazole (PROTONIX) 40 mg tablet Take 1 tablet (40 mg total) by mouth daily before breakfast    topiramate (TOPAMAX) 25 mg tablet Take 25 mg by mouth 2 (two) times a day PRN    ergocalciferol (VITAMIN D2) 50,000 units Take 50,000 Units by mouth once a week Every Thursday (Patient not taking: Reported on 12/13/2023)    famotidine (PEPCID) 20 mg tablet Take 1 tablet (20 mg total) by mouth 2 (two) times a day    lactulose 20 g/30 mL Take 30 mL (20 g total) by mouth 2 (two) times a day (Patient not taking: Reported on 3/2/2023)    methocarbamol (ROBAXIN) 500 mg tablet Take 1 tablet (500 mg total) by mouth daily at bedtime for 10 days (Patient not taking: Reported on 2/20/2024)    ondansetron (ZOFRAN) 4 mg tablet Take 1 tablet (4 mg total) by mouth every 6 (six) hours (Patient not taking: Reported on 2/20/2024)    sertraline (ZOLOFT) 50 mg tablet TAKE 1 TABLET BY MOUTH EVERY DAY (Patient not taking: Reported on 2/8/2024)       Objective     /70   Pulse 68   Temp (!) 96.5 °F (35.8 °C)   Resp 16   Ht 5' 3\" (1.6 m)   Wt 81.1 kg (178 lb 12.8 oz)   SpO2 100%   BMI 31.67 kg/m²     Physical Exam  Vitals reviewed.   Constitutional:       General: She is awake. She is not in acute distress.     Appearance: Normal appearance. She is well-developed and well-groomed. She is not ill-appearing.   Cardiovascular:      Rate and Rhythm: Normal rate and regular rhythm.      Heart sounds: Normal heart sounds. No murmur heard.  Pulmonary:      Effort: Pulmonary effort is normal.      Breath sounds: Normal breath sounds.   Abdominal:      General: Abdomen is flat. Bowel sounds are normal.      Palpations: Abdomen is soft.      Tenderness: There is generalized abdominal tenderness (significant pain reaction to extremely minimal pressure on abdomen).   Musculoskeletal:      " Lumbar back: Tenderness (L>R) present.   Skin:     General: Skin is warm and dry.   Neurological:      Mental Status: She is alert and oriented to person, place, and time.   Psychiatric:         Attention and Perception: Attention normal.         Mood and Affect: Mood normal.         Speech: Speech normal.         Behavior: Behavior normal. Behavior is cooperative.         Thought Content: Thought content normal.         Judgment: Judgment normal.       AVTAR Monreal     Number Of Deep Sutures (Optional): 12

## 2024-02-20 NOTE — ASSESSMENT & PLAN NOTE
Voiding symptoms as in HPI, will send urine culture.  Reviewed CT a/p which was unremarkable.  Reinforce hydration.  Will follow up pending result of culture.

## 2024-02-20 NOTE — ASSESSMENT & PLAN NOTE
Reviewed ER note including labs and CT.  Continue zofran, prescription for pepcid prn.  Recommend bland diet.  Will send urine culture given her complaint of voiding symptoms.  Disproportionate pain response to extremely gentle palpation.  Vitals wnl.  Pt instructed to call for reevaluation if sx worsen or persist, reinforced that a viral process must run its course.

## 2024-02-21 DIAGNOSIS — K58.1 IRRITABLE BOWEL SYNDROME WITH CONSTIPATION: ICD-10-CM

## 2024-02-21 DIAGNOSIS — Z91.09 ALLERGY TO ENVIRONMENTAL FACTORS: ICD-10-CM

## 2024-02-21 DIAGNOSIS — K64.4 EXTERNAL HEMORRHOIDS: ICD-10-CM

## 2024-02-21 DIAGNOSIS — J30.89 NON-SEASONAL ALLERGIC RHINITIS, UNSPECIFIED TRIGGER: ICD-10-CM

## 2024-02-21 PROBLEM — K52.9 GASTROENTERITIS: Status: RESOLVED | Noted: 2024-02-20 | Resolved: 2024-02-21

## 2024-02-21 LAB — BACTERIA UR CULT: ABNORMAL

## 2024-02-21 RX ORDER — FLUTICASONE PROPIONATE 50 MCG
SPRAY, SUSPENSION (ML) NASAL
Qty: 16 ML | Refills: 5 | Status: SHIPPED | OUTPATIENT
Start: 2024-02-21

## 2024-02-21 RX ORDER — LACTULOSE 10 G/15ML
SOLUTION ORAL
Qty: 180 ML | Refills: 1 | Status: SHIPPED | OUTPATIENT
Start: 2024-02-21

## 2024-02-21 RX ORDER — SENNOSIDES 8.6 MG
TABLET ORAL
Qty: 30 TABLET | Refills: 0 | Status: SHIPPED | OUTPATIENT
Start: 2024-02-21

## 2024-02-22 ENCOUNTER — TELEPHONE (OUTPATIENT)
Age: 44
End: 2024-02-22

## 2024-02-22 ENCOUNTER — PATIENT MESSAGE (OUTPATIENT)
Dept: FAMILY MEDICINE CLINIC | Facility: CLINIC | Age: 44
End: 2024-02-22

## 2024-02-22 DIAGNOSIS — R39.9 UTI SYMPTOMS: Primary | ICD-10-CM

## 2024-02-22 RX ORDER — AMOXICILLIN AND CLAVULANATE POTASSIUM 875; 125 MG/1; MG/1
1 TABLET, FILM COATED ORAL EVERY 12 HOURS SCHEDULED
Qty: 10 TABLET | Refills: 0 | Status: SHIPPED | OUTPATIENT
Start: 2024-02-22 | End: 2024-02-27

## 2024-02-22 NOTE — TELEPHONE ENCOUNTER
Spoke to patient and informed she wanted to know if an antibiotic was going to be sent to the pharmacy

## 2024-02-28 ENCOUNTER — TELEPHONE (OUTPATIENT)
Age: 44
End: 2024-02-28

## 2024-02-28 DIAGNOSIS — B37.31 VAGINAL YEAST INFECTION: ICD-10-CM

## 2024-02-28 RX ORDER — FLUCONAZOLE 150 MG/1
150 TABLET ORAL ONCE
Qty: 1 TABLET | Refills: 1 | Status: SHIPPED | OUTPATIENT
Start: 2024-02-28 | End: 2024-02-28

## 2024-02-28 NOTE — TELEPHONE ENCOUNTER
Pt called say she was in the ED and they prescribed an abx for her. She always gets yeast infections when she takes an abx and is looking for something to be prescribed. [facility]

## 2024-03-20 ENCOUNTER — TELEPHONE (OUTPATIENT)
Age: 44
End: 2024-03-20

## 2024-03-20 DIAGNOSIS — K21.9 GASTROESOPHAGEAL REFLUX DISEASE WITHOUT ESOPHAGITIS: Primary | ICD-10-CM

## 2024-03-20 DIAGNOSIS — K52.9 GASTROENTERITIS: ICD-10-CM

## 2024-03-20 RX ORDER — FAMOTIDINE 20 MG/1
20 TABLET, FILM COATED ORAL 2 TIMES DAILY
Qty: 60 TABLET | Refills: 5 | Status: SHIPPED | OUTPATIENT
Start: 2024-03-20 | End: 2024-03-26 | Stop reason: ALTCHOICE

## 2024-03-20 NOTE — TELEPHONE ENCOUNTER
Pt called requesting a new referral to see her Gastroenterologist . Pt said she is having stomach pain again like she was before.  Pt asked for a call once referral is ready

## 2024-03-26 ENCOUNTER — APPOINTMENT (OUTPATIENT)
Dept: LAB | Facility: CLINIC | Age: 44
End: 2024-03-26
Payer: COMMERCIAL

## 2024-03-26 ENCOUNTER — OFFICE VISIT (OUTPATIENT)
Dept: FAMILY MEDICINE CLINIC | Facility: CLINIC | Age: 44
End: 2024-03-26
Payer: COMMERCIAL

## 2024-03-26 ENCOUNTER — HOSPITAL ENCOUNTER (OUTPATIENT)
Dept: RADIOLOGY | Facility: HOSPITAL | Age: 44
Discharge: HOME | End: 2024-03-26
Payer: COMMERCIAL

## 2024-03-26 VITALS
TEMPERATURE: 97.8 F | RESPIRATION RATE: 16 BRPM | WEIGHT: 173 LBS | HEIGHT: 63 IN | SYSTOLIC BLOOD PRESSURE: 120 MMHG | BODY MASS INDEX: 30.65 KG/M2 | DIASTOLIC BLOOD PRESSURE: 86 MMHG | OXYGEN SATURATION: 99 % | HEART RATE: 81 BPM

## 2024-03-26 DIAGNOSIS — M25.561 ACUTE PAIN OF RIGHT KNEE: ICD-10-CM

## 2024-03-26 DIAGNOSIS — N28.89 LEFT RENAL MASS: ICD-10-CM

## 2024-03-26 DIAGNOSIS — R10.13 EPIGASTRIC PAIN: ICD-10-CM

## 2024-03-26 DIAGNOSIS — K21.9 GASTROESOPHAGEAL REFLUX DISEASE WITHOUT ESOPHAGITIS: Primary | ICD-10-CM

## 2024-03-26 PROBLEM — R39.9 UTI SYMPTOMS: Status: RESOLVED | Noted: 2024-02-20 | Resolved: 2024-03-26

## 2024-03-26 PROCEDURE — 99214 OFFICE O/P EST MOD 30 MIN: CPT | Performed by: FAMILY MEDICINE

## 2024-03-26 PROCEDURE — 73564 X-RAY EXAM KNEE 4 OR MORE: CPT

## 2024-03-26 RX ORDER — METOCLOPRAMIDE 5 MG/1
TABLET ORAL
COMMUNITY
Start: 2024-02-26

## 2024-03-26 RX ORDER — SUCRALFATE ORAL 1 G/10ML
1 SUSPENSION ORAL 2 TIMES DAILY
Qty: 414 ML | Refills: 0 | Status: SHIPPED | OUTPATIENT
Start: 2024-03-26

## 2024-03-26 RX ORDER — OMEPRAZOLE 20 MG/1
20 CAPSULE, DELAYED RELEASE ORAL DAILY
COMMUNITY
End: 2024-03-26

## 2024-03-26 RX ORDER — OMEPRAZOLE 40 MG/1
40 CAPSULE, DELAYED RELEASE ORAL
Qty: 90 CAPSULE | Refills: 0 | Status: SHIPPED | OUTPATIENT
Start: 2024-03-26

## 2024-03-26 NOTE — PROGRESS NOTES
Name: Jolene Tierney      : 1980      MRN: [id number]  Encounter Provider: Toya Caal MD  Encounter Date: 3/26/2024   Encounter department: [facility]    Assessment & Plan     1. Gastroesophageal reflux disease without esophagitis  Assessment & Plan:  Last EGD  showed mild gastritis  To start omeprazole 40 mg daily  To f/u GI      Orders:  -     omeprazole (PriLOSEC) 40 MG capsule; Take 1 capsule (40 mg total) by mouth daily before breakfast  -     sucralfate (CARAFATE) 1 g/10 mL suspension; Take 10 mL (1 g total) by mouth 2 (two) times a day    2. Acute pain of right knee  -     XR knee 4+ vw right injury; Future; Expected date: 2024  -     Ambulatory referral to Orthopedic Surgery    3. Left renal mass  Assessment & Plan:  Normal CT scan in 2024  To f/u with nephrologsit      4. Epigastric pain  -     H. pylori antigen, stool; Future           Subjective   Right knee swelling for the last 10 days   No injury or trauma  Had gastroenteritis in 2024 and took her awhile to get better  Taking prilosec from her friend for the last few days and its helping with her pain     Knee Pain   There was no injury mechanism. The pain is present in the right knee. The pain is at a severity of 6/10. Pertinent negatives include no inability to bear weight, loss of motion, loss of sensation, muscle weakness, numbness or tingling. The symptoms are aggravated by movement, palpation and weight bearing. She has tried nothing for the symptoms. The treatment provided mild relief.     Review of Systems   Constitutional: Negative.    HENT: Negative.     Respiratory: Negative.     Cardiovascular: Negative.    Gastrointestinal:  Positive for abdominal pain.   Endocrine: Negative.    Genitourinary: Negative.    Musculoskeletal:  Positive for arthralgias and joint swelling.   Neurological:  Negative for tingling and numbness.   Hematological: Negative.    Psychiatric/Behavioral: Negative.         Past  Medical History:   Diagnosis Date   • Allergic    • Allergic rhinitis    • Anemia    • Anxiety    • Chondromalacia of both patellae    • Closed fracture of left distal fibula 08/27/2020   • Depression    • Fibromyalgia    • Fibromyalgia    • Gastroenteritis 2/20/2024   • GERD (gastroesophageal reflux disease)    • Headache(784.0)    • Hematochezia    • Herpes simplex infection    • HPV (human papilloma virus) infection     11/2013   • Hypertension    • IBS (irritable bowel syndrome)    • Insomnia    • Mental status change    • Migraine    • Obesity 08/06/2015   • Scoliosis    • Scoliosis      Past Surgical History:   Procedure Laterality Date   • BREAST BIOPSY Left 12/10/2013   • EXPLORATORY LAPAROTOMY     • WISDOM TOOTH EXTRACTION       Family History   Problem Relation Age of Onset   • Stroke Mother    • Hypertension Mother    • Psoriasis Mother    • Depression Mother    • Hepatitis Father         B    • Substance Abuse Father    • No Known Problems Daughter    • No Known Problems Daughter    • Dementia Maternal Grandmother    • No Known Problems Maternal Grandfather    • No Known Problems Paternal Grandmother    • Alcohol abuse Paternal Grandfather    • Asthma Brother    • No Known Problems Brother    • Diabetes type I Son    • Diabetes Son    • Breast cancer Family         maternal aunt - 30 's    • Breast cancer Maternal Aunt 30   • Breast cancer Cousin 30   • Cancer Cousin      Social History     Socioeconomic History   • Marital status: Single     Spouse name: None   • Number of children: None   • Years of education: completed 12th grade   • Highest education level: None   Occupational History   • Occupation: home health aide   Tobacco Use   • Smoking status: Former     Types: Cigarettes   • Smokeless tobacco: Never   • Tobacco comments:     quit 2018   Vaping Use   • Vaping status: Never Used   Substance and Sexual Activity   • Alcohol use: Yes     Alcohol/week: 3.0 standard drinks of alcohol     Types: 1  Glasses of wine, 1 Cans of beer, 1 Shots of liquor per week     Comment: social    • Drug use: Yes     Frequency: 3.0 times per week     Types: Marijuana     Comment: few times a week    • Sexual activity: Yes     Partners: Male     Birth control/protection: I.U.D.   Other Topics Concern   • None   Social History Narrative   • None     Social Determinants of Health     Financial Resource Strain: Not on file   Food Insecurity: Not on file   Transportation Needs: No Transportation Needs (1/9/2023)    PRAPARE - Transportation    • Lack of Transportation (Medical): No    • Lack of Transportation (Non-Medical): No   Physical Activity: Not on file   Stress: Not on file   Social Connections: Not on file   Intimate Partner Violence: Not on file   Housing Stability: Not on file     Current Outpatient Medications on File Prior to Visit   Medication Sig   • albuterol (PROVENTIL HFA,VENTOLIN HFA) 90 mcg/act inhaler INHALE 2 PUFFS EVERY 6 (SIX) HOURS AS NEEDED FOR SHORTNESS OF BREATH   • Azelastine HCl 137 MCG/SPRAY SOLN 1 SPRAY INTO EACH NOSTRIL 2 (TWO) TIMES A DAY USE IN EACH NOSTRIL AS DIRECTED   • clonazePAM (KlonoPIN) 0.5 mg tablet TAKE 1 TABLET BY MOUTH EVERY DAY   • [facility] Senna 8.6 MG tablet TAKE 1 TABLET (8.6 MG TOTAL) BY MOUTH DAILY AT BEDTIME   • ergocalciferol (VITAMIN D2) 50,000 units Take 50,000 Units by mouth once a week Every Thursday   • fluocinonide (LIDEX) 0.05 % ointment APPLY THIN LAYER TO SCAR AREA ON BACK TWICE DAILY EVERY OTHER DAY.   • fluticasone (FLONASE) 50 mcg/act nasal spray SPRAY 2 SPRAYS INTO EACH NOSTRIL EVERY DAY   • levonorgestrel (MIRENA) 20 MCG/24HR IUD Mirena 20 MCG/24HR Intrauterine Intrauterine Device  Refills: 0  Active   • methocarbamol (ROBAXIN) 500 mg tablet Take 1 tablet (500 mg total) by mouth 3 (three) times a day for 14 days   • metoclopramide (REGLAN) 5 mg tablet TAKE 1 TABLET (5 MG TOTAL) BY MOUTH 4 (FOUR) TIMES A DAY FOR 10 DAYS.   • montelukast (SINGULAIR) 10 mg tablet Take 1  tablet (10 mg total) by mouth daily at bedtime   • naproxen (Naprosyn) 500 mg tablet Take 1 tablet (500 mg total) by mouth 2 (two) times a day with meals for 10 days   • NON FORMULARY Take 1 Dose by mouth Medical marijuana   • ondansetron (ZOFRAN) 4 mg tablet Take 1 tablet (4 mg total) by mouth every 8 (eight) hours as needed for nausea or vomiting   • topiramate (TOPAMAX) 25 mg tablet Take 25 mg by mouth 2 (two) times a day PRN   • [DISCONTINUED] omeprazole (PriLOSEC) 20 mg delayed release capsule Take 20 mg by mouth daily   • diclofenac (VOLTAREN) 75 mg EC tablet Take 75 mg by mouth 2 (two) times a day as needed (Patient not taking: Reported on 3/26/2024)   • dicyclomine (BENTYL) 20 mg tablet Take 1 tablet (20 mg total) by mouth every 6 (six) hours (Patient not taking: Reported on 3/26/2024)   • docusate sodium (COLACE) 100 mg capsule Take 1 capsule (100 mg total) by mouth in the morning (Patient not taking: Reported on 3/26/2024)   • lactulose (CHRONULAC) 10 g/15 mL solution TAKE 30 ML (20 G TOTAL) BY MOUTH 2 (TWO) TIMES A DAY (Patient not taking: Reported on 3/26/2024)   • methocarbamol (ROBAXIN) 500 mg tablet Take 1 tablet (500 mg total) by mouth daily at bedtime for 10 days (Patient not taking: Reported on 2/20/2024)   • sertraline (ZOLOFT) 50 mg tablet TAKE 1 TABLET BY MOUTH EVERY DAY (Patient not taking: Reported on 2/8/2024)   • [DISCONTINUED] Butalbital-APAP-Caffeine (Fioricet) -40 MG CAPS Take 1 tablet by mouth once as needed (migraines) for up to 1 dose (Patient not taking: Reported on 3/26/2024)   • [DISCONTINUED] famotidine (PEPCID) 20 mg tablet TAKE 1 TABLET BY MOUTH TWICE A DAY (Patient not taking: Reported on 3/26/2024)   • [DISCONTINUED] ondansetron (ZOFRAN) 4 mg tablet Take 1 tablet (4 mg total) by mouth every 6 (six) hours (Patient not taking: Reported on 2/20/2024)   • [DISCONTINUED] pantoprazole (PROTONIX) 40 mg tablet Take 1 tablet (40 mg total) by mouth daily before breakfast (Patient  "not taking: Reported on 3/26/2024)     Allergies   Allergen Reactions   • Cymbalta [Duloxetine Hcl] Nausea Only and GI Intolerance     Stomach upset   • Other Other (See Comments)     Tomatoes, black walnuts, cherries, peaches gives her itchy throat  Other reaction(s): Other , Other      Immunization History   Administered Date(s) Administered   • COVID-19 MODERNA VACC 0.5 ML IM 04/10/2022, 05/15/2022   • INFLUENZA 10/13/2015, 11/07/2017   • Influenza Quadrivalent Preservative Free 3 years and older IM 10/13/2015, 11/07/2017   • Influenza, seasonal, injectable 10/08/2013   • Tdap 10/08/2013   • Tuberculin Skin Test-PPD Intradermal 11/13/2017, 01/15/2019, 05/05/2021, 09/05/2023, 09/05/2023       Objective     /86 (BP Location: Left arm, Patient Position: Sitting, Cuff Size: Standard)   Pulse 81   Temp 97.8 °F (36.6 °C) (Tympanic)   Resp 16   Ht 5' 3\" (1.6 m)   Wt 78.5 kg (173 lb)   SpO2 99%   BMI 30.65 kg/m²     Physical Exam  Constitutional:       Appearance: Normal appearance.   Cardiovascular:      Rate and Rhythm: Normal rate and regular rhythm.      Pulses: Normal pulses.      Heart sounds: Normal heart sounds.   Abdominal:      Tenderness: There is abdominal tenderness.      Comments: epigastric   Musculoskeletal:         General: Swelling and tenderness present.      Comments: Right knee   Neurological:      General: No focal deficit present.      Mental Status: She is alert and oriented to person, place, and time.   Psychiatric:         Mood and Affect: Mood normal.         Behavior: Behavior normal.       Toya Caal MD    "

## 2024-03-28 ENCOUNTER — APPOINTMENT (OUTPATIENT)
Dept: LAB | Facility: CLINIC | Age: 44
End: 2024-03-28
Payer: COMMERCIAL

## 2024-03-28 DIAGNOSIS — R10.13 EPIGASTRIC PAIN: ICD-10-CM

## 2024-03-28 PROCEDURE — 87338 HPYLORI STOOL AG IA: CPT

## 2024-03-29 ENCOUNTER — TELEPHONE (OUTPATIENT)
Dept: OBGYN CLINIC | Facility: CLINIC | Age: 44
End: 2024-03-29

## 2024-03-29 LAB — H PYLORI AG STL QL IA: NEGATIVE

## 2024-04-09 DIAGNOSIS — K21.9 GASTROESOPHAGEAL REFLUX DISEASE WITHOUT ESOPHAGITIS: ICD-10-CM

## 2024-04-09 DIAGNOSIS — K58.1 IRRITABLE BOWEL SYNDROME WITH CONSTIPATION: ICD-10-CM

## 2024-04-10 RX ORDER — SUCRALFATE ORAL 1 G/10ML
1 SUSPENSION ORAL 2 TIMES DAILY
Qty: 414 ML | Refills: 0 | Status: SHIPPED | OUTPATIENT
Start: 2024-04-10

## 2024-04-10 RX ORDER — SENNOSIDES 8.6 MG/1
1 TABLET, COATED ORAL
Qty: 30 TABLET | Refills: 0 | Status: SHIPPED | OUTPATIENT
Start: 2024-04-10

## 2024-04-11 ENCOUNTER — CONSULT (OUTPATIENT)
Dept: GASTROENTEROLOGY | Facility: CLINIC | Age: 44
End: 2024-04-11

## 2024-04-11 VITALS
WEIGHT: 172 LBS | SYSTOLIC BLOOD PRESSURE: 120 MMHG | HEIGHT: 63 IN | BODY MASS INDEX: 30.48 KG/M2 | TEMPERATURE: 97 F | DIASTOLIC BLOOD PRESSURE: 70 MMHG

## 2024-04-11 DIAGNOSIS — R10.13 EPIGASTRIC PAIN: Primary | ICD-10-CM

## 2024-04-11 DIAGNOSIS — K59.09 CHRONIC CONSTIPATION: ICD-10-CM

## 2024-04-11 DIAGNOSIS — K21.9 GASTROESOPHAGEAL REFLUX DISEASE, UNSPECIFIED WHETHER ESOPHAGITIS PRESENT: ICD-10-CM

## 2024-04-11 DIAGNOSIS — R11.2 NAUSEA AND VOMITING IN ADULT: ICD-10-CM

## 2024-04-11 DIAGNOSIS — R63.4 UNINTENTIONAL WEIGHT LOSS OF MORE THAN 10 POUNDS: ICD-10-CM

## 2024-04-11 RX ORDER — OMEPRAZOLE 40 MG/1
40 CAPSULE, DELAYED RELEASE ORAL
Qty: 90 CAPSULE | Refills: 3 | Status: SHIPPED | OUTPATIENT
Start: 2024-04-11

## 2024-04-11 RX ORDER — FAMOTIDINE 40 MG/1
40 TABLET, FILM COATED ORAL
Qty: 30 TABLET | Refills: 3 | Status: SHIPPED | OUTPATIENT
Start: 2024-04-11

## 2024-04-11 NOTE — PROGRESS NOTES
[facility] - Outpatient Consultation New Patient  Jolene Tierney 43 y.o. female MRN: [id number]  Encounter: [id number]      ASSESSMENT AND PLAN:    1. Epigastric pain  2. Nausea and vomiting in adult  3. Gastroesophageal reflux disease, unspecified whether esophagitis present  4. Unintentional weight loss of more than 10 pounds  Patient with significant ongoing epigastric pain, nausea and vomiting symptoms, GERD symptoms with associated significant unintentional weight loss for the last several weeks.  Symptoms persist despite PPI use daily.  No prior EGD.      At this time I recommend patient continue with omeprazole 40 mg once daily before breakfast  Will start patient on famotidine 40 mg once daily at bedtime in addition to this  Will order EGD with biopsy to r/o esophagitis/ gastritis/ H pylori/ PUD/celiac disease. I educated patient on GERD, GERD diet and lifestyle including avoidance of trigger foods including fatty, greasy, spicy foods, chocolate, caffeine, alcohol. We discussed  the importance of eating smaller frequent meals, not eating close to bedtime. I also advised to limit NSAIDs if able.     Should symptoms persist and follow-up to consider nuclear medicine gastric emptying scan, colonoscopy    - Ambulatory Referral to Gastroenterology  - famotidine (PEPCID) 40 MG tablet; Take 1 tablet (40 mg total) by mouth daily at bedtime  Dispense: 30 tablet; Refill: 3  - omeprazole (PriLOSEC) 40 MG capsule; Take 1 capsule (40 mg total) by mouth daily before breakfast  Dispense: 90 capsule; Refill: 3  - EGD; Future    5. Chronic constipation  Recommended patient start MiraLAX powder OTC once daily.      I obtained informed consent from the patient the risk/benefits/alternatives of the procedure/s were discussed with the patient.  Risks included, but not limited to, infection, bleeding, perforation, injury to organs in the abdomen, missed lesion and incomplete procedure were discussed.   Patient was agreeable and electronic signature was obtained.     Patient was instructed to call the office with any questions, concerns, new/ worsening/ persisting GI symptoms. Advised patient go to the ER with any severe or worsening abdominal pain, fevers/ chills, intractable N/V, chest pain, SOB, dizziness, lightheadedness, feeling something stuck in esophagus that will not go down. Patient expressed understanding and is in agreement with treatment plan.       Will plan to follow up after diagnostic tests   ________________________________________________________    HPI:    Patient is new to me and our office.  Patient with a past medical history of migraines, acid reflux, IBS, anxiety, fibromyalgia, endometriosis, obesity presents to the office today as a referral to discuss GERD symptoms.      Patient complains of GI issues x 2 months.   Main complaint is burning epigastric abdominal pain. Pain is daily and constant. Eating and drinking (even water) makes pain worse. She is fearful to eat. She has lost 25 lbs since onset without trying. She denies radiation of pain. She notes milk makes her pain better. She is avoiding coffee, fruits, chocolate.   With the pain she has associated heartburn, acid reflux, nausea, cough.   She did vomit at onset of symptoms but not recently.   Symptoms persist despite omeprazole 40 mg once daily before breakfast.  She is prescribe Zofran 4 mg every 8 hours as needed for nausea.  She has been taking peppermint oil pills. She has baseline constipation having ~ 2 Bms/ week.   Patient denies any fevers/ chills,  black or bloody stools, dysphagia, odynophagia.   Denies chest pain, SOB,    Tobacco use- None  Alcohol use- None recent . Prior occasional use   She has medical card for marijuana but she notes this makes her symptoms worse   NSAID use- None  She denies FH of CRC   No prior abdominal surgeries   No prior EGD or colonoscopy     She denies changes in diet medication or travel  prior to symptom onset       REVIEW OF SYSTEMS:    Review of Systems   Constitutional:  Positive for appetite change (decreased) and unexpected weight change (weight loss). Negative for chills and fever.   HENT:  Negative for ear pain and sore throat.    Eyes:  Negative for pain and visual disturbance.   Respiratory:  Negative for cough and shortness of breath.    Cardiovascular:  Negative for chest pain and palpitations.   Gastrointestinal:  Positive for abdominal pain, nausea and vomiting.   Genitourinary:  Negative for dysuria and hematuria.   Musculoskeletal:  Negative for arthralgias and back pain.   Skin:  Negative for color change and rash.   Neurological:  Negative for seizures and syncope.   All other systems reviewed and are negative.       Historical Information   Past Medical History:   Diagnosis Date    Allergic     Allergic rhinitis     Anemia     Anxiety     Chondromalacia of both patellae     Closed fracture of left distal fibula 08/27/2020    Depression     Fibromyalgia     Fibromyalgia     Gastroenteritis 2/20/2024    GERD (gastroesophageal reflux disease)     Headache(784.0)     Hematochezia     Herpes simplex infection     HPV (human papilloma virus) infection     11/2013    Hypertension     IBS (irritable bowel syndrome)     Insomnia     Mental status change     Migraine     Obesity 08/06/2015    Scoliosis     Scoliosis      Past Surgical History:   Procedure Laterality Date    BREAST BIOPSY Left 12/10/2013    EXPLORATORY LAPAROTOMY      WISDOM TOOTH EXTRACTION       Social History   Social History     Substance and Sexual Activity   Alcohol Use Yes    Alcohol/week: 3.0 standard drinks of alcohol    Types: 1 Glasses of wine, 1 Cans of beer, 1 Shots of liquor per week    Comment: social      Social History     Substance and Sexual Activity   Drug Use Yes    Frequency: 3.0 times per week    Types: Marijuana    Comment: few times a week      Social History     Tobacco Use   Smoking Status Former     Types: Cigarettes   Smokeless Tobacco Never   Tobacco Comments    quit 2018     Family History   Problem Relation Age of Onset    Stroke Mother     Hypertension Mother     Psoriasis Mother     Depression Mother     Hepatitis Father         B     Substance Abuse Father     No Known Problems Daughter     No Known Problems Daughter     Dementia Maternal Grandmother     No Known Problems Maternal Grandfather     No Known Problems Paternal Grandmother     Alcohol abuse Paternal Grandfather     Asthma Brother     No Known Problems Brother     Diabetes type I Son     Diabetes Son     Breast cancer Family         maternal aunt - 30 's     Breast cancer Maternal Aunt 30    Breast cancer Cousin 30    Cancer Cousin        Meds/Allergies       Current Outpatient Medications:     levonorgestrel (MIRENA) 20 MCG/24HR IUD    omeprazole (PriLOSEC) 40 MG capsule    ondansetron (ZOFRAN) 4 mg tablet    albuterol (PROVENTIL HFA,VENTOLIN HFA) 90 mcg/act inhaler    Azelastine HCl 137 MCG/SPRAY SOLN    clonazePAM (KlonoPIN) 0.5 mg tablet    diclofenac (VOLTAREN) 75 mg EC tablet    dicyclomine (BENTYL) 20 mg tablet    docusate sodium (COLACE) 100 mg capsule    ergocalciferol (VITAMIN D2) 50,000 units    fluocinonide (LIDEX) 0.05 % ointment    fluticasone (FLONASE) 50 mcg/act nasal spray    lactulose (CHRONULAC) 10 g/15 mL solution    methocarbamol (ROBAXIN) 500 mg tablet    methocarbamol (ROBAXIN) 500 mg tablet    metoclopramide (REGLAN) 5 mg tablet    montelukast (SINGULAIR) 10 mg tablet    naproxen (Naprosyn) 500 mg tablet    NON FORMULARY    Senna-Time 8.6 MG tablet    sertraline (ZOLOFT) 50 mg tablet    sucralfate (CARAFATE) 1 g/10 mL suspension    topiramate (TOPAMAX) 25 mg tablet    Allergies   Allergen Reactions    Cymbalta [Duloxetine Hcl] Nausea Only and GI Intolerance     Stomach upset    Other Other (See Comments)     Tomatoes, black walnuts, cherries, peaches gives her itchy throat  Other reaction(s): Other , Other             Objective   Wt Readings from Last 3 Encounters:   04/11/24 78 kg (172 lb)   03/26/24 78.5 kg (173 lb)   02/20/24 81.1 kg (178 lb 12.8 oz)     Temp Readings from Last 3 Encounters:   04/11/24 (!) 97 °F (36.1 °C) (Tympanic)   03/26/24 97.8 °F (36.6 °C) (Tympanic)   02/20/24 (!) 96.5 °F (35.8 °C)     BP Readings from Last 3 Encounters:   04/11/24 120/70   03/26/24 120/86   02/20/24 130/70     Pulse Readings from Last 3 Encounters:   03/26/24 81   02/20/24 68   02/18/24 80        PHYSICAL EXAM:     Physical Exam  Vitals reviewed.   Constitutional:       General: She is not in acute distress.     Appearance: She is obese. She is not toxic-appearing.      Comments: Well appearing    HENT:      Head: Normocephalic and atraumatic.   Eyes:      Extraocular Movements: Extraocular movements intact.      Conjunctiva/sclera: Conjunctivae normal.   Cardiovascular:      Rate and Rhythm: Normal rate and regular rhythm.   Pulmonary:      Effort: Pulmonary effort is normal. No respiratory distress.      Breath sounds: Normal breath sounds.   Abdominal:      General: Bowel sounds are normal.      Palpations: Abdomen is soft.      Tenderness: There is generalized abdominal tenderness and tenderness in the epigastric area.   Musculoskeletal:         General: No swelling or tenderness.      Cervical back: Normal range of motion and neck supple.   Skin:     General: Skin is warm and dry.      Coloration: Skin is not jaundiced.   Neurological:      General: No focal deficit present.      Mental Status: She is alert and oriented to person, place, and time. Mental status is at baseline.   Psychiatric:         Mood and Affect: Mood normal.         Behavior: Behavior normal.         Thought Content: Thought content normal.             Lab Results:   No visits with results within 1 Day(s) from this visit.   Latest known visit with results is:   Appointment on 03/28/2024   Component Date Value    H pylori Ag, Stl 03/28/2024 Negative         Lab Results   Component Value Date    WBC 4.60 02/18/2024    HGB 13.9 02/18/2024    HCT 42.1 02/18/2024    MCV 99 (H) 02/18/2024     02/18/2024       Lab Results   Component Value Date     08/13/2015    SODIUM 137 02/26/2024    K 3.6 02/26/2024     02/26/2024    CO2 30 02/26/2024    ANIONGAP 7 08/13/2015    AGAP 5 02/26/2024    BUN 15 02/26/2024    CREATININE 0.88 02/26/2024    GLUC 94 02/26/2024    GLUF 111 (H) 01/10/2023    CALCIUM 8.5 02/26/2024    AST 15 02/26/2024    ALT 18 02/26/2024    ALKPHOS 45 02/26/2024    PROT 7.6 08/13/2015    TP 6.4 02/26/2024    BILITOT 0.70 08/13/2015    TBILI 0.4 02/26/2024    EGFR 84 02/26/2024     Lab Results   Component Value Date    LIPASE 39 02/18/2024     Lab Results   Component Value Date    HEPCAB Non-reactive 08/16/2022        Radiology Results:   CT of the abdomen pelvis with IV contrast was completed 2/18/2024 for abdominal pain, this was reviewed, this showed no acute intra-abdominal abnormality.  Liver showed some focal steatosis.  No calcified gallstones.  Unremarkable pancreas.  Unremarkable stomach and bowel.  No ascites or lymphadenopathy.        Sarika Chino PA-C   Available on [employer]

## 2024-04-11 NOTE — PATIENT INSTRUCTIONS
Start Miralax powder OTC one capful once daily to help with constipation. You cannot taste this- dissolve in water once/ day     Scheduled date of EGD(as of today):04.16.24  Physician performing EGD:DR PLEITEZ  [facility] of [facility]  Instructions reviewed with patient by:AYO  Clearances: N/A

## 2024-04-12 ENCOUNTER — ANESTHESIA (OUTPATIENT)
Dept: ANESTHESIOLOGY | Facility: HOSPITAL | Age: 44
End: 2024-04-12

## 2024-04-12 ENCOUNTER — ANESTHESIA EVENT (OUTPATIENT)
Dept: ANESTHESIOLOGY | Facility: HOSPITAL | Age: 44
End: 2024-04-12

## 2024-04-14 RX ORDER — SODIUM CHLORIDE 9 MG/ML
125 INJECTION, SOLUTION INTRAVENOUS CONTINUOUS
Status: CANCELLED | OUTPATIENT
Start: 2024-04-14

## 2024-04-15 DIAGNOSIS — Z91.09 ALLERGY TO ENVIRONMENTAL FACTORS: ICD-10-CM

## 2024-04-15 RX ORDER — MONTELUKAST SODIUM 10 MG/1
10 TABLET ORAL
Qty: 90 TABLET | Refills: 1 | Status: SHIPPED | OUTPATIENT
Start: 2024-04-15

## 2024-04-15 NOTE — ANESTHESIA PREPROCEDURE EVALUATION
Procedure:  PRE-OP ONLY    Relevant Problems   ANESTHESIA (within normal limits)      CARDIO (within normal limits)   (+) Intractable migraine   (+) Migraine headache      ENDO (within normal limits)      GI/HEPATIC   (+) Acid reflux disease      /RENAL (within normal limits)      GYN (within normal limits)      HEMATOLOGY (within normal limits)      MUSCULOSKELETAL   (+) Chondromalacia of both patellae   (+) Fibromyalgia      NEURO/PSYCH   (+) Anxiety   (+) Fibromyalgia   (+) Intractable migraine   (+) Migraine headache      PULMONARY (within normal limits)             Anesthesia Plan  ASA Score- 2     Anesthesia Type- IV sedation with anesthesia with ASA Monitors.         Additional Monitors:     Airway Plan:            Plan Factors-Exercise tolerance (METS): >4 METS.    Chart reviewed. EKG reviewed.  Existing labs reviewed. Patient summary reviewed.    Patient is a current smoker.  Patient instructed to abstain from smoking on day of procedure. Patient did not smoke on day of surgery.            Induction- intravenous.    Postoperative Plan-     Informed Consent- Anesthetic plan and risks discussed with patient.

## 2024-04-16 ENCOUNTER — ANESTHESIA EVENT (OUTPATIENT)
Dept: GASTROENTEROLOGY | Facility: MEDICAL CENTER | Age: 44
End: 2024-04-16

## 2024-04-16 ENCOUNTER — ANESTHESIA (OUTPATIENT)
Dept: GASTROENTEROLOGY | Facility: MEDICAL CENTER | Age: 44
End: 2024-04-16

## 2024-04-16 ENCOUNTER — HOSPITAL ENCOUNTER (OUTPATIENT)
Dept: GASTROENTEROLOGY | Facility: MEDICAL CENTER | Age: 44
Setting detail: OUTPATIENT SURGERY
Discharge: HOME | End: 2024-04-16
Admitting: INTERNAL MEDICINE
Payer: COMMERCIAL

## 2024-04-16 VITALS
SYSTOLIC BLOOD PRESSURE: 130 MMHG | OXYGEN SATURATION: 99 % | HEART RATE: 84 BPM | RESPIRATION RATE: 14 BRPM | DIASTOLIC BLOOD PRESSURE: 61 MMHG | TEMPERATURE: 98 F

## 2024-04-16 DIAGNOSIS — R10.13 EPIGASTRIC PAIN: ICD-10-CM

## 2024-04-16 DIAGNOSIS — R63.4 UNINTENTIONAL WEIGHT LOSS OF MORE THAN 10 POUNDS: ICD-10-CM

## 2024-04-16 DIAGNOSIS — R11.2 NAUSEA AND VOMITING IN ADULT: ICD-10-CM

## 2024-04-16 DIAGNOSIS — K21.9 GASTROESOPHAGEAL REFLUX DISEASE, UNSPECIFIED WHETHER ESOPHAGITIS PRESENT: ICD-10-CM

## 2024-04-16 PROCEDURE — 88305 TISSUE EXAM BY PATHOLOGIST: CPT | Performed by: STUDENT IN AN ORGANIZED HEALTH CARE EDUCATION/TRAINING PROGRAM

## 2024-04-16 RX ORDER — SODIUM CHLORIDE 9 MG/ML
125 INJECTION, SOLUTION INTRAVENOUS CONTINUOUS
Status: DISCONTINUED | OUTPATIENT
Start: 2024-04-16 | End: 2024-04-20 | Stop reason: HOSPADM

## 2024-04-16 RX ORDER — LIDOCAINE HYDROCHLORIDE 20 MG/ML
INJECTION, SOLUTION EPIDURAL; INFILTRATION; INTRACAUDAL; PERINEURAL AS NEEDED
Status: DISCONTINUED | OUTPATIENT
Start: 2024-04-16 | End: 2024-04-16

## 2024-04-16 RX ORDER — PROPOFOL 10 MG/ML
INJECTION, EMULSION INTRAVENOUS AS NEEDED
Status: DISCONTINUED | OUTPATIENT
Start: 2024-04-16 | End: 2024-04-16

## 2024-04-16 RX ADMIN — SODIUM CHLORIDE 125 ML/HR: 0.9 INJECTION, SOLUTION INTRAVENOUS at 14:53

## 2024-04-16 RX ADMIN — PROPOFOL 150 MG: 10 INJECTION, EMULSION INTRAVENOUS at 15:23

## 2024-04-16 RX ADMIN — LIDOCAINE HYDROCHLORIDE 100 MG: 20 INJECTION, SOLUTION EPIDURAL; INFILTRATION; INTRACAUDAL at 15:23

## 2024-04-16 RX ADMIN — PROPOFOL 50 MG: 10 INJECTION, EMULSION INTRAVENOUS at 15:26

## 2024-04-16 NOTE — ANESTHESIA PREPROCEDURE EVALUATION
Procedure:  EGD    Relevant Problems   CARDIO   (+) Intractable migraine   (+) Migraine headache      GI/HEPATIC   (+) Acid reflux disease      MUSCULOSKELETAL   (+) Chondromalacia of both patellae   (+) Fibromyalgia      NEURO/PSYCH   (+) Anxiety   (+) Fibromyalgia   (+) Intractable migraine   (+) Migraine headache        Physical Exam    Airway    Mallampati score: III  TM Distance: >3 FB  Neck ROM: full     Dental   No notable dental hx     Cardiovascular  Rhythm: regular, Rate: normal, Cardiovascular exam normal    Pulmonary  Pulmonary exam normal Breath sounds clear to auscultation    Other Findings  post-pubertal.      Anesthesia Plan  ASA Score- 2     Anesthesia Type- IV sedation with anesthesia with ASA Monitors.         Additional Monitors:     Airway Plan:            Plan Factors-Exercise tolerance (METS): >4 METS.    Chart reviewed. EKG reviewed.  Existing labs reviewed. Patient summary reviewed.    Patient is not a current smoker. Patient not instructed to abstain from smoking on day of procedure. Patient did not smoke on day of surgery.            Induction- intravenous.    Postoperative Plan-     Informed Consent- Anesthetic plan and risks discussed with patient.

## 2024-04-16 NOTE — ANESTHESIA POSTPROCEDURE EVALUATION
Post-Op Assessment Note    CV Status:  Stable    Pain management: adequate       Mental Status:  Alert and awake   Hydration Status:  Euvolemic   PONV Controlled:  Controlled   Airway Patency:  Patent     Post Op Vitals Reviewed: Yes    No anethesia notable event occurred.    Staff: Anesthesiologist               BP      Temp      Pulse     Resp      SpO2      /61   Pulse 84   Temp 98 °F (36.7 °C) (Temporal)   Resp 14   SpO2 99%

## 2024-04-16 NOTE — H&P
History and Physical - [facility]  Jolene Tierney 43 y.o. female MRN: [id number]                  HPI: Jolene Tierney is a 43 y.o. year old female who presents for EGD evaluation for epigastric pain, nausea, vomiting.      REVIEW OF SYSTEMS: Per the HPI, and otherwise unremarkable.    Historical Information   Past Medical History:   Diagnosis Date    Allergic     Allergic rhinitis     Anemia     Anxiety     Chondromalacia of both patellae     Closed fracture of left distal fibula 08/27/2020    Depression     Fibromyalgia     Fibromyalgia     Gastroenteritis 2/20/2024    GERD (gastroesophageal reflux disease)     Headache(784.0)     Hematochezia     Herpes simplex infection     HPV (human papilloma virus) infection     11/2013    Hypertension     IBS (irritable bowel syndrome)     Insomnia     Mental status change     Migraine     Obesity 08/06/2015    Scoliosis     Scoliosis      Past Surgical History:   Procedure Laterality Date    BREAST BIOPSY Left 12/10/2013    EXPLORATORY LAPAROTOMY      WISDOM TOOTH EXTRACTION       Social History   Social History     Substance and Sexual Activity   Alcohol Use Yes    Alcohol/week: 3.0 standard drinks of alcohol    Types: 1 Glasses of wine, 1 Cans of beer, 1 Shots of liquor per week    Comment: social      Social History     Substance and Sexual Activity   Drug Use Yes    Frequency: 3.0 times per week    Types: Marijuana    Comment: few times a week      Social History     Tobacco Use   Smoking Status Former    Types: Cigarettes   Smokeless Tobacco Never   Tobacco Comments    quit 2018     Family History   Problem Relation Age of Onset    Stroke Mother     Hypertension Mother     Psoriasis Mother     Depression Mother     Hepatitis Father         B     Substance Abuse Father     No Known Problems Daughter     No Known Problems Daughter     Dementia Maternal Grandmother     No Known Problems Maternal Grandfather     No Known Problems Paternal Grandmother      Alcohol abuse Paternal Grandfather     Asthma Brother     No Known Problems Brother     Diabetes type I Son     Diabetes Son     Breast cancer Family         maternal aunt - 30 's     Breast cancer Maternal Aunt 30    Breast cancer Cousin 30    Cancer Cousin        Meds/Allergies       Current Outpatient Medications:     albuterol (PROVENTIL HFA,VENTOLIN HFA) 90 mcg/act inhaler    Azelastine HCl 137 MCG/SPRAY SOLN    clonazePAM (KlonoPIN) 0.5 mg tablet    diclofenac (VOLTAREN) 75 mg EC tablet    dicyclomine (BENTYL) 20 mg tablet    docusate sodium (COLACE) 100 mg capsule    ergocalciferol (VITAMIN D2) 50,000 units    famotidine (PEPCID) 40 MG tablet    fluocinonide (LIDEX) 0.05 % ointment    fluticasone (FLONASE) 50 mcg/act nasal spray    lactulose (CHRONULAC) 10 g/15 mL solution    levonorgestrel (MIRENA) 20 MCG/24HR IUD    methocarbamol (ROBAXIN) 500 mg tablet    methocarbamol (ROBAXIN) 500 mg tablet    metoclopramide (REGLAN) 5 mg tablet    montelukast (SINGULAIR) 10 mg tablet    naproxen (Naprosyn) 500 mg tablet    NON FORMULARY    omeprazole (PriLOSEC) 40 MG capsule    ondansetron (ZOFRAN) 4 mg tablet    Senna-Time 8.6 MG tablet    sertraline (ZOLOFT) 50 mg tablet    sucralfate (CARAFATE) 1 g/10 mL suspension    topiramate (TOPAMAX) 25 mg tablet    Current Facility-Administered Medications:     sodium chloride 0.9 % infusion, 125 mL/hr, Intravenous, Continuous    Allergies   Allergen Reactions    Cymbalta [Duloxetine Hcl] Nausea Only and GI Intolerance     Stomach upset    Other Other (See Comments)     Tomatoes, black walnuts, cherries, peaches gives her itchy throat  Other reaction(s): Other , Other        Objective     There were no vitals taken for this visit.      PHYSICAL EXAM    Gen: NAD  Head: NCAT  CV: RRR  CHEST: Clear  ABD: soft, NT/ND  EXT: no edema      ASSESSMENT/PLAN:  This is a 43 y.o. year old female here for EGD evaluation, and she is stable and optimized for her procedure.

## 2024-04-18 ENCOUNTER — APPOINTMENT (OUTPATIENT)
Dept: RADIOLOGY | Facility: AMBULARY SURGERY CENTER | Age: 44
End: 2024-04-18
Attending: ORTHOPAEDIC SURGERY
Payer: COMMERCIAL

## 2024-04-18 ENCOUNTER — OFFICE VISIT (OUTPATIENT)
Dept: OBGYN CLINIC | Facility: CLINIC | Age: 44
End: 2024-04-18
Payer: COMMERCIAL

## 2024-04-18 VITALS — WEIGHT: 172 LBS | HEIGHT: 63 IN | BODY MASS INDEX: 30.48 KG/M2

## 2024-04-18 DIAGNOSIS — M25.561 RIGHT KNEE PAIN, UNSPECIFIED CHRONICITY: ICD-10-CM

## 2024-04-18 DIAGNOSIS — M17.0 BILATERAL PRIMARY OSTEOARTHRITIS OF KNEE: Primary | ICD-10-CM

## 2024-04-18 DIAGNOSIS — M25.562 LEFT KNEE PAIN, UNSPECIFIED CHRONICITY: ICD-10-CM

## 2024-04-18 DIAGNOSIS — M22.2X1 PATELLOFEMORAL ARTHRALGIA OF BOTH KNEES: ICD-10-CM

## 2024-04-18 DIAGNOSIS — M22.2X2 PATELLOFEMORAL ARTHRALGIA OF BOTH KNEES: ICD-10-CM

## 2024-04-18 PROCEDURE — 73562 X-RAY EXAM OF KNEE 3: CPT

## 2024-04-18 PROCEDURE — 99214 OFFICE O/P EST MOD 30 MIN: CPT | Performed by: ORTHOPAEDIC SURGERY

## 2024-04-18 PROCEDURE — 20610 DRAIN/INJ JOINT/BURSA W/O US: CPT | Performed by: ORTHOPAEDIC SURGERY

## 2024-04-18 RX ORDER — BETAMETHASONE SODIUM PHOSPHATE AND BETAMETHASONE ACETATE 3; 3 MG/ML; MG/ML
12 INJECTION, SUSPENSION INTRA-ARTICULAR; INTRALESIONAL; INTRAMUSCULAR; SOFT TISSUE
Status: COMPLETED | OUTPATIENT
Start: 2024-04-18 | End: 2024-04-18

## 2024-04-18 RX ORDER — BUPIVACAINE HYDROCHLORIDE 2.5 MG/ML
2 INJECTION, SOLUTION INFILTRATION; PERINEURAL
Status: COMPLETED | OUTPATIENT
Start: 2024-04-18 | End: 2024-04-18

## 2024-04-18 RX ADMIN — BUPIVACAINE HYDROCHLORIDE 2 ML: 2.5 INJECTION, SOLUTION INFILTRATION; PERINEURAL at 13:45

## 2024-04-18 RX ADMIN — BETAMETHASONE SODIUM PHOSPHATE AND BETAMETHASONE ACETATE 12 MG: 3; 3 INJECTION, SUSPENSION INTRA-ARTICULAR; INTRALESIONAL; INTRAMUSCULAR; SOFT TISSUE at 13:45

## 2024-04-18 NOTE — PROGRESS NOTES
Assessment:  1. Left knee pain, unspecified chronicity  XR knee 3 vw left non injury      2. Right knee pain, unspecified chronicity  XR knee 3 vw right non injury      3. Bilateral primary osteoarthritis of knee        4. Patellofemoral arthralgia of both knees          Patient Active Problem List   Diagnosis    Acid reflux disease    Anxiety    Chondromalacia of both patellae    Endometriosis    External hemorrhoids    Fibromyalgia    Irritable bowel syndrome    Migraine headache    Obesity    Vitamin D deficiency    Left renal mass    Intractable migraine    Allergy to environmental factors           Plan      43 y.o. female with bilateral knee patellofemoral osteoarthritis  Physical exam performed today, diagnostic imaging reviewed, and thorough subjective history obtained during today's visit.  Diagnosis, operative and non-operative treatment options, as well as expected outcomes and recoveries of each were discussed with the patient. The patient verbalized understanding of exam findings and treatment plan. The patient was given the opportunity to ask questions and all of their questions were addressed during today's visit.  Patient was offered, accepted, and received a cortisone injection of the bilateral knee today. Patient tolerated procedure well with no immediate complications. Post-injection protocols and expectations were discussed with the patient.   bilateral knee visco-supplement was ordered as patient has on-going knee pain and stiffness in which interferes with their daily activity and sleep along with crepitus with range of motion on exam and significant osteoarthritic changes on radiograph.  The patient rates their pain at 10/10 at times. The patient has tried home exercise program learned from past physical therapy, steroid injections, activity modification, oral medications with limited benefit.  Bracing has not provided relief.   The patient has been counseled on weight loss.    Return for  VISCO.          Subjective:     Patient ID: Jolene Tierney 43 y.o. female    HPI    Her pain has been ongoing for at least 10 years, recently exacerbated approximately 3 months ago upon returning from vacation in [place]. She describes constant aching pain with intermittent sharp stabbing pain based on activity. Pain is rated 6/10 currently, 7-8/10 on average. Pain is worsened with more activity and rising from prolonged seated position, ascending and descending stairs. She notes associated clicking and intermittent sensation of instability. She denies previous injury or surgery, radiating pain or paraesthesias to the hip or ankle. She is not able to take oral pain medication currently due to ongoing GI complications. She has completed physical therapy in the past with minimal relief. She received a cortisone injection to the bilateral knee in 2018 which provided her some relief for approximately one week.    The following portions of the patient's history were reviewed and updated as appropriate: allergies, current medications, past family history, past social history, past surgical history and problem list.      Objective:    Review of Systems  Pertinent items are noted in HPI.  All other systems were reviewed and are negative.    Past Medical History:   Diagnosis Date    Allergic     Allergic rhinitis     Anemia     Anxiety     Chondromalacia of both patellae     Closed fracture of left distal fibula 08/27/2020    Depression     Fibromyalgia     Fibromyalgia     Gastroenteritis 2/20/2024    GERD (gastroesophageal reflux disease)     Headache(784.0)     Hematochezia     Herpes simplex infection     HPV (human papilloma virus) infection     11/2013    Hypertension     IBS (irritable bowel syndrome)     Insomnia     Mental status change     Migraine     Obesity 08/06/2015    Scoliosis     Scoliosis        Past Surgical History:   Procedure Laterality Date    BREAST BIOPSY Left 12/10/2013    EXPLORATORY LAPAROTOMY       WISDOM TOOTH EXTRACTION         Family History   Problem Relation Age of Onset    Stroke Mother     Hypertension Mother     Psoriasis Mother     Depression Mother     Hepatitis Father         B     Substance Abuse Father     No Known Problems Daughter     No Known Problems Daughter     Dementia Maternal Grandmother     No Known Problems Maternal Grandfather     No Known Problems Paternal Grandmother     Alcohol abuse Paternal Grandfather     Asthma Brother     No Known Problems Brother     Diabetes type I Son     Diabetes Son     Breast cancer Family         maternal aunt - 30 's     Breast cancer Maternal Aunt 30    Breast cancer Cousin 30    Cancer Cousin        Social History     Occupational History    Occupation: home health aide   Tobacco Use    Smoking status: Former     Types: Cigarettes    Smokeless tobacco: Never    Tobacco comments:     quit 2018   Vaping Use    Vaping status: Never Used   Substance and Sexual Activity    Alcohol use: Yes     Alcohol/week: 3.0 standard drinks of alcohol     Types: 1 Glasses of wine, 1 Cans of beer, 1 Shots of liquor per week     Comment: social     Drug use: Yes     Frequency: 3.0 times per week     Types: Marijuana     Comment: few times a week     Sexual activity: Yes     Partners: Male     Birth control/protection: I.U.D.         Current Outpatient Medications:     albuterol (PROVENTIL HFA,VENTOLIN HFA) 90 mcg/act inhaler, INHALE 2 PUFFS EVERY 6 (SIX) HOURS AS NEEDED FOR SHORTNESS OF BREATH (Patient not taking: Reported on 4/11/2024), Disp: 8 g, Rfl: 0    Azelastine HCl 137 MCG/SPRAY SOLN, 1 SPRAY INTO EACH NOSTRIL 2 (TWO) TIMES A DAY USE IN EACH NOSTRIL AS DIRECTED (Patient not taking: Reported on 4/11/2024), Disp: 30 mL, Rfl: 0    clonazePAM (KlonoPIN) 0.5 mg tablet, TAKE 1 TABLET BY MOUTH EVERY DAY (Patient not taking: Reported on 4/11/2024), Disp: 30 tablet, Rfl: 0    diclofenac (VOLTAREN) 75 mg EC tablet, Take 75 mg by mouth 2 (two) times a day as needed  (Patient not taking: Reported on 3/26/2024), Disp: , Rfl:     dicyclomine (BENTYL) 20 mg tablet, Take 1 tablet (20 mg total) by mouth every 6 (six) hours (Patient not taking: Reported on 3/26/2024), Disp: 20 tablet, Rfl: 0    docusate sodium (COLACE) 100 mg capsule, Take 1 capsule (100 mg total) by mouth in the morning (Patient not taking: Reported on 3/26/2024), Disp: 30 capsule, Rfl: 0    ergocalciferol (VITAMIN D2) 50,000 units, Take 50,000 Units by mouth once a week Every Thursday (Patient not taking: Reported on 4/11/2024), Disp: , Rfl:     famotidine (PEPCID) 40 MG tablet, Take 1 tablet (40 mg total) by mouth daily at bedtime, Disp: 30 tablet, Rfl: 3    fluocinonide (LIDEX) 0.05 % ointment, APPLY THIN LAYER TO SCAR AREA ON BACK TWICE DAILY EVERY OTHER DAY. (Patient not taking: Reported on 4/11/2024), Disp: , Rfl:     fluticasone (FLONASE) 50 mcg/act nasal spray, SPRAY 2 SPRAYS INTO EACH NOSTRIL EVERY DAY (Patient not taking: Reported on 4/11/2024), Disp: 16 mL, Rfl: 5    lactulose (CHRONULAC) 10 g/15 mL solution, TAKE 30 ML (20 G TOTAL) BY MOUTH 2 (TWO) TIMES A DAY (Patient not taking: Reported on 3/26/2024), Disp: 180 mL, Rfl: 1    levonorgestrel (MIRENA) 20 MCG/24HR IUD, Mirena 20 MCG/24HR Intrauterine Intrauterine Device  Refills: 0  Active, Disp: , Rfl:     methocarbamol (ROBAXIN) 500 mg tablet, Take 1 tablet (500 mg total) by mouth 3 (three) times a day for 14 days, Disp: 42 tablet, Rfl: 0    methocarbamol (ROBAXIN) 500 mg tablet, Take 1 tablet (500 mg total) by mouth daily at bedtime for 10 days (Patient not taking: Reported on 2/20/2024), Disp: 10 tablet, Rfl: 0    metoclopramide (REGLAN) 5 mg tablet, TAKE 1 TABLET (5 MG TOTAL) BY MOUTH 4 (FOUR) TIMES A DAY FOR 10 DAYS. (Patient not taking: Reported on 4/11/2024), Disp: , Rfl:     montelukast (SINGULAIR) 10 mg tablet, TAKE 1 TABLET BY MOUTH DAILY AT BEDTIME, Disp: 90 tablet, Rfl: 1    naproxen (Naprosyn) 500 mg tablet, Take 1 tablet (500 mg total) by  "mouth 2 (two) times a day with meals for 10 days, Disp: 20 tablet, Rfl: 0    NON FORMULARY, Take 1 Dose by mouth Medical marijuana (Patient not taking: Reported on 4/11/2024), Disp: , Rfl:     omeprazole (PriLOSEC) 40 MG capsule, Take 1 capsule (40 mg total) by mouth daily before breakfast, Disp: 90 capsule, Rfl: 3    ondansetron (ZOFRAN) 4 mg tablet, Take 1 tablet (4 mg total) by mouth every 8 (eight) hours as needed for nausea or vomiting, Disp: 20 tablet, Rfl: 0    Senna-Time 8.6 MG tablet, TAKE 1 TABLET (8.6 MG TOTAL) BY MOUTH DAILY AT BEDTIME (Patient not taking: Reported on 4/11/2024), Disp: 30 tablet, Rfl: 0    sertraline (ZOLOFT) 50 mg tablet, TAKE 1 TABLET BY MOUTH EVERY DAY (Patient not taking: Reported on 2/8/2024), Disp: 30 tablet, Rfl: 5    sucralfate (CARAFATE) 1 g/10 mL suspension, TAKE 10 ML (1 G TOTAL) BY MOUTH 2 (TWO) TIMES A DAY (Patient not taking: Reported on 4/11/2024), Disp: 414 mL, Rfl: 0    topiramate (TOPAMAX) 25 mg tablet, Take 25 mg by mouth 2 (two) times a day PRN (Patient not taking: Reported on 4/11/2024), Disp: , Rfl:   No current facility-administered medications for this visit.    Facility-Administered Medications Ordered in Other Visits:     sodium chloride 0.9 % infusion, 125 mL/hr, Intravenous, Continuous, Brian Lim DO, Last Rate: 125 mL/hr at 04/16/24 1518, Restarted at 04/16/24 1529    Allergies   Allergen Reactions    Cymbalta [Duloxetine Hcl] Nausea Only and GI Intolerance     Stomach upset    Other Other (See Comments)     Tomatoes, black walnuts, cherries, peaches gives her itchy throat  Other reaction(s): Other , Other        Physical Exam  Ht 5' 3\" (1.6 m)   Wt 78 kg (172 lb)   BMI 30.47 kg/m²   Cons: Appears well.  No apparent distress.  Psych: Alert. Oriented x3.  Mood and affect normal.  Eyes: PERRLA, EOMI  Resp: Normal effort.  No audible wheezing or stridor.  CV: Palpable pulse.  No discernable arrhythmia.  No LE edema.  Lymph:  No palpable cervical, " "axillary, or inguinal lymphadenopathy.  Skin: Warm.  No palpable masses.  No visible lesions.  Neuro: Normal muscle tone.  Normal and symmetric DTR's.    Right Knee Exam     Tenderness   The patient is experiencing tenderness in the medial joint line and lateral joint line.    Range of Motion   The patient has normal right knee ROM.    Tests   Katt:  Medial - negative Lateral - negative    Other   Erythema: absent  Scars: absent  Sensation: normal  Pulse: present  Swelling: none    Comments:    - Bounce test  Knee stable at 0, 30, 90 degrees  + Patellar grind  + peripatellar crepitation  Skin intact and well perfused  Sensation intact in DP/SP/Gutierres/Sa/T nerve distributions  2+ DP & PT pulses  Brisk capillary refill in all toes        Left Knee Exam     Tenderness   The patient is experiencing tenderness in the medial joint line and lateral joint line.    Range of Motion   The patient has normal left knee ROM.    Tests   Katt:  Medial - negative Lateral - negative    Other   Erythema: absent  Scars: absent  Sensation: normal  Pulse: present  Swelling: none  Effusion: no effusion present    Comments:    - Bounce test  Knee stable at 0, 30, 90 degrees  + Patellar grind  + peripatellar crepitation  Skin intact and well perfused  Sensation intact in DP/SP/Gutierres/Sa/T nerve distributions  2+ DP & PT pulses  Brisk capillary refill in all toes                Large joint arthrocentesis: bilateral knee  Universal Protocol:  Consent: Verbal consent obtained.  Risks and benefits: risks, benefits and alternatives were discussed  Consent given by: patient  Time out: Immediately prior to procedure a \"time out\" was called to verify the correct patient, procedure, equipment, support staff and site/side marked as required.  Patient understanding: patient states understanding of the procedure being performed  Site marked: the operative site was marked  Patient identity confirmed: verbally with patient  Supporting " "Documentation  Indications: pain and diagnostic evaluation   Procedure Details  Location: knee - bilateral knee  Preparation: Patient was prepped and draped in the usual sterile fashion  Needle size: 22 G  Ultrasound guidance: no    Medications (Right): 12 mg betamethasone acetate-betamethasone sodium phosphate 6 (3-3) mg/mL; 2 mL bupivacaine 0.25 %Medications (Left): 12 mg betamethasone acetate-betamethasone sodium phosphate 6 (3-3) mg/mL; 2 mL bupivacaine 0.25 %   Patient tolerance: patient tolerated the procedure well with no immediate complications  Dressing:  Sterile dressing applied               I have personally reviewed pertinent films in PACS and my interpretation is mild osteoarthritis, most notable in the patellofemoral compartment, with maintained joint space, osteophyte formation.      Scribe Attestation      I,:  Nadine Cordero am acting as a scribe while in the presence of the attending physician.:       I,:  Travis Sloan DO personally performed the services described in this documentation    as scribed in my presence.:                Portions of the record may have been created with voice recognition software.  Occasional wrong word or \"sound a like\" substitutions may have occurred due to the inherent limitations of voice recognition software.  Read the chart carefully and recognize, using context, where substitutions have occurred.  "

## 2024-05-05 DIAGNOSIS — K58.1 IRRITABLE BOWEL SYNDROME WITH CONSTIPATION: ICD-10-CM

## 2024-05-05 DIAGNOSIS — K64.4 EXTERNAL HEMORRHOIDS: ICD-10-CM

## 2024-05-05 RX ORDER — LACTULOSE 10 G/15ML
SOLUTION ORAL
Qty: 180 ML | Refills: 1 | Status: SHIPPED | OUTPATIENT
Start: 2024-05-05

## 2024-05-06 DIAGNOSIS — K21.9 GASTROESOPHAGEAL REFLUX DISEASE, UNSPECIFIED WHETHER ESOPHAGITIS PRESENT: ICD-10-CM

## 2024-05-06 RX ORDER — FAMOTIDINE 40 MG/1
40 TABLET, FILM COATED ORAL
Qty: 90 TABLET | Refills: 1 | Status: SHIPPED | OUTPATIENT
Start: 2024-05-06

## 2024-05-17 ENCOUNTER — PROCEDURE VISIT (OUTPATIENT)
Dept: OBGYN CLINIC | Facility: CLINIC | Age: 44
End: 2024-05-17
Payer: COMMERCIAL

## 2024-05-17 VITALS — HEIGHT: 63 IN | BODY MASS INDEX: 30.48 KG/M2 | WEIGHT: 172 LBS

## 2024-05-17 DIAGNOSIS — M17.0 ARTHRITIS OF BOTH KNEES: Primary | ICD-10-CM

## 2024-05-17 PROCEDURE — 20610 DRAIN/INJ JOINT/BURSA W/O US: CPT | Performed by: ORTHOPAEDIC SURGERY

## 2024-05-17 RX ORDER — HYALURONATE SODIUM 10 MG/ML
20 SYRINGE (ML) INTRAARTICULAR
Status: COMPLETED | OUTPATIENT
Start: 2024-05-17 | End: 2024-05-17

## 2024-05-17 RX ADMIN — Medication 20 MG: at 11:30

## 2024-05-17 NOTE — PROGRESS NOTES
"1. Arthritis of both knees          Patient is here for her first injection of euflexxa into the bilateral knee.     Patient rates their pain as 6/10 today    Physical exam of the knee shows no effusion no ecchymosis.      Large joint arthrocentesis: L knee  Universal Protocol:  Consent given by: patient  Time out: Immediately prior to procedure a \"time out\" was called to verify the correct patient, procedure, equipment, support staff and site/side marked as required.  Supporting Documentation  Indications: pain   Procedure Details  Location: knee - L knee  Needle size: 22 G  Ultrasound guidance: no  Approach: anterolateral  Medications administered: 20 mg Sodium Hyaluronate (Viscosup) 20 MG/2ML    Patient tolerance: patient tolerated the procedure well with no immediate complications      Large joint arthrocentesis: R knee  Universal Protocol:  Consent given by: patient  Supporting Documentation  Indications: pain   Procedure Details  Location: knee - R knee  Needle size: 22 G  Ultrasound guidance: no  Approach: anterolateral  Medications administered: 20 mg Sodium Hyaluronate (Viscosup) 20 MG/2ML    Patient tolerance: patient tolerated the procedure well with no immediate complications          Patient tolerated procedure follow up one week  "

## 2024-05-24 ENCOUNTER — PROCEDURE VISIT (OUTPATIENT)
Dept: OBGYN CLINIC | Facility: CLINIC | Age: 44
End: 2024-05-24
Payer: COMMERCIAL

## 2024-05-24 VITALS — HEIGHT: 63 IN | BODY MASS INDEX: 30.48 KG/M2 | WEIGHT: 172 LBS

## 2024-05-24 DIAGNOSIS — M25.561 RIGHT KNEE PAIN, UNSPECIFIED CHRONICITY: ICD-10-CM

## 2024-05-24 DIAGNOSIS — M25.562 LEFT KNEE PAIN, UNSPECIFIED CHRONICITY: ICD-10-CM

## 2024-05-24 DIAGNOSIS — M17.0 ARTHRITIS OF BOTH KNEES: Primary | ICD-10-CM

## 2024-05-24 PROCEDURE — 20610 DRAIN/INJ JOINT/BURSA W/O US: CPT

## 2024-05-24 RX ORDER — HYALURONATE SODIUM 10 MG/ML
20 SYRINGE (ML) INTRAARTICULAR
Status: COMPLETED | OUTPATIENT
Start: 2024-05-24 | End: 2024-05-24

## 2024-05-24 RX ADMIN — Medication 20 MG: at 10:30

## 2024-05-24 NOTE — PROGRESS NOTES
"1. Arthritis of both knees  Large joint arthrocentesis: L knee    Large joint arthrocentesis: R knee      2. Left knee pain, unspecified chronicity  Large joint arthrocentesis: L knee    Large joint arthrocentesis: R knee      3. Right knee pain, unspecified chronicity  Large joint arthrocentesis: L knee    Large joint arthrocentesis: R knee          Patient is here for her second injection of euflexxa into the bilateral knee.     Patient rates their pain as 6/10 today    Physical exam of the knee shows no effusion no ecchymosis.      Large joint arthrocentesis: L knee  Universal Protocol:  Consent given by: patient  Time out: Immediately prior to procedure a \"time out\" was called to verify the correct patient, procedure, equipment, support staff and site/side marked as required.  Supporting Documentation  Indications: pain   Procedure Details  Location: knee - L knee  Needle size: 22 G  Ultrasound guidance: no  Approach: anterolateral  Medications administered: 20 mg Sodium Hyaluronate (Viscosup) 20 MG/2ML  Specialty Pharmacy Supplied: received medications from pharmacy  Patient tolerance: patient tolerated the procedure well with no immediate complications      Large joint arthrocentesis: R knee  Universal Protocol:  Consent given by: patient  Supporting Documentation  Indications: pain   Procedure Details  Location: knee - R knee  Needle size: 22 G  Ultrasound guidance: no  Approach: anterolateral  Medications administered: 20 mg Sodium Hyaluronate (Viscosup) 20 MG/2ML  Specialty Pharmacy Supplied: received medications from pharmacy  Patient tolerance: patient tolerated the procedure well with no immediate complications          Patient tolerated procedure follow up one week  "

## 2024-06-03 ENCOUNTER — PROCEDURE VISIT (OUTPATIENT)
Dept: OBGYN CLINIC | Facility: CLINIC | Age: 44
End: 2024-06-03
Payer: COMMERCIAL

## 2024-06-03 VITALS — WEIGHT: 172 LBS | HEIGHT: 63 IN | BODY MASS INDEX: 30.48 KG/M2

## 2024-06-03 DIAGNOSIS — M22.42 CHONDROMALACIA OF BOTH PATELLAE: Primary | ICD-10-CM

## 2024-06-03 DIAGNOSIS — M22.41 CHONDROMALACIA OF BOTH PATELLAE: Primary | ICD-10-CM

## 2024-06-03 PROCEDURE — 20610 DRAIN/INJ JOINT/BURSA W/O US: CPT | Performed by: ORTHOPAEDIC SURGERY

## 2024-06-03 RX ORDER — HYALURONATE SODIUM 10 MG/ML
20 SYRINGE (ML) INTRAARTICULAR
Status: COMPLETED | OUTPATIENT
Start: 2024-06-03 | End: 2024-06-03

## 2024-06-03 RX ORDER — CELECOXIB 100 MG/1
100 CAPSULE ORAL 2 TIMES DAILY
Qty: 30 CAPSULE | Refills: 1 | Status: SHIPPED | OUTPATIENT
Start: 2024-06-03

## 2024-06-03 RX ADMIN — Medication 20 MG: at 14:45

## 2024-06-03 NOTE — PROGRESS NOTES
"1. Chondromalacia of both patellae          Patient is here for her third injection of Euflexxa into the bilateral knee.     Patient rates their pain as 5/10 today    Physical exam of the knee shows no effusion no ecchymosis.      Large joint arthrocentesis: L knee  Universal Protocol:  Consent given by: patient  Time out: Immediately prior to procedure a \"time out\" was called to verify the correct patient, procedure, equipment, support staff and site/side marked as required.  Supporting Documentation  Indications: pain   Procedure Details  Location: knee - L knee  Needle size: 22 G  Ultrasound guidance: no  Approach: anterolateral  Medications administered: 20 mg Sodium Hyaluronate (Viscosup) 20 MG/2ML    Patient tolerance: patient tolerated the procedure well with no immediate complications      Large joint arthrocentesis: R knee  Universal Protocol:  Consent given by: patient  Supporting Documentation  Indications: pain   Procedure Details  Location: knee - R knee  Needle size: 22 G  Ultrasound guidance: no  Approach: anterolateral  Medications administered: 20 mg Sodium Hyaluronate (Viscosup) 20 MG/2ML    Patient tolerance: patient tolerated the procedure well with no immediate complications          Patient tolerated procedure follow up possibly in 1 month if she still having pain.  In the meantime I will prescribe her Celebrex because she is going on vacation to help with her pain.  "

## 2024-06-07 DIAGNOSIS — Z91.09 ALLERGY TO ENVIRONMENTAL FACTORS: ICD-10-CM

## 2024-06-07 DIAGNOSIS — K64.4 EXTERNAL HEMORRHOIDS: ICD-10-CM

## 2024-06-07 DIAGNOSIS — J30.89 NON-SEASONAL ALLERGIC RHINITIS, UNSPECIFIED TRIGGER: ICD-10-CM

## 2024-06-07 DIAGNOSIS — K58.1 IRRITABLE BOWEL SYNDROME WITH CONSTIPATION: ICD-10-CM

## 2024-06-07 DIAGNOSIS — F41.9 ANXIETY: ICD-10-CM

## 2024-06-07 RX ORDER — FLUTICASONE PROPIONATE 50 MCG
SPRAY, SUSPENSION (ML) NASAL
Qty: 48 ML | Refills: 1 | Status: SHIPPED | OUTPATIENT
Start: 2024-06-07

## 2024-06-07 RX ORDER — CLONAZEPAM 0.5 MG/1
0.5 TABLET ORAL DAILY
Qty: 30 TABLET | Refills: 0 | Status: SHIPPED | OUTPATIENT
Start: 2024-06-07

## 2024-06-07 RX ORDER — LACTULOSE 10 G/15ML
SOLUTION ORAL
Qty: 540 ML | Refills: 1 | Status: SHIPPED | OUTPATIENT
Start: 2024-06-07

## 2024-06-07 NOTE — TELEPHONE ENCOUNTER
Reason for call:   [x] Refill   [] Prior Auth  [] Other:     Office:   [x] PCP/Provider - Toya Caal MD    [] Specialty/Provider -     Medication: clonazePAM (KlonoPIN) 0.5 mg tablet     Dose/Frequency: TAKE 1 TABLET BY MOUTH EVERY DAY,     Quantity: 30    Pharmacy: [facility]/pharmacy #[id number] - [place], [place] - [address]     Does the patient have enough for 3 days?   [] Yes   [x] No - Send as HP to POD

## 2024-06-07 NOTE — TELEPHONE ENCOUNTER
Medication:  PDMP   02/21/2024 11/13/2023 clonazePAM (Tablet) 30.0 30 0.5 MG AMPARO GUTIERREZ     Active agreement on file -No

## 2024-06-11 ENCOUNTER — OFFICE VISIT (OUTPATIENT)
Dept: GASTROENTEROLOGY | Facility: CLINIC | Age: 44
End: 2024-06-11
Payer: COMMERCIAL

## 2024-06-11 VITALS
SYSTOLIC BLOOD PRESSURE: 120 MMHG | BODY MASS INDEX: 30.48 KG/M2 | HEIGHT: 63 IN | TEMPERATURE: 98.4 F | WEIGHT: 172 LBS | DIASTOLIC BLOOD PRESSURE: 64 MMHG

## 2024-06-11 DIAGNOSIS — R11.0 NAUSEA IN ADULT: ICD-10-CM

## 2024-06-11 DIAGNOSIS — R19.4 CHANGE IN BOWEL HABIT: ICD-10-CM

## 2024-06-11 DIAGNOSIS — R19.7 DIARRHEA, UNSPECIFIED TYPE: ICD-10-CM

## 2024-06-11 DIAGNOSIS — K21.9 GASTROESOPHAGEAL REFLUX DISEASE, UNSPECIFIED WHETHER ESOPHAGITIS PRESENT: Primary | ICD-10-CM

## 2024-06-11 PROCEDURE — 99214 OFFICE O/P EST MOD 30 MIN: CPT | Performed by: PHYSICIAN ASSISTANT

## 2024-06-11 RX ORDER — OMEPRAZOLE 40 MG/1
40 CAPSULE, DELAYED RELEASE ORAL
Qty: 180 CAPSULE | Refills: 3 | Status: SHIPPED | OUTPATIENT
Start: 2024-06-11

## 2024-06-11 NOTE — PATIENT INSTRUCTIONS
Scheduled date of colonoscopy (as of today): 8/16/24  Physician performing colonoscopy: Dr. Dillard  Location of colonoscopy: [facility]  Bowel prep reviewed with patient: Golytely  Instructions reviewed with patient by: Jeovanny   Clearances:  N/A

## 2024-06-11 NOTE — PROGRESS NOTES
[facility] - Outpatient Follow-up Note  Jolene Tierney 43 y.o. female MRN: [id number]  Encounter: [id number]    ASSESSMENT AND PLAN:    1. Gastroesophageal reflux disease, unspecified whether esophagitis present  2. Nausea in adult  We reviewed her recent EGD results and biopsy findings which were grossly normal.  Patient expressed understanding to results.  All questions answered.  She continues with significant GERD symptoms and nausea despite PPI in the morning and H2 blocker in the evening.  Thankfully her weight is stable from last office visit but she does endorse unintentional weight loss over the last several months.    At this time we will increase her omeprazole to 40 mg twice daily before breakfast and before dinner  We discussed the importance of avoiding fatty, greasy, spicy foods, limiting excess caffeine, chocolate, alcohol which she will do  She will continue to take famotidine 40 mg at bedtime  Will plan to order nuclear medicine gastric emptying scan to rule out possible underlying gastroparesis    - omeprazole (PriLOSEC) 40 MG capsule; Take 1 capsule (40 mg total) by mouth 2 (two) times a day before meals  Dispense: 180 capsule; Refill: 3  - NM gastric emptying; Future      3. Change in bowel habit  4. Diarrhea, unspecified type  Patient complains today of change in bowel habit, diarrhea for the last several months with occasional nocturnal stools.  No prior colonoscopy.  She has had unintentional weight loss in the last several months.  Thankfully weight stable from last office visit.    Will check CRP and TSH  Will order stool studies to rule out possible underlying infection, will also check fecal calprotectin  We will plan for colonoscopy with TI intubation and pancolonic biopsies for further evaluation of ongoing symptoms    - C-reactive protein; Future  - TSH, 3rd generation with Free T4 reflex; Future  - Colonoscopy; Future  - polyethylene glycol (GOLYTELY) 4000  mL solution; Take 4,000 mL by mouth once for 1 dose Take as directed by office instructions prior to colonoscopy.  Dispense: 4000 mL; Refill: 0  - Stool Enteric Bacterial Panel by PCR; Future  - Giardia antigen; Future  - Ova and parasite examination; Future  - Clostridium difficile toxin by PCR with EIA; Future  - Calprotectin,Fecal; Future        I obtained informed consent from the patient the risk/benefits/alternatives of the procedure/s were discussed with the patient.  Risks included, but not limited to, infection, bleeding, perforation, injury to organs in the abdomen, missed lesion and incomplete procedure were discussed.  Patient was agreeable and electronic signature was obtained.      Patient was instructed to call the office with any questions, concerns, new/ worsening/ persisting GI symptoms. Advised patient go to the ER with any severe or worsening abdominal pain, fevers/ chills, intractable N/V, chest pain, SOB, dizziness, lightheadedness, feeling something stuck in esophagus that will not go down. Patient expressed understanding and is in agreement with treatment plan.     Will plan to follow up after diagnostic tests   __________________________________________________________    SUBJECTIVE:    Patient with a past medical history of migraines, acid reflux, IBS, anxiety, fibromyalgia, endometriosis, obesity presents to the office today for follow up.  Patient was last seen in the office by me 4/11/2024, previous office note was reviewed.  At last office visit I recommended patient continue with omeprazole 40 mg once daily before breakfast and started patient on famotidine 40 mg once daily at bedtime.  I also ordered an EGD and recommended patient start MiraLAX powder over-the-counter once daily for chronic constipation.  EGD was completed 4/16/2024, this was reviewed and was completely normal.  Duodenal biopsy was normal and negative for celiac disease.  Gastric biopsy was negative for H. pylori.   Distal and proximal esophageal biopsies were normal and negative for eosinophilic esophagitis or Jean Baptiste's esophagus.      Unfortunately since last office visit patient continues to complain of heartburn and decreased appetite.   Heartburn worse with chocolate and sauce which she is trying to avoid   She is taking omeprazole 40 mg once daily before breakfast and famotidine 40 mg once daily at bedtime and her symptoms persist.   She has nausea but no vomiting.   Her epigastric abdominal pain has resolved.   She complains today of change in bowel habit x few months.   Prior to a few months ago she was having constipation issues. Now she is having ~ 5 small loose Bms/ day.   She does have occasional nocturnal stools. She denies changes in diet medication or travel prior to symptom onset   Her weight has been stable since last office visit.   Patient denies any fevers/ chills, unintentional weight loss, vomiting, black or bloody stools,  dysphagia, odynophagia.   Denies chest pain, SOB    Review of Systems   Constitutional:  Positive for appetite change (decreased appetite). Negative for chills and fever.   HENT:  Negative for ear pain and sore throat.    Eyes:  Negative for pain and visual disturbance.   Respiratory:  Negative for cough and shortness of breath.    Cardiovascular:  Negative for chest pain and palpitations.   Gastrointestinal:  Positive for diarrhea and nausea. Negative for abdominal pain and vomiting.   Genitourinary:  Negative for dysuria and hematuria.   Musculoskeletal:  Negative for arthralgias and back pain.   Skin:  Negative for color change and rash.   Neurological:  Negative for seizures and syncope.   All other systems reviewed and are negative.         Historical Information   Past Medical History:   Diagnosis Date    Allergic     Allergic rhinitis     Anemia     Anxiety     Chondromalacia of both patellae     Closed fracture of left distal fibula 08/27/2020    Depression     Fibromyalgia      Fibromyalgia     Gastroenteritis 2/20/2024    GERD (gastroesophageal reflux disease)     Headache(784.0)     Hematochezia     Herpes simplex infection     HPV (human papilloma virus) infection     11/2013    Hypertension     IBS (irritable bowel syndrome)     Insomnia     Mental status change     Migraine     Obesity 08/06/2015    Scoliosis     Scoliosis      Past Surgical History:   Procedure Laterality Date    BREAST BIOPSY Left 12/10/2013    EXPLORATORY LAPAROTOMY      WISDOM TOOTH EXTRACTION       Social History   Social History     Substance and Sexual Activity   Alcohol Use Yes    Alcohol/week: 3.0 standard drinks of alcohol    Types: 1 Glasses of wine, 1 Cans of beer, 1 Shots of liquor per week    Comment: social      Social History     Substance and Sexual Activity   Drug Use Yes    Frequency: 3.0 times per week    Types: Marijuana    Comment: few times a week      Social History     Tobacco Use   Smoking Status Former    Types: Cigarettes   Smokeless Tobacco Never   Tobacco Comments    quit 2018     Family History   Problem Relation Age of Onset    Stroke Mother     Hypertension Mother     Psoriasis Mother     Depression Mother     Hepatitis Father         B     Substance Abuse Father     No Known Problems Daughter     No Known Problems Daughter     Dementia Maternal Grandmother     No Known Problems Maternal Grandfather     No Known Problems Paternal Grandmother     Alcohol abuse Paternal Grandfather     Asthma Brother     No Known Problems Brother     Diabetes type I Son     Diabetes Son     Breast cancer Family         maternal aunt - 30 's     Breast cancer Maternal Aunt 30    Breast cancer Cousin 30    Cancer Cousin        Meds/Allergies       Current Outpatient Medications:     albuterol (PROVENTIL HFA,VENTOLIN HFA) 90 mcg/act inhaler    Azelastine HCl 137 MCG/SPRAY SOLN    celecoxib (CeleBREX) 100 mg capsule    clonazePAM (KlonoPIN) 0.5 mg tablet    diclofenac (VOLTAREN) 75 mg EC tablet     dicyclomine (BENTYL) 20 mg tablet    ergocalciferol (VITAMIN D2) 50,000 units    famotidine (PEPCID) 40 MG tablet    fluocinonide (LIDEX) 0.05 % ointment    fluticasone (FLONASE) 50 mcg/act nasal spray    lactulose (CHRONULAC) 10 g/15 mL solution    levonorgestrel (MIRENA) 20 MCG/24HR IUD    montelukast (SINGULAIR) 10 mg tablet    NON FORMULARY    omeprazole (PriLOSEC) 40 MG capsule    ondansetron (ZOFRAN) 4 mg tablet    methocarbamol (ROBAXIN) 500 mg tablet    naproxen (Naprosyn) 500 mg tablet    sucralfate (CARAFATE) 1 g/10 mL suspension    Allergies   Allergen Reactions    Cymbalta [Duloxetine Hcl] Nausea Only and GI Intolerance     Stomach upset    Other Other (See Comments)     Tomatoes, black walnuts, cherries, peaches gives her itchy throat  Other reaction(s): Other , Other            Objective     Wt Readings from Last 3 Encounters:   06/11/24 78 kg (172 lb)   06/03/24 78 kg (172 lb)   05/24/24 78 kg (172 lb)     Temp Readings from Last 3 Encounters:   06/11/24 98.4 °F (36.9 °C) (Tympanic)   04/16/24 98 °F (36.7 °C) (Temporal)   04/11/24 (!) 97 °F (36.1 °C) (Tympanic)     BP Readings from Last 3 Encounters:   06/11/24 120/64   04/16/24 130/61   04/11/24 120/70     Pulse Readings from Last 3 Encounters:   04/16/24 84   03/26/24 81   02/20/24 68          PHYSICAL EXAM:      Physical Exam  Vitals reviewed.   Constitutional:       General: She is not in acute distress.     Appearance: She is not toxic-appearing.   HENT:      Head: Normocephalic and atraumatic.   Eyes:      Extraocular Movements: Extraocular movements intact.      Conjunctiva/sclera: Conjunctivae normal.   Cardiovascular:      Rate and Rhythm: Normal rate and regular rhythm.   Pulmonary:      Effort: Pulmonary effort is normal. No respiratory distress.      Breath sounds: Normal breath sounds.   Abdominal:      General: Bowel sounds are normal.      Palpations: Abdomen is soft.      Tenderness: There is no abdominal tenderness.   Musculoskeletal:          General: No swelling or tenderness.      Cervical back: Normal range of motion and neck supple.   Skin:     General: Skin is warm and dry.      Coloration: Skin is not jaundiced.   Neurological:      General: No focal deficit present.      Mental Status: She is alert and oriented to person, place, and time. Mental status is at baseline.   Psychiatric:         Mood and Affect: Mood normal.         Behavior: Behavior normal.         Thought Content: Thought content normal.        Lab Results:   Lab Results   Component Value Date    WBC 4.60 02/18/2024    HGB 13.9 02/18/2024    HCT 42.1 02/18/2024    MCV 99 (H) 02/18/2024     02/18/2024       Lab Results   Component Value Date     08/13/2015    SODIUM 137 02/26/2024    K 3.6 02/26/2024     02/26/2024    CO2 30 02/26/2024    ANIONGAP 7 08/13/2015    AGAP 5 02/26/2024    BUN 15 02/26/2024    CREATININE 0.88 02/26/2024    GLUC 94 02/26/2024    GLUF 111 (H) 01/10/2023    CALCIUM 8.5 02/26/2024    AST 15 02/26/2024    ALT 18 02/26/2024    ALKPHOS 45 02/26/2024    PROT 7.6 08/13/2015    TP 6.4 02/26/2024    BILITOT 0.70 08/13/2015    TBILI 0.4 02/26/2024    EGFR 84 02/26/2024     Lab Results   Component Value Date    LIPASE 39 02/18/2024       Lab Results   Component Value Date    HEPCAB Non-reactive 08/16/2022      Stool H pylori 3/28/2024 negative     Lab Results   Component Value Date    CRP <3.0 08/13/2015       Lab Results   Component Value Date    NRN5AGVJCWCC 2.210 02/18/2021         Radiology Results:   CT of the abdomen pelvis with IV contrast was completed 2/18/2024 for abdominal pain, this was reviewed, this showed no acute intra-abdominal abnormality. Liver showed some focal steatosis. No calcified gallstones. Unremarkable pancreas. Unremarkable stomach and bowel. No ascites or lymphadenopathy.       Sarika Chino PA-C   Available on [employer]

## 2024-06-24 ENCOUNTER — TELEPHONE (OUTPATIENT)
Age: 44
End: 2024-06-24

## 2024-06-24 DIAGNOSIS — K21.9 GASTROESOPHAGEAL REFLUX DISEASE, UNSPECIFIED WHETHER ESOPHAGITIS PRESENT: Primary | ICD-10-CM

## 2024-06-24 RX ORDER — PANTOPRAZOLE SODIUM 40 MG/1
40 TABLET, DELAYED RELEASE ORAL
Qty: 60 TABLET | Refills: 4 | Status: SHIPPED | OUTPATIENT
Start: 2024-06-24

## 2024-06-24 NOTE — TELEPHONE ENCOUNTER
Patients GI provider:  JULIENNE Chino      Number to return call: [phone]    Reason for call: Pt calling to ask for advise from Sarika; she says she has been taking her medication as recommended for a few weeks but for the past week it hasn't been working at all, she is having a lot of acid and burning that's now cause a very sore throat. She would like advisement on what she should do?

## 2024-06-24 NOTE — TELEPHONE ENCOUNTER
Called pt and she is willing to try the pantoprazole 40mg, she is also trying gaviscon and maybe pepto to help with the burning in her stomach.

## 2024-07-11 ENCOUNTER — OFFICE VISIT (OUTPATIENT)
Dept: FAMILY MEDICINE CLINIC | Facility: CLINIC | Age: 44
End: 2024-07-11
Payer: COMMERCIAL

## 2024-07-11 VITALS
HEART RATE: 85 BPM | OXYGEN SATURATION: 97 % | WEIGHT: 173 LBS | RESPIRATION RATE: 16 BRPM | DIASTOLIC BLOOD PRESSURE: 72 MMHG | HEIGHT: 63 IN | SYSTOLIC BLOOD PRESSURE: 118 MMHG | TEMPERATURE: 97.1 F | BODY MASS INDEX: 30.65 KG/M2

## 2024-07-11 DIAGNOSIS — J02.9 PHARYNGITIS WITH VIRAL SYNDROME: Primary | ICD-10-CM

## 2024-07-11 DIAGNOSIS — R21 RASH: ICD-10-CM

## 2024-07-11 DIAGNOSIS — B34.9 PHARYNGITIS WITH VIRAL SYNDROME: Primary | ICD-10-CM

## 2024-07-11 LAB — S PYO AG THROAT QL: NEGATIVE

## 2024-07-11 PROCEDURE — 87070 CULTURE OTHR SPECIMN AEROBIC: CPT | Performed by: FAMILY MEDICINE

## 2024-07-11 PROCEDURE — 87147 CULTURE TYPE IMMUNOLOGIC: CPT | Performed by: FAMILY MEDICINE

## 2024-07-11 PROCEDURE — 99214 OFFICE O/P EST MOD 30 MIN: CPT | Performed by: FAMILY MEDICINE

## 2024-07-11 PROCEDURE — 87880 STREP A ASSAY W/OPTIC: CPT | Performed by: FAMILY MEDICINE

## 2024-07-11 RX ORDER — LIDOCAINE HYDROCHLORIDE 20 MG/ML
15 SOLUTION OROPHARYNGEAL 2 TIMES DAILY PRN
Qty: 100 ML | Refills: 0 | Status: SHIPPED | OUTPATIENT
Start: 2024-07-11

## 2024-07-11 NOTE — PROGRESS NOTES
Ambulatory Visit  Name: Jolene Tierney      : 1980      MRN: [id number]  Encounter Provider: Ayan Caba MD  Encounter Date: 2024   Encounter department: [facility]    Assessment & Plan   1. Pharyngitis with viral syndrome  Comments:  Day 2 of illness. Suspect strept pharyngitis. POCT strept negative. Throat culture sent. Suportive care ( warm aric water gargles, tea w/honey, flonase.) Viscous lidocaine prescribed. Follow up results  Orders:  -     POCT rapid ANTIGEN strepA  -     Lidocaine Viscous HCl (XYLOCAINE) 2 % mucosal solution; Swish and spit 15 mL 2 (two) times a day as needed for mouth pain or discomfort  -     Throat culture  2. Rash  Comments:  Erythematous rash with a papule in the center. Approximately 4 cm in size. Possible insect bite. Doesnt recall a tick bite [place] Asked to call should the rash worsen as it will warrant doxycycline for lyme        History of Present Illness     Presents with congestion, sore throat, nausea, adenopathy, body aches, and headaches.   Symptoms started 2 days ago   Was babysitting her grandson last week who was sick at the time.   No fever, vomiting, diarrhea, SOB, wheezing, ear pain, or sinus pressure.  Tried mucinex  Also noticed a rash this am  Denies recent tick or insect bite    Review of Systems   Constitutional:  Positive for fatigue. Negative for fever.   HENT:  Positive for congestion, postnasal drip, sore throat and trouble swallowing. Negative for sinus pressure.    Respiratory: Negative.     Gastrointestinal:  Positive for nausea. Negative for abdominal pain and vomiting.   Musculoskeletal:  Positive for myalgias.   Skin:  Positive for rash.   Neurological:  Positive for headaches.     Past Medical History   Past Medical History:   Diagnosis Date   • Allergic    • Allergic rhinitis    • Anemia    • Anxiety    • Chondromalacia of both patellae    • Closed fracture of left distal  fibula 08/27/2020   • Depression    • Fibromyalgia    • Fibromyalgia    • Gastroenteritis 2/20/2024   • GERD (gastroesophageal reflux disease)    • Headache(784.0)    • Hematochezia    • Herpes simplex infection    • HPV (human papilloma virus) infection     11/2013   • Hypertension    • IBS (irritable bowel syndrome)    • Insomnia    • Mental status change    • Migraine    • Obesity 08/06/2015   • Scoliosis    • Scoliosis      Past Surgical History:   Procedure Laterality Date   • BREAST BIOPSY Left 12/10/2013   • EXPLORATORY LAPAROTOMY     • WISDOM TOOTH EXTRACTION       Family History   Problem Relation Age of Onset   • Stroke Mother    • Hypertension Mother    • Psoriasis Mother    • Depression Mother    • Hepatitis Father         B    • Substance Abuse Father    • No Known Problems Daughter    • No Known Problems Daughter    • Dementia Maternal Grandmother    • No Known Problems Maternal Grandfather    • No Known Problems Paternal Grandmother    • Alcohol abuse Paternal Grandfather    • Asthma Brother    • No Known Problems Brother    • Diabetes type I Son    • Diabetes Son    • Breast cancer Family         maternal aunt - 30 's    • Breast cancer Maternal Aunt 30   • Breast cancer Cousin 30   • Cancer Cousin      Current Outpatient Medications on File Prior to Visit   Medication Sig Dispense Refill   • albuterol (PROVENTIL HFA,VENTOLIN HFA) 90 mcg/act inhaler INHALE 2 PUFFS EVERY 6 (SIX) HOURS AS NEEDED FOR SHORTNESS OF BREATH 8 g 0   • Azelastine HCl 137 MCG/SPRAY SOLN 1 SPRAY INTO EACH NOSTRIL 2 (TWO) TIMES A DAY USE IN EACH NOSTRIL AS DIRECTED 30 mL 0   • celecoxib (CeleBREX) 100 mg capsule Take 1 capsule (100 mg total) by mouth 2 (two) times a day 30 capsule 1   • clonazePAM (KlonoPIN) 0.5 mg tablet Take 1 tablet (0.5 mg total) by mouth daily 30 tablet 0   • diclofenac (VOLTAREN) 75 mg EC tablet Take 75 mg by mouth 2 (two) times a day as needed     • dicyclomine (BENTYL) 20 mg tablet Take 1 tablet (20 mg  total) by mouth every 6 (six) hours 20 tablet 0   • ergocalciferol (VITAMIN D2) 50,000 units Take 50,000 Units by mouth once a week Every Thursday     • famotidine (PEPCID) 40 MG tablet TAKE 1 TABLET BY MOUTH DAILY AT BEDTIME 90 tablet 1   • fluocinonide (LIDEX) 0.05 % ointment      • fluticasone (FLONASE) 50 mcg/act nasal spray SPRAY 2 SPRAYS INTO EACH NOSTRIL EVERY DAY 48 mL 1   • lactulose (CHRONULAC) 10 g/15 mL solution TAKE 30 ML (20 G TOTAL) BY MOUTH 2 (TWO) TIMES A  mL 1   • levonorgestrel (MIRENA) 20 MCG/24HR IUD Mirena 20 MCG/24HR Intrauterine Intrauterine Device  Refills: 0  Active     • methocarbamol (ROBAXIN) 500 mg tablet Take 1 tablet (500 mg total) by mouth 3 (three) times a day for 14 days 42 tablet 0   • montelukast (SINGULAIR) 10 mg tablet TAKE 1 TABLET BY MOUTH DAILY AT BEDTIME 90 tablet 1   • naproxen (Naprosyn) 500 mg tablet Take 1 tablet (500 mg total) by mouth 2 (two) times a day with meals for 10 days 20 tablet 0   • NON FORMULARY Take 1 Dose by mouth Medical marijuana     • ondansetron (ZOFRAN) 4 mg tablet Take 1 tablet (4 mg total) by mouth every 8 (eight) hours as needed for nausea or vomiting 20 tablet 0   • pantoprazole (PROTONIX) 40 mg tablet Take 1 tablet (40 mg total) by mouth 2 (two) times a day before meals 60 tablet 4   • polyethylene glycol (GOLYTELY) 4000 mL solution Take 4,000 mL by mouth once for 1 dose Take as directed by office instructions prior to colonoscopy. 4000 mL 0   • sucralfate (CARAFATE) 1 g/10 mL suspension TAKE 10 ML (1 G TOTAL) BY MOUTH 2 (TWO) TIMES A DAY (Patient not taking: Reported on 4/11/2024) 414 mL 0     No current facility-administered medications on file prior to visit.     Allergies   Allergen Reactions   • Cymbalta [Duloxetine Hcl] Nausea Only and GI Intolerance     Stomach upset   • Other Other (See Comments)     Tomatoes, black walnuts, cherries, peaches gives her itchy throat  Other reaction(s): Other , Other       Current Outpatient  Medications on File Prior to Visit   Medication Sig Dispense Refill   • albuterol (PROVENTIL HFA,VENTOLIN HFA) 90 mcg/act inhaler INHALE 2 PUFFS EVERY 6 (SIX) HOURS AS NEEDED FOR SHORTNESS OF BREATH 8 g 0   • Azelastine HCl 137 MCG/SPRAY SOLN 1 SPRAY INTO EACH NOSTRIL 2 (TWO) TIMES A DAY USE IN EACH NOSTRIL AS DIRECTED 30 mL 0   • celecoxib (CeleBREX) 100 mg capsule Take 1 capsule (100 mg total) by mouth 2 (two) times a day 30 capsule 1   • clonazePAM (KlonoPIN) 0.5 mg tablet Take 1 tablet (0.5 mg total) by mouth daily 30 tablet 0   • diclofenac (VOLTAREN) 75 mg EC tablet Take 75 mg by mouth 2 (two) times a day as needed     • dicyclomine (BENTYL) 20 mg tablet Take 1 tablet (20 mg total) by mouth every 6 (six) hours 20 tablet 0   • ergocalciferol (VITAMIN D2) 50,000 units Take 50,000 Units by mouth once a week Every Thursday     • famotidine (PEPCID) 40 MG tablet TAKE 1 TABLET BY MOUTH DAILY AT BEDTIME 90 tablet 1   • fluocinonide (LIDEX) 0.05 % ointment      • fluticasone (FLONASE) 50 mcg/act nasal spray SPRAY 2 SPRAYS INTO EACH NOSTRIL EVERY DAY 48 mL 1   • lactulose (CHRONULAC) 10 g/15 mL solution TAKE 30 ML (20 G TOTAL) BY MOUTH 2 (TWO) TIMES A  mL 1   • levonorgestrel (MIRENA) 20 MCG/24HR IUD Mirena 20 MCG/24HR Intrauterine Intrauterine Device  Refills: 0  Active     • methocarbamol (ROBAXIN) 500 mg tablet Take 1 tablet (500 mg total) by mouth 3 (three) times a day for 14 days 42 tablet 0   • montelukast (SINGULAIR) 10 mg tablet TAKE 1 TABLET BY MOUTH DAILY AT BEDTIME 90 tablet 1   • naproxen (Naprosyn) 500 mg tablet Take 1 tablet (500 mg total) by mouth 2 (two) times a day with meals for 10 days 20 tablet 0   • NON FORMULARY Take 1 Dose by mouth Medical marijuana     • ondansetron (ZOFRAN) 4 mg tablet Take 1 tablet (4 mg total) by mouth every 8 (eight) hours as needed for nausea or vomiting 20 tablet 0   • pantoprazole (PROTONIX) 40 mg tablet Take 1 tablet (40 mg total) by mouth 2 (two) times a day  "before meals 60 tablet 4   • polyethylene glycol (GOLYTELY) 4000 mL solution Take 4,000 mL by mouth once for 1 dose Take as directed by office instructions prior to colonoscopy. 4000 mL 0   • sucralfate (CARAFATE) 1 g/10 mL suspension TAKE 10 ML (1 G TOTAL) BY MOUTH 2 (TWO) TIMES A DAY (Patient not taking: Reported on 4/11/2024) 414 mL 0     No current facility-administered medications on file prior to visit.      Social History     Tobacco Use   • Smoking status: Former     Types: Cigarettes   • Smokeless tobacco: Never   • Tobacco comments:     quit 2018   Vaping Use   • Vaping status: Never Used   Substance and Sexual Activity   • Alcohol use: Yes     Alcohol/week: 3.0 standard drinks of alcohol     Types: 1 Glasses of wine, 1 Cans of beer, 1 Shots of liquor per week     Comment: social    • Drug use: Yes     Frequency: 3.0 times per week     Types: Marijuana     Comment: few times a week    • Sexual activity: Yes     Partners: Male     Birth control/protection: I.U.D.     Objective     /72 (BP Location: Right arm, Patient Position: Sitting, Cuff Size: Large)   Pulse 85   Temp (!) 97.1 °F (36.2 °C) (Tympanic)   Resp 16   Ht 5' 3\" (1.6 m)   Wt 78.5 kg (173 lb)   SpO2 97%   BMI 30.65 kg/m²     Physical Exam  Constitutional:       Appearance: She is ill-appearing. She is not toxic-appearing.      Comments: Tired appearing    HENT:      Mouth/Throat:      Mouth: Mucous membranes are moist.      Pharynx: Oropharyngeal exudate and posterior oropharyngeal erythema present.   Eyes:      Extraocular Movements: Extraocular movements intact.   Cardiovascular:      Rate and Rhythm: Normal rate and regular rhythm.      Heart sounds: No murmur heard.  Pulmonary:      Effort: Pulmonary effort is normal.      Comments: Diminished breath sounds in the RLL  Lymphadenopathy:      Cervical: Cervical adenopathy (left aneterior cervical) present.   Skin:     General: Skin is warm.      Findings: Rash (rash on the left " side of the neck) present.          Neurological:      Mental Status: She is oriented to person, place, and time.   Psychiatric:         Mood and Affect: Mood normal.         Behavior: Behavior normal.

## 2024-07-14 DIAGNOSIS — J02.0 STREPTOCOCCAL PHARYNGITIS: Primary | ICD-10-CM

## 2024-07-14 LAB — BACTERIA THROAT CULT: ABNORMAL

## 2024-07-14 RX ORDER — AMOXICILLIN 500 MG/1
500 CAPSULE ORAL EVERY 12 HOURS SCHEDULED
Qty: 20 CAPSULE | Refills: 0 | Status: SHIPPED | OUTPATIENT
Start: 2024-07-14 | End: 2024-07-24

## 2024-07-27 DIAGNOSIS — Z91.09 ALLERGY TO ENVIRONMENTAL FACTORS: ICD-10-CM

## 2024-07-28 RX ORDER — MONTELUKAST SODIUM 10 MG/1
10 TABLET ORAL
Qty: 100 TABLET | Refills: 1 | Status: SHIPPED | OUTPATIENT
Start: 2024-07-28

## 2024-08-01 DIAGNOSIS — J30.2 SEASONAL ALLERGIC RHINITIS, UNSPECIFIED TRIGGER: ICD-10-CM

## 2024-08-01 DIAGNOSIS — F41.9 ANXIETY: ICD-10-CM

## 2024-08-01 DIAGNOSIS — B34.9 PHARYNGITIS WITH VIRAL SYNDROME: ICD-10-CM

## 2024-08-01 DIAGNOSIS — E55.9 VITAMIN D DEFICIENCY: Primary | ICD-10-CM

## 2024-08-01 DIAGNOSIS — J02.9 PHARYNGITIS WITH VIRAL SYNDROME: ICD-10-CM

## 2024-08-01 RX ORDER — ALBUTEROL SULFATE 90 UG/1
2 AEROSOL, METERED RESPIRATORY (INHALATION) EVERY 4 HOURS PRN
Qty: 8 G | Refills: 0 | Status: SHIPPED | OUTPATIENT
Start: 2024-08-01

## 2024-08-01 RX ORDER — CLONAZEPAM 0.5 MG/1
0.5 TABLET ORAL DAILY
Qty: 30 TABLET | Refills: 0 | Status: SHIPPED | OUTPATIENT
Start: 2024-08-01

## 2024-08-01 RX ORDER — ERGOCALCIFEROL 1.25 MG/1
50000 CAPSULE ORAL WEEKLY
Qty: 8 CAPSULE | Refills: 0 | Status: SHIPPED | OUTPATIENT
Start: 2024-08-01

## 2024-08-01 RX ORDER — LIDOCAINE HYDROCHLORIDE 20 MG/ML
15 SOLUTION OROPHARYNGEAL 2 TIMES DAILY PRN
Qty: 100 ML | Refills: 0 | Status: SHIPPED | OUTPATIENT
Start: 2024-08-01

## 2024-08-01 NOTE — TELEPHONE ENCOUNTER
Requested medication(s) are due for refill today: Yes  Patient has already received a courtesy refill: No  Other reason request has been forwarded to provider: Per protocol

## 2024-08-01 NOTE — TELEPHONE ENCOUNTER
Medication:  PDMP     06/07/2024 06/07/2024 clonazePAM (Tablet) 30.0 30 0.5 MG NA KEN MENDOZA     Active agreement on file -No

## 2024-08-05 ENCOUNTER — PATIENT MESSAGE (OUTPATIENT)
Dept: GASTROENTEROLOGY | Facility: CLINIC | Age: 44
End: 2024-08-05

## 2024-08-05 ENCOUNTER — NURSE TRIAGE (OUTPATIENT)
Age: 44
End: 2024-08-05

## 2024-08-05 NOTE — TELEPHONE ENCOUNTER
"Pt questioning if NM Gastric Emptying needs to be completed prior to colonoscopy. Pt was scheduled for test today however could not get coverage at work. Reviewed OV note with pt. Pt to complete labs, gastric emptying and colonoscopy per OV recommendations.       Reason for Disposition   Information only question and nurse able to answer    Answer Assessment - Initial Assessment Questions  1. REASON FOR CALL or QUESTION: \"What is your reason for calling today?\" or \"How can I best help you?\" or \"What question do you have that I can help answer?\"      Pt questioning if NM Gastric Emptying needs to be completed prior to colonoscopy.    Protocols used: Information Only Call - No Triage-ADULT-[place]    "

## 2024-08-07 ENCOUNTER — ANESTHESIA EVENT (OUTPATIENT)
Dept: ANESTHESIOLOGY | Facility: HOSPITAL | Age: 44
End: 2024-08-07

## 2024-08-07 ENCOUNTER — ANESTHESIA (OUTPATIENT)
Dept: ANESTHESIOLOGY | Facility: HOSPITAL | Age: 44
End: 2024-08-07

## 2024-08-09 ENCOUNTER — APPOINTMENT (OUTPATIENT)
Dept: LAB | Facility: CLINIC | Age: 44
End: 2024-08-09
Payer: COMMERCIAL

## 2024-08-09 DIAGNOSIS — R19.4 CHANGE IN BOWEL HABIT: ICD-10-CM

## 2024-08-09 LAB
CRP SERPL QL: 3.2 MG/L
TSH SERPL DL<=0.05 MIU/L-ACNC: 2.54 UIU/ML (ref 0.45–4.5)

## 2024-08-09 PROCEDURE — 86140 C-REACTIVE PROTEIN: CPT

## 2024-08-09 PROCEDURE — 36415 COLL VENOUS BLD VENIPUNCTURE: CPT

## 2024-08-09 PROCEDURE — 84443 ASSAY THYROID STIM HORMONE: CPT

## 2024-08-11 DIAGNOSIS — M22.42 CHONDROMALACIA OF BOTH PATELLAE: ICD-10-CM

## 2024-08-11 DIAGNOSIS — M22.41 CHONDROMALACIA OF BOTH PATELLAE: ICD-10-CM

## 2024-08-12 ENCOUNTER — OFFICE VISIT (OUTPATIENT)
Dept: OBGYN CLINIC | Facility: CLINIC | Age: 44
End: 2024-08-12
Payer: COMMERCIAL

## 2024-08-12 VITALS
WEIGHT: 173 LBS | HEIGHT: 63 IN | BODY MASS INDEX: 30.65 KG/M2 | HEART RATE: 60 BPM | DIASTOLIC BLOOD PRESSURE: 71 MMHG | SYSTOLIC BLOOD PRESSURE: 104 MMHG

## 2024-08-12 DIAGNOSIS — M22.41 CHONDROMALACIA OF BOTH PATELLAE: Primary | ICD-10-CM

## 2024-08-12 DIAGNOSIS — M22.42 CHONDROMALACIA OF BOTH PATELLAE: Primary | ICD-10-CM

## 2024-08-12 PROCEDURE — 99213 OFFICE O/P EST LOW 20 MIN: CPT | Performed by: ORTHOPAEDIC SURGERY

## 2024-08-12 RX ORDER — METHYLPREDNISOLONE 4 MG
TABLET, DOSE PACK ORAL
Qty: 1 EACH | Refills: 0 | Status: SHIPPED | OUTPATIENT
Start: 2024-08-12

## 2024-08-12 RX ORDER — CELECOXIB 100 MG/1
100 CAPSULE ORAL 2 TIMES DAILY
Qty: 30 CAPSULE | Refills: 5 | Status: SHIPPED | OUTPATIENT
Start: 2024-08-12

## 2024-08-12 NOTE — RESULT ENCOUNTER NOTE
Please call patient and let her know that I reviewed her blood work results.  Good news, her TSH/thyroid function is normal.  Her CRP, which is a nonspecific marker of inflammation in the body, is also basically normal.  It resulted at 3.2 which is just slightly out of the normal range.  I am not necessarily worried about this but I do recommend patient keep her upcoming colonoscopy as scheduled.  Please instruct the patient to complete her stool studies that were ordered prior to her colonoscopy/as soon as possible.  These orders are in the system.    Any questions or concerns please let me know.   Thanks!  Sarika Chino PA-C

## 2024-08-12 NOTE — PATIENT INSTRUCTIONS
PATELLOFEMORAL SYNDROME-Alaniz TAPING TECHNIQUE    Search “Leukotape P tape” and “Cover roll stretch tape” on  [employer]  Leukotape P is typically 1.5in x 15 yards, Cover roll stretch tape is typically 2in x 10 yards        How to apply:  Place cover roll tape over knee cap. This protects the skin.  Apply Leukotape over the cover roll tape. Use the Leukotape to pull the knee cap to the middle of the body (medial side of knee) to prevent lateral (outside) tracking of the knee cap.  Wear the tape for 3 days (72 hrs) straight, then take off one day (24 hrs) off, then repeat for a total of 6 weeks. You should be > or = 50% improved at which time you may start weaning the tape by wearing it less each day.  Visit [employer] and search “Alaniz tape for the knee” to watch a video on how to apply tape.  Video titled “Alaniz Taping of the knee” created by [employer] recommended.    What does Alaniz taping technique do?  Patellofemoral syndrome is when the inside quadriceps muscle, called the VMO muscle, becomes weak due to a number of factors. This causes the Patellofemoral ligament, which is the only ligament holding the patella (knee cap) in place, to become weak as well. When the ligament becomes weak, the knee cap drifts or tracks too far to the lateral side of the knee (outside knee) which causes tension on this ligament. The knee cap hits the lateral area of the femur, resulting in pain or discomfort around the front of the knee.  Physical Therapy is sometimes used to strengthen the weak muscles, such as the VMO, to correct this problem. When the tape is applied correctly, it helps to realign the knee cap to the center of the knee. This helps correct for the lateral tracking of the knee cap and relieve discomfort.  You can search online for exercises that can help strengthen the VMO quadriceps muscle or attend physical therapy.

## 2024-08-12 NOTE — PROGRESS NOTES
Assessment:  1. Chondromalacia of both patellae  methylPREDNISolone 4 MG tablet therapy pack        Patient Active Problem List   Diagnosis    Acid reflux disease    Anxiety    Chondromalacia of both patellae    Endometriosis    External hemorrhoids    Fibromyalgia    Irritable bowel syndrome    Migraine headache    Obesity    Vitamin D deficiency    Left renal mass    Intractable migraine    Allergy to environmental factors       Plan:    43 y.o. female  with chondromalacia patella of both knees    Discussed underlying pathology of diagnosis  Discussed Medrol Dosepak for persistent pain and inflammation of the bilateral knees.  Discussed side effects and risks.  Discussed speaking with PCP prior to taking.  Patient expressed understanding.  Patient agreeable.  Discussed Rubin taping technique of the bilateral knees.  Handout with instruction provided for patient today.  Discussed if persistent pain and inflammation with steroid and Rubin taping patient should return bilateral steroid injections.  Patient expressed understanding.  Patient agreeable.  Discussed physical therapy for home exercises program and assistance with Rubin taping.  Patient deferred at this time.  Follow-up as needed for persistent pain and inflammation    The assessment and plan were formulated by Dr. Sloan and I assisted in carrying it out.    Subjective:   Patient ID: Jolene Tierney is a 43 y.o. female .    HPI    Patient presents to the office for follow up of bilateral knee pain. Patient reports persistent pain in anterior aspect bilateral knees not relieved with gel injections . Increased after vacation.     The following portions of the patient's history were reviewed and updated as appropriate: allergies, current medications, past family history, past social history, past surgical history and problem list.    Social History     Socioeconomic History    Marital status: Single     Spouse name: Not on file    Number of children:  Not on file    Years of education: completed 12th grade    Highest education level: Not on file   Occupational History    Occupation: home health aide   Tobacco Use    Smoking status: Former     Types: Cigarettes    Smokeless tobacco: Never    Tobacco comments:     quit 2018   Vaping Use    Vaping status: Never Used   Substance and Sexual Activity    Alcohol use: Yes     Alcohol/week: 3.0 standard drinks of alcohol     Types: 1 Glasses of wine, 1 Cans of beer, 1 Shots of liquor per week     Comment: social     Drug use: Yes     Frequency: 3.0 times per week     Types: Marijuana     Comment: few times a week     Sexual activity: Yes     Partners: Male     Birth control/protection: I.U.D.   Other Topics Concern    Not on file   Social History Narrative    Not on file     Social Determinants of Health     Financial Resource Strain: Not on file   Food Insecurity: Not on file   Transportation Needs: No Transportation Needs (1/9/2023)    PRAPARE - Transportation     Lack of Transportation (Medical): No     Lack of Transportation (Non-Medical): No   Physical Activity: Not on file   Stress: Not on file   Social Connections: Not on file   Intimate Partner Violence: Not on file   Housing Stability: Not on file     Past Medical History:   Diagnosis Date    Allergic     Allergic rhinitis     Anemia     Anxiety     Chondromalacia of both patellae     Closed fracture of left distal fibula 08/27/2020    Depression     Fibromyalgia     Fibromyalgia     Gastroenteritis 2/20/2024    GERD (gastroesophageal reflux disease)     Headache(784.0)     Hematochezia     Herpes simplex infection     HPV (human papilloma virus) infection     11/2013    Hypertension     IBS (irritable bowel syndrome)     Insomnia     Mental status change     Migraine     Obesity 08/06/2015    Scoliosis     Scoliosis      Past Surgical History:   Procedure Laterality Date    BREAST BIOPSY Left 12/10/2013    EXPLORATORY LAPAROTOMY      WISDOM TOOTH EXTRACTION        Allergies   Allergen Reactions    Cymbalta [Duloxetine Hcl] Nausea Only and GI Intolerance     Stomach upset    Other Other (See Comments)     Tomatoes, black walnuts, cherries, peaches gives her itchy throat  Other reaction(s): Other , Other      Current Outpatient Medications on File Prior to Visit   Medication Sig Dispense Refill    albuterol (PROVENTIL HFA,VENTOLIN HFA) 90 mcg/act inhaler Inhale 2 puffs every 4 (four) hours as needed for wheezing 8 g 0    Azelastine HCl 137 MCG/SPRAY SOLN 1 SPRAY INTO EACH NOSTRIL 2 (TWO) TIMES A DAY USE IN EACH NOSTRIL AS DIRECTED 30 mL 0    celecoxib (CeleBREX) 100 mg capsule TAKE 1 CAPSULE BY MOUTH TWICE A DAY 30 capsule 5    clonazePAM (KlonoPIN) 0.5 mg tablet Take 1 tablet (0.5 mg total) by mouth daily 30 tablet 0    diclofenac (VOLTAREN) 75 mg EC tablet Take 75 mg by mouth 2 (two) times a day as needed      dicyclomine (BENTYL) 20 mg tablet Take 1 tablet (20 mg total) by mouth every 6 (six) hours 20 tablet 0    ergocalciferol (VITAMIN D2) 50,000 units Take 1 capsule (50,000 Units total) by mouth once a week Every Thursday 8 capsule 0    famotidine (PEPCID) 40 MG tablet TAKE 1 TABLET BY MOUTH DAILY AT BEDTIME 90 tablet 1    fluocinonide (LIDEX) 0.05 % ointment       fluticasone (FLONASE) 50 mcg/act nasal spray SPRAY 2 SPRAYS INTO EACH NOSTRIL EVERY DAY 48 mL 1    lactulose (CHRONULAC) 10 g/15 mL solution TAKE 30 ML (20 G TOTAL) BY MOUTH 2 (TWO) TIMES A  mL 1    levonorgestrel (MIRENA) 20 MCG/24HR IUD Mirena 20 MCG/24HR Intrauterine Intrauterine Device  Refills: 0  Active      Lidocaine Viscous HCl (XYLOCAINE) 2 % mucosal solution Swish and spit 15 mL 2 (two) times a day as needed for mouth pain or discomfort 100 mL 0    methocarbamol (ROBAXIN) 500 mg tablet Take 1 tablet (500 mg total) by mouth 3 (three) times a day for 14 days 42 tablet 0    montelukast (SINGULAIR) 10 mg tablet TAKE 1 TABLET BY MOUTH DAILY AT BEDTIME 100 tablet 1    naproxen (Naprosyn) 500 mg  tablet Take 1 tablet (500 mg total) by mouth 2 (two) times a day with meals for 10 days 20 tablet 0    NON FORMULARY Take 1 Dose by mouth Medical marijuana      ondansetron (ZOFRAN) 4 mg tablet Take 1 tablet (4 mg total) by mouth every 8 (eight) hours as needed for nausea or vomiting 20 tablet 0    pantoprazole (PROTONIX) 40 mg tablet Take 1 tablet (40 mg total) by mouth 2 (two) times a day before meals 60 tablet 4    polyethylene glycol (GOLYTELY) 4000 mL solution Take 4,000 mL by mouth once for 1 dose Take as directed by office instructions prior to colonoscopy. 4000 mL 0    sucralfate (CARAFATE) 1 g/10 mL suspension TAKE 10 ML (1 G TOTAL) BY MOUTH 2 (TWO) TIMES A DAY (Patient not taking: Reported on 4/11/2024) 414 mL 0    [DISCONTINUED] celecoxib (CeleBREX) 100 mg capsule Take 1 capsule (100 mg total) by mouth 2 (two) times a day 30 capsule 1     No current facility-administered medications on file prior to visit.       Review of Systems   Constitutional:  Negative for chills and fever.   HENT:  Negative for ear pain and sore throat.    Eyes:  Negative for pain and visual disturbance.   Respiratory:  Negative for cough and shortness of breath.    Cardiovascular:  Negative for chest pain and palpitations.   Gastrointestinal:  Negative for abdominal pain and vomiting.   Genitourinary:  Negative for dysuria and hematuria.   Musculoskeletal:  Positive for arthralgias. Negative for back pain.   Skin:  Negative for color change and rash.   Neurological:  Negative for seizures and syncope.   All other systems reviewed and are negative.    See HPi    Objective:    Vitals:    08/12/24 1203   BP: 104/71   Pulse: 60       Physical Exam  Vitals and nursing note reviewed.   Constitutional:       General: She is not in acute distress.     Appearance: She is well-developed.   HENT:      Head: Normocephalic and atraumatic.   Eyes:      Conjunctiva/sclera: Conjunctivae normal.   Cardiovascular:      Rate and Rhythm: Normal rate  "and regular rhythm.      Heart sounds: No murmur heard.  Pulmonary:      Effort: Pulmonary effort is normal. No respiratory distress.      Breath sounds: Normal breath sounds.   Abdominal:      Palpations: Abdomen is soft.      Tenderness: There is no abdominal tenderness.   Musculoskeletal:         General: No swelling.      Cervical back: Neck supple.   Skin:     General: Skin is warm and dry.      Capillary Refill: Capillary refill takes less than 2 seconds.   Neurological:      Mental Status: She is alert.   Psychiatric:         Mood and Affect: Mood normal.         Right Knee Exam     Range of Motion   Extension:  normal   Flexion:  normal       Left Knee Exam     Range of Motion   Extension:  normal   Flexion:  normal             Procedures  No Procedures performed today         Portions of the record may have been created with voice recognition software.  Occasional wrong word or \"sound a like\" substitutions may have occurred due to the inherent limitations of voice recognition software.  Read the chart carefully and recognize, using context, where substitutions have occurred.   "

## 2024-08-13 DIAGNOSIS — J30.89 NON-SEASONAL ALLERGIC RHINITIS, UNSPECIFIED TRIGGER: ICD-10-CM

## 2024-08-13 DIAGNOSIS — Z91.09 ALLERGY TO ENVIRONMENTAL FACTORS: ICD-10-CM

## 2024-08-13 RX ORDER — FLUTICASONE PROPIONATE 50 MCG
SPRAY, SUSPENSION (ML) NASAL
Qty: 48 ML | Refills: 2 | Status: SHIPPED | OUTPATIENT
Start: 2024-08-13

## 2024-08-14 ENCOUNTER — APPOINTMENT (OUTPATIENT)
Dept: LAB | Facility: CLINIC | Age: 44
End: 2024-08-14
Payer: COMMERCIAL

## 2024-08-15 ENCOUNTER — TELEPHONE (OUTPATIENT)
Age: 44
End: 2024-08-15

## 2024-08-15 NOTE — TELEPHONE ENCOUNTER
Scheduled date of colonoscopy (as of today):09/06/2024  Physician performing colonoscopy:Dr John  Location of colonoscopy:[facility]   Bowel prep reviewed with patient:golytely (please send to patients pharmacy again thank you)   Instructions reviewed with patient by:pt has instructions   Clearances: n/a

## 2024-08-19 DIAGNOSIS — R19.4 CHANGE IN BOWEL HABIT: Primary | ICD-10-CM

## 2024-08-23 ENCOUNTER — TELEPHONE (OUTPATIENT)
Dept: GASTROENTEROLOGY | Facility: MEDICAL CENTER | Age: 44
End: 2024-08-23

## 2024-08-23 NOTE — TELEPHONE ENCOUNTER
Confirming Upcoming Procedure: Colonoscopy on Sept 6  Physician performing: Dr. John  [facility] of procedure:  [facility]  Prep: Golytely  Diabetic: N/A

## 2024-08-26 ENCOUNTER — ANESTHESIA (OUTPATIENT)
Dept: ANESTHESIOLOGY | Facility: HOSPITAL | Age: 44
End: 2024-08-26

## 2024-08-26 ENCOUNTER — ANESTHESIA EVENT (OUTPATIENT)
Dept: ANESTHESIOLOGY | Facility: HOSPITAL | Age: 44
End: 2024-08-26

## 2024-08-27 DIAGNOSIS — E55.9 VITAMIN D DEFICIENCY: ICD-10-CM

## 2024-08-28 DIAGNOSIS — R19.4 CHANGE IN BOWEL HABIT: ICD-10-CM

## 2024-08-28 RX ORDER — ERGOCALCIFEROL 1.25 MG/1
50000 CAPSULE, LIQUID FILLED ORAL WEEKLY
Qty: 12 CAPSULE | Refills: 1 | Status: SHIPPED | OUTPATIENT
Start: 2024-08-28

## 2024-09-19 DIAGNOSIS — F41.9 ANXIETY: ICD-10-CM

## 2024-09-20 RX ORDER — CLONAZEPAM 0.5 MG/1
0.5 TABLET ORAL DAILY
Qty: 30 TABLET | Refills: 0 | Status: SHIPPED | OUTPATIENT
Start: 2024-09-20

## 2024-09-20 NOTE — TELEPHONE ENCOUNTER
Medication:  PDMP     08/01/2024 08/01/2024 clonazePAM (Tablet) 30.0 30 0.5 MG AMPARO GUTIERREZ     Active agreement on file -No

## 2024-10-16 ENCOUNTER — TELEPHONE (OUTPATIENT)
Age: 44
End: 2024-10-16

## 2024-10-16 NOTE — TELEPHONE ENCOUNTER
Pt called in stating she needs a TB test for work but there's no order in her chart. Please contact the pt.

## 2024-10-22 ENCOUNTER — CLINICAL SUPPORT (OUTPATIENT)
Dept: FAMILY MEDICINE CLINIC | Facility: CLINIC | Age: 44
End: 2024-10-22
Payer: COMMERCIAL

## 2024-10-22 DIAGNOSIS — Z11.1 SCREENING FOR TUBERCULOSIS: Primary | ICD-10-CM

## 2024-10-22 PROCEDURE — 86580 TB INTRADERMAL TEST: CPT

## 2024-10-22 NOTE — PROGRESS NOTES
Assessment/Plan:      Diagnoses and all orders for this visit:    Screening for tuberculosis  -     TB Skin Test          Subjective:     Patient ID: Jolene Tierney is a 44 y.o. female.          Objective:      There were no vitals taken for this visit.

## 2024-10-24 ENCOUNTER — CLINICAL SUPPORT (OUTPATIENT)
Dept: FAMILY MEDICINE CLINIC | Facility: CLINIC | Age: 44
End: 2024-10-24

## 2024-10-24 DIAGNOSIS — Z11.1 ENCOUNTER FOR PPD SKIN TEST READING: Primary | ICD-10-CM

## 2024-10-24 LAB
INDURATION: 0 MM
TB SKIN TEST: NEGATIVE

## 2024-10-24 NOTE — PROGRESS NOTES
Assessment/Plan:      Diagnoses and all orders for this visit:    Encounter for PPD skin test reading          Subjective:     Patient ID: Jolene Tierney is a 44 y.o. female.          Objective:      There were no vitals taken for this visit.

## 2024-10-25 ENCOUNTER — TELEPHONE (OUTPATIENT)
Age: 44
End: 2024-10-25

## 2024-10-25 DIAGNOSIS — N28.89 RENAL MASS: Primary | ICD-10-CM

## 2024-10-25 NOTE — TELEPHONE ENCOUNTER
Patient needs new order for CT abd/pelvis to be put in [facility]. The one that Dmitry gave her  and she wants to schedule her f/u appointment but wants to schedule her CT 1st.

## 2024-10-27 DIAGNOSIS — Z00.6 ENCOUNTER FOR EXAMINATION FOR NORMAL COMPARISON OR CONTROL IN CLINICAL RESEARCH PROGRAM: ICD-10-CM

## 2024-10-28 NOTE — TELEPHONE ENCOUNTER
Called patient and notified her that Ct scan was placed in system- attempted to scheduled follow up and she will call back later

## 2024-10-30 DIAGNOSIS — J30.2 SEASONAL ALLERGIC RHINITIS, UNSPECIFIED TRIGGER: ICD-10-CM

## 2024-10-30 DIAGNOSIS — G43.709 CHRONIC MIGRAINE WITHOUT AURA WITHOUT STATUS MIGRAINOSUS, NOT INTRACTABLE: ICD-10-CM

## 2024-10-30 DIAGNOSIS — F41.9 ANXIETY: ICD-10-CM

## 2024-10-30 RX ORDER — CLONAZEPAM 0.5 MG/1
0.5 TABLET ORAL DAILY
Qty: 30 TABLET | Refills: 0 | Status: SHIPPED | OUTPATIENT
Start: 2024-10-30

## 2024-10-30 RX ORDER — ALBUTEROL SULFATE 90 UG/1
INHALANT RESPIRATORY (INHALATION)
Qty: 8.5 G | Refills: 5 | Status: SHIPPED | OUTPATIENT
Start: 2024-10-30

## 2024-10-30 RX ORDER — METHOCARBAMOL 500 MG/1
500 TABLET, FILM COATED ORAL 3 TIMES DAILY
Qty: 42 TABLET | Refills: 0 | Status: SHIPPED | OUTPATIENT
Start: 2024-10-30 | End: 2024-11-13

## 2024-10-30 NOTE — TELEPHONE ENCOUNTER
Patient is checking on status of refill request, her pharmacy told her to reach out to the office for new prescriptions.   Reason for call:   [x] Refill   [] Prior Auth  [] Other:     Office:   [x] PCP/Provider - Chit  [] Specialty/Provider -     Albuterol HFA  2 puffs q4h prn #1 inhaler    Clonazepam 0.5mg  1 tab daily #30    Methocarbamol 500mg  1 tab tid prn #42    Pharmacy: [facility]/pharmacy #[id number] - [place], [place] - [address] [phone]     Does the patient have enough for 3 days?   [] Yes   [x] No - Send as HP to POD

## 2024-10-30 NOTE — TELEPHONE ENCOUNTER
Medication:  PDMP     09/20/2024 09/20/2024 clonazePAM (Tablet) 30.0 30 0.5 MG AMPARO GUTIERREZ     Active agreement on file -No

## 2024-11-01 NOTE — TELEPHONE ENCOUNTER
[facility] and they require more information like OV note.    Please review     # [phone]  Fax# [phone]   Tracking # [id number]

## 2024-11-13 ENCOUNTER — TELEMEDICINE (OUTPATIENT)
Dept: FAMILY MEDICINE CLINIC | Facility: CLINIC | Age: 44
End: 2024-11-13
Payer: COMMERCIAL

## 2024-11-13 DIAGNOSIS — M54.50 ACUTE LEFT-SIDED LOW BACK PAIN WITHOUT SCIATICA: ICD-10-CM

## 2024-11-13 DIAGNOSIS — G43.001 MIGRAINE WITHOUT AURA AND WITH STATUS MIGRAINOSUS, NOT INTRACTABLE: ICD-10-CM

## 2024-11-13 DIAGNOSIS — J01.00 ACUTE NON-RECURRENT MAXILLARY SINUSITIS: Primary | ICD-10-CM

## 2024-11-13 DIAGNOSIS — F41.9 ANXIETY: ICD-10-CM

## 2024-11-13 PROCEDURE — 99214 OFFICE O/P EST MOD 30 MIN: CPT | Performed by: FAMILY MEDICINE

## 2024-11-13 RX ORDER — FLUCONAZOLE 150 MG/1
150 TABLET ORAL ONCE
Qty: 1 TABLET | Refills: 0 | Status: SHIPPED | OUTPATIENT
Start: 2024-11-13 | End: 2024-11-13

## 2024-11-13 RX ORDER — MELOXICAM 7.5 MG/1
7.5 TABLET ORAL DAILY PRN
Qty: 30 TABLET | Refills: 1 | Status: SHIPPED | OUTPATIENT
Start: 2024-11-13 | End: 2025-05-12

## 2024-11-13 RX ORDER — TIZANIDINE HYDROCHLORIDE 4 MG/1
4 CAPSULE, GELATIN COATED ORAL 3 TIMES DAILY PRN
Qty: 90 CAPSULE | Refills: 0 | Status: SHIPPED | OUTPATIENT
Start: 2024-11-13

## 2024-11-13 NOTE — PROGRESS NOTES
Virtual Regular Visit  Name: Jolene Tierney      : 1980      MRN: [id number]  Encounter Provider: Toya Caal MD  Encounter Date: 2024   Encounter department: [facility]    Verification of patient location:    Patient is located at Home in the following state in which I hold an active license [place]    Assessment & Plan  Acute left-sided low back pain without sciatica  Suspect muscle pain   Refused PT at this time     Orders:    meloxicam (MOBIC) 7.5 mg tablet; Take 1 tablet (7.5 mg total) by mouth daily as needed for moderate pain    TiZANidine (ZANAFLEX) 4 MG capsule; Take 1 capsule (4 mg total) by mouth 3 (three) times a day as needed for muscle spasms    Acute non-recurrent maxillary sinusitis  To start flonase daily   Orders:    amoxicillin-clavulanate (AUGMENTIN) 875-125 mg per tablet; Take 1 tablet by mouth every 12 (twelve) hours for 10 days    fluconazole (DIFLUCAN) 150 mg tablet; Take 1 tablet (150 mg total) by mouth once for 1 dose    Anxiety  Stable on Klonopin prn          Migraine without aura and with status migrainosus, not intractable  Stable  Not on meds              Encounter provider Toya Caal MD    The patient was identified by name and date of birth. Jolene Tierney was informed that this is a telemedicine visit and that the visit is being conducted through the [employer] platform. She agrees to proceed..  My office door was closed. No one else was in the room.  She acknowledged consent and understanding of privacy and security of the video platform. The patient has agreed to participate and understands they can discontinue the visit at any time.    Patient is aware this is a billable service.     History of Present Illness     Cold symptoms 3-4 days   Feels more pressure in her sinuses  Left sided low back pain that goes down to her buttock and thigh x 1 week  No triggers that she could recall  6/10 pain - burning achy pain   Tried methocarbamol which didn't  help much   Back Pain  This is a chronic problem. The current episode started more than 1 year ago. Associated symptoms include headaches. Pertinent negatives include no abdominal pain, chest pain or dysuria.   URI   This is a new problem. The current episode started in the past 7 days (3-4 days ago). There has been no fever. Associated symptoms include congestion, coughing, headaches, rhinorrhea, sinus pain and a sore throat. Pertinent negatives include no abdominal pain, chest pain, diarrhea, dysuria, ear pain, joint pain, joint swelling, nausea, neck pain, plugged ear sensation, rash, sneezing, swollen glands, vomiting or wheezing. Treatments tried: mucinex and nyquil. The treatment provided mild relief.       History obtained from : patient  Review of Systems   HENT:  Positive for congestion, rhinorrhea, sinus pain and sore throat. Negative for ear pain and sneezing.    Respiratory:  Positive for cough. Negative for wheezing.    Cardiovascular:  Negative for chest pain.   Gastrointestinal:  Negative for abdominal pain, diarrhea, nausea and vomiting.   Genitourinary:  Negative for dysuria.   Musculoskeletal:  Negative for joint pain and neck pain.   Skin:  Negative for rash.   Neurological:  Positive for headaches.     Medical History Reviewed by provider this encounter:  Meds  Problems       Past Medical History   Past Medical History:   Diagnosis Date    Allergic     Allergic rhinitis     Anemia     Anxiety     Chondromalacia of both patellae     Closed fracture of left distal fibula 08/27/2020    Depression     Fibromyalgia     Fibromyalgia     Gastroenteritis 2/20/2024    GERD (gastroesophageal reflux disease)     Headache(784.0)     Hematochezia     Herpes simplex infection     HPV (human papilloma virus) infection     11/2013    Hypertension     IBS (irritable bowel syndrome)     Insomnia     Mental status change     Migraine     Obesity 08/06/2015    Scoliosis     Scoliosis      Past Surgical History:    Procedure Laterality Date    BREAST BIOPSY Left 12/10/2013    EXPLORATORY LAPAROTOMY      WISDOM TOOTH EXTRACTION       Family History   Problem Relation Age of Onset    Stroke Mother     Hypertension Mother     Psoriasis Mother     Depression Mother     Hepatitis Father         B     Substance Abuse Father     No Known Problems Daughter     No Known Problems Daughter     Dementia Maternal Grandmother     No Known Problems Maternal Grandfather     No Known Problems Paternal Grandmother     Alcohol abuse Paternal Grandfather     Asthma Brother     No Known Problems Brother     Diabetes type I Son     Diabetes Son     Breast cancer Family         maternal aunt - 30 's     Breast cancer Maternal Aunt 30    Breast cancer Cousin 30    Cancer Cousin      Current Outpatient Medications on File Prior to Visit   Medication Sig Dispense Refill    albuterol (PROVENTIL HFA,VENTOLIN HFA) 90 mcg/act inhaler TAKE 2 PUFFS BY MOUTH EVERY 4 HOURS AS NEEDED FOR WHEEZE 8.5 g 5    Azelastine HCl 137 MCG/SPRAY SOLN 1 SPRAY INTO EACH NOSTRIL 2 (TWO) TIMES A DAY USE IN EACH NOSTRIL AS DIRECTED 30 mL 0    celecoxib (CeleBREX) 100 mg capsule TAKE 1 CAPSULE BY MOUTH TWICE A DAY 30 capsule 5    clonazePAM (KlonoPIN) 0.5 mg tablet TAKE 1 TABLET BY MOUTH EVERY DAY 30 tablet 0    diclofenac (VOLTAREN) 75 mg EC tablet Take 75 mg by mouth 2 (two) times a day as needed      ergocalciferol (VITAMIN D2) 50,000 units TAKE 1 CAPSULE (50,000 UNITS TOTAL) BY MOUTH ONCE A WEEK EVERY THURSDAY 12 capsule 1    fluocinonide (LIDEX) 0.05 % ointment       fluticasone (FLONASE) 50 mcg/act nasal spray SPRAY 2 SPRAYS INTO EACH NOSTRIL EVERY DAY 48 mL 2    levonorgestrel (MIRENA) 20 MCG/24HR IUD Mirena 20 MCG/24HR Intrauterine Intrauterine Device  Refills: 0  Active      montelukast (SINGULAIR) 10 mg tablet TAKE 1 TABLET BY MOUTH DAILY AT BEDTIME 100 tablet 1    NON FORMULARY Take 1 Dose by mouth Medical marijuana      ondansetron (ZOFRAN) 4 mg tablet Take 1  tablet (4 mg total) by mouth every 8 (eight) hours as needed for nausea or vomiting 20 tablet 0    [DISCONTINUED] methocarbamol (ROBAXIN) 500 mg tablet Take 1 tablet (500 mg total) by mouth 3 (three) times a day for 14 days 42 tablet 0    dicyclomine (BENTYL) 20 mg tablet Take 1 tablet (20 mg total) by mouth every 6 (six) hours (Patient not taking: Reported on 11/13/2024) 20 tablet 0    famotidine (PEPCID) 40 MG tablet TAKE 1 TABLET BY MOUTH DAILY AT BEDTIME (Patient not taking: Reported on 11/13/2024) 90 tablet 1    lactulose (CHRONULAC) 10 g/15 mL solution TAKE 30 ML (20 G TOTAL) BY MOUTH 2 (TWO) TIMES A  mL 1    Lidocaine Viscous HCl (XYLOCAINE) 2 % mucosal solution Swish and spit 15 mL 2 (two) times a day as needed for mouth pain or discomfort (Patient not taking: Reported on 11/13/2024) 100 mL 0    pantoprazole (PROTONIX) 40 mg tablet Take 1 tablet (40 mg total) by mouth 2 (two) times a day before meals (Patient not taking: Reported on 11/13/2024) 60 tablet 4    polyethylene glycol (GOLYTELY) 4000 mL solution Take 4,000 mL by mouth once for 1 dose Take as directed by office instructions prior to colonoscopy. (Patient not taking: Reported on 11/13/2024) 4000 mL 0    sucralfate (CARAFATE) 1 g/10 mL suspension TAKE 10 ML (1 G TOTAL) BY MOUTH 2 (TWO) TIMES A DAY (Patient not taking: Reported on 4/11/2024) 414 mL 0    [DISCONTINUED] methylPREDNISolone 4 MG tablet therapy pack Use as directed on package (Patient not taking: Reported on 11/13/2024) 1 each 0    [DISCONTINUED] naproxen (Naprosyn) 500 mg tablet Take 1 tablet (500 mg total) by mouth 2 (two) times a day with meals for 10 days (Patient not taking: Reported on 11/13/2024) 20 tablet 0     No current facility-administered medications on file prior to visit.     Allergies   Allergen Reactions    Cymbalta [Duloxetine Hcl] Nausea Only and GI Intolerance     Stomach upset    Other Other (See Comments)     Tomatoes, black walnuts, cherries, peaches gives her  itchy throat  Other reaction(s): Other , Other       Current Outpatient Medications on File Prior to Visit   Medication Sig Dispense Refill    albuterol (PROVENTIL HFA,VENTOLIN HFA) 90 mcg/act inhaler TAKE 2 PUFFS BY MOUTH EVERY 4 HOURS AS NEEDED FOR WHEEZE 8.5 g 5    Azelastine HCl 137 MCG/SPRAY SOLN 1 SPRAY INTO EACH NOSTRIL 2 (TWO) TIMES A DAY USE IN EACH NOSTRIL AS DIRECTED 30 mL 0    celecoxib (CeleBREX) 100 mg capsule TAKE 1 CAPSULE BY MOUTH TWICE A DAY 30 capsule 5    clonazePAM (KlonoPIN) 0.5 mg tablet TAKE 1 TABLET BY MOUTH EVERY DAY 30 tablet 0    diclofenac (VOLTAREN) 75 mg EC tablet Take 75 mg by mouth 2 (two) times a day as needed      ergocalciferol (VITAMIN D2) 50,000 units TAKE 1 CAPSULE (50,000 UNITS TOTAL) BY MOUTH ONCE A WEEK EVERY THURSDAY 12 capsule 1    fluocinonide (LIDEX) 0.05 % ointment       fluticasone (FLONASE) 50 mcg/act nasal spray SPRAY 2 SPRAYS INTO EACH NOSTRIL EVERY DAY 48 mL 2    levonorgestrel (MIRENA) 20 MCG/24HR IUD Mirena 20 MCG/24HR Intrauterine Intrauterine Device  Refills: 0  Active      montelukast (SINGULAIR) 10 mg tablet TAKE 1 TABLET BY MOUTH DAILY AT BEDTIME 100 tablet 1    NON FORMULARY Take 1 Dose by mouth Medical marijuana      ondansetron (ZOFRAN) 4 mg tablet Take 1 tablet (4 mg total) by mouth every 8 (eight) hours as needed for nausea or vomiting 20 tablet 0    [DISCONTINUED] methocarbamol (ROBAXIN) 500 mg tablet Take 1 tablet (500 mg total) by mouth 3 (three) times a day for 14 days 42 tablet 0    dicyclomine (BENTYL) 20 mg tablet Take 1 tablet (20 mg total) by mouth every 6 (six) hours (Patient not taking: Reported on 11/13/2024) 20 tablet 0    famotidine (PEPCID) 40 MG tablet TAKE 1 TABLET BY MOUTH DAILY AT BEDTIME (Patient not taking: Reported on 11/13/2024) 90 tablet 1    lactulose (CHRONULAC) 10 g/15 mL solution TAKE 30 ML (20 G TOTAL) BY MOUTH 2 (TWO) TIMES A  mL 1    Lidocaine Viscous HCl (XYLOCAINE) 2 % mucosal solution Swish and spit 15 mL 2 (two)  times a day as needed for mouth pain or discomfort (Patient not taking: Reported on 11/13/2024) 100 mL 0    pantoprazole (PROTONIX) 40 mg tablet Take 1 tablet (40 mg total) by mouth 2 (two) times a day before meals (Patient not taking: Reported on 11/13/2024) 60 tablet 4    polyethylene glycol (GOLYTELY) 4000 mL solution Take 4,000 mL by mouth once for 1 dose Take as directed by office instructions prior to colonoscopy. (Patient not taking: Reported on 11/13/2024) 4000 mL 0    sucralfate (CARAFATE) 1 g/10 mL suspension TAKE 10 ML (1 G TOTAL) BY MOUTH 2 (TWO) TIMES A DAY (Patient not taking: Reported on 4/11/2024) 414 mL 0    [DISCONTINUED] methylPREDNISolone 4 MG tablet therapy pack Use as directed on package (Patient not taking: Reported on 11/13/2024) 1 each 0    [DISCONTINUED] naproxen (Naprosyn) 500 mg tablet Take 1 tablet (500 mg total) by mouth 2 (two) times a day with meals for 10 days (Patient not taking: Reported on 11/13/2024) 20 tablet 0     No current facility-administered medications on file prior to visit.      Social History     Tobacco Use    Smoking status: Former     Types: Cigarettes     Passive exposure: Never    Smokeless tobacco: Never    Tobacco comments:     quit 2018   Vaping Use    Vaping status: Never Used   Substance and Sexual Activity    Alcohol use: Yes     Alcohol/week: 3.0 standard drinks of alcohol     Types: 1 Glasses of wine, 1 Cans of beer, 1 Shots of liquor per week     Comment: social     Drug use: Not Currently     Types: Marijuana     Comment: x 1 month    Sexual activity: Yes     Partners: Male     Birth control/protection: I.U.D.         Objective     There were no vitals taken for this visit.  Physical Exam  Constitutional:       Appearance: Normal appearance.   Pulmonary:      Effort: Pulmonary effort is normal.   Neurological:      Mental Status: She is alert.   Psychiatric:         Mood and Affect: Mood normal.         Behavior: Behavior normal.         Visit  Time  Total Visit Duration: 25 minutes

## 2024-11-14 ENCOUNTER — VBI (OUTPATIENT)
Dept: ADMINISTRATIVE | Facility: OTHER | Age: 44
End: 2024-11-14

## 2024-11-14 NOTE — TELEPHONE ENCOUNTER
11/14/24 9:57 AM     Chart reviewed for Blood Pressure was/were submitted to the patient's insurance.     Arelis Rogel   [facility]

## 2024-11-26 DIAGNOSIS — K21.9 GASTROESOPHAGEAL REFLUX DISEASE, UNSPECIFIED WHETHER ESOPHAGITIS PRESENT: ICD-10-CM

## 2024-11-27 RX ORDER — FAMOTIDINE 40 MG/1
40 TABLET, FILM COATED ORAL
Qty: 90 TABLET | Refills: 1 | Status: SHIPPED | OUTPATIENT
Start: 2024-11-27

## 2024-11-29 ENCOUNTER — APPOINTMENT (OUTPATIENT)
Dept: LAB | Facility: MEDICAL CENTER | Age: 44
End: 2024-11-29

## 2024-11-29 ENCOUNTER — TELEPHONE (OUTPATIENT)
Age: 44
End: 2024-11-29

## 2024-11-29 DIAGNOSIS — F41.9 ANXIETY: ICD-10-CM

## 2024-11-29 DIAGNOSIS — Z00.6 ENCOUNTER FOR EXAMINATION FOR NORMAL COMPARISON OR CONTROL IN CLINICAL RESEARCH PROGRAM: ICD-10-CM

## 2024-11-29 PROCEDURE — 36415 COLL VENOUS BLD VENIPUNCTURE: CPT

## 2024-11-29 RX ORDER — CLONAZEPAM 0.5 MG/1
0.5 TABLET ORAL DAILY
Qty: 30 TABLET | Refills: 0 | Status: SHIPPED | OUTPATIENT
Start: 2024-11-29

## 2024-11-29 NOTE — TELEPHONE ENCOUNTER
Medication:  PDMP   10/30/2024 10/30/2024 clonazePAM (Tablet) 30.0 30 0.5 MG AMPARO GUTIERREZ     Active agreement on file -No

## 2024-11-29 NOTE — TELEPHONE ENCOUNTER
Reason for call:   [] Refill   [x] Prior Auth  [] Other:     Office:   [x] PCP/Provider -   [] Specialty/Provider -

## 2024-12-05 DIAGNOSIS — M54.50 ACUTE LEFT-SIDED LOW BACK PAIN WITHOUT SCIATICA: ICD-10-CM

## 2024-12-06 RX ORDER — TIZANIDINE HYDROCHLORIDE 4 MG/1
4 CAPSULE, GELATIN COATED ORAL 3 TIMES DAILY PRN
Qty: 270 CAPSULE | Refills: 1 | Status: SHIPPED | OUTPATIENT
Start: 2024-12-06

## 2024-12-09 ENCOUNTER — OFFICE VISIT (OUTPATIENT)
Age: 44
End: 2024-12-09
Payer: COMMERCIAL

## 2024-12-09 ENCOUNTER — TELEPHONE (OUTPATIENT)
Age: 44
End: 2024-12-09

## 2024-12-09 VITALS
HEART RATE: 65 BPM | DIASTOLIC BLOOD PRESSURE: 90 MMHG | WEIGHT: 173 LBS | SYSTOLIC BLOOD PRESSURE: 135 MMHG | HEIGHT: 63 IN | BODY MASS INDEX: 30.65 KG/M2

## 2024-12-09 DIAGNOSIS — M54.42 ACUTE LEFT-SIDED LOW BACK PAIN WITH LEFT-SIDED SCIATICA: Primary | ICD-10-CM

## 2024-12-09 DIAGNOSIS — M54.16 LUMBAR RADICULOPATHY: ICD-10-CM

## 2024-12-09 PROCEDURE — 98941 CHIROPRACT MANJ 3-4 REGIONS: CPT | Performed by: CHIROPRACTOR

## 2024-12-09 PROCEDURE — 99213 OFFICE O/P EST LOW 20 MIN: CPT | Performed by: CHIROPRACTOR

## 2024-12-09 RX ORDER — LIDOCAINE 50 MG/G
1 PATCH TOPICAL DAILY
COMMUNITY
Start: 2024-12-06 | End: 2024-12-16 | Stop reason: SDUPTHER

## 2024-12-09 RX ORDER — OXYCODONE HYDROCHLORIDE 5 MG/1
TABLET ORAL
COMMUNITY
Start: 2024-12-06 | End: 2024-12-13

## 2024-12-09 NOTE — TELEPHONE ENCOUNTER
Spoke with patient, she is in extreme pain - she asked if Dr. Caal can prescribe her something stronger. Notes previous medications are not helping.

## 2024-12-09 NOTE — TELEPHONE ENCOUNTER
She already had oxycodone 7 tablets in the last 2 days- that's a lot of meds for short amount of time and she is already on klonapin - together can cause respiratory suppression-.  I am happy to send steroid pack for her if she is interested

## 2024-12-09 NOTE — TELEPHONE ENCOUNTER
Spoke with the patient and she stated that she is waiting on a call back to pain management. Patient stated that she is in a lot of pain and was asking for something to be called in as in a narcotic. The patient stated that she isn't able to sleep due to the pain. Please advise.

## 2024-12-09 NOTE — PROGRESS NOTES
"Date of first visit: 12/9/2024      HPI:  Jolene presents for evaluation and treatment today of the worsening of acute low back pain.  She describes that over a month ago she began feeling increased low back pain which then most recently exacerbated with symptoms into the left buttock and posterior thigh.  She notes the pain is intense she actually presented to the local ED.  Prior to this she had trialed Robaxin meloxicam as well as topical pain patches from her PCP however had no improvement.    She did receive oral prednisone and notes some slight improvement in her condition but still is having persistent symptomatology.  Pain is aggravated by excessive axial loads alleviated by lying down.  She is not receiving restorative sleep.    She denies any changes in bowel bladder status she reports no other \"red flag\" symptoms.  Denies any recent fever chills night sweats infection.    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      The following portions of the patient's history were reviewed and updated as appropriate: allergies, current medications, past family history, past medical history, past social history, past surgical history, and problem list.    Review of Systems    Physical Exam:  Exam today reveals ability to be in some distress she moves about the exam room slowly she has palpable paralumbar spasm noted tender upon palpation midline and left parasacral region.  Lumbar range of motion is limited in all planes.    Neurologically she has positive root tension signs on the left on straight leg raising her motor strength is 5/5 she can heel and toe walk without problem.  Remaining neurologic exam is stable.  I did review CT performed in the ED revealed degenerative changes seen.    Assessment:   Diagnosis ICD-10-CM Associated Orders   1. Acute left-sided low back pain with left-sided " sciatica  M54.42       2. Lumbar radiculopathy  M54.16                 Treatment: 56422  Manipulation performed to the left innominate, sacrum, L5 L4 levels via side-lying flexion distraction procedure well-tolerated by the patient pretreat US low back    Discussion:  Reviewed case with the patient today.  I reviewed past medical notes.  Will have treatment over the next 2 weeks goals of reducing swelling and spasm and decreasing pain will continue to monitor as she progresses reassess at 2 weeks in 4 weeks.  I reviewed home care.  Will see her back for follow-up later this week.

## 2024-12-09 NOTE — TELEPHONE ENCOUNTER
Patient called because she had a virtual visit with her provider on 11/13/24 and was prescribed medication and she said that the medication is not working so she had to go to the ER on Friday and they prescribed her 7 pain killers and she said she is still in extreme pain and wants to know what else her doctor recommends her to do. She would like a call back to further discuss. Thank you.

## 2024-12-09 NOTE — TELEPHONE ENCOUNTER
Has she called her pain management at [facility]? Its chronic pain she has I dont prescribe chronic pain meds. I can offer steroid taper if she likes. And she should also call her pain management as well    Gerard Garcia, DO   [address]   [address]   [place] [address] [phone] (Work)   [phone] (Fax)

## 2024-12-10 ENCOUNTER — TELEPHONE (OUTPATIENT)
Age: 44
End: 2024-12-10

## 2024-12-10 NOTE — TELEPHONE ENCOUNTER
Pt was looking to see [facility] provider. Pt had prior pain mgmt within 2 years, pt was looking to be seen soon as possible. I read disclaimer and I advised pt of the review process. Pt declined the review process and will continue care with her current pain mgmt provider.

## 2024-12-10 NOTE — TELEPHONE ENCOUNTER
Percocet was given for acute pain when she had when she overdid/hurts herself. NOT for chronic pain. She didn't have pain management back then either-now they have to take over. We could call her pain management for her if she likes. Give us a number? Please talk to Cherelle about this message- it sounds like a threat at this time and I do not feel comfortable dealing with this at this point.     Dr rogers

## 2024-12-10 NOTE — TELEPHONE ENCOUNTER
I cannot prescribe chronic pain meds as I am not trained in pain management ( I will be medically liable for this) .   She is to follow up with her pain management for further recommendation and to see if they could call in something for her in the meantime until they see her in few weeks?

## 2024-12-10 NOTE — TELEPHONE ENCOUNTER
Patient stated she is not taking Klonapin at this time and the ER already gave her a steroid pack that she finished. Patient states she is in excruciating pain.     Please advise.

## 2024-12-10 NOTE — TELEPHONE ENCOUNTER
Spoke with the patient and she stated that you have given her percocet in the past. The patient was asking why couldn't do that for her now. The patient stated that she doesn't believe that pain management will give her anything until she is seen. The patient kept asking for something to be called in for her and will not take no for answer. Please advise.

## 2024-12-11 NOTE — TELEPHONE ENCOUNTER
Spoke to Jolene, informed her Dr. Caal will not be prescribing pain medications and she must contact pain management.  Provided additional phone numbers for pain management including Dr. Tovar, pain specialist of [facility] and Dr. Pinzon.

## 2024-12-12 ENCOUNTER — PATIENT MESSAGE (OUTPATIENT)
Dept: FAMILY MEDICINE CLINIC | Facility: CLINIC | Age: 44
End: 2024-12-12

## 2024-12-13 ENCOUNTER — PROCEDURE VISIT (OUTPATIENT)
Age: 44
End: 2024-12-13
Payer: COMMERCIAL

## 2024-12-13 VITALS
DIASTOLIC BLOOD PRESSURE: 81 MMHG | BODY MASS INDEX: 30.65 KG/M2 | SYSTOLIC BLOOD PRESSURE: 106 MMHG | WEIGHT: 173 LBS | HEIGHT: 63 IN | HEART RATE: 80 BPM

## 2024-12-13 DIAGNOSIS — M54.2 NECK PAIN: ICD-10-CM

## 2024-12-13 DIAGNOSIS — M79.18 MYOFASCIAL PAIN: ICD-10-CM

## 2024-12-13 DIAGNOSIS — M54.42 ACUTE LEFT-SIDED LOW BACK PAIN WITH LEFT-SIDED SCIATICA: Primary | ICD-10-CM

## 2024-12-13 DIAGNOSIS — M54.6 ACUTE BILATERAL THORACIC BACK PAIN: ICD-10-CM

## 2024-12-13 DIAGNOSIS — M54.12 CERVICAL RADICULOPATHY: ICD-10-CM

## 2024-12-13 DIAGNOSIS — M54.16 LUMBAR RADICULOPATHY: ICD-10-CM

## 2024-12-13 LAB
APOB+LDLR+PCSK9 GENE MUT ANL BLD/T: NOT DETECTED
BRCA1+BRCA2 DEL+DUP + FULL MUT ANL BLD/T: NOT DETECTED
MLH1+MSH2+MSH6+PMS2 GN DEL+DUP+FUL M: NOT DETECTED

## 2024-12-13 PROCEDURE — 98941 CHIROPRACT MANJ 3-4 REGIONS: CPT | Performed by: CHIROPRACTOR

## 2024-12-13 RX ORDER — METHOCARBAMOL 500 MG/1
500 TABLET, FILM COATED ORAL 3 TIMES DAILY PRN
COMMUNITY
Start: 2024-12-10

## 2024-12-15 DIAGNOSIS — G43.001 MIGRAINE WITHOUT AURA AND WITH STATUS MIGRAINOSUS, NOT INTRACTABLE: Primary | ICD-10-CM

## 2024-12-16 DIAGNOSIS — E55.9 VITAMIN D DEFICIENCY: ICD-10-CM

## 2024-12-16 DIAGNOSIS — M22.42 CHONDROMALACIA OF BOTH PATELLAE: ICD-10-CM

## 2024-12-16 DIAGNOSIS — M54.50 ACUTE LEFT-SIDED LOW BACK PAIN WITHOUT SCIATICA: Primary | ICD-10-CM

## 2024-12-16 DIAGNOSIS — M22.41 CHONDROMALACIA OF BOTH PATELLAE: ICD-10-CM

## 2024-12-17 ENCOUNTER — TELEPHONE (OUTPATIENT)
Dept: OBGYN CLINIC | Facility: CLINIC | Age: 44
End: 2024-12-17

## 2024-12-17 RX ORDER — LIDOCAINE 50 MG/G
1 PATCH TOPICAL DAILY
Qty: 30 PATCH | Refills: 0 | Status: SHIPPED | OUTPATIENT
Start: 2024-12-17

## 2024-12-17 RX ORDER — CELECOXIB 100 MG/1
100 CAPSULE ORAL 2 TIMES DAILY
Qty: 30 CAPSULE | Refills: 0 | Status: SHIPPED | OUTPATIENT
Start: 2024-12-17

## 2024-12-17 RX ORDER — ERGOCALCIFEROL 1.25 MG/1
50000 CAPSULE, LIQUID FILLED ORAL WEEKLY
Qty: 12 CAPSULE | Refills: 0 | Status: SHIPPED | OUTPATIENT
Start: 2024-12-17

## 2024-12-17 RX ORDER — DICLOFENAC SODIUM 75 MG/1
75 TABLET, DELAYED RELEASE ORAL 2 TIMES DAILY PRN
Qty: 30 TABLET | Refills: 0 | Status: SHIPPED | OUTPATIENT
Start: 2024-12-17 | End: 2025-12-17

## 2024-12-17 NOTE — TELEPHONE ENCOUNTER
The Celebrex is bumping up against the Meloxicam. Please confirm pt is to be taking both medications for a Prior Auth of Celebrex.    Please send response back to [facility] Auth POD.        Thank you

## 2024-12-18 RX ORDER — DEXAMETHASONE 4 MG/1
TABLET ORAL
Qty: 6 TABLET | Refills: 0 | Status: SHIPPED | OUTPATIENT
Start: 2024-12-18

## 2024-12-19 ENCOUNTER — TELEPHONE (OUTPATIENT)
Dept: FAMILY MEDICINE CLINIC | Facility: CLINIC | Age: 44
End: 2024-12-19

## 2024-12-19 NOTE — TELEPHONE ENCOUNTER
[place] diclofenac (VOLTAREN) 75 mg SUBMITTED     to [facility]    via    []CMM-KEY:    [x]Surescripts-Case ID # [id number]   []Availity-Auth ID #  NDC #    []Faxed to plan   []Other website    []Phone call Case ID #      []PA sent as URGENT    All office notes, labs and other pertaining documents and studies sent. Clinical questions answered. Awaiting determination from insurance company.     Turnaround time for your insurance to make a decision on your Prior Authorization can take 7-21 business days.

## 2024-12-19 NOTE — TELEPHONE ENCOUNTER
Forwarding to PA team for review.       Pharmacy comment: Alternative Requested:PRIOR AUTH REQ.     Name from pharmacy: DICLOFENAC SOD EC 75 MG TAB

## 2024-12-20 NOTE — TELEPHONE ENCOUNTER
[place] diclofenac (VOLTAREN) 75 mg APPROVED         Patient advised by          []MyChart Message  []Phone call   []LMOM  []L/M to call office as no active Communication consent on file  []Unable to leave detailed message as VM not approved on Communication consent       Pharmacy advised by    [x]Fax  []Phone call

## 2024-12-22 NOTE — PROGRESS NOTES
Date of first visit: 12/9/2024      HPI:  Jolene presents for treatment of acute low back and left leg pain symptoms are ongoing and 8 on a pain scale.    The following portions of the patient's history were reviewed and updated as appropriate: allergies, current medications, past family history, past medical history, past social history, past surgical history, and problem list.    Review of Systems    Physical Exam:  Exam today reveals ability to be in some distress she moves about the exam room slowly she has palpable paralumbar spasm noted tender upon palpation midline and left parasacral region.  Lumbar range of motion is limited in all planes.    Neurologically she has positive root tension signs on the left on straight leg raising her motor strength is 5/5 she can heel and toe walk without problem.  Remaining neurologic exam is stable.      Assessment:   Diagnosis ICD-10-CM Associated Orders   1. Acute left-sided low back pain with left-sided sciatica  M54.42       2. Lumbar radiculopathy  M54.16       3. Neck pain  M54.2       4. Cervical radiculopathy  M54.12       5. Myofascial pain  M79.18       6. Acute bilateral thoracic back pain  M54.6                 Treatment: 16163  Manipulation performed to the left innominate, sacrum, L5 L4 levels via side-lying flexion distraction procedure well-tolerated by the patient pretreat US low back    Discussion:  Continue home program.

## 2024-12-29 DIAGNOSIS — F41.9 ANXIETY: ICD-10-CM

## 2024-12-30 RX ORDER — CLONAZEPAM 0.5 MG/1
0.5 TABLET ORAL DAILY
Qty: 30 TABLET | Refills: 0 | Status: SHIPPED | OUTPATIENT
Start: 2024-12-30

## 2024-12-30 NOTE — TELEPHONE ENCOUNTER
Medication:  PDMP   12/01/2024 11/29/2024 clonazePAM (Tablet) 30.0 30 0.5 MG AMPARO GUTIERREZ     Active agreement on file -No

## 2025-02-09 DIAGNOSIS — E55.9 VITAMIN D DEFICIENCY: ICD-10-CM

## 2025-02-09 DIAGNOSIS — M54.50 ACUTE LEFT-SIDED LOW BACK PAIN WITHOUT SCIATICA: ICD-10-CM

## 2025-02-11 RX ORDER — ERGOCALCIFEROL 1.25 MG/1
50000 CAPSULE, LIQUID FILLED ORAL WEEKLY
Qty: 12 CAPSULE | Refills: 0 | Status: SHIPPED | OUTPATIENT
Start: 2025-02-11

## 2025-02-11 RX ORDER — LIDOCAINE 50 MG/G
1 PATCH TOPICAL DAILY
Qty: 30 PATCH | Refills: 0 | Status: SHIPPED | OUTPATIENT
Start: 2025-02-11

## 2025-02-17 ENCOUNTER — OFFICE VISIT (OUTPATIENT)
Dept: FAMILY MEDICINE CLINIC | Facility: CLINIC | Age: 45
End: 2025-02-17
Payer: COMMERCIAL

## 2025-02-17 VITALS
HEART RATE: 73 BPM | BODY MASS INDEX: 31.89 KG/M2 | RESPIRATION RATE: 18 BRPM | DIASTOLIC BLOOD PRESSURE: 76 MMHG | HEIGHT: 63 IN | OXYGEN SATURATION: 99 % | SYSTOLIC BLOOD PRESSURE: 118 MMHG | WEIGHT: 180 LBS

## 2025-02-17 DIAGNOSIS — E55.9 VITAMIN D DEFICIENCY: ICD-10-CM

## 2025-02-17 DIAGNOSIS — N80.9 ENDOMETRIOSIS: ICD-10-CM

## 2025-02-17 DIAGNOSIS — R35.0 URINARY FREQUENCY: Primary | ICD-10-CM

## 2025-02-17 DIAGNOSIS — N92.0 INTERMENSTRUAL SPOTTING DUE TO INTRAUTERINE DEVICE (IUD): ICD-10-CM

## 2025-02-17 DIAGNOSIS — F41.9 ANXIETY: ICD-10-CM

## 2025-02-17 DIAGNOSIS — K21.9 GASTROESOPHAGEAL REFLUX DISEASE WITHOUT ESOPHAGITIS: ICD-10-CM

## 2025-02-17 DIAGNOSIS — Z97.5 INTERMENSTRUAL SPOTTING DUE TO INTRAUTERINE DEVICE (IUD): ICD-10-CM

## 2025-02-17 LAB
SL AMB  POCT GLUCOSE, UA: ABNORMAL
SL AMB LEUKOCYTE ESTERASE,UA: ABNORMAL
SL AMB POCT BILIRUBIN,UA: ABNORMAL
SL AMB POCT BLOOD,UA: ABNORMAL
SL AMB POCT CLARITY,UA: CLEAR
SL AMB POCT COLOR,UA: YELLOW
SL AMB POCT KETONES,UA: ABNORMAL
SL AMB POCT NITRITE,UA: ABNORMAL
SL AMB POCT PH,UA: 6
SL AMB POCT SPECIFIC GRAVITY,UA: 1.02
SL AMB POCT URINE PROTEIN: ABNORMAL
SL AMB POCT UROBILINOGEN: ABNORMAL

## 2025-02-17 PROCEDURE — 99214 OFFICE O/P EST MOD 30 MIN: CPT | Performed by: FAMILY MEDICINE

## 2025-02-17 PROCEDURE — 81002 URINALYSIS NONAUTO W/O SCOPE: CPT | Performed by: FAMILY MEDICINE

## 2025-02-17 PROCEDURE — 87086 URINE CULTURE/COLONY COUNT: CPT | Performed by: FAMILY MEDICINE

## 2025-02-17 PROCEDURE — 87077 CULTURE AEROBIC IDENTIFY: CPT | Performed by: FAMILY MEDICINE

## 2025-02-17 RX ORDER — CLINDAMYCIN PHOSPHATE 10 MG/G
GEL TOPICAL
COMMUNITY
Start: 2025-02-11

## 2025-02-17 RX ORDER — TRIAMCINOLONE ACETONIDE 1 MG/G
OINTMENT TOPICAL
COMMUNITY
Start: 2025-02-11

## 2025-02-17 RX ORDER — FLUCONAZOLE 150 MG/1
1 TABLET ORAL DAILY
COMMUNITY
Start: 2024-11-13

## 2025-02-17 RX ORDER — ESCITALOPRAM OXALATE 5 MG/1
5 TABLET ORAL DAILY
Qty: 30 TABLET | Refills: 1 | Status: SHIPPED | OUTPATIENT
Start: 2025-02-17

## 2025-02-17 RX ORDER — CLONAZEPAM 1 MG/1
1 TABLET ORAL DAILY
Qty: 30 TABLET | Refills: 0 | Status: SHIPPED | OUTPATIENT
Start: 2025-02-17

## 2025-02-17 NOTE — PROGRESS NOTES
Name: Jolene Tierney      : 1980      MRN: [id number]  Encounter Provider: Ayan Caba MD  Encounter Date: 2025   Encounter department: [facility]  :  Follow-up with myself or PCP in 6 weeks.  Assessment & Plan  Urinary frequency  Increased urinary frequency and hesitancy.  Urine dip revealed bilirubin and protein.  No leuks or blood.  Pelvic region is tender on palpation.  Urine culture collected as well as transvaginal ultrasound.  Orders:    POCT urine dip    Urine culture    Intermenstrual spotting due to intrauterine device (IUD)  Spotting reported over the last 2 weeks.  Mirena placed 2 to 3 years ago.  Lower abdomen tender on exam.  Has an appointment already scheduled with her gynecologist.  Will order transvaginal ultrasound as well as labs.  Does complain of fatigue which could be due to poor sleep or anemia.  Iron panel also ordered.  Orders:    US transvaginal only; Future    TSH, 3rd generation with Free T4 reflex; Future    CBC and differential; Future    Iron Panel (Includes Ferritin, Iron Sat%, Iron, and TIBC); Future    Anxiety  Increased anxiety at night with insomnia.  Currently on Klonopin 0.5 mg at night.  Tried Zoloft, Celexa, and Cymbalta in the past.  Recommend adding an SSRI and agreed to increase Klonopin to 1 mg for now.  The goal is to taper her down to her original dose and continue Lexapro.  Orders:    escitalopram (LEXAPRO) 5 mg tablet; Take 1 tablet (5 mg total) by mouth daily    clonazePAM (KlonoPIN) 1 mg tablet; Take 1 tablet (1 mg total) by mouth daily    Vitamin B12; Future    TSH, 3rd generation with Free T4 reflex; Future    CBC and differential; Future    Iron Panel (Includes Ferritin, Iron Sat%, Iron, and TIBC); Future    Endometriosis  History of endometriosis with pelvic pain.  Transvaginal ultrasound ordered.  Has follow-up with gynecologist.       Vitamin D deficiency  Known history of vitamin D deficiency with increased fatigue.  Due  "for repeat labs.  Orders:    Vitamin D 25 hydroxy; Future    BMI 31.0-31.9,adult         Gastroesophageal reflux disease without esophagitis  History of acid reflux.  Currently on PPI and H2 blocker.  Will check B12 level given long-term use of PPI.  Anti-GERD diet recommended.  Orders:    Vitamin B12; Future           History of Present Illness   Jolene is seen today for an acute visit.    Her complaints include insomnia, fatigue, headache, vaginal spotting, and GERD.    Spotting started 2 weeks ago and has persisted.  She has an appointment with her gynecologist already scheduled.  Has a Mirena which was placed 2 to 3 years ago.  States she has had Mirena for the past 20 years that were replaced every 5 years.    Blood is noted when she wipes.  Also experiencing cramping in the pelvic region.  Has a history of endometriosis.    Patient also shared that she has been feeling more anxious which is interfering with her sleep.  As a result she feels tired throughout the day.  She has been on clonazepam since 2018.  She wonders if the dose needs to be increased.  States her mind races at night.  She is not on a SSRI or SNRI.  She has tried Zoloft, Celexa, and Cymbalta (did not tolerate Cymbalta).  Lastly, she reports urinary hesitancy and frequency.  Denies dysuria or cloudy urine.      Review of Systems   Constitutional:  Positive for fatigue.   Respiratory: Negative.     Gastrointestinal:  Positive for abdominal pain and diarrhea.   Genitourinary:  Positive for difficulty urinating, frequency and pelvic pain. Negative for dysuria and enuresis.   Psychiatric/Behavioral:  Positive for sleep disturbance. The patient is nervous/anxious.        Objective   /76 (BP Location: Left arm, Patient Position: Sitting, Cuff Size: Large)   Pulse 73   Resp 18   Ht 5' 3\" (1.6 m)   Wt 81.6 kg (180 lb)   SpO2 99%   BMI 31.89 kg/m²      Physical Exam  Vitals reviewed.   Constitutional:       General: She is not in acute " distress.     Appearance: Normal appearance. She is not ill-appearing.      Comments: Tired appearing   HENT:      Head: Normocephalic and atraumatic.   Eyes:      Extraocular Movements: Extraocular movements intact.   Pulmonary:      Effort: Pulmonary effort is normal.   Abdominal:      General: There is no distension.      Palpations: Abdomen is soft. There is no mass.      Tenderness: There is abdominal tenderness in the right lower quadrant, suprapubic area and left lower quadrant. There is no right CVA tenderness, left CVA tenderness, guarding or rebound.      Hernia: No hernia is present.   Skin:     General: Skin is warm.      Coloration: Skin is not pale.   Psychiatric:         Attention and Perception: Attention normal.         Mood and Affect: Mood is depressed.         Behavior: Behavior is withdrawn.         Thought Content: Thought content normal.         Cognition and Memory: Cognition and memory normal.

## 2025-02-17 NOTE — ASSESSMENT & PLAN NOTE
History of endometriosis with pelvic pain.  Transvaginal ultrasound ordered.  Has follow-up with gynecologist.

## 2025-02-17 NOTE — ASSESSMENT & PLAN NOTE
Increased anxiety at night with insomnia.  Currently on Klonopin 0.5 mg at night.  Tried Zoloft, Celexa, and Cymbalta in the past.  Recommend adding an SSRI and agreed to increase Klonopin to 1 mg for now.  The goal is to taper her down to her original dose and continue Lexapro.  Orders:    escitalopram (LEXAPRO) 5 mg tablet; Take 1 tablet (5 mg total) by mouth daily    clonazePAM (KlonoPIN) 1 mg tablet; Take 1 tablet (1 mg total) by mouth daily    Vitamin B12; Future    TSH, 3rd generation with Free T4 reflex; Future    CBC and differential; Future    Iron Panel (Includes Ferritin, Iron Sat%, Iron, and TIBC); Future

## 2025-02-18 ENCOUNTER — TELEPHONE (OUTPATIENT)
Dept: FAMILY MEDICINE CLINIC | Facility: CLINIC | Age: 45
End: 2025-02-18

## 2025-02-18 ENCOUNTER — TELEPHONE (OUTPATIENT)
Age: 45
End: 2025-02-18

## 2025-02-18 DIAGNOSIS — G43.001 MIGRAINE WITHOUT AURA AND WITH STATUS MIGRAINOSUS, NOT INTRACTABLE: ICD-10-CM

## 2025-02-18 DIAGNOSIS — R35.0 URINARY FREQUENCY: Primary | ICD-10-CM

## 2025-02-18 RX ORDER — NITROFURANTOIN 25; 75 MG/1; MG/1
100 CAPSULE ORAL 2 TIMES DAILY
Qty: 10 CAPSULE | Refills: 0 | Status: SHIPPED | OUTPATIENT
Start: 2025-02-18 | End: 2025-02-20

## 2025-02-18 RX ORDER — SUMATRIPTAN SUCCINATE 25 MG/1
25 TABLET ORAL ONCE AS NEEDED
Qty: 10 TABLET | Refills: 0 | Status: SHIPPED | OUTPATIENT
Start: 2025-02-18

## 2025-02-18 NOTE — ASSESSMENT & PLAN NOTE
Known history of vitamin D deficiency with increased fatigue.  Due for repeat labs.  Orders:    Vitamin D 25 hydroxy; Future

## 2025-02-18 NOTE — TELEPHONE ENCOUNTER
Patient called in follow up on urine results; concerned with abnormal results of POCT urine dip. RN did advise the urine culture should result in next 24 hours. Patient reports she does not feel good; increased fatigue with lower abdominal pain/pressure, cramping pain with urination, and abdominal bloating. Please follow up with patient for review of UA. Thank you.

## 2025-02-18 NOTE — ASSESSMENT & PLAN NOTE
History of acid reflux.  Currently on PPI and H2 blocker.  Will check B12 level given long-term use of PPI.  Anti-GERD diet recommended.  Orders:    Vitamin B12; Future

## 2025-02-19 ENCOUNTER — APPOINTMENT (OUTPATIENT)
Dept: LAB | Facility: MEDICAL CENTER | Age: 45
End: 2025-02-19
Payer: COMMERCIAL

## 2025-02-19 ENCOUNTER — TELEPHONE (OUTPATIENT)
Dept: FAMILY MEDICINE CLINIC | Facility: CLINIC | Age: 45
End: 2025-02-19

## 2025-02-19 DIAGNOSIS — G43.001 MIGRAINE WITHOUT AURA AND WITH STATUS MIGRAINOSUS, NOT INTRACTABLE: ICD-10-CM

## 2025-02-19 DIAGNOSIS — N92.0 INTERMENSTRUAL SPOTTING DUE TO INTRAUTERINE DEVICE (IUD): ICD-10-CM

## 2025-02-19 DIAGNOSIS — K21.9 GASTROESOPHAGEAL REFLUX DISEASE WITHOUT ESOPHAGITIS: ICD-10-CM

## 2025-02-19 DIAGNOSIS — F41.9 ANXIETY: ICD-10-CM

## 2025-02-19 DIAGNOSIS — Z97.5 INTERMENSTRUAL SPOTTING DUE TO INTRAUTERINE DEVICE (IUD): ICD-10-CM

## 2025-02-19 DIAGNOSIS — E55.9 VITAMIN D DEFICIENCY: ICD-10-CM

## 2025-02-19 LAB
BASOPHILS # BLD AUTO: 0.05 THOUSANDS/ΜL (ref 0–0.1)
BASOPHILS NFR BLD AUTO: 1 % (ref 0–1)
EOSINOPHIL # BLD AUTO: 0.02 THOUSAND/ΜL (ref 0–0.61)
EOSINOPHIL NFR BLD AUTO: 0 % (ref 0–6)
ERYTHROCYTE [DISTWIDTH] IN BLOOD BY AUTOMATED COUNT: 12 % (ref 11.6–15.1)
HCT VFR BLD AUTO: 46.5 % (ref 34.8–46.1)
HGB BLD-MCNC: 14.6 G/DL (ref 11.5–15.4)
IMM GRANULOCYTES # BLD AUTO: 0.01 THOUSAND/UL (ref 0–0.2)
IMM GRANULOCYTES NFR BLD AUTO: 0 % (ref 0–2)
IRON SATN MFR SERPL: 26 % (ref 15–50)
IRON SERPL-MCNC: 85 UG/DL (ref 50–212)
LYMPHOCYTES # BLD AUTO: 1.26 THOUSANDS/ΜL (ref 0.6–4.47)
LYMPHOCYTES NFR BLD AUTO: 22 % (ref 14–44)
MCH RBC QN AUTO: 32.1 PG (ref 26.8–34.3)
MCHC RBC AUTO-ENTMCNC: 31.4 G/DL (ref 31.4–37.4)
MCV RBC AUTO: 102 FL (ref 82–98)
MONOCYTES # BLD AUTO: 0.31 THOUSAND/ΜL (ref 0.17–1.22)
MONOCYTES NFR BLD AUTO: 5 % (ref 4–12)
NEUTROPHILS # BLD AUTO: 4.2 THOUSANDS/ΜL (ref 1.85–7.62)
NEUTS SEG NFR BLD AUTO: 72 % (ref 43–75)
NRBC BLD AUTO-RTO: 0 /100 WBCS
PLATELET # BLD AUTO: 214 THOUSANDS/UL (ref 149–390)
PMV BLD AUTO: 11.2 FL (ref 8.9–12.7)
RBC # BLD AUTO: 4.55 MILLION/UL (ref 3.81–5.12)
TIBC SERPL-MCNC: 322 UG/DL (ref 250–450)
TRANSFERRIN SERPL-MCNC: 230 MG/DL (ref 203–362)
UIBC SERPL-MCNC: 237 UG/DL (ref 155–355)
WBC # BLD AUTO: 5.85 THOUSAND/UL (ref 4.31–10.16)

## 2025-02-19 PROCEDURE — 83550 IRON BINDING TEST: CPT

## 2025-02-19 PROCEDURE — 85025 COMPLETE CBC W/AUTO DIFF WBC: CPT

## 2025-02-19 PROCEDURE — 82306 VITAMIN D 25 HYDROXY: CPT

## 2025-02-19 PROCEDURE — 36415 COLL VENOUS BLD VENIPUNCTURE: CPT

## 2025-02-19 PROCEDURE — 82728 ASSAY OF FERRITIN: CPT

## 2025-02-19 PROCEDURE — 83540 ASSAY OF IRON: CPT

## 2025-02-19 PROCEDURE — 84443 ASSAY THYROID STIM HORMONE: CPT

## 2025-02-19 PROCEDURE — 82607 VITAMIN B-12: CPT

## 2025-02-19 RX ORDER — NARATRIPTAN 1 MG/1
TABLET ORAL
Refills: 0 | OUTPATIENT
Start: 2025-02-19

## 2025-02-19 NOTE — TELEPHONE ENCOUNTER
Reason for call:   [x] Prior Auth  [] Other:     Caller:  [] Patient  [x] Pharmacy  Name:  [id number]   Address: [address] [place] [address]   Callback Number:     Medication: IMITREX    Dose/Frequency: Take 1 tablet (25 mg total) by mouth once as needed for migraine for up to 1 dose     Quantity: 10    Ordering Provider:   [x] PCP/Provider - Ayan Caba MD   [] Speciality/Provider -

## 2025-02-20 ENCOUNTER — RESULTS FOLLOW-UP (OUTPATIENT)
Dept: FAMILY MEDICINE CLINIC | Facility: CLINIC | Age: 45
End: 2025-02-20

## 2025-02-20 DIAGNOSIS — N39.0 URINARY TRACT INFECTION WITHOUT HEMATURIA, SITE UNSPECIFIED: Primary | ICD-10-CM

## 2025-02-20 LAB
25(OH)D3 SERPL-MCNC: 41 NG/ML (ref 30–100)
BACTERIA UR CULT: ABNORMAL
FERRITIN SERPL-MCNC: 29 NG/ML (ref 11–307)
TSH SERPL DL<=0.05 MIU/L-ACNC: 1.4 UIU/ML (ref 0.45–4.5)
VIT B12 SERPL-MCNC: 205 PG/ML (ref 180–914)

## 2025-02-20 RX ORDER — AMOXICILLIN 500 MG/1
500 CAPSULE ORAL EVERY 8 HOURS SCHEDULED
Qty: 15 CAPSULE | Refills: 0 | Status: SHIPPED | OUTPATIENT
Start: 2025-02-20 | End: 2025-02-25

## 2025-02-20 NOTE — TELEPHONE ENCOUNTER
PA Sumatriptan (Imitrex) 25 mg SUBMITTED     to [facility]     via    []CMM-KEY:    [x]Surescripts-Case ID # [id number]   []Availity-Auth ID #  NDC #    []Faxed to plan   []Other website    []Phone call Case ID #      []PA sent as URGENT    All office notes, labs and other pertaining documents and studies sent. Clinical questions answered. Awaiting determination from insurance company.     Turnaround time for your insurance to make a decision on your Prior Authorization can take 7-21 business days.

## 2025-02-21 ENCOUNTER — PATIENT MESSAGE (OUTPATIENT)
Dept: FAMILY MEDICINE CLINIC | Facility: CLINIC | Age: 45
End: 2025-02-21

## 2025-02-21 DIAGNOSIS — G43.001 MIGRAINE WITHOUT AURA AND WITH STATUS MIGRAINOSUS, NOT INTRACTABLE: ICD-10-CM

## 2025-02-21 DIAGNOSIS — N80.9 ENDOMETRIOSIS: ICD-10-CM

## 2025-02-21 DIAGNOSIS — R10.2 PELVIC PAIN: ICD-10-CM

## 2025-02-21 DIAGNOSIS — R35.0 URINARY FREQUENCY: Primary | ICD-10-CM

## 2025-02-21 RX ORDER — NARATRIPTAN 1 MG/1
TABLET ORAL
Refills: 0 | OUTPATIENT
Start: 2025-02-21

## 2025-02-21 NOTE — TELEPHONE ENCOUNTER
PA Sumatriptan (Imitrex) 25 mg DENIED    Reason:(Screenshot if applicable)        Message sent to office clinical pool Yes    Denial letter scanned into Media Yes    Appeal started No (Provider will need to decide if appeal is warranted and send clinical documentation to Prior Authorization Team for initiation.)    **Please follow up with your patient regarding denial and next steps**

## 2025-02-21 NOTE — PROGRESS NOTES
Pre-charting for Appointment      Reason for being seen today: np irregular periods spotting and tired     Any current testing/imaging done prior to exam today: blood work done on 02/19/2025

## 2025-02-24 ENCOUNTER — TELEPHONE (OUTPATIENT)
Age: 45
End: 2025-02-24

## 2025-02-24 ENCOUNTER — OFFICE VISIT (OUTPATIENT)
Dept: OBGYN CLINIC | Facility: CLINIC | Age: 45
End: 2025-02-24
Payer: COMMERCIAL

## 2025-02-24 VITALS
SYSTOLIC BLOOD PRESSURE: 122 MMHG | BODY MASS INDEX: 31.86 KG/M2 | WEIGHT: 179.8 LBS | HEIGHT: 63 IN | DIASTOLIC BLOOD PRESSURE: 80 MMHG

## 2025-02-24 DIAGNOSIS — Z11.3 SCREEN FOR STD (SEXUALLY TRANSMITTED DISEASE): ICD-10-CM

## 2025-02-24 DIAGNOSIS — N89.8 VAGINAL ODOR: ICD-10-CM

## 2025-02-24 DIAGNOSIS — Z97.5 BREAKTHROUGH BLEEDING WITH IUD: ICD-10-CM

## 2025-02-24 DIAGNOSIS — N93.9 ABNORMAL VAGINAL BLEEDING: ICD-10-CM

## 2025-02-24 DIAGNOSIS — R10.2 PELVIC PAIN: Primary | ICD-10-CM

## 2025-02-24 DIAGNOSIS — N92.1 BREAKTHROUGH BLEEDING WITH IUD: ICD-10-CM

## 2025-02-24 PROCEDURE — 87510 GARDNER VAG DNA DIR PROBE: CPT | Performed by: PHYSICIAN ASSISTANT

## 2025-02-24 PROCEDURE — 87660 TRICHOMONAS VAGIN DIR PROBE: CPT | Performed by: PHYSICIAN ASSISTANT

## 2025-02-24 PROCEDURE — 87491 CHLMYD TRACH DNA AMP PROBE: CPT | Performed by: PHYSICIAN ASSISTANT

## 2025-02-24 PROCEDURE — 99202 OFFICE O/P NEW SF 15 MIN: CPT | Performed by: PHYSICIAN ASSISTANT

## 2025-02-24 PROCEDURE — 87480 CANDIDA DNA DIR PROBE: CPT | Performed by: PHYSICIAN ASSISTANT

## 2025-02-24 PROCEDURE — 87591 N.GONORRHOEAE DNA AMP PROB: CPT | Performed by: PHYSICIAN ASSISTANT

## 2025-02-24 RX ORDER — RIZATRIPTAN BENZOATE 5 MG/1
5 TABLET, ORALLY DISINTEGRATING ORAL ONCE AS NEEDED
Qty: 10 TABLET | Refills: 0 | Status: SHIPPED | OUTPATIENT
Start: 2025-02-24

## 2025-02-24 NOTE — TELEPHONE ENCOUNTER
Patient stated that she gets  yeast infection with amoxicillin and she would like the pill sent to pharmacy [facility] in [facility]. Patient stated that she would also like to review her A1c and lipid since it was abnormal.  Mary Tolliver

## 2025-02-24 NOTE — PROGRESS NOTES
Assessment/Plan:      Diagnoses and all orders for this visit:    Pelvic pain  -     US pelvis complete w transvaginal; Future  -     VAGINOSIS DNA PROBE  -     Chlamydia/GC amplified DNA by PCR    Breakthrough bleeding with IUD  -     US pelvis complete w transvaginal; Future  -     VAGINOSIS DNA PROBE  -     Chlamydia/GC amplified DNA by PCR    Abnormal vaginal bleeding  -     VAGINOSIS DNA PROBE  -     Chlamydia/GC amplified DNA by PCR    Vaginal odor  -     VAGINOSIS DNA PROBE  -     Chlamydia/GC amplified DNA by PCR    Screen for STD (sexually transmitted disease)  -     VAGINOSIS DNA PROBE  -     Chlamydia/GC amplified DNA by PCR     44-year-old female with known Mirena IUD last placed 2023 presenting today with multiple symptoms including pelvic pain and almost daily spotting for the past 3 to 4 weeks as well as other variable symptoms including urinary symptoms with recent urine culture showing lactobacilli by PCP recently started on Amoxil, hot and cold sweats/flashes as well as anxiety recently started on Lexapro a week ago through her PCP.    Examination today revealing mild tenderness of the generalized pelvis on palpation as well as bimanual exam without localization or palpable abnormality or asymmetry.  IUD strings appropriately visualized from os without any significant bleeding in the vaginal vault, from the cervix or the os.  Examination today otherwise unremarkable.    She reports meeting with her PCP and had some low iron and B12 as well as slight elevation in her A1c nearing prediabetic range.  Patient notes a lot of changes to her body.  Her PCP initially ordered a transvaginal ultrasound but then reportedly wanted her to change to CT abdomen and pelvis.    At this point from a GYN standpoint etiology of all of her symptoms remain unclear.  She is 44 and may start experiencing some hormonal fluctuation at the start of some menopausal changes.  Also discussed it is possible to have some  breakthrough bleeding with Mirena IUD, reassuring no heavy bleeding at this time.  Unclear etiology of her pelvic symptoms and therefore would recommend proceeding with pelvic ultrasound to further evaluate reproductive organs and placement of IUD.  I encouraged her to complete Amoxil as prescribed for large growth of lactobacilli in urine.  Vaginal cultures and STD testing performed today including vaginosis probe and GC/CT; will call patient with results and treat if needed.  I encouraged her to continue Lexapro for anxiety, however certainly if hot flashes become worse we can consider switching to Effexor or Paxil which can be better for vasomotor symptom and menopause/perimenopause.  Patient will continue recommendations for B12 and iron deficiency.      I will have patient return to office in about 6 to 8 weeks to follow-up for symptoms and have her annual exam and update Pap smear at that time.  She was encouraged to complete ultrasound in advance so we can also review those results together.  Patient expresses understanding and agreeable to plan.    Chief Complaint   Patient presents with    Menstrual Problem     Pt sates that she has been spotting for just over 3 week. She is also experiencing uti. Which includes lower back and abdomen pain She was given Nitrofurantoin. She has headaches and insomnia and fatigue.        Subjective:     Patient ID: Jolene Tierney is a 44 y.o. female.    45y/o female presenting today to discuss multiple symptoms/concerns.  Pt has mirena IUD.    States she was concerned as she was experiencing vaginal spotting for past 3-4 weeks.  Also noticed needing to urinate more than usual.  Also feeling cold and night sweats.   She is concerned about starting menopause.     States she saw her PCP last week for the same.   Was tested for UTI and positive lactobacillus and tx with amoxil, which she just started today.  Still with some urinary sxs, no worse.     She denies risk for STD -  monogamous sexual relationship.  She has no current vaginal discharge, itching or odor.     States she was also told her cholesterol and A1C was high.  Also vit B12 and iron was low.    She has had mirena IUD's  for past 20 years since giving birth to her her last son.  Last switched 2023 through [facility].  States she has rare episodes of bleeding with mirena but usually only lasting 5 days, denies irregular bleeding/prolonged or heavy bleeding.     She notes generalized pelvic pain that has been present since start of the bleeding 3-4 wks ago.  Unchanged since onset.  She has also been experiencing lower back pain right side > left.  States has been constant since vaginal bleeding as well.  States standing or sitting can make pain worse.  Tried heating pad/tylenol/motrin which helps a little.  Has had back pain before but usually higher up, this feels different.  Denies chills/fevers.    States has been diagnosed with fibromyalgia which she experiences intermittent fatigue.  States she also has insomnia, which she feels is worse. States most times she feels she is not getting good quality sleep.  She was also recently started on lexapro for anxiety by her PCP which she just started a week ago.     Her active medical problems include migraines, IBS, acid reflux, anxiety, fibromyalgia, insomnia, history of endometriosis and vitamin D deficiency.            Review of Systems   Constitutional: Negative.    Respiratory: Negative.     Cardiovascular: Negative.    Gastrointestinal: Negative.  Negative for abdominal pain, constipation, diarrhea, nausea and vomiting.   Genitourinary:  Positive for frequency, menstrual problem, pelvic pain, urgency and vaginal bleeding. Negative for difficulty urinating, dyspareunia, flank pain, genital sores, hematuria, vaginal discharge and vaginal pain.   Musculoskeletal:  Positive for back pain.   Neurological:  Positive for headaches. Negative for dizziness and light-headedness.    Psychiatric/Behavioral:  The patient is nervous/anxious.          The following portions of the patient's history were reviewed and updated as appropriate: allergies, current medications, past family history, past medical history, past social history, past surgical history, and problem list.      Objective:     Physical Exam  Vitals reviewed.   Constitutional:       Appearance: Normal appearance. She is not ill-appearing.   Cardiovascular:      Rate and Rhythm: Normal rate and regular rhythm.      Heart sounds: Normal heart sounds.   Pulmonary:      Effort: Pulmonary effort is normal.      Breath sounds: Normal breath sounds.   Abdominal:      General: There is no distension.      Palpations: Abdomen is soft.      Tenderness: There is abdominal tenderness (generalized lower). There is no right CVA tenderness, left CVA tenderness, guarding or rebound.   Genitourinary:     General: Normal vulva.      Labia:         Right: No rash, tenderness or lesion.         Left: No rash, tenderness or lesion.       Vagina: No vaginal discharge, erythema, tenderness, bleeding or lesions.      Cervix: No cervical motion tenderness, discharge, friability, lesion, erythema or cervical bleeding.      Uterus: Tender.       Adnexa:         Right: Tenderness present.         Left: Tenderness present.             Comments: IUD strings visualized from os as appropriate. No significant bleeding noted within vaginal canal or from os. No lesion on cervix. Not friable. Generalized pelvic tenderness on bimanual exam without localization  Musculoskeletal:      Cervical back: Neck supple.   Lymphadenopathy:      Lower Body: No right inguinal adenopathy. No left inguinal adenopathy.   Neurological:      Mental Status: She is alert and oriented to person, place, and time.   Psychiatric:         Mood and Affect: Mood normal.         Behavior: Behavior normal. Behavior is cooperative.

## 2025-02-25 LAB
C TRACH DNA SPEC QL NAA+PROBE: NEGATIVE
CANDIDA RRNA VAG QL PROBE: DETECTED
G VAGINALIS RRNA GENITAL QL PROBE: NOT DETECTED
N GONORRHOEA DNA SPEC QL NAA+PROBE: NEGATIVE
T VAGINALIS RRNA GENITAL QL PROBE: NOT DETECTED

## 2025-02-26 ENCOUNTER — RESULTS FOLLOW-UP (OUTPATIENT)
Dept: OBGYN CLINIC | Facility: CLINIC | Age: 45
End: 2025-02-26

## 2025-02-26 DIAGNOSIS — B37.31 VAGINAL YEAST INFECTION: Primary | ICD-10-CM

## 2025-02-26 RX ORDER — FLUCONAZOLE 150 MG/1
150 TABLET ORAL ONCE
Qty: 1 TABLET | Refills: 0 | Status: SHIPPED | OUTPATIENT
Start: 2025-02-26 | End: 2025-02-26

## 2025-03-05 ENCOUNTER — HOSPITAL ENCOUNTER (OUTPATIENT)
Dept: RADIOLOGY | Facility: HOSPITAL | Age: 45
Discharge: HOME | End: 2025-03-05
Payer: COMMERCIAL

## 2025-03-05 DIAGNOSIS — N92.1 BREAKTHROUGH BLEEDING WITH IUD: ICD-10-CM

## 2025-03-05 DIAGNOSIS — Z97.5 BREAKTHROUGH BLEEDING WITH IUD: ICD-10-CM

## 2025-03-05 DIAGNOSIS — R10.2 PELVIC PAIN: ICD-10-CM

## 2025-03-05 PROCEDURE — 76856 US EXAM PELVIC COMPLETE: CPT

## 2025-03-05 PROCEDURE — 76830 TRANSVAGINAL US NON-OB: CPT

## 2025-03-07 ENCOUNTER — OFFICE VISIT (OUTPATIENT)
Dept: URGENT CARE | Facility: MEDICAL CENTER | Age: 45
End: 2025-03-07
Payer: COMMERCIAL

## 2025-03-07 VITALS
SYSTOLIC BLOOD PRESSURE: 130 MMHG | OXYGEN SATURATION: 100 % | TEMPERATURE: 97.8 F | HEART RATE: 76 BPM | WEIGHT: 181 LBS | DIASTOLIC BLOOD PRESSURE: 63 MMHG | RESPIRATION RATE: 18 BRPM | BODY MASS INDEX: 32.07 KG/M2 | HEIGHT: 63 IN

## 2025-03-07 DIAGNOSIS — R68.89 FLU-LIKE SYMPTOMS: ICD-10-CM

## 2025-03-07 DIAGNOSIS — R51.9 ACUTE NONINTRACTABLE HEADACHE, UNSPECIFIED HEADACHE TYPE: ICD-10-CM

## 2025-03-07 DIAGNOSIS — J06.9 VIRAL URI WITH COUGH: Primary | ICD-10-CM

## 2025-03-07 PROCEDURE — 99214 OFFICE O/P EST MOD 30 MIN: CPT | Performed by: PHYSICIAN ASSISTANT

## 2025-03-07 PROCEDURE — 96372 THER/PROPH/DIAG INJ SC/IM: CPT | Performed by: PHYSICIAN ASSISTANT

## 2025-03-07 PROCEDURE — 87636 SARSCOV2 & INF A&B AMP PRB: CPT | Performed by: PHYSICIAN ASSISTANT

## 2025-03-07 RX ORDER — KETOROLAC TROMETHAMINE 30 MG/ML
30 INJECTION, SOLUTION INTRAMUSCULAR; INTRAVENOUS ONCE
Status: COMPLETED | OUTPATIENT
Start: 2025-03-07 | End: 2025-03-07

## 2025-03-07 RX ORDER — OSELTAMIVIR PHOSPHATE 75 MG/1
75 CAPSULE ORAL EVERY 12 HOURS SCHEDULED
Qty: 10 CAPSULE | Refills: 0 | Status: SHIPPED | OUTPATIENT
Start: 2025-03-07 | End: 2025-03-12

## 2025-03-07 RX ORDER — METHYLPREDNISOLONE 4 MG/1
TABLET ORAL
Qty: 21 TABLET | Refills: 0 | Status: SHIPPED | OUTPATIENT
Start: 2025-03-07

## 2025-03-07 RX ADMIN — KETOROLAC TROMETHAMINE 30 MG: 30 INJECTION, SOLUTION INTRAMUSCULAR; INTRAVENOUS at 08:42

## 2025-03-07 NOTE — LETTER
March 7, 2025     Patient: Jolene Tierney   YOB: 1980   Date of Visit: 3/7/2025       To Whom it May Concern:    Jolene Tierney was seen in my clinic on 3/7/2025. She may return to work on 3/8/2025 .    If you have any questions or concerns, please don't hesitate to call.         Sincerely,          Cinda Campos PA-C        CC: No Recipients

## 2025-03-07 NOTE — PATIENT INSTRUCTIONS
Discussed with patient she likely has a viral upper respiratory infection.  Will do testing for flu and COVID.  Recommended oral steroid.   Patient is in the window for Tamiflu medication.  Advised she can start this medication today, and if the testing for flu comes back negative she can stop it.    Shot of Toradol given in office to help with pain from headache.      Take over the counter medications as needed for symptomatic management.         Follow up with PCP in 3-5 days.  Proceed to  ER if symptoms worsen.    If tests are performed, our office will contact you with results only if changes need to made to the care plan discussed with you at the visit. You can review your full results on [facility].

## 2025-03-07 NOTE — PROGRESS NOTES
[facility]        NAME: Jolene Tierney is a 44 y.o. female  : 1980    MRN: [id number]  DATE: 2025  TIME: 8:42 AM    Assessment and Plan   Viral URI with cough [J06.9]  1. Viral URI with cough  oseltamivir (TAMIFLU) 75 mg capsule    methylPREDNISolone 4 MG tablet therapy pack      2. Flu-like symptoms  Covid/Flu-Office Collect      3. Acute nonintractable headache, unspecified headache type  ketorolac (TORADOL) injection 30 mg            Patient Instructions     Patient Instructions   Discussed with patient she likely has a viral upper respiratory infection.  Will do testing for flu and COVID.  Recommended oral steroid.   Patient is in the window for Tamiflu medication.  Advised she can start this medication today, and if the testing for flu comes back negative she can stop it.    Shot of Toradol given in office to help with pain from headache.      Take over the counter medications as needed for symptomatic management.         Follow up with PCP in 3-5 days.  Proceed to  ER if symptoms worsen.    If tests are performed, our office will contact you with results only if changes need to made to the care plan discussed with you at the visit. [facility]        Chief Complaint     Chief Complaint   Patient presents with    Cold Like Symptoms     Started yesterday with sore throat, congestion, headache, body aches and nausea.         History of Present Illness       URI   This is a new problem. The current episode started yesterday. The problem has been unchanged. There has been no fever. Associated symptoms include congestion, coughing, headaches, rhinorrhea and a sore throat. Pertinent negatives include no ear pain, nausea or wheezing.       Review of Systems   Review of Systems   HENT:  Positive for congestion, rhinorrhea and sore throat. Negative for ear pain.    Respiratory:  Positive for cough. Negative for wheezing.    Gastrointestinal:  Negative  for nausea.   Neurological:  Positive for headaches.   All other systems reviewed and are negative.        Current Medications       Current Outpatient Medications:     albuterol (PROVENTIL HFA,VENTOLIN HFA) 90 mcg/act inhaler, TAKE 2 PUFFS BY MOUTH EVERY 4 HOURS AS NEEDED FOR WHEEZE, Disp: 8.5 g, Rfl: 5    Azelastine HCl 137 MCG/SPRAY SOLN, 1 SPRAY INTO EACH NOSTRIL 2 (TWO) TIMES A DAY USE IN EACH NOSTRIL AS DIRECTED, Disp: 30 mL, Rfl: 0    clindamycin 1 % gel, USE ONCE DAILY TO ACNE AREAS ON FACE., Disp: , Rfl:     clonazePAM (KlonoPIN) 1 mg tablet, Take 1 tablet (1 mg total) by mouth daily, Disp: 30 tablet, Rfl: 0    ergocalciferol (VITAMIN D2) 50,000 units, TAKE 1 CAPSULE (50,000 UNITS TOTAL) BY MOUTH ONCE A WEEK EVERY THURSDAY, Disp: 12 capsule, Rfl: 0    escitalopram (LEXAPRO) 5 mg tablet, Take 1 tablet (5 mg total) by mouth daily, Disp: 30 tablet, Rfl: 1    famotidine (PEPCID) 40 MG tablet, TAKE 1 TABLET BY MOUTH EVERYDAY AT BEDTIME, Disp: 90 tablet, Rfl: 1    fluocinonide (LIDEX) 0.05 % ointment, , Disp: , Rfl:     fluticasone (FLONASE) 50 mcg/act nasal spray, SPRAY 2 SPRAYS INTO EACH NOSTRIL EVERY DAY, Disp: 48 mL, Rfl: 2    lactulose (CHRONULAC) 10 g/15 mL solution, TAKE 30 ML (20 G TOTAL) BY MOUTH 2 (TWO) TIMES A DAY, Disp: 540 mL, Rfl: 1    levonorgestrel (MIRENA) 20 MCG/24HR IUD, Mirena 20 MCG/24HR Intrauterine Intrauterine Device  Refills: 0  Active, Disp: , Rfl:     lidocaine (LIDODERM) 5 %, APPLY 1 PATCH TOPICALLY OVER 12 HOURS DAILY, Disp: 30 patch, Rfl: 0    methocarbamol (ROBAXIN) 500 mg tablet, Take 500 mg by mouth Three times daily as needed, Disp: , Rfl:     methylPREDNISolone 4 MG tablet therapy pack, Use as directed on package, Disp: 21 tablet, Rfl: 0    montelukast (SINGULAIR) 10 mg tablet, TAKE 1 TABLET BY MOUTH DAILY AT BEDTIME, Disp: 100 tablet, Rfl: 1    omeprazole (PriLOSEC) 20 mg delayed release capsule, Take 40 mg by mouth daily, Disp: , Rfl:     ondansetron (ZOFRAN) 4 mg tablet, Take  1 tablet (4 mg total) by mouth every 8 (eight) hours as needed for nausea or vomiting, Disp: 20 tablet, Rfl: 0    oseltamivir (TAMIFLU) 75 mg capsule, Take 1 capsule (75 mg total) by mouth every 12 (twelve) hours for 5 days, Disp: 10 capsule, Rfl: 0    rizatriptan (MAXALT-MLT) 5 mg disintegrating tablet, Take 1 tablet (5 mg total) by mouth once as needed for migraine may repeat in 2 hours if necessary, Disp: 10 tablet, Rfl: 0    TiZANidine (ZANAFLEX) 4 MG capsule, TAKE 1 CAPSULE BY MOUTH 3 TIMES A DAY AS NEEDED FOR MUSCLE SPASMS., Disp: 270 capsule, Rfl: 1    triamcinolone (KENALOG) 0.1 % ointment, apply to affected area every day, Disp: , Rfl:     celecoxib (CeleBREX) 100 mg capsule, Take 1 capsule (100 mg total) by mouth 2 (two) times a day (Patient not taking: Reported on 2/17/2025), Disp: 30 capsule, Rfl: 0    diclofenac (VOLTAREN) 75 mg EC tablet, Take 1 tablet (75 mg total) by mouth 2 (two) times a day as needed (mild pain) (Patient not taking: Reported on 2/17/2025), Disp: 30 tablet, Rfl: 0    Lidocaine Viscous HCl (XYLOCAINE) 2 % mucosal solution, Swish and spit 15 mL 2 (two) times a day as needed for mouth pain or discomfort (Patient not taking: Reported on 2/17/2025), Disp: 100 mL, Rfl: 0    NON FORMULARY, Take 1 Dose by mouth Medical marijuana (Patient not taking: Reported on 2/24/2025), Disp: , Rfl:     polyethylene glycol (GOLYTELY) 4000 mL solution, Take 4,000 mL by mouth once for 1 dose Take as directed by office instructions prior to colonoscopy. (Patient not taking: Reported on 11/13/2024), Disp: 4000 mL, Rfl: 0    sucralfate (CARAFATE) 1 g/10 mL suspension, TAKE 10 ML (1 G TOTAL) BY MOUTH 2 (TWO) TIMES A DAY (Patient not taking: Reported on 4/11/2024), Disp: 414 mL, Rfl: 0    Current Facility-Administered Medications:     ketorolac (TORADOL) injection 30 mg, 30 mg, Intramuscular, Once,     Current Allergies     Allergies as of 03/07/2025 - Reviewed 03/07/2025   Allergen Reaction Noted    Cymbalta  "[duloxetine hcl] Nausea Only and GI Intolerance 11/18/2015    Other Other (See Comments) 06/28/2022            The following portions of the patient's history were reviewed and updated as appropriate: allergies, current medications, past family history, past medical history, past social history, past surgical history and problem list.     Past Medical History:   Diagnosis Date    Allergic     Allergic rhinitis     Anemia     Anxiety     Chondromalacia of both patellae     Closed fracture of left distal fibula 08/27/2020    Depression     Fibromyalgia     Fibromyalgia     Gastroenteritis 2/20/2024    GERD (gastroesophageal reflux disease)     Headache(784.0)     Hematochezia     Herpes simplex infection     HPV (human papilloma virus) infection     11/2013    Hypertension     IBS (irritable bowel syndrome)     Insomnia     Mental status change     Migraine     Obesity 08/06/2015    Scoliosis     Scoliosis        Past Surgical History:   Procedure Laterality Date    BREAST BIOPSY Left 12/10/2013    EXPLORATORY LAPAROTOMY      WISDOM TOOTH EXTRACTION         Family History   Problem Relation Age of Onset    Stroke Mother     Hypertension Mother     Psoriasis Mother     Depression Mother     Hepatitis Father         B     Substance Abuse Father     No Known Problems Daughter     No Known Problems Daughter     Dementia Maternal Grandmother     No Known Problems Maternal Grandfather     No Known Problems Paternal Grandmother     Alcohol abuse Paternal Grandfather     Asthma Brother     No Known Problems Brother     Diabetes type I Son     Diabetes Son     Breast cancer Family         maternal aunt - 30 's     Breast cancer Maternal Aunt 30    Breast cancer Cousin 30    Cancer Cousin          Medications have been verified.        Objective   /63   Pulse 76   Temp 97.8 °F (36.6 °C)   Resp 18   Ht 5' 3\" (1.6 m)   Wt 82.1 kg (181 lb)   SpO2 100%   BMI 32.06 kg/m²        Physical Exam     Physical Exam  Vitals " and nursing note reviewed.   Constitutional:       Appearance: Normal appearance.   HENT:      Right Ear: Tympanic membrane, ear canal and external ear normal.      Left Ear: Tympanic membrane, ear canal and external ear normal.      Nose: Congestion present.      Mouth/Throat:      Mouth: Mucous membranes are moist.      Pharynx: No oropharyngeal exudate or posterior oropharyngeal erythema.   Cardiovascular:      Rate and Rhythm: Normal rate and regular rhythm.   Pulmonary:      Effort: Pulmonary effort is normal.      Breath sounds: Normal breath sounds. No wheezing or rhonchi.   Skin:     General: Skin is warm and dry.   Neurological:      General: No focal deficit present.      Mental Status: She is alert and oriented to person, place, and time.   Psychiatric:         Mood and Affect: Mood normal.         Behavior: Behavior normal.

## 2025-03-09 ENCOUNTER — RESULTS FOLLOW-UP (OUTPATIENT)
Dept: URGENT CARE | Facility: MEDICAL CENTER | Age: 45
End: 2025-03-09

## 2025-03-10 DIAGNOSIS — G43.001 MIGRAINE WITHOUT AURA AND WITH STATUS MIGRAINOSUS, NOT INTRACTABLE: ICD-10-CM

## 2025-03-10 DIAGNOSIS — F41.9 ANXIETY: ICD-10-CM

## 2025-03-11 DIAGNOSIS — F41.9 ANXIETY: ICD-10-CM

## 2025-03-11 RX ORDER — SUMATRIPTAN SUCCINATE 25 MG/1
25 TABLET ORAL ONCE AS NEEDED
Qty: 10 TABLET | Refills: 0 | OUTPATIENT
Start: 2025-03-11

## 2025-03-11 RX ORDER — CLONAZEPAM 1 MG/1
1 TABLET ORAL DAILY
Qty: 30 TABLET | Refills: 0 | Status: SHIPPED | OUTPATIENT
Start: 2025-03-11

## 2025-03-11 NOTE — TELEPHONE ENCOUNTER
Medication:  PDMP     02/17/2025 02/17/2025 clonazePAM (Tablet) 30.0 30 1 MG NA YANIRA RAYA     Active agreement on file -No

## 2025-03-12 RX ORDER — ESCITALOPRAM OXALATE 5 MG/1
5 TABLET ORAL DAILY
Qty: 90 TABLET | Refills: 1 | Status: SHIPPED | OUTPATIENT
Start: 2025-03-12

## 2025-03-19 DIAGNOSIS — K21.9 GASTROESOPHAGEAL REFLUX DISEASE, UNSPECIFIED WHETHER ESOPHAGITIS PRESENT: ICD-10-CM

## 2025-03-19 RX ORDER — FAMOTIDINE 40 MG/1
40 TABLET, FILM COATED ORAL
Qty: 90 TABLET | Refills: 3 | Status: SHIPPED | OUTPATIENT
Start: 2025-03-19

## 2025-03-28 ENCOUNTER — OFFICE VISIT (OUTPATIENT)
Dept: FAMILY MEDICINE CLINIC | Facility: CLINIC | Age: 45
End: 2025-03-28
Payer: COMMERCIAL

## 2025-03-28 VITALS
OXYGEN SATURATION: 98 % | HEIGHT: 63 IN | DIASTOLIC BLOOD PRESSURE: 70 MMHG | HEART RATE: 78 BPM | BODY MASS INDEX: 32.07 KG/M2 | SYSTOLIC BLOOD PRESSURE: 130 MMHG | RESPIRATION RATE: 16 BRPM | TEMPERATURE: 98.1 F | WEIGHT: 181 LBS

## 2025-03-28 DIAGNOSIS — G43.001 MIGRAINE WITHOUT AURA AND WITH STATUS MIGRAINOSUS, NOT INTRACTABLE: Primary | ICD-10-CM

## 2025-03-28 DIAGNOSIS — M79.7 FIBROMYALGIA: ICD-10-CM

## 2025-03-28 DIAGNOSIS — E53.8 B12 DEFICIENCY: ICD-10-CM

## 2025-03-28 DIAGNOSIS — F41.9 ANXIETY: ICD-10-CM

## 2025-03-28 PROCEDURE — 96372 THER/PROPH/DIAG INJ SC/IM: CPT

## 2025-03-28 PROCEDURE — 99214 OFFICE O/P EST MOD 30 MIN: CPT | Performed by: FAMILY MEDICINE

## 2025-03-28 RX ORDER — CYANOCOBALAMIN 1000 UG/ML
1000 INJECTION, SOLUTION INTRAMUSCULAR; SUBCUTANEOUS WEEKLY
Status: SHIPPED | OUTPATIENT
Start: 2025-03-28 | End: 2025-04-25

## 2025-03-28 RX ORDER — PROPRANOLOL HYDROCHLORIDE 60 MG/1
60 CAPSULE, EXTENDED RELEASE ORAL DAILY
Qty: 30 CAPSULE | Refills: 0 | Status: SHIPPED | OUTPATIENT
Start: 2025-03-28

## 2025-03-28 RX ORDER — RIZATRIPTAN BENZOATE 10 MG/1
10 TABLET ORAL AS NEEDED
Qty: 18 TABLET | Refills: 0 | Status: SHIPPED | OUTPATIENT
Start: 2025-03-28

## 2025-03-28 RX ORDER — VENLAFAXINE HYDROCHLORIDE 37.5 MG/1
37.5 CAPSULE, EXTENDED RELEASE ORAL DAILY
Qty: 30 CAPSULE | Refills: 0 | Status: SHIPPED | OUTPATIENT
Start: 2025-03-28

## 2025-03-28 RX ADMIN — CYANOCOBALAMIN 1000 MCG: 1000 INJECTION, SOLUTION INTRAMUSCULAR; SUBCUTANEOUS at 11:08

## 2025-03-28 NOTE — ASSESSMENT & PLAN NOTE
Found on recent labs  To get b12 injections once a week x 4 weeks then continue oral vitamin B12 500 mcg daily after   Orders:  •  cyanocobalamin injection 1,000 mcg

## 2025-03-28 NOTE — PROGRESS NOTES
Name: Jolene Tierney      : 1980      MRN: [id number]  Encounter Provider: Toya Caal MD  Encounter Date: 3/28/2025   Encounter department: [facility]  :  Assessment & Plan  Migraine without aura and with status migrainosus, not intractable  Not controlled  Trial of propranolol again   Seeing neurology with [facility] next month     Orders:  •  propranolol (INDERAL LA) 60 mg 24 hr capsule; Take 1 capsule (60 mg total) by mouth daily  •  rizatriptan (Maxalt) 10 mg tablet; Take 1 tablet (10 mg total) by mouth as needed for migraine Take at the onset of migraine; if symptoms continue or return, may take another dose at least 2 hours after first dose. Take no more than 2 doses in a day.    Anxiety  Tried Zoloft, Celexa, and Cymbalta , lexapro which gave her side effects  Trial of Effexor to also help with her chronic pain   Orders:  •  venlafaxine (EFFEXOR-XR) 37.5 mg 24 hr capsule; Take 1 capsule (37.5 mg total) by mouth daily    B12 deficiency  Found on recent labs  To get b12 injections once a week x 4 weeks then continue oral vitamin B12 500 mcg daily after   Orders:  •  cyanocobalamin injection 1,000 mcg    Fibromyalgia  Continues to have flare up on and off  Added effexor today to see if it helps              History of Present Illness   Here for follow up  Having hard time achieving orgasm since starting lexapro  Headaches almost daily and nothing is helping       Anxiety  Presents for follow-up visit. Symptoms include excessive worry, irritability, muscle tension and nervous/anxious behavior. Patient reports no chest pain, compulsions, confusion, decreased concentration, depressed mood, dizziness, dry mouth, feeling of choking, hyperventilation, impotence, insomnia, nausea, obsessions, palpitations, panic, restlessness, shortness of breath or suicidal ideas.           Review of Systems   Constitutional:  Positive for irritability.   Respiratory:  Negative for shortness of breath.   "  Cardiovascular:  Negative for chest pain and palpitations.   Gastrointestinal:  Negative for nausea.   Genitourinary:  Negative for impotence.   Neurological:  Negative for dizziness.   Psychiatric/Behavioral:  Negative for confusion, decreased concentration and suicidal ideas. The patient is nervous/anxious. The patient does not have insomnia.        Objective   /70 (BP Location: Left arm, Patient Position: Sitting, Cuff Size: Standard)   Pulse 78   Temp 98.1 °F (36.7 °C) (Tympanic)   Resp 16   Ht 5' 3\" (1.6 m)   Wt 82.1 kg (181 lb)   SpO2 98%   BMI 32.06 kg/m²      Physical Exam  Vitals and nursing note reviewed.   Constitutional:       Appearance: Normal appearance.   Cardiovascular:      Rate and Rhythm: Normal rate and regular rhythm.      Pulses: Normal pulses.      Heart sounds: Normal heart sounds.   Pulmonary:      Effort: Pulmonary effort is normal.      Breath sounds: Normal breath sounds.   Neurological:      General: No focal deficit present.      Mental Status: She is alert and oriented to person, place, and time.   Psychiatric:         Mood and Affect: Mood normal.         Behavior: Behavior normal.         "

## 2025-03-28 NOTE — ASSESSMENT & PLAN NOTE
Not controlled  Trial of propranolol again   Seeing neurology with [facility] next month     Orders:  •  propranolol (INDERAL LA) 60 mg 24 hr capsule; Take 1 capsule (60 mg total) by mouth daily  •  rizatriptan (Maxalt) 10 mg tablet; Take 1 tablet (10 mg total) by mouth as needed for migraine Take at the onset of migraine; if symptoms continue or return, may take another dose at least 2 hours after first dose. Take no more than 2 doses in a day.

## 2025-03-28 NOTE — ASSESSMENT & PLAN NOTE
Tried Zoloft, Celexa, and Cymbalta , lexapro which gave her side effects  Trial of Effexor to also help with her chronic pain   Orders:  •  venlafaxine (EFFEXOR-XR) 37.5 mg 24 hr capsule; Take 1 capsule (37.5 mg total) by mouth daily

## 2025-03-31 ENCOUNTER — TELEPHONE (OUTPATIENT)
Dept: RHEUMATOLOGY | Facility: CLINIC | Age: 45
End: 2025-03-31

## 2025-04-03 ENCOUNTER — CLINICAL SUPPORT (OUTPATIENT)
Dept: FAMILY MEDICINE CLINIC | Facility: CLINIC | Age: 45
End: 2025-04-03
Payer: COMMERCIAL

## 2025-04-03 DIAGNOSIS — E53.8 B12 DEFICIENCY: Primary | ICD-10-CM

## 2025-04-03 PROCEDURE — 96372 THER/PROPH/DIAG INJ SC/IM: CPT

## 2025-04-03 RX ADMIN — CYANOCOBALAMIN 1000 MCG: 1000 INJECTION, SOLUTION INTRAMUSCULAR; SUBCUTANEOUS at 15:53

## 2025-04-03 NOTE — PROGRESS NOTES
Assessment/Plan:      Diagnoses and all orders for this visit:    B12 deficiency          Subjective:     Patient ID: Jolene Tierney is a 44 y.o. female.          Objective:      There were no vitals taken for this visit.

## 2025-04-11 ENCOUNTER — CLINICAL SUPPORT (OUTPATIENT)
Dept: FAMILY MEDICINE CLINIC | Facility: CLINIC | Age: 45
End: 2025-04-11
Payer: COMMERCIAL

## 2025-04-11 DIAGNOSIS — E53.8 B12 DEFICIENCY: Primary | ICD-10-CM

## 2025-04-11 PROCEDURE — 96372 THER/PROPH/DIAG INJ SC/IM: CPT

## 2025-04-11 RX ADMIN — CYANOCOBALAMIN 1000 MCG: 1000 INJECTION, SOLUTION INTRAMUSCULAR; SUBCUTANEOUS at 10:57

## 2025-04-14 ENCOUNTER — HOSPITAL ENCOUNTER (OUTPATIENT)
Dept: MAMMOGRAPHY | Facility: CLINIC | Age: 45
Discharge: HOME | End: 2025-04-14
Payer: COMMERCIAL

## 2025-04-14 ENCOUNTER — RESULTS FOLLOW-UP (OUTPATIENT)
Dept: FAMILY MEDICINE CLINIC | Facility: CLINIC | Age: 45
End: 2025-04-14

## 2025-04-14 VITALS — BODY MASS INDEX: 32.07 KG/M2 | HEIGHT: 63 IN | WEIGHT: 181 LBS

## 2025-04-14 DIAGNOSIS — R92.8 ABNORMAL MAMMOGRAM: ICD-10-CM

## 2025-04-14 PROCEDURE — 77066 DX MAMMO INCL CAD BI: CPT

## 2025-04-14 PROCEDURE — G0279 TOMOSYNTHESIS, MAMMO: HCPCS

## 2025-04-19 DIAGNOSIS — G43.001 MIGRAINE WITHOUT AURA AND WITH STATUS MIGRAINOSUS, NOT INTRACTABLE: ICD-10-CM

## 2025-04-19 DIAGNOSIS — F41.9 ANXIETY: ICD-10-CM

## 2025-04-19 RX ORDER — VENLAFAXINE HYDROCHLORIDE 37.5 MG/1
37.5 CAPSULE, EXTENDED RELEASE ORAL DAILY
Qty: 90 CAPSULE | Refills: 1 | Status: SHIPPED | OUTPATIENT
Start: 2025-04-19

## 2025-04-19 RX ORDER — PROPRANOLOL HYDROCHLORIDE 60 MG/1
60 CAPSULE, EXTENDED RELEASE ORAL DAILY
Qty: 90 CAPSULE | Refills: 1 | Status: SHIPPED | OUTPATIENT
Start: 2025-04-19

## 2025-04-20 DIAGNOSIS — Z91.09 ALLERGY TO ENVIRONMENTAL FACTORS: ICD-10-CM

## 2025-04-20 DIAGNOSIS — M54.50 ACUTE LEFT-SIDED LOW BACK PAIN WITHOUT SCIATICA: ICD-10-CM

## 2025-04-20 DIAGNOSIS — G43.001 MIGRAINE WITHOUT AURA AND WITH STATUS MIGRAINOSUS, NOT INTRACTABLE: ICD-10-CM

## 2025-04-20 DIAGNOSIS — E55.9 VITAMIN D DEFICIENCY: ICD-10-CM

## 2025-04-20 DIAGNOSIS — F41.9 ANXIETY: ICD-10-CM

## 2025-04-21 RX ORDER — MONTELUKAST SODIUM 10 MG/1
10 TABLET ORAL
Qty: 90 TABLET | Refills: 1 | Status: SHIPPED | OUTPATIENT
Start: 2025-04-21

## 2025-04-21 RX ORDER — ERGOCALCIFEROL 1.25 MG/1
50000 CAPSULE, LIQUID FILLED ORAL WEEKLY
Qty: 12 CAPSULE | Refills: 0 | Status: SHIPPED | OUTPATIENT
Start: 2025-04-21

## 2025-04-21 RX ORDER — LIDOCAINE 50 MG/G
1 PATCH TOPICAL DAILY
Qty: 30 PATCH | Refills: 0 | Status: SHIPPED | OUTPATIENT
Start: 2025-04-21

## 2025-04-21 RX ORDER — RIZATRIPTAN BENZOATE 10 MG/1
10 TABLET ORAL AS NEEDED
Qty: 12 TABLET | Refills: 0 | Status: SHIPPED | OUTPATIENT
Start: 2025-04-21

## 2025-04-21 RX ORDER — CLONAZEPAM 1 MG/1
1 TABLET ORAL DAILY
Qty: 30 TABLET | Refills: 0 | Status: SHIPPED | OUTPATIENT
Start: 2025-04-21

## 2025-04-21 RX ORDER — SUMATRIPTAN SUCCINATE 25 MG/1
25 TABLET ORAL ONCE AS NEEDED
Qty: 10 TABLET | Refills: 0 | OUTPATIENT
Start: 2025-04-21

## 2025-04-23 ENCOUNTER — CLINICAL SUPPORT (OUTPATIENT)
Dept: FAMILY MEDICINE CLINIC | Facility: CLINIC | Age: 45
End: 2025-04-23
Payer: COMMERCIAL

## 2025-04-23 DIAGNOSIS — E53.8 B12 DEFICIENCY: Primary | ICD-10-CM

## 2025-04-23 PROCEDURE — 96372 THER/PROPH/DIAG INJ SC/IM: CPT

## 2025-04-23 RX ADMIN — CYANOCOBALAMIN 1000 MCG: 1000 INJECTION, SOLUTION INTRAMUSCULAR; SUBCUTANEOUS at 15:24

## 2025-04-24 ENCOUNTER — NURSE TRIAGE (OUTPATIENT)
Age: 45
End: 2025-04-24

## 2025-04-24 NOTE — TELEPHONE ENCOUNTER
"    FOLLOW UP: No OV available. Patient encouraged to go to the ER for evaluation     REASON FOR CONVERSATION: Rapid Heart Rate    SYMPTOMS: Started 4/24. Patient keeps getting alerts that her heart rate is high on her apple watch. Also reports she feels dizzy almost as if she is going to pass out. Patient is on propranolol (INDERAL LA) 60 mg 24 hr capsule for migraines, no cardiac history. Denies feeling like this prior.    OTHER:  will take patient to ER for evaluation    DISPOSITION: Go to ED Now  Reason for Disposition   Feeling weak or lightheaded (e.g., woozy, feeling like they might faint)    Answer Assessment - Initial Assessment Questions  1. DESCRIPTION: \"Please describe your heart rate or heartbeat that you are having\" (e.g., fast/slow, regular/irregular, skipped or extra beats, \"palpitations\")      Feels like heart is beating fast in 90's (sitting), feels chest tightness/fluttering   2. ONSET: \"When did it start?\" (e.g., minutes, hours, days)       4/24/25  3. DURATION: \"How long does it last\" (e.g., seconds, minutes, hours)      Last couple mins   4. PATTERN \"Does it come and go, or has it been constant since it started?\"  \"Does it get worse with exertion?\"   \"Are you feeling it now?\"      Worse when it bends over   5. TAP: \"Using your hand, can you tap out what you are feeling on a chair or table in front of you, so that I can hear?\" Note: Not all patients can do this.        Unable   6. HEART RATE: \"Can you tell me your heart rate?\" \"How many beats in 15 seconds?\"  Note: Not all patients can do this.        97 bpm,   7. RECURRENT SYMPTOM: \"Have you ever had this before?\" If Yes, ask: \"When was the last time?\" and \"What happened that time?\"       Denies   8. CAUSE: \"What do you think is causing the palpitations?\"      Unsure   9. CARDIAC HISTORY: \"Do you have any history of heart disease?\" (e.g., heart attack, angina, bypass surgery, angioplasty, arrhythmia)      Denies   10. OTHER SYMPTOMS: \"Do " "you have any other symptoms?\" (e.g., dizziness, chest pain, sweating, difficulty breathing)        Dizziness, lightheaded   11. PREGNANCY: \"Is there any chance you are pregnant?\" \"When was your last menstrual period?\"        NA    Protocols used: Heart Rate and Heartbeat Questions-Adult-[place]    "

## 2025-05-14 DIAGNOSIS — M54.50 ACUTE LEFT-SIDED LOW BACK PAIN WITHOUT SCIATICA: ICD-10-CM

## 2025-05-16 ENCOUNTER — RA CDI HCC (OUTPATIENT)
Dept: OTHER | Facility: HOSPITAL | Age: 45
End: 2025-05-16

## 2025-05-16 NOTE — PROGRESS NOTES
HCC coding opportunities       Chart reviewed, no opportunity found: CHART REVIEWED, NO OPPORTUNITY FOUND      I10 - no evidence in chart  N80.9 on PL and assessed 2/18/2025  M41.9 - on PL history      [employer] AUDIT   Patients Insurance        Commercial Insurance: [employer]

## 2025-05-19 DIAGNOSIS — M54.50 ACUTE LEFT-SIDED LOW BACK PAIN WITHOUT SCIATICA: ICD-10-CM

## 2025-05-19 DIAGNOSIS — F41.9 ANXIETY: ICD-10-CM

## 2025-05-19 RX ORDER — CLONAZEPAM 1 MG/1
1 TABLET ORAL DAILY
Qty: 30 TABLET | Refills: 0 | OUTPATIENT
Start: 2025-05-19

## 2025-05-19 RX ORDER — LIDOCAINE 50 MG/G
1 PATCH TOPICAL DAILY
Qty: 30 PATCH | Refills: 0 | Status: SHIPPED | OUTPATIENT
Start: 2025-05-19

## 2025-05-19 RX ORDER — CLONAZEPAM 1 MG/1
1 TABLET ORAL DAILY
Qty: 30 TABLET | Refills: 0 | Status: SHIPPED | OUTPATIENT
Start: 2025-05-19

## 2025-05-19 NOTE — TELEPHONE ENCOUNTER
Medication:  PDMP   04/21/2025 04/21/2025 clonazePAM (Tablet) 30.0 30 1 MG NA LORENA GUTIERREZ     Active agreement on file -No

## 2025-05-19 NOTE — TELEPHONE ENCOUNTER
Reason for call:   [x] Refill   [] Prior Auth  [] Other:     Office:   [x] PCP/Provider -  Toya Caal MD  [] Specialty/Provider -     Medication: clonazePAM (KlonoPIN) 1 mg tablet     Dose/Frequency: TAKE 1 TABLET BY MOUTH EVERY DAY     Quantity: 30 tablet (30 day supply)     Pharmacy: [facility]/pharmacy #[id number]     Local Pharmacy   Does the patient have enough for 3 days?   [] Yes   [x] No - Send as HP to POD    Mail Away Pharmacy   Does the patient have enough for 10 days?   [] Yes   [] No - Send as HP to POD

## 2025-05-21 ENCOUNTER — CLINICAL SUPPORT (OUTPATIENT)
Dept: FAMILY MEDICINE CLINIC | Facility: CLINIC | Age: 45
End: 2025-05-21
Payer: COMMERCIAL

## 2025-05-21 DIAGNOSIS — E53.8 B12 DEFICIENCY: Primary | ICD-10-CM

## 2025-05-21 PROCEDURE — 96372 THER/PROPH/DIAG INJ SC/IM: CPT

## 2025-05-21 RX ORDER — CYANOCOBALAMIN 1000 UG/ML
1000 INJECTION, SOLUTION INTRAMUSCULAR; SUBCUTANEOUS
Status: SHIPPED | OUTPATIENT
Start: 2025-05-21

## 2025-05-21 RX ADMIN — CYANOCOBALAMIN 1000 MCG: 1000 INJECTION, SOLUTION INTRAMUSCULAR; SUBCUTANEOUS at 14:59

## 2025-06-04 ENCOUNTER — TELEPHONE (OUTPATIENT)
Age: 45
End: 2025-06-04

## 2025-06-04 DIAGNOSIS — E53.8 B12 DEFICIENCY: Primary | ICD-10-CM

## 2025-06-04 NOTE — TELEPHONE ENCOUNTER
I ordered b12 level to check - we do not do weekly b12 injections . She has to take oral b12 OTC as well for maintenance

## 2025-06-04 NOTE — TELEPHONE ENCOUNTER
Patient called to schedule her B12 injection.  She stated she should be getting them weekly.  However the order states every 30 days.  Please advise what the frequency should be so the patient can schedule the nurse visit.  Thank you!

## 2025-06-10 ENCOUNTER — APPOINTMENT (OUTPATIENT)
Dept: LAB | Age: 45
End: 2025-06-10
Payer: COMMERCIAL

## 2025-06-10 DIAGNOSIS — E53.8 B12 DEFICIENCY: ICD-10-CM

## 2025-06-10 LAB — VIT B12 SERPL-MCNC: 677 PG/ML (ref 180–914)

## 2025-06-10 PROCEDURE — 36415 COLL VENOUS BLD VENIPUNCTURE: CPT

## 2025-06-10 PROCEDURE — 82607 VITAMIN B-12: CPT

## 2025-06-10 NOTE — TELEPHONE ENCOUNTER
Pt called because she thought she was getting a B12 shot at the lab. I advised that the doctor wanted to have her levels checked and she should do that  blood test.

## 2025-06-14 ENCOUNTER — RESULTS FOLLOW-UP (OUTPATIENT)
Dept: FAMILY MEDICINE CLINIC | Facility: CLINIC | Age: 45
End: 2025-06-14

## 2025-06-15 DIAGNOSIS — M54.50 ACUTE LEFT-SIDED LOW BACK PAIN WITHOUT SCIATICA: ICD-10-CM

## 2025-06-16 RX ORDER — LIDOCAINE 50 MG/G
1 PATCH TOPICAL DAILY
Qty: 30 PATCH | Refills: 0 | Status: SHIPPED | OUTPATIENT
Start: 2025-06-16

## 2025-06-19 DIAGNOSIS — F41.9 ANXIETY: ICD-10-CM

## 2025-06-20 RX ORDER — ESCITALOPRAM OXALATE 5 MG/1
5 TABLET ORAL DAILY
Qty: 90 TABLET | Refills: 1 | OUTPATIENT
Start: 2025-06-20

## 2025-07-07 DIAGNOSIS — G43.001 MIGRAINE WITHOUT AURA AND WITH STATUS MIGRAINOSUS, NOT INTRACTABLE: ICD-10-CM

## 2025-07-07 DIAGNOSIS — K21.9 GASTROESOPHAGEAL REFLUX DISEASE, UNSPECIFIED WHETHER ESOPHAGITIS PRESENT: ICD-10-CM

## 2025-07-07 RX ORDER — FAMOTIDINE 40 MG/1
40 TABLET, FILM COATED ORAL
Qty: 90 TABLET | Refills: 0 | Status: SHIPPED | OUTPATIENT
Start: 2025-07-07

## 2025-07-09 RX ORDER — PROPRANOLOL HYDROCHLORIDE 60 MG/1
60 CAPSULE, EXTENDED RELEASE ORAL DAILY
Qty: 90 CAPSULE | Refills: 0 | OUTPATIENT
Start: 2025-07-09

## 2025-07-18 DIAGNOSIS — M54.50 ACUTE LEFT-SIDED LOW BACK PAIN WITHOUT SCIATICA: ICD-10-CM

## 2025-07-18 DIAGNOSIS — K21.9 GASTROESOPHAGEAL REFLUX DISEASE, UNSPECIFIED WHETHER ESOPHAGITIS PRESENT: Primary | ICD-10-CM

## 2025-07-18 RX ORDER — OMEPRAZOLE 20 MG/1
40 CAPSULE, DELAYED RELEASE ORAL DAILY
Qty: 30 CAPSULE | Refills: 2 | Status: SHIPPED | OUTPATIENT
Start: 2025-07-18

## 2025-07-22 DIAGNOSIS — M54.50 ACUTE LEFT-SIDED LOW BACK PAIN WITHOUT SCIATICA: ICD-10-CM

## 2025-07-23 RX ORDER — MELOXICAM 7.5 MG/1
7.5 TABLET ORAL DAILY PRN
Qty: 30 TABLET | Refills: 1 | OUTPATIENT
Start: 2025-07-23 | End: 2026-01-19

## 2025-08-08 DIAGNOSIS — F41.9 ANXIETY: ICD-10-CM

## 2025-08-08 RX ORDER — CLONAZEPAM 1 MG/1
1 TABLET ORAL DAILY
Qty: 30 TABLET | Refills: 0 | Status: SHIPPED | OUTPATIENT
Start: 2025-08-08

## 2025-08-15 ENCOUNTER — NURSE TRIAGE (OUTPATIENT)
Age: 45
End: 2025-08-15

## 2025-08-15 ENCOUNTER — OFFICE VISIT (OUTPATIENT)
Dept: URGENT CARE | Facility: MEDICAL CENTER | Age: 45
End: 2025-08-15
Payer: COMMERCIAL

## 2025-08-19 DIAGNOSIS — K21.9 GASTROESOPHAGEAL REFLUX DISEASE, UNSPECIFIED WHETHER ESOPHAGITIS PRESENT: ICD-10-CM

## 2025-08-19 RX ORDER — OMEPRAZOLE 20 MG/1
20 CAPSULE, DELAYED RELEASE ORAL DAILY
Qty: 90 CAPSULE | Refills: 0 | Status: SHIPPED | OUTPATIENT
Start: 2025-08-19